# Patient Record
Sex: MALE | Race: BLACK OR AFRICAN AMERICAN | NOT HISPANIC OR LATINO | Employment: OTHER | URBAN - METROPOLITAN AREA
[De-identification: names, ages, dates, MRNs, and addresses within clinical notes are randomized per-mention and may not be internally consistent; named-entity substitution may affect disease eponyms.]

---

## 2017-01-09 RX ORDER — AMLODIPINE BESYLATE 10 MG/1
TABLET ORAL
Qty: 90 TABLET | Refills: 0 | Status: SHIPPED | OUTPATIENT
Start: 2017-01-09 | End: 2017-04-06 | Stop reason: SDUPTHER

## 2017-01-09 RX ORDER — LISINOPRIL 40 MG/1
TABLET ORAL
Qty: 90 TABLET | Refills: 0 | Status: SHIPPED | OUTPATIENT
Start: 2017-01-09 | End: 2017-04-06 | Stop reason: SDUPTHER

## 2017-04-07 RX ORDER — AMLODIPINE BESYLATE 10 MG/1
TABLET ORAL
Qty: 90 TABLET | Refills: 0 | Status: SHIPPED | OUTPATIENT
Start: 2017-04-07 | End: 2017-07-04 | Stop reason: SDUPTHER

## 2017-04-07 RX ORDER — LISINOPRIL 40 MG/1
TABLET ORAL
Qty: 90 TABLET | Refills: 0 | Status: SHIPPED | OUTPATIENT
Start: 2017-04-07 | End: 2017-07-04 | Stop reason: SDUPTHER

## 2017-07-05 ENCOUNTER — OFFICE VISIT (OUTPATIENT)
Dept: INTERNAL MEDICINE | Facility: CLINIC | Age: 62
End: 2017-07-05
Payer: COMMERCIAL

## 2017-07-05 ENCOUNTER — LAB VISIT (OUTPATIENT)
Dept: LAB | Facility: HOSPITAL | Age: 62
End: 2017-07-05
Attending: FAMILY MEDICINE
Payer: COMMERCIAL

## 2017-07-05 VITALS — SYSTOLIC BLOOD PRESSURE: 128 MMHG | DIASTOLIC BLOOD PRESSURE: 76 MMHG

## 2017-07-05 DIAGNOSIS — N52.9 ERECTILE DYSFUNCTION, UNSPECIFIED ERECTILE DYSFUNCTION TYPE: ICD-10-CM

## 2017-07-05 DIAGNOSIS — Z00.00 PREVENTATIVE HEALTH CARE: ICD-10-CM

## 2017-07-05 DIAGNOSIS — I10 ESSENTIAL HYPERTENSION: Primary | ICD-10-CM

## 2017-07-05 LAB
ALBUMIN SERPL BCP-MCNC: 3.6 G/DL
ALP SERPL-CCNC: 66 U/L
ALT SERPL W/O P-5'-P-CCNC: 34 U/L
ANION GAP SERPL CALC-SCNC: 4 MMOL/L
AST SERPL-CCNC: 23 U/L
BASOPHILS # BLD AUTO: 0.01 K/UL
BASOPHILS NFR BLD: 0.2 %
BILIRUB SERPL-MCNC: 0.4 MG/DL
BUN SERPL-MCNC: 13 MG/DL
CALCIUM SERPL-MCNC: 9.1 MG/DL
CHLORIDE SERPL-SCNC: 106 MMOL/L
CHOLEST/HDLC SERPL: 4.4 {RATIO}
CO2 SERPL-SCNC: 29 MMOL/L
COMPLEXED PSA SERPL-MCNC: 1.8 NG/ML
CREAT SERPL-MCNC: 1 MG/DL
DIFFERENTIAL METHOD: ABNORMAL
EOSINOPHIL # BLD AUTO: 0.3 K/UL
EOSINOPHIL NFR BLD: 7.8 %
ERYTHROCYTE [DISTWIDTH] IN BLOOD BY AUTOMATED COUNT: 13.6 %
EST. GFR  (AFRICAN AMERICAN): >60 ML/MIN/1.73 M^2
EST. GFR  (NON AFRICAN AMERICAN): >60 ML/MIN/1.73 M^2
ESTIMATED AVG GLUCOSE: 120 MG/DL
FERRITIN SERPL-MCNC: 46 NG/ML
GLUCOSE SERPL-MCNC: 105 MG/DL
HBA1C MFR BLD HPLC: 5.8 %
HCT VFR BLD AUTO: 44.1 %
HDL/CHOLESTEROL RATIO: 22.6 %
HDLC SERPL-MCNC: 159 MG/DL
HDLC SERPL-MCNC: 36 MG/DL
HGB BLD-MCNC: 14.2 G/DL
IRON SERPL-MCNC: 63 UG/DL
LDLC SERPL CALC-MCNC: 110.8 MG/DL
LYMPHOCYTES # BLD AUTO: 1.6 K/UL
LYMPHOCYTES NFR BLD: 38.5 %
MCH RBC QN AUTO: 27.8 PG
MCHC RBC AUTO-ENTMCNC: 32.2 %
MCV RBC AUTO: 86 FL
MONOCYTES # BLD AUTO: 0.6 K/UL
MONOCYTES NFR BLD: 13.4 %
NEUTROPHILS # BLD AUTO: 1.6 K/UL
NEUTROPHILS NFR BLD: 40.1 %
NONHDLC SERPL-MCNC: 123 MG/DL
PLATELET # BLD AUTO: 174 K/UL
PMV BLD AUTO: 11.3 FL
POTASSIUM SERPL-SCNC: 4.6 MMOL/L
PROT SERPL-MCNC: 7.5 G/DL
RBC # BLD AUTO: 5.11 M/UL
SATURATED IRON: 18 %
SODIUM SERPL-SCNC: 139 MMOL/L
TOTAL IRON BINDING CAPACITY: 351 UG/DL
TRANSFERRIN SERPL-MCNC: 237 MG/DL
TRIGL SERPL-MCNC: 61 MG/DL
WBC # BLD AUTO: 4.1 K/UL

## 2017-07-05 PROCEDURE — 36415 COLL VENOUS BLD VENIPUNCTURE: CPT

## 2017-07-05 PROCEDURE — 84153 ASSAY OF PSA TOTAL: CPT

## 2017-07-05 PROCEDURE — 85025 COMPLETE CBC W/AUTO DIFF WBC: CPT

## 2017-07-05 PROCEDURE — 82728 ASSAY OF FERRITIN: CPT

## 2017-07-05 PROCEDURE — 83540 ASSAY OF IRON: CPT

## 2017-07-05 PROCEDURE — 83036 HEMOGLOBIN GLYCOSYLATED A1C: CPT

## 2017-07-05 PROCEDURE — 99999 PR PBB SHADOW E&M-EST. PATIENT-LVL II: CPT | Mod: PBBFAC,,, | Performed by: FAMILY MEDICINE

## 2017-07-05 PROCEDURE — 80061 LIPID PANEL: CPT

## 2017-07-05 PROCEDURE — 99396 PREV VISIT EST AGE 40-64: CPT | Mod: S$GLB,,, | Performed by: FAMILY MEDICINE

## 2017-07-05 PROCEDURE — 80053 COMPREHEN METABOLIC PANEL: CPT

## 2017-07-05 RX ORDER — AMLODIPINE BESYLATE 10 MG/1
TABLET ORAL
Qty: 90 TABLET | Refills: 0 | Status: SHIPPED | OUTPATIENT
Start: 2017-07-05 | End: 2017-10-02 | Stop reason: SDUPTHER

## 2017-07-05 RX ORDER — LISINOPRIL 40 MG/1
TABLET ORAL
Qty: 90 TABLET | Refills: 0 | Status: SHIPPED | OUTPATIENT
Start: 2017-07-05 | End: 2017-10-02 | Stop reason: SDUPTHER

## 2017-07-05 NOTE — PROGRESS NOTES
Subjective:       Patient ID: Adrian Burt is a 62 y.o. male.    Chief Complaint: No chief complaint on file.  Adrian Burt 62 y.o. male is here for office visit to review care and physical exam, reports doing well in general.  Walking for exercise.  Limits Coca [employer] to one a day, diet otherwise ok.  Has gained weight since last seen.  Not too much LUTs.      HPI  Review of Systems   Constitutional: Negative for activity change, appetite change, fatigue, fever and unexpected weight change.   HENT: Negative for congestion, hearing loss, postnasal drip and rhinorrhea.    Eyes: Negative for pain, discharge and visual disturbance.   Respiratory: Negative for cough, choking and shortness of breath.    Cardiovascular: Negative for chest pain, palpitations and leg swelling.   Gastrointestinal: Negative for abdominal pain, diarrhea and vomiting.   Genitourinary: Negative for dysuria, flank pain, hematuria and urgency.   Musculoskeletal: Negative for arthralgias, back pain, joint swelling and neck pain.   Skin: Negative for color change and rash.   Neurological: Negative for dizziness, tremors, syncope, weakness, numbness and headaches.   Psychiatric/Behavioral: Negative for agitation and confusion. The patient is not hyperactive.        Objective:      Physical Exam   Constitutional: He is oriented to person, place, and time. He appears well-developed and well-nourished.   HENT:   Head: Normocephalic.   Eyes: EOM are normal. Pupils are equal, round, and reactive to light.   Neck: Normal range of motion. Neck supple. No thyromegaly present.   Cardiovascular: Normal rate.  Exam reveals no gallop and no friction rub.    No murmur heard.  Pulmonary/Chest: Effort normal. No respiratory distress. He has no wheezes.   Abdominal: Soft. Bowel sounds are normal. He exhibits no mass. There is no tenderness.   Musculoskeletal: He exhibits no edema or tenderness.   Lymphadenopathy:     He has no cervical adenopathy.    Neurological: He is alert and oriented to person, place, and time. He has normal reflexes. No cranial nerve deficit.   Skin: Skin is warm. No rash noted.   Psychiatric: He has a normal mood and affect. His behavior is normal.       Assessment:       No diagnosis found.    Plan:       Diagnoses and all orders for this visit:    Essential hypertension  - Discussed  Preventative health care  -     Comprehensive metabolic panel; Future  -     Lipid panel; Future  -     CBC auto differential; Future  -     Hemoglobin A1c; Future  -     PSA, Screening; Future    0+9

## 2017-07-14 DIAGNOSIS — Z12.11 SPECIAL SCREENING FOR MALIGNANT NEOPLASMS, COLON: Primary | ICD-10-CM

## 2017-07-14 RX ORDER — POLYETHYLENE GLYCOL 3350, SODIUM SULFATE ANHYDROUS, SODIUM BICARBONATE, SODIUM CHLORIDE, POTASSIUM CHLORIDE 236; 22.74; 6.74; 5.86; 2.97 G/4L; G/4L; G/4L; G/4L; G/4L
4 POWDER, FOR SOLUTION ORAL ONCE
Qty: 4000 ML | Refills: 0 | Status: SHIPPED | OUTPATIENT
Start: 2017-07-14 | End: 2017-07-14

## 2017-08-16 ENCOUNTER — ANESTHESIA (OUTPATIENT)
Dept: ENDOSCOPY | Facility: HOSPITAL | Age: 62
End: 2017-08-16
Payer: COMMERCIAL

## 2017-08-16 ENCOUNTER — ANESTHESIA EVENT (OUTPATIENT)
Dept: ENDOSCOPY | Facility: HOSPITAL | Age: 62
End: 2017-08-16
Payer: COMMERCIAL

## 2017-08-16 ENCOUNTER — HOSPITAL ENCOUNTER (OUTPATIENT)
Facility: HOSPITAL | Age: 62
Discharge: HOME | End: 2017-08-16
Attending: COLON & RECTAL SURGERY | Admitting: COLON & RECTAL SURGERY
Payer: COMMERCIAL

## 2017-08-16 ENCOUNTER — SURGERY (OUTPATIENT)
Age: 62
End: 2017-08-16

## 2017-08-16 VITALS
WEIGHT: 263 LBS | RESPIRATION RATE: 20 BRPM | HEART RATE: 66 BPM | BODY MASS INDEX: 33.75 KG/M2 | HEIGHT: 74 IN | TEMPERATURE: 98 F | DIASTOLIC BLOOD PRESSURE: 89 MMHG | SYSTOLIC BLOOD PRESSURE: 136 MMHG | RESPIRATION RATE: 18 BRPM | OXYGEN SATURATION: 98 %

## 2017-08-16 DIAGNOSIS — Z12.11 SCREENING FOR COLON CANCER: ICD-10-CM

## 2017-08-16 PROCEDURE — 88305 TISSUE EXAM BY PATHOLOGIST: CPT

## 2017-08-16 PROCEDURE — 63600175 PHARM REV CODE 636 W HCPCS: Performed by: NURSE ANESTHETIST, CERTIFIED REGISTERED

## 2017-08-16 PROCEDURE — 25000003 PHARM REV CODE 250: Performed by: NURSE PRACTITIONER

## 2017-08-16 PROCEDURE — 45380 COLONOSCOPY AND BIOPSY: CPT | Performed by: COLON & RECTAL SURGERY

## 2017-08-16 PROCEDURE — 45380 COLONOSCOPY AND BIOPSY: CPT | Mod: 33,,, | Performed by: COLON & RECTAL SURGERY

## 2017-08-16 PROCEDURE — D9220A PRA ANESTHESIA: Mod: 33,CRNA,, | Performed by: NURSE ANESTHETIST, CERTIFIED REGISTERED

## 2017-08-16 PROCEDURE — 27201012 HC FORCEPS, HOT/COLD, DISP: Performed by: COLON & RECTAL SURGERY

## 2017-08-16 PROCEDURE — 37000009 HC ANESTHESIA EA ADD 15 MINS: Performed by: COLON & RECTAL SURGERY

## 2017-08-16 PROCEDURE — 88305 TISSUE EXAM BY PATHOLOGIST: CPT | Mod: 26,,,

## 2017-08-16 PROCEDURE — 37000008 HC ANESTHESIA 1ST 15 MINUTES: Performed by: COLON & RECTAL SURGERY

## 2017-08-16 PROCEDURE — D9220A PRA ANESTHESIA: Mod: 33,ANES,, | Performed by: ANESTHESIOLOGY

## 2017-08-16 RX ORDER — PROPOFOL 10 MG/ML
VIAL (ML) INTRAVENOUS
Status: DISCONTINUED | OUTPATIENT
Start: 2017-08-16 | End: 2017-08-16

## 2017-08-16 RX ORDER — PROPOFOL 10 MG/ML
VIAL (ML) INTRAVENOUS CONTINUOUS PRN
Status: DISCONTINUED | OUTPATIENT
Start: 2017-08-16 | End: 2017-08-16

## 2017-08-16 RX ORDER — SODIUM CHLORIDE 9 MG/ML
INJECTION, SOLUTION INTRAVENOUS CONTINUOUS
Status: DISCONTINUED | OUTPATIENT
Start: 2017-08-16 | End: 2017-08-16 | Stop reason: HOSPADM

## 2017-08-16 RX ORDER — LIDOCAINE HCL/PF 100 MG/5ML
SYRINGE (ML) INTRAVENOUS
Status: DISCONTINUED | OUTPATIENT
Start: 2017-08-16 | End: 2017-08-16

## 2017-08-16 RX ADMIN — PROPOFOL 150 MCG/KG/MIN: 10 INJECTION, EMULSION INTRAVENOUS at 09:08

## 2017-08-16 RX ADMIN — LIDOCAINE HYDROCHLORIDE 50 MG: 20 INJECTION, SOLUTION INTRAVENOUS at 09:08

## 2017-08-16 RX ADMIN — PROPOFOL 100 MG: 10 INJECTION, EMULSION INTRAVENOUS at 09:08

## 2017-08-16 RX ADMIN — SODIUM CHLORIDE: 0.9 INJECTION, SOLUTION INTRAVENOUS at 09:08

## 2017-08-16 NOTE — PLAN OF CARE
Discharge instructions given to pt. Pt verbalizes understanding and has no questions at this time.

## 2017-08-16 NOTE — TRANSFER OF CARE
"Anesthesia Transfer of Care Note    Patient: Adrian Burt    Procedure(s) Performed: Procedure(s) (LRB):  COLONOSCOPY (N/A)    Patient location: PACU    Anesthesia Type: general    Transport from OR: Transported from OR on room air with adequate spontaneous ventilation    Post pain: adequate analgesia    Post assessment: no apparent anesthetic complications    Post vital signs: stable    Level of consciousness: sedated    Nausea/Vomiting: no nausea/vomiting    Complications: none    Transfer of care protocol was followed      Last vitals:   Visit Vitals  BP (!) 158/90 (BP Location: Left arm, Patient Position: Lying)   Pulse 73   Temp 36.3 °C (97.4 °F) (Oral)   Resp 18   Ht 6' 2" (1.88 m)   Wt 119.3 kg (263 lb)   SpO2 98%   BMI 33.77 kg/m²     "

## 2017-08-16 NOTE — DISCHARGE INSTRUCTIONS
Colonoscopy     A camera attached to a flexible tube with a viewing lens is used to take video pictures.     Colonoscopy is a test to view the inside of your lower digestive tract (colon and rectum). Sometimes it can show the last part of the small intestine (ileum). During the test, small pieces of tissue may be removed for testing. This is called a biopsy. Small growths, such as polyps, may also be removed.   Why is colonoscopy done?  The test is done to help look for colon cancer. And it can help find the source of abdominal pain, bleeding, and changes in bowel habits. It may be needed once a year, depending on factors such as your:  · Age  · Health history  · Family health history  · Symptoms  · Results from any prior colonoscopy  Risks and possible complications  These include:  · Bleeding               · A puncture or tear in the colon   · Risks of anesthesia  · A cancer lesion not being seen  Getting ready   To prepare for the test:  · Talk with your healthcare provider about the risks of the test (see below). Also ask your healthcare provider about alternatives to the test.  · Tell your healthcare provider about any medicines you take. Also tell him or her about any health conditions you may have.  · Make sure your rectum and colon are empty for the test. Follow the diet and bowel prep instructions exactly. If you dont, the test may need to be rescheduled.  · Plan for a friend or family member to drive you home after the test.     Colonoscopy provides an inside view of the entire colon.     You may discuss the results with your doctor right away or at a future visit.  During the test   The test is usually done in the hospital on an outpatient basis. This means you go home the same day. The procedure takes about 30 minutes. During that time:  · You are given relaxing (sedating) medicine through an IV line. You may be drowsy, or fully asleep.  · The healthcare provider will first give you a physical exam to  check for anal and rectal problems.  · Then the anus is lubricated and the scope inserted.  · If you are awake, you may have a feeling similar to needing to have a bowel movement. You may also feel pressure as air is pumped into the colon. Its OK to pass gas during the procedure.  · Biopsy, polyp removal, or other treatments may be done during the test.  After the test   You may have gas right after the test. It can help to try to pass it to help prevent later bloating. Your healthcare provider may discuss the results with you right away. Or you may need to schedule a follow-up visit to talk about the results. After the test, you can go back to your normal eating and other activities. You may be tired from the sedation and need to rest for a few hours.  Date Last Reviewed: 11/1/2016  © 5031-1424 [employer]. [address] [place] [address], [place] [address]. All rights reserved. This information is not intended as a substitute for professional medical care. Always follow your healthcare professional's instructions.

## 2017-08-16 NOTE — ANESTHESIA POSTPROCEDURE EVALUATION
"Anesthesia Post Evaluation    Patient: Adrian Burt    Procedure(s) Performed: Procedure(s) (LRB):  COLONOSCOPY (N/A)    Final Anesthesia Type: general  Patient location during evaluation: PACU  Patient participation: Yes- Able to Participate  Level of consciousness: awake and alert  Post-procedure vital signs: reviewed and stable  Pain management: adequate  Airway patency: patent  PONV status at discharge: No PONV  Anesthetic complications: no      Cardiovascular status: hemodynamically stable and blood pressure returned to baseline  Respiratory status: unassisted and spontaneous ventilation  Hydration status: euvolemic  Follow-up not needed.        Visit Vitals  /89 (BP Location: Left arm, Patient Position: Lying)   Pulse 66   Temp 36.7 °C (98 °F) (Axillary)   Resp 18   Ht 6' 2" (1.88 m)   Wt 119.3 kg (263 lb)   SpO2 98%   BMI 33.77 kg/m²       Pain/Sid Score: Pain Assessment Performed: Yes (8/16/2017 10:46 AM)  Presence of Pain: denies (8/16/2017 10:46 AM)  Sid Score: 10 (8/16/2017 10:46 AM)      "

## 2017-08-16 NOTE — PATIENT INSTRUCTIONS
Discharge Summary/Instructions after an Endoscopic Procedure  Patient Name: Adrian Burt  Patient MRN: [id number]  Patient YOB: 1955 Wednesday, August 16, 2017  Leo Henriquez MD  RESTRICTIONS:  During your procedure today, you received medications for sedation.  These   medications may affect your judgment, balance and coordination.  Therefore,   for 24 hours, you have the following restrictions:   - DO NOT drive a car, operate machinery, make legal/financial decisions,   sign important papers or drink alcohol.    ACTIVITY:  The following day: return to full activity including work, except no heavy   lifting, straining or running for 3 days if polyps were removed.  DIET:  Eat and drink normally unless instructed otherwise.  TREATMENT FOR COMMON SIDE EFFECTS:  - Mild abdominal pain, belching, bloating or excessive gas: rest, eat   lightly and use a heating pad.  - Sore Throat: treat with throat lozenges and/or gargle with warm salt   water.  SYMPTOMS TO WATCH FOR AND REPORT TO YOUR PHYSICIAN:  1. Abdominal pain or bloating, other than gas cramps.  2. Chest pain.  3. Back pain.  4. Chills or fever occurring within 24 hours after the procedure.  5. Rectal bleeding, which would show as bright red, maroon, or black stools.   (A tablespoon of blood from the rectum is not serious, especially if   hemorrhoids are present.)  6. Vomiting.  7. Weakness or dizziness.  8. Because air was used during the procedure, expelling large amounts of air   from your rectum or belching is normal.  9. If a bowel prep was taken, you may not have a bowel movement for 1-3   days.  This is normal.  GO DIRECTLY TO THE EMERGENCY ROOM IF YOU HAVE ANY OF THE FOLLOWING:   Difficulty breathing  Chills and/or fever over 101 F   Persistent vomiting and/or vomiting blood   Severe abdominal pain   Severe chest pain   Black, tarry stools   Bleeding- more than one tablespoon  Your doctor recommends these additional instructions:  If any  biopsies were taken, your doctorâs clinic will call you in 1 to 2   weeks with any results.  You have a contact number available for emergencies.  The signs and symptoms   of potential delayed complications were discussed with you.  You may return   to normal activities tomorrow.  Written discharge instructions were   provided to you.   You are being discharged to home.   Resume your regular diet indefinitely.   Continue your present medications.   Your physician has recommended a repeat colonoscopy in three to five years   for surveillance.  For questions, problems or results please call your physician - Leo Henriquez MD at Work:  [phone].  [facility], EMERGENCY ROOM PHONE NUMBER: [phone]  IF A COMPLICATION OR EMERGENCY SITUATION ARISES AND YOU ARE UNABLE TO REACH   YOUR PHYSICIAN - GO DIRECTLY TO THE EMERGENCY ROOM.  Leo Henriquez MD  8/16/2017 10:14:18 AM  This report has been verified and signed electronically.

## 2017-08-16 NOTE — ANESTHESIA PREPROCEDURE EVALUATION
08/16/2017  Adrian Burt is a 62 y.o., male.    Anesthesia Evaluation         Review of Systems  Anesthesia Hx:  No problems with previous Anesthesia   Social:  Former Smoker    Cardiovascular:   Exercise tolerance: good Hypertension, well controlled  Denies Angina.    Pulmonary:   Denies Shortness of breath.    Hepatic/GI:   GERD        Physical Exam  General:  Well nourished    Airway/Jaw/Neck:  Airway Findings: Mouth Opening: Normal Tongue: Normal  General Airway Assessment: Adult  Mallampati: II  TM Distance: Normal, at least 6 cm  Jaw/Neck Findings:     Neck ROM: Normal ROM      Dental:  Dental Findings: In tact   Chest/Lungs:  Chest/Lungs Findings: Clear to auscultation, Normal Respiratory Rate     Heart/Vascular:  Heart Findings: Rate: Normal  Rhythm: Regular Rhythm  Sounds: Normal        Mental Status:  Mental Status Findings:  Cooperative, Alert and Oriented         Anesthesia Plan  Type of Anesthesia, risks & benefits discussed:  Anesthesia Type:  general  Patient's Preference:   Intra-op Monitoring Plan: standard ASA monitors  Intra-op Monitoring Plan Comments:   Post Op Pain Control Plan:   Post Op Pain Control Plan Comments:   Induction:   IV  Beta Blocker:  Patient is not currently on a Beta-Blocker (No further documentation required).       Informed Consent: Patient understands risks and agrees with Anesthesia plan.  Questions answered. Anesthesia consent signed with patient.  ASA Score: 2     Day of Surgery Review of History & Physical:            Ready For Surgery From Anesthesia Perspective.

## 2017-08-16 NOTE — H&P
Endoscopy H&P    Procedure : Colonoscopy      personal history of colon polyps      Past Medical History:   Diagnosis Date    Allergy     Fatty liver     GERD (gastroesophageal reflux disease)     Hypertension      Sedation Problems: NO  Family History   Problem Relation Age of Onset    Kidney disease Mother     Heart disease Father      Fam Hx of Sedation Problems: NO  Social History     Social History    Marital status:      Spouse name: N/A    Number of children: N/A    Years of education: N/A     Occupational History    Not on file.     Social History Main Topics    Smoking status: Former Smoker    Smokeless tobacco: Not on file    Alcohol use No    Drug use: No    Sexual activity: Yes     Partners: Female     Other Topics Concern    Not on file     Social History Narrative    No narrative on file       Review of Systems -     Respiratory ROS: no cough, shortness of breath, or wheezing  Cardiovascular ROS: no chest pain or dyspnea on exertion  Gastrointestinal ROS: no abdominal pain, change in bowel habits, or black or bloody stools  Musculoskeletal ROS: negative  Neurological ROS: no TIA or stroke symptoms        Physical Exam:  General: no distress  Head: normocephalic  Airway:  normal oropharynx, airway normal  Neck: supple, symmetrical, trachea midline  Lungs:  normal respiratory effort  Heart: regular rate and rhythm  Abdomen: soft, non-tender non-distented; bowel sounds normal; no masses,  no organomegaly  Extremities: no cyanosis or edema, or clubbing       Deep Sedation: Mallampati Score per anesthesia     SedationPlan :MAC     ASA : II

## 2017-08-23 ENCOUNTER — TELEPHONE (OUTPATIENT)
Dept: ENDOSCOPY | Facility: HOSPITAL | Age: 62
End: 2017-08-23

## 2017-10-02 RX ORDER — AMLODIPINE BESYLATE 10 MG/1
TABLET ORAL
Qty: 90 TABLET | Refills: 0 | Status: SHIPPED | OUTPATIENT
Start: 2017-10-02 | End: 2017-12-29 | Stop reason: SDUPTHER

## 2017-10-02 RX ORDER — LISINOPRIL 40 MG/1
TABLET ORAL
Qty: 90 TABLET | Refills: 0 | Status: SHIPPED | OUTPATIENT
Start: 2017-10-02 | End: 2017-12-29 | Stop reason: SDUPTHER

## 2017-10-09 PROBLEM — Z00.00 PREVENTATIVE HEALTH CARE: Status: RESOLVED | Noted: 2017-07-05 | Resolved: 2017-10-09

## 2017-12-29 RX ORDER — AMLODIPINE BESYLATE 10 MG/1
TABLET ORAL
Qty: 90 TABLET | Refills: 0 | Status: SHIPPED | OUTPATIENT
Start: 2017-12-29 | End: 2018-03-28 | Stop reason: SDUPTHER

## 2017-12-29 RX ORDER — LISINOPRIL 40 MG/1
TABLET ORAL
Qty: 90 TABLET | Refills: 0 | Status: SHIPPED | OUTPATIENT
Start: 2017-12-29 | End: 2018-03-28 | Stop reason: SDUPTHER

## 2018-03-28 RX ORDER — AMLODIPINE BESYLATE 10 MG/1
TABLET ORAL
Qty: 90 TABLET | Refills: 0 | Status: SHIPPED | OUTPATIENT
Start: 2018-03-28 | End: 2018-07-28 | Stop reason: SDUPTHER

## 2018-03-28 RX ORDER — LISINOPRIL 40 MG/1
TABLET ORAL
Qty: 90 TABLET | Refills: 0 | Status: SHIPPED | OUTPATIENT
Start: 2018-03-28 | End: 2018-07-28 | Stop reason: SDUPTHER

## 2018-05-16 ENCOUNTER — OFFICE VISIT (OUTPATIENT)
Dept: INTERNAL MEDICINE | Facility: CLINIC | Age: 63
End: 2018-05-16
Payer: MEDICARE

## 2018-05-16 DIAGNOSIS — J30.1 SEASONAL ALLERGIC RHINITIS DUE TO POLLEN: ICD-10-CM

## 2018-05-16 DIAGNOSIS — I10 ESSENTIAL HYPERTENSION: Primary | ICD-10-CM

## 2018-05-16 PROCEDURE — 99214 OFFICE O/P EST MOD 30 MIN: CPT | Mod: 25,S$GLB,, | Performed by: FAMILY MEDICINE

## 2018-05-16 PROCEDURE — 99999 PR PBB SHADOW E&M-EST. PATIENT-LVL I: CPT | Mod: PBBFAC,,, | Performed by: FAMILY MEDICINE

## 2018-05-16 PROCEDURE — 96372 THER/PROPH/DIAG INJ SC/IM: CPT | Mod: S$GLB,,, | Performed by: FAMILY MEDICINE

## 2018-05-16 RX ORDER — METHYLPREDNISOLONE 4 MG/1
TABLET ORAL
Qty: 21 TABLET | Refills: 0 | Status: SHIPPED | OUTPATIENT
Start: 2018-05-16 | End: 2018-11-15 | Stop reason: ALTCHOICE

## 2018-05-16 RX ORDER — TRIAMCINOLONE ACETONIDE 40 MG/ML
40 INJECTION, SUSPENSION INTRA-ARTICULAR; INTRAMUSCULAR
Status: COMPLETED | OUTPATIENT
Start: 2018-05-16 | End: 2018-05-16

## 2018-05-16 RX ORDER — ALBUTEROL SULFATE 90 UG/1
2 AEROSOL, METERED RESPIRATORY (INHALATION) EVERY 6 HOURS PRN
Qty: 1 EACH | Refills: 11 | Status: SHIPPED | OUTPATIENT
Start: 2018-05-16 | End: 2018-11-15 | Stop reason: ALTCHOICE

## 2018-05-16 RX ORDER — MONTELUKAST SODIUM 10 MG/1
10 TABLET ORAL NIGHTLY
Qty: 30 TABLET | Refills: 0 | Status: SHIPPED | OUTPATIENT
Start: 2018-05-16 | End: 2018-06-12 | Stop reason: SDUPTHER

## 2018-05-16 RX ADMIN — TRIAMCINOLONE ACETONIDE 40 MG: 40 INJECTION, SUSPENSION INTRA-ARTICULAR; INTRAMUSCULAR at 02:05

## 2018-05-16 NOTE — PROGRESS NOTES
Subjective:       Patient ID: Adrian Burt is a 63 y.o. male.    Chief Complaint: No chief complaint on file.  Adrian Burt 63 y.o. male is here for office visit to review care and physical exam,  HPI  Review of Systems   Constitutional: Negative for activity change, appetite change, fatigue, fever and unexpected weight change.   HENT: Negative for congestion, hearing loss, postnasal drip and rhinorrhea.    Eyes: Negative for pain, discharge and visual disturbance.   Respiratory: Negative for cough, choking and shortness of breath.    Cardiovascular: Negative for chest pain, palpitations and leg swelling.   Gastrointestinal: Negative for abdominal pain, diarrhea and vomiting.   Genitourinary: Negative for dysuria, flank pain, hematuria and urgency.   Musculoskeletal: Negative for arthralgias, back pain, joint swelling and neck pain.   Skin: Negative for color change and rash.   Neurological: Negative for dizziness, tremors, syncope, weakness, numbness and headaches.   Psychiatric/Behavioral: Negative for agitation and confusion. The patient is not hyperactive.        Objective:      Physical Exam   Constitutional: He is oriented to person, place, and time. He appears well-developed and well-nourished.   HENT:   Head: Normocephalic.   Eyes: EOM are normal. Pupils are equal, round, and reactive to light.   Neck: Normal range of motion. Neck supple. No thyromegaly present.   Cardiovascular: Normal rate.  Exam reveals no gallop and no friction rub.    No murmur heard.  Pulmonary/Chest: Effort normal. No respiratory distress. He has no wheezes.   Abdominal: Soft. Bowel sounds are normal. He exhibits no mass. There is no tenderness.   Musculoskeletal: He exhibits no edema or tenderness.   Lymphadenopathy:     He has no cervical adenopathy.   Neurological: He is alert and oriented to person, place, and time. He has normal reflexes. No cranial nerve deficit.   Skin: Skin is warm. No rash noted.   Psychiatric: He  has a normal mood and affect. His behavior is normal.       Assessment:       1. Essential hypertension    2. Seasonal allergic rhinitis due to pollen        Plan:       Diagnoses and all orders for this visit:    Essential hypertension  - Follow, controled, avoid decongestantsSeasonal allergic rhinitis due to pollen  -     triamcinolone acetonide injection 40 mg; Inject 1 mL (40 mg total) into the muscle one time.  -     methylPREDNISolone (MEDROL DOSEPACK) 4 mg tablet; Take as directed  -     montelukast (SINGULAIR) 10 mg tablet; Take 1 tablet (10 mg total) by mouth every evening.  -     albuterol 90 mcg/actuation inhaler; Inhale 2 puffs into the lungs every 6 (six) hours as needed for Wheezing.  RTC if no imp

## 2018-06-12 DIAGNOSIS — J30.1 SEASONAL ALLERGIC RHINITIS DUE TO POLLEN: ICD-10-CM

## 2018-06-12 RX ORDER — MONTELUKAST SODIUM 10 MG/1
TABLET ORAL
Qty: 90 TABLET | Refills: 0 | Status: SHIPPED | OUTPATIENT
Start: 2018-06-12 | End: 2018-12-04 | Stop reason: SDUPTHER

## 2018-07-30 RX ORDER — LISINOPRIL 40 MG/1
TABLET ORAL
Qty: 90 TABLET | Refills: 0 | Status: SHIPPED | OUTPATIENT
Start: 2018-07-30 | End: 2018-11-01 | Stop reason: SDUPTHER

## 2018-07-30 RX ORDER — AMLODIPINE BESYLATE 10 MG/1
TABLET ORAL
Qty: 90 TABLET | Refills: 0 | Status: SHIPPED | OUTPATIENT
Start: 2018-07-30 | End: 2018-11-01 | Stop reason: SDUPTHER

## 2018-09-07 DIAGNOSIS — J30.1 SEASONAL ALLERGIC RHINITIS DUE TO POLLEN: ICD-10-CM

## 2018-09-07 RX ORDER — MONTELUKAST SODIUM 10 MG/1
TABLET ORAL
Qty: 90 TABLET | Refills: 0 | OUTPATIENT
Start: 2018-09-07

## 2018-09-10 ENCOUNTER — LAB VISIT (OUTPATIENT)
Dept: LAB | Facility: HOSPITAL | Age: 63
End: 2018-09-10
Payer: MEDICARE

## 2018-09-10 ENCOUNTER — OFFICE VISIT (OUTPATIENT)
Dept: INTERNAL MEDICINE | Facility: CLINIC | Age: 63
End: 2018-09-10
Payer: MEDICARE

## 2018-09-10 VITALS
TEMPERATURE: 98 F | HEART RATE: 87 BPM | DIASTOLIC BLOOD PRESSURE: 82 MMHG | WEIGHT: 253.31 LBS | HEIGHT: 74 IN | BODY MASS INDEX: 32.51 KG/M2 | OXYGEN SATURATION: 97 % | SYSTOLIC BLOOD PRESSURE: 142 MMHG

## 2018-09-10 DIAGNOSIS — I10 ESSENTIAL HYPERTENSION: ICD-10-CM

## 2018-09-10 DIAGNOSIS — M54.50 ACUTE LEFT-SIDED LOW BACK PAIN WITHOUT SCIATICA: ICD-10-CM

## 2018-09-10 DIAGNOSIS — Z12.5 SCREENING FOR PROSTATE CANCER: ICD-10-CM

## 2018-09-10 DIAGNOSIS — M54.50 ACUTE LEFT-SIDED LOW BACK PAIN WITHOUT SCIATICA: Primary | ICD-10-CM

## 2018-09-10 LAB
ALBUMIN SERPL BCP-MCNC: 4.1 G/DL
ALP SERPL-CCNC: 58 U/L
ALT SERPL W/O P-5'-P-CCNC: 31 U/L
ANION GAP SERPL CALC-SCNC: 6 MMOL/L
AST SERPL-CCNC: 23 U/L
BASOPHILS # BLD AUTO: 0.01 K/UL
BASOPHILS NFR BLD: 0.2 %
BILIRUB SERPL-MCNC: 0.7 MG/DL
BILIRUB UR QL STRIP: NEGATIVE
BUN SERPL-MCNC: 11 MG/DL
CALCIUM SERPL-MCNC: 9.6 MG/DL
CHLORIDE SERPL-SCNC: 106 MMOL/L
CLARITY UR REFRACT.AUTO: CLEAR
CO2 SERPL-SCNC: 27 MMOL/L
COLOR UR AUTO: YELLOW
COMPLEXED PSA SERPL-MCNC: 2.1 NG/ML
CREAT SERPL-MCNC: 1 MG/DL
DIFFERENTIAL METHOD: ABNORMAL
EOSINOPHIL # BLD AUTO: 0.2 K/UL
EOSINOPHIL NFR BLD: 3.9 %
ERYTHROCYTE [DISTWIDTH] IN BLOOD BY AUTOMATED COUNT: 14.5 %
EST. GFR  (AFRICAN AMERICAN): >60 ML/MIN/1.73 M^2
EST. GFR  (NON AFRICAN AMERICAN): >60 ML/MIN/1.73 M^2
GLUCOSE SERPL-MCNC: 75 MG/DL
GLUCOSE UR QL STRIP: NEGATIVE
HCT VFR BLD AUTO: 45.1 %
HGB BLD-MCNC: 14.2 G/DL
HGB UR QL STRIP: NEGATIVE
KETONES UR QL STRIP: NEGATIVE
LEUKOCYTE ESTERASE UR QL STRIP: ABNORMAL
LYMPHOCYTES # BLD AUTO: 2.2 K/UL
LYMPHOCYTES NFR BLD: 49.3 %
MCH RBC QN AUTO: 26.8 PG
MCHC RBC AUTO-ENTMCNC: 31.5 G/DL
MCV RBC AUTO: 85 FL
MICROSCOPIC COMMENT: ABNORMAL
MONOCYTES # BLD AUTO: 0.5 K/UL
MONOCYTES NFR BLD: 11.6 %
NEUTROPHILS # BLD AUTO: 1.5 K/UL
NEUTROPHILS NFR BLD: 35 %
NITRITE UR QL STRIP: NEGATIVE
PH UR STRIP: 6 [PH] (ref 5–8)
PLATELET # BLD AUTO: 182 K/UL
PMV BLD AUTO: 11.3 FL
POTASSIUM SERPL-SCNC: 4.3 MMOL/L
PROT SERPL-MCNC: 7.7 G/DL
PROT UR QL STRIP: NEGATIVE
RBC # BLD AUTO: 5.29 M/UL
RBC #/AREA URNS AUTO: 1 /HPF (ref 0–4)
SODIUM SERPL-SCNC: 139 MMOL/L
SP GR UR STRIP: 1.01 (ref 1–1.03)
URN SPEC COLLECT METH UR: ABNORMAL
UROBILINOGEN UR STRIP-ACNC: NEGATIVE EU/DL
WBC # BLD AUTO: 4.4 K/UL
WBC #/AREA URNS AUTO: 8 /HPF (ref 0–5)

## 2018-09-10 PROCEDURE — 1101F PT FALLS ASSESS-DOCD LE1/YR: CPT | Mod: ,,, | Performed by: NURSE PRACTITIONER

## 2018-09-10 PROCEDURE — 84153 ASSAY OF PSA TOTAL: CPT

## 2018-09-10 PROCEDURE — 3077F SYST BP >= 140 MM HG: CPT | Mod: ,,, | Performed by: NURSE PRACTITIONER

## 2018-09-10 PROCEDURE — 80053 COMPREHEN METABOLIC PANEL: CPT

## 2018-09-10 PROCEDURE — 99999 PR PBB SHADOW E&M-EST. PATIENT-LVL V: CPT | Mod: PBBFAC,,, | Performed by: NURSE PRACTITIONER

## 2018-09-10 PROCEDURE — 82043 UR ALBUMIN QUANTITATIVE: CPT

## 2018-09-10 PROCEDURE — 36415 COLL VENOUS BLD VENIPUNCTURE: CPT

## 2018-09-10 PROCEDURE — 3008F BODY MASS INDEX DOCD: CPT | Mod: ,,, | Performed by: NURSE PRACTITIONER

## 2018-09-10 PROCEDURE — 99215 OFFICE O/P EST HI 40 MIN: CPT | Mod: PBBFAC | Performed by: NURSE PRACTITIONER

## 2018-09-10 PROCEDURE — 83036 HEMOGLOBIN GLYCOSYLATED A1C: CPT

## 2018-09-10 PROCEDURE — 3079F DIAST BP 80-89 MM HG: CPT | Mod: ,,, | Performed by: NURSE PRACTITIONER

## 2018-09-10 PROCEDURE — 81001 URINALYSIS AUTO W/SCOPE: CPT

## 2018-09-10 PROCEDURE — 99214 OFFICE O/P EST MOD 30 MIN: CPT | Mod: S$PBB,,, | Performed by: NURSE PRACTITIONER

## 2018-09-10 PROCEDURE — 85025 COMPLETE CBC W/AUTO DIFF WBC: CPT

## 2018-09-10 RX ORDER — MELOXICAM 7.5 MG/1
7.5 TABLET ORAL DAILY
Qty: 30 TABLET | Refills: 0 | Status: SHIPPED | OUTPATIENT
Start: 2018-09-10 | End: 2018-10-07 | Stop reason: SDUPTHER

## 2018-09-10 RX ORDER — TIZANIDINE 4 MG/1
4 TABLET ORAL EVERY 6 HOURS PRN
Qty: 30 TABLET | Refills: 0 | Status: SHIPPED | OUTPATIENT
Start: 2018-09-10 | End: 2018-09-14 | Stop reason: SDUPTHER

## 2018-09-10 NOTE — PROGRESS NOTES
Subjective:       Patient ID: Adrian Burt is a 63 y.o. male.    Chief Complaint: Low-back Pain (left side. (friday) ) and Pelvic Pain    Pt here c/o left sided flank pain started Friday, gradual onset.  Has hx pulled muscle in past.  No pain radiating down leg, no b/b issues.        Review of Systems   Constitutional: Negative for activity change, appetite change, fatigue and fever.   HENT: Negative for trouble swallowing.    Genitourinary: Positive for flank pain. Negative for dysuria and hematuria.   Musculoskeletal: Positive for back pain. Negative for arthralgias, myalgias, neck pain and neck stiffness.   Skin: Negative for rash.   Psychiatric/Behavioral: Negative for sleep disturbance.         Past Medical History:   Diagnosis Date    Allergy     Fatty liver     GERD (gastroesophageal reflux disease)     Hypertension      Past Surgical History:   Procedure Laterality Date    COLONOSCOPY N/A 8/16/2017    Procedure: COLONOSCOPY;  Surgeon: Leo Henriquez MD;  Location: [facility] ([address] [place] [address]);  Service: Endoscopy;  Laterality: N/A;    COLONOSCOPY N/A 8/16/2017    Performed by Leo Henriquez MD at [facility] ([address] [place] [address])     Social History     Social History Narrative    Not on file     Family History   Problem Relation Age of Onset    Kidney disease Mother     Heart disease Father      Outpatient Encounter Medications as of 9/10/2018   Medication Sig Dispense Refill    amLODIPine (NORVASC) 10 MG tablet TAKE 1 TABLET BY MOUTH EVERY MORNING 90 tablet 0    hydrocodone-acetaminophen (NORCO) 5-325 mg per tablet Take 1 tablet by mouth every 4 to 6 hours as needed.      lisinopril (PRINIVIL,ZESTRIL) 40 MG tablet TAKE 1 TABLET BY MOUTH EVERY MORNING 90 tablet 0    montelukast (SINGULAIR) 10 mg tablet TAKE 1 TABLET BY MOUTH EVERY EVENING 90 tablet 0    sildenafil (VIAGRA) 100 MG tablet Take 1 tablet by mouth continuous prn.      testosterone (TESTIM) 50 mg/5 gram (1 %) Gel by Percutaneous route.    "   albuterol 90 mcg/actuation inhaler Inhale 2 puffs into the lungs every 6 (six) hours as needed for Wheezing. 1 each 11    fexofenadine (ALLEGRA) 30 MG tablet Take 30 mg by mouth 2 (two) times daily.      loratadine (CLARITIN) 10 mg tablet       meclizine (ANTIVERT) 25 mg tablet Take 1 tablet (25 mg total) by mouth 3 (three) times daily as needed. 20 tablet 0    meloxicam (MOBIC) 7.5 MG tablet Take 1 tablet (7.5 mg total) by mouth once daily. 30 tablet 0    methylPREDNISolone (MEDROL DOSEPACK) 4 mg tablet Take as directed 21 tablet 0    tiZANidine (ZANAFLEX) 4 MG tablet Take 1 tablet (4 mg total) by mouth every 6 (six) hours as needed (muscle spasm). 30 tablet 0     No facility-administered encounter medications on file as of 9/10/2018.      Last 3 sets of Vitals  Vitals - 1 value per visit 8/16/2017 8/16/2017 9/10/2018   SYSTOLIC 136 - 142   DIASTOLIC 89 - 82   PULSE 66 - 87   TEMPERATURE 98 - 97.6   RESPIRATIONS 18 20 -   SPO2 98 - 97   Weight (lb) 263 - 253.31   Weight (kg) 119.296 - 114.9   HEIGHT 6' 2" - 6' 2"   BODY MASS INDEX 33.77 - 32.52   VISIT REPORT - - -   Pain Score  - - 0         Objective:      Physical Exam   Constitutional: He is oriented to person, place, and time. He appears well-developed and well-nourished. No distress.   HENT:   Head: Normocephalic and atraumatic.   Right Ear: External ear normal.   Left Ear: External ear normal.   Nose: Nose normal.   Mouth/Throat: Oropharynx is clear and moist. No oropharyngeal exudate.   Eyes: EOM are normal. Pupils are equal, round, and reactive to light.   Neck: Normal range of motion. Neck supple.   Cardiovascular: Normal rate, regular rhythm, normal heart sounds and intact distal pulses.   Pulmonary/Chest: Effort normal and breath sounds normal. No stridor. No respiratory distress. He has no wheezes.   Musculoskeletal:        Lumbar back: He exhibits tenderness and spasm. He exhibits normal range of motion, no bony tenderness, no swelling, no " edema, no deformity, no laceration, no pain and normal pulse.        Back:    Neurological: He is alert and oriented to person, place, and time.   Skin: Skin is warm and dry. Capillary refill takes less than 2 seconds. No rash noted. He is not diaphoretic. No erythema. No pallor.   Psychiatric: He has a normal mood and affect. His behavior is normal. Judgment and thought content normal.   Nursing note and vitals reviewed.          Lab Results   Component Value Date    WBC 4.40 09/10/2018    RBC 5.29 09/10/2018    HGB 14.2 09/10/2018    HCT 45.1 09/10/2018    MCV 85 09/10/2018    MCH 26.8 (L) 09/10/2018    MCHC 31.5 (L) 09/10/2018    RDW 14.5 09/10/2018     09/10/2018    MPV 11.3 09/10/2018    GRAN 1.5 (L) 09/10/2018    GRAN 35.0 (L) 09/10/2018    LYMPH 2.2 09/10/2018    LYMPH 49.3 (H) 09/10/2018    MONO 0.5 09/10/2018    MONO 11.6 09/10/2018    EOS 0.2 09/10/2018    BASO 0.01 09/10/2018    EOSINOPHIL 3.9 09/10/2018    BASOPHIL 0.2 09/10/2018     Lab Results   Component Value Date    WBC 4.40 09/10/2018    HGB 14.2 09/10/2018    HCT 45.1 09/10/2018     09/10/2018    CHOL 159 07/05/2017    TRIG 61 07/05/2017    HDL 36 (L) 07/05/2017    ALT 34 07/05/2017    AST 23 07/05/2017     07/05/2017    K 4.6 07/05/2017     07/05/2017    CREATININE 1.0 07/05/2017    BUN 13 07/05/2017    CO2 29 07/05/2017    PSA 1.8 07/05/2017    HGBA1C 5.8 (H) 07/05/2017       Assessment:       1. Acute left-sided low back pain without sciatica    2. Essential hypertension    3. Screening for prostate cancer        Plan:       Pt given card to get immunizations updated      Adrian Sierra was seen today for low-back pain and pelvic pain.    Diagnoses and all orders for this visit:    Acute left-sided low back pain without sciatica  -     meloxicam (MOBIC) 7.5 MG tablet; Take 1 tablet (7.5 mg total) by mouth once daily.  -     tiZANidine (ZANAFLEX) 4 MG tablet; Take 1 tablet (4 mg total) by mouth every 6 (six) hours as needed  (muscle spasm).    Essential hypertension  -     MICROALBUMIN / CREATININE RATIO URINE  -     Urinalysis  -     Cardiology Lab Abdominal Aorta Evaluation; Future  -     CBC auto differential; Future  -     Comprehensive metabolic panel; Future  -     Hemoglobin A1c; Future    Screening for prostate cancer  -     PSA, Screening; Future      Patient Instructions   Warm moist heat to back    Labs today    Urine test to check your protein level     Call for any worsening    Follow up with your new PCP of choice, see list

## 2018-09-10 NOTE — PATIENT INSTRUCTIONS
Warm moist heat to back    Labs today    Urine test to check your protein level     Call for any worsening    Follow up with your new PCP of choice, see list

## 2018-09-11 ENCOUNTER — CLINICAL SUPPORT (OUTPATIENT)
Dept: CARDIOLOGY | Facility: CLINIC | Age: 63
End: 2018-09-11
Attending: NURSE PRACTITIONER
Payer: MEDICARE

## 2018-09-11 DIAGNOSIS — I10 ESSENTIAL HYPERTENSION: ICD-10-CM

## 2018-09-11 LAB
ALBUMIN/CREAT UR: 12 UG/MG
CREAT UR-MCNC: 75 MG/DL
ESTIMATED AVG GLUCOSE: 123 MG/DL
HBA1C MFR BLD HPLC: 5.9 %
MICROALBUMIN UR DL<=1MG/L-MCNC: 9 UG/ML
VASCULAR ABDOMINAL AORTIC ANEURYSM (AAA): 2.4

## 2018-09-11 PROCEDURE — 93978 VASCULAR STUDY: CPT | Mod: PBBFAC | Performed by: INTERNAL MEDICINE

## 2018-09-11 RX ORDER — MELOXICAM 7.5 MG/1
TABLET ORAL
Qty: 90 TABLET | Refills: 0 | OUTPATIENT
Start: 2018-09-11

## 2018-09-11 RX ORDER — TIZANIDINE 4 MG/1
TABLET ORAL
Qty: 385 TABLET | Refills: 0 | OUTPATIENT
Start: 2018-09-11

## 2018-09-12 ENCOUNTER — TELEPHONE (OUTPATIENT)
Dept: INTERNAL MEDICINE | Facility: CLINIC | Age: 63
End: 2018-09-12

## 2018-09-12 NOTE — TELEPHONE ENCOUNTER
Mihaela Smith, FNP-C  Lily Downs MA             Please call pt and let him know that his ultrasound to check for an aneurysm is normal

## 2018-09-14 DIAGNOSIS — M54.50 ACUTE LEFT-SIDED LOW BACK PAIN WITHOUT SCIATICA: ICD-10-CM

## 2018-09-14 RX ORDER — TIZANIDINE 4 MG/1
TABLET ORAL
Qty: 30 TABLET | Refills: 0 | Status: SHIPPED | OUTPATIENT
Start: 2018-09-14 | End: 2018-09-18 | Stop reason: SDUPTHER

## 2018-09-14 RX ORDER — TIZANIDINE 4 MG/1
TABLET ORAL
Qty: 385 TABLET | Refills: 0 | OUTPATIENT
Start: 2018-09-14

## 2018-09-18 DIAGNOSIS — M54.50 ACUTE LEFT-SIDED LOW BACK PAIN WITHOUT SCIATICA: ICD-10-CM

## 2018-09-18 RX ORDER — TIZANIDINE 4 MG/1
TABLET ORAL
Qty: 30 TABLET | Refills: 0 | Status: SHIPPED | OUTPATIENT
Start: 2018-09-18 | End: 2018-11-15 | Stop reason: ALTCHOICE

## 2018-10-07 DIAGNOSIS — M54.50 ACUTE LEFT-SIDED LOW BACK PAIN WITHOUT SCIATICA: ICD-10-CM

## 2018-10-08 DIAGNOSIS — M54.50 ACUTE LEFT-SIDED LOW BACK PAIN WITHOUT SCIATICA: ICD-10-CM

## 2018-10-08 RX ORDER — MELOXICAM 7.5 MG/1
TABLET ORAL
Qty: 90 TABLET | Refills: 0 | Status: SHIPPED | OUTPATIENT
Start: 2018-10-08 | End: 2018-11-15 | Stop reason: ALTCHOICE

## 2018-10-08 RX ORDER — MELOXICAM 7.5 MG/1
TABLET ORAL
Qty: 30 TABLET | Refills: 0 | Status: SHIPPED | OUTPATIENT
Start: 2018-10-08 | End: 2018-10-08 | Stop reason: SDUPTHER

## 2018-11-01 RX ORDER — LISINOPRIL 40 MG/1
40 TABLET ORAL EVERY MORNING
Qty: 90 TABLET | Refills: 0 | Status: SHIPPED | OUTPATIENT
Start: 2018-11-01 | End: 2019-01-31

## 2018-11-01 RX ORDER — AMLODIPINE BESYLATE 10 MG/1
10 TABLET ORAL EVERY MORNING
Qty: 90 TABLET | Refills: 0 | Status: SHIPPED | OUTPATIENT
Start: 2018-11-01 | End: 2019-01-31 | Stop reason: SDUPTHER

## 2018-11-09 ENCOUNTER — PES CALL (OUTPATIENT)
Dept: ADMINISTRATIVE | Facility: CLINIC | Age: 63
End: 2018-11-09

## 2018-11-15 ENCOUNTER — OFFICE VISIT (OUTPATIENT)
Dept: INTERNAL MEDICINE | Facility: CLINIC | Age: 63
End: 2018-11-15
Payer: MEDICARE

## 2018-11-15 VITALS
BODY MASS INDEX: 32.79 KG/M2 | OXYGEN SATURATION: 97 % | WEIGHT: 255.5 LBS | SYSTOLIC BLOOD PRESSURE: 130 MMHG | HEART RATE: 80 BPM | HEIGHT: 74 IN | DIASTOLIC BLOOD PRESSURE: 72 MMHG

## 2018-11-15 DIAGNOSIS — Z01.00 ROUTINE EYE EXAM: ICD-10-CM

## 2018-11-15 DIAGNOSIS — J30.2 SEASONAL ALLERGIES: ICD-10-CM

## 2018-11-15 DIAGNOSIS — Z87.438 HISTORY OF BENIGN PROSTATIC HYPERPLASIA: ICD-10-CM

## 2018-11-15 DIAGNOSIS — I10 ESSENTIAL HYPERTENSION: ICD-10-CM

## 2018-11-15 DIAGNOSIS — M19.90 OSTEOARTHRITIS, UNSPECIFIED OSTEOARTHRITIS TYPE, UNSPECIFIED SITE: ICD-10-CM

## 2018-11-15 DIAGNOSIS — Z00.00 ENCOUNTER FOR PREVENTIVE HEALTH EXAMINATION: Primary | ICD-10-CM

## 2018-11-15 PROBLEM — Z12.11 SCREENING FOR COLON CANCER: Status: RESOLVED | Noted: 2017-08-16 | Resolved: 2018-11-15

## 2018-11-15 PROCEDURE — G0439 PPPS, SUBSEQ VISIT: HCPCS | Mod: S$GLB,,, | Performed by: NURSE PRACTITIONER

## 2018-11-15 PROCEDURE — 3078F DIAST BP <80 MM HG: CPT | Mod: S$GLB,,, | Performed by: NURSE PRACTITIONER

## 2018-11-15 PROCEDURE — 99999 PR PBB SHADOW E&M-EST. PATIENT-LVL IV: CPT | Mod: PBBFAC,,, | Performed by: NURSE PRACTITIONER

## 2018-11-15 PROCEDURE — 3075F SYST BP GE 130 - 139MM HG: CPT | Mod: S$GLB,,, | Performed by: NURSE PRACTITIONER

## 2018-11-15 NOTE — PATIENT INSTRUCTIONS
Counseling and Referral of Other Preventative  (Italic type indicates deductible and co-insurance are waived)    Patient Name: Adrian Burt  Today's Date: 11/15/2018    Health Maintenance       Date Due Completion Date    Zoster Vaccine 01/25/2013 ---    PROSTATE-SPECIFIC ANTIGEN 09/10/2019 9/10/2018    Pneumococcal (65+) (2 of 2 - PPSV23) 09/10/2019 9/10/2018    Lipid Panel 07/05/2022 7/5/2017    Colonoscopy 08/16/2022 8/16/2017    TETANUS VACCINE 09/10/2028 9/10/2018        Orders Placed This Encounter   Procedures    Ambulatory Referral to Optometry     The following information is provided to all patients.  This information is to help you find resources for any of the problems found today that may be affecting your health:                Living healthy guide: www.[facility].[place].gov      Understanding Diabetes: www.diabetes.org      Eating healthy: www.cdc.gov/healthyweight      CDC home safety checklist: www.cdc.gov/steadi/patient.html      Agency on Aging: www.goea.[place].[facility]      Alcoholics anonymous (AA): www.aa.org      Physical Activity: www.shlomo.nih.gov/cb8dnyd      Tobacco use: www.quitwithusla.org

## 2018-11-15 NOTE — PROGRESS NOTES
"Adrian Burt presented for a  Medicare AWV and comprehensive Health Risk Assessment today. The following components were reviewed and updated:    · Medical history  · Family History  · Social history  · Allergies and Current Medications  · Health Risk Assessment  · Health Maintenance  · Care Team     ** See Completed Assessments for Annual Wellness Visit within the encounter summary.**       The following assessments were completed:  · Living Situation  · CAGE  · Depression Screening  · Timed Get Up and Go  · Whisper Test  · Cognitive Function Screening  ·   ·   · Nutrition Screening  · ADL Screening  · PAQ Screening    Vitals:    11/15/18 1113   BP: 130/72   Pulse: 80   SpO2: 97%   Weight: 115.9 kg (255 lb 8.2 oz)   Height: 6' 2" (1.88 m)     Body mass index is 32.81 kg/m².  Physical Exam   Constitutional: He is oriented to person, place, and time. He appears well-developed.   overweight   HENT:   Head: Normocephalic and atraumatic.   Nose: Nose normal.   Eyes: Conjunctivae and EOM are normal.   Cardiovascular: Normal rate, regular rhythm, normal heart sounds and intact distal pulses.   Pulmonary/Chest: Effort normal and breath sounds normal.   Abdominal: Soft.   Musculoskeletal: Normal range of motion.   Neurological: He is alert and oriented to person, place, and time.   Skin: Skin is warm and dry.   Psychiatric: He has a normal mood and affect. His behavior is normal. Judgment and thought content normal.   Nursing note and vitals reviewed.        Diagnoses and health risks identified today and associated recommendations/orders:    1. Encounter for preventive health examination  Assessment performed. Health maintenance updated. Chart review completed.    2. Routine eye exam  - Ambulatory Referral to Optometry    3. History of benign prostatic hyperplasia  Stable. Followed by PCP.  Component      Latest Ref Rng & Units 9/10/2018   PSA, SCREEN      0.00 - 4.00 ng/mL 2.1     4. Essential hypertension  Chronic. " Stable on current regimen. Followed by PCP.    5. Osteoarthritis, unspecified osteoarthritis type, unspecified site  Chronic. Stable on current regimen. Followed by PCP.    6. Seasonal allergies  Chronic. Stable on current regimen. Followed by PCP.    7. BMI 32.0-32.9,adult  Stable. Discussed diet and exercise recommendations. 30 minutes a day 5x each week is recommended.  Followed by PCP.      Provided Adrian Sierra with a 5-10 year written screening schedule and personal prevention plan. Recommendations were developed using the USPSTF age appropriate recommendations. Education, counseling, and referrals were provided as needed. After Visit Summary printed and given to patient which includes a list of additional screenings\tests needed.    Follow-up for Annual Wellness Visit in 1 year, follow up with Primary Care Provider as instructed, ;sooner if prob.    SELAM Lantigua

## 2018-11-27 DIAGNOSIS — M54.50 ACUTE LEFT-SIDED LOW BACK PAIN WITHOUT SCIATICA: ICD-10-CM

## 2018-11-27 RX ORDER — MELOXICAM 7.5 MG/1
TABLET ORAL
Qty: 30 TABLET | Refills: 0 | Status: SHIPPED | OUTPATIENT
Start: 2018-11-27 | End: 2019-01-31

## 2018-12-04 DIAGNOSIS — J30.1 SEASONAL ALLERGIC RHINITIS DUE TO POLLEN: ICD-10-CM

## 2018-12-04 RX ORDER — MONTELUKAST SODIUM 10 MG/1
10 TABLET ORAL NIGHTLY
Qty: 90 TABLET | Refills: 0 | Status: SHIPPED | OUTPATIENT
Start: 2018-12-04 | End: 2019-01-31 | Stop reason: SDUPTHER

## 2019-01-31 ENCOUNTER — OFFICE VISIT (OUTPATIENT)
Dept: INTERNAL MEDICINE | Facility: CLINIC | Age: 64
End: 2019-01-31
Payer: MEDICARE

## 2019-01-31 ENCOUNTER — LAB VISIT (OUTPATIENT)
Dept: LAB | Facility: HOSPITAL | Age: 64
End: 2019-01-31
Attending: INTERNAL MEDICINE
Payer: MEDICARE

## 2019-01-31 VITALS
HEIGHT: 74 IN | SYSTOLIC BLOOD PRESSURE: 142 MMHG | WEIGHT: 259.94 LBS | DIASTOLIC BLOOD PRESSURE: 92 MMHG | OXYGEN SATURATION: 99 % | BODY MASS INDEX: 33.36 KG/M2 | HEART RATE: 75 BPM

## 2019-01-31 DIAGNOSIS — R73.03 PREDIABETES: ICD-10-CM

## 2019-01-31 DIAGNOSIS — J30.1 SEASONAL ALLERGIC RHINITIS DUE TO POLLEN: ICD-10-CM

## 2019-01-31 DIAGNOSIS — H92.03 DISCOMFORT OF BOTH EARS: ICD-10-CM

## 2019-01-31 DIAGNOSIS — I10 ESSENTIAL HYPERTENSION: ICD-10-CM

## 2019-01-31 DIAGNOSIS — Z13.220 LIPID SCREENING: ICD-10-CM

## 2019-01-31 DIAGNOSIS — N52.9 ERECTILE DYSFUNCTION, UNSPECIFIED ERECTILE DYSFUNCTION TYPE: ICD-10-CM

## 2019-01-31 DIAGNOSIS — Z76.89 ESTABLISHING CARE WITH NEW DOCTOR, ENCOUNTER FOR: Primary | ICD-10-CM

## 2019-01-31 DIAGNOSIS — R22.32 MASS OF FINGER OF LEFT HAND: ICD-10-CM

## 2019-01-31 LAB
ALBUMIN SERPL BCP-MCNC: 3.9 G/DL
ALP SERPL-CCNC: 66 U/L
ALT SERPL W/O P-5'-P-CCNC: 33 U/L
ANION GAP SERPL CALC-SCNC: 5 MMOL/L
AST SERPL-CCNC: 22 U/L
BILIRUB SERPL-MCNC: 0.5 MG/DL
BUN SERPL-MCNC: 14 MG/DL
CALCIUM SERPL-MCNC: 9.4 MG/DL
CHLORIDE SERPL-SCNC: 107 MMOL/L
CHOLEST SERPL-MCNC: 171 MG/DL
CHOLEST/HDLC SERPL: 4.2 {RATIO}
CO2 SERPL-SCNC: 29 MMOL/L
CREAT SERPL-MCNC: 1 MG/DL
EST. GFR  (AFRICAN AMERICAN): >60 ML/MIN/1.73 M^2
EST. GFR  (NON AFRICAN AMERICAN): >60 ML/MIN/1.73 M^2
ESTIMATED AVG GLUCOSE: 123 MG/DL
GLUCOSE SERPL-MCNC: 104 MG/DL
HBA1C MFR BLD HPLC: 5.9 %
HDLC SERPL-MCNC: 41 MG/DL
HDLC SERPL: 24 %
LDLC SERPL CALC-MCNC: 116.8 MG/DL
NONHDLC SERPL-MCNC: 130 MG/DL
POTASSIUM SERPL-SCNC: 4.3 MMOL/L
PROT SERPL-MCNC: 7.4 G/DL
SODIUM SERPL-SCNC: 141 MMOL/L
TRIGL SERPL-MCNC: 66 MG/DL

## 2019-01-31 PROCEDURE — 3080F PR MOST RECENT DIASTOLIC BLOOD PRESSURE >= 90 MM HG: ICD-10-PCS | Mod: S$GLB,,, | Performed by: INTERNAL MEDICINE

## 2019-01-31 PROCEDURE — 99999 PR PBB SHADOW E&M-EST. PATIENT-LVL III: ICD-10-PCS | Mod: PBBFAC,,, | Performed by: INTERNAL MEDICINE

## 2019-01-31 PROCEDURE — 80061 LIPID PANEL: CPT

## 2019-01-31 PROCEDURE — 83036 HEMOGLOBIN GLYCOSYLATED A1C: CPT

## 2019-01-31 PROCEDURE — 99214 OFFICE O/P EST MOD 30 MIN: CPT | Mod: S$GLB,,, | Performed by: INTERNAL MEDICINE

## 2019-01-31 PROCEDURE — 99214 PR OFFICE/OUTPT VISIT, EST, LEVL IV, 30-39 MIN: ICD-10-PCS | Mod: S$GLB,,, | Performed by: INTERNAL MEDICINE

## 2019-01-31 PROCEDURE — 1101F PT FALLS ASSESS-DOCD LE1/YR: CPT | Mod: S$GLB,,, | Performed by: INTERNAL MEDICINE

## 2019-01-31 PROCEDURE — 80053 COMPREHEN METABOLIC PANEL: CPT

## 2019-01-31 PROCEDURE — 1101F PR PT FALLS ASSESS DOC 0-1 FALLS W/OUT INJ PAST YR: ICD-10-PCS | Mod: S$GLB,,, | Performed by: INTERNAL MEDICINE

## 2019-01-31 PROCEDURE — 3080F DIAST BP >= 90 MM HG: CPT | Mod: S$GLB,,, | Performed by: INTERNAL MEDICINE

## 2019-01-31 PROCEDURE — 3077F PR MOST RECENT SYSTOLIC BLOOD PRESSURE >= 140 MM HG: ICD-10-PCS | Mod: S$GLB,,, | Performed by: INTERNAL MEDICINE

## 2019-01-31 PROCEDURE — 36415 COLL VENOUS BLD VENIPUNCTURE: CPT

## 2019-01-31 PROCEDURE — 3077F SYST BP >= 140 MM HG: CPT | Mod: S$GLB,,, | Performed by: INTERNAL MEDICINE

## 2019-01-31 PROCEDURE — 99999 PR PBB SHADOW E&M-EST. PATIENT-LVL III: CPT | Mod: PBBFAC,,, | Performed by: INTERNAL MEDICINE

## 2019-01-31 RX ORDER — LOSARTAN POTASSIUM 100 MG/1
100 TABLET ORAL DAILY
Qty: 90 TABLET | Refills: 3 | Status: SHIPPED | OUTPATIENT
Start: 2019-01-31 | End: 2020-01-22

## 2019-01-31 RX ORDER — FLUTICASONE PROPIONATE 50 MCG
1 SPRAY, SUSPENSION (ML) NASAL DAILY
Qty: 16 G | Refills: 3 | Status: SHIPPED | OUTPATIENT
Start: 2019-01-31 | End: 2019-09-24 | Stop reason: SDUPTHER

## 2019-01-31 RX ORDER — AMLODIPINE BESYLATE 10 MG/1
10 TABLET ORAL EVERY MORNING
Qty: 90 TABLET | Refills: 3 | Status: SHIPPED | OUTPATIENT
Start: 2019-01-31 | End: 2020-01-22

## 2019-01-31 RX ORDER — MONTELUKAST SODIUM 10 MG/1
10 TABLET ORAL NIGHTLY
Qty: 90 TABLET | Refills: 3 | Status: SHIPPED | OUTPATIENT
Start: 2019-01-31 | End: 2020-02-17

## 2019-01-31 NOTE — PROGRESS NOTES
"INTERNAL MEDICINE INITIAL VISIT NOTE      CHIEF COMPLAINT     Chief Complaint   Patient presents with    Establish Care    Mass     left hand       HPI     Adrian Burt is a 64 y.o. AA male who presents with HTN, BPH, hx hemorrhoids, DJD, seasonal allergies, former pt of Dr. Brower, here today to establish care.    Today reports a lump on L hand that popped up yesterday, located on dorsal aspect of his hand.  Says it felt like a marble and today is much smaller and as well-circumscribed.  Ear discomfort on R for several mos, feels like something is in there, says a little moisture comes out when he puts a tissue in it.  L ear feels like it's "vibrating" at times.    Past Medical History:  Past Medical History:   Diagnosis Date    Allergy     Fatty liver     GERD (gastroesophageal reflux disease)     Hypertension        Past Surgical History:  Past Surgical History:   Procedure Laterality Date    COLONOSCOPY N/A 8/16/2017    Performed by Leo Henriquez MD at [facility] (4TH FLR)    LEG SURGERY Left        Home Medications:  Prior to Admission medications    Medication Sig Start Date End Date Taking? Authorizing Provider   amLODIPine (NORVASC) 10 MG tablet Take 1 tablet (10 mg total) by mouth every morning. 11/1/18   Mavis Jeffries NP   lisinopril (PRINIVIL,ZESTRIL) 40 MG tablet Take 1 tablet (40 mg total) by mouth every morning. 11/1/18   Mavis Jeffries NP   meclizine (ANTIVERT) 25 mg tablet Take 1 tablet (25 mg total) by mouth 3 (three) times daily as needed. 11/15/12   Jerilyn Becker PA-C   meloxicam (MOBIC) 7.5 MG tablet TAKE 1 TABLET BY MOUTH DAILY 11/27/18   SELAM Clifton-C   montelukast (SINGULAIR) 10 mg tablet Take 1 tablet (10 mg total) by mouth every evening. 12/4/18   Mone Brown MD   multivit with minerals/lutein (MULTIVITAMIN 50 PLUS ORAL) Take 1 tablet by mouth once daily.    Historical Provider, MD   sildenafil (VIAGRA) 100 MG tablet Take 1 tablet by mouth " "continuous prn. 6/15/12   Historical Provider, MD   testosterone (TESTIM) 50 mg/5 gram (1 %) Gel by Percutaneous route. 6/15/12   Historical Provider, MD       Allergies:  Review of patient's allergies indicates:   Allergen Reactions    No known drug allergies        Family History:  Family History   Problem Relation Age of Onset    Kidney disease Mother     Heart disease Father     Cancer Sister         kidney    Kidney disease Sister        Social History:  Social History     Tobacco Use    Smoking status: Former Smoker     Last attempt to quit: 11/15/1978     Years since quittin.2    Smokeless tobacco: Never Used    Tobacco comment: smoked for 10 years, quit in 76   Substance Use Topics    Alcohol use: No    Drug use: No       Review of Systems:  Review of Systems   Constitutional: Negative for appetite change, chills, fatigue, fever and unexpected weight change.   HENT: Negative for congestion, hearing loss and rhinorrhea.    Eyes: Negative for pain and visual disturbance.   Respiratory: Negative for cough, chest tightness, shortness of breath and wheezing.    Cardiovascular: Negative for chest pain, palpitations and leg swelling.   Gastrointestinal: Negative for abdominal distention and abdominal pain.   Endocrine: Negative for polydipsia and polyuria.   Genitourinary: Negative for decreased urine volume, discharge, dysuria and frequency.   Neurological: Negative for dizziness, weakness, numbness and headaches.   Psychiatric/Behavioral: Negative for behavioral problems and confusion.       Health Maintenance:   Immunizations:   Influenza 2018  TDap 2018  Prevnar 2018, pvax due in Sept  Shingrix rec once back in stock    Cancer Screening:  Colonoscopy:  2017 3 polyps removed, tubular adenoma on path and reported rec f/u 5 yrs\  PSA 2018 wnl  AAA screening since tob hx neg for AAA 2018    PHYSICAL EXAM     BP (!) 142/92   Pulse 75   Ht 6' 2" (1.88 m)   Wt 117.9 kg (259 lb 14.8 oz)   " SpO2 99%   BMI 33.37 kg/m²     GEN - A+OX4, NAD   HEENT - PERRL, EOMI, OP clear  Neck - No thyromegaly or cervical LAD. No thyroid masses felt.  CV - RRR, no m/r/g  Chest - CTAB, no wheezing, crackles, or rhonchi  Abd - S/NT/ND/+BS.   Ext - 2+BDP. No C/C/E.  LN - No LAD appreciated.  Skin - Normal color and texture, no rash.  Lump noted over dorsal aspect of L-hand, nickel-sized, rubbery, no erythema or warmth, no TTP, mobile      LABS         ASSESSMENT/PLAN     Adrian Burt is a 64 y.o. male with  Adrian Sierra was seen today for establish care and mass.    Diagnoses and all orders for this visit:    Establishing care with new doctor, encounter for  History and physical exam completed.  Health maintenance reviewed as above.    Essential hypertension  Elevated today but a little better on repeat  Missed several doses of meds this past week.  Advised to take daily as rx'ed.  Advised of possible link c ACEi to lung CA; while no formal recall has been advised, pt given opportunity to change to ARB, pt would like to change  Stop Lisinopril 40 and start losartan 100 daily, cont amlodipine at current dose  -     losartan (COZAAR) 100 MG tablet; Take 1 tablet (100 mg total) by mouth once daily.  -     amLODIPine (NORVASC) 10 MG tablet; Take 1 tablet (10 mg total) by mouth every morning.  -     Comprehensive metabolic panel; Future; Expected date: 01/31/2019    Erectile dysfunction, unspecified erectile dysfunction type  Mgmt as per Urology, also on Testim topical as per Uro    Mass of finger of left hand  On dorsal hand.  Possible ganglion cyst vs lymph node although it appears to be rapidly decreasing in size.  Will monitor for now and if it does not resolve, can get referral to hand clinic    Discomfort of both ears  Clear on exam today, pt given reassurance  Cont meds for allergies.    Seasonal allergic rhinitis due to pollen  Stable, cont meds  -     montelukast (SINGULAIR) 10 mg tablet; Take 1 tablet (10 mg total)  by mouth every evening.  -     fluticasone (FLONASE) 50 mcg/actuation nasal spray; 1 spray (50 mcg total) by Each Nare route once daily.    Prediabetes  Noted on chart review  Lab Results   Component Value Date    HGBA1C 5.9 (H) 01/31/2019     Pt was counseled on the need to avoid intake of simple sugars and minimize carbohydrates.  Also recommended weight loss and daily exercise.  -     Hemoglobin A1c; Future; Expected date: 01/31/2019    Lipid screening  -     Lipid panel; Future; Expected date: 01/31/2019      HM as above    RTC in 2 months for f/u BP since meds adjusted, sooner if needed and depending on labs.    Mone Brown MD  Department of Internal Medicine - [facility]  01/31/2019

## 2019-02-01 RX ORDER — LISINOPRIL 40 MG/1
TABLET ORAL
Qty: 90 TABLET | Refills: 0 | Status: SHIPPED | OUTPATIENT
Start: 2019-02-01 | End: 2019-04-04

## 2019-02-01 RX ORDER — AMLODIPINE BESYLATE 10 MG/1
TABLET ORAL
Qty: 90 TABLET | Refills: 0 | Status: SHIPPED | OUTPATIENT
Start: 2019-02-01 | End: 2019-04-04

## 2019-02-04 DIAGNOSIS — M54.50 ACUTE LEFT-SIDED LOW BACK PAIN WITHOUT SCIATICA: ICD-10-CM

## 2019-02-04 RX ORDER — MELOXICAM 7.5 MG/1
TABLET ORAL
Qty: 90 TABLET | Refills: 0 | OUTPATIENT
Start: 2019-02-04

## 2019-04-04 ENCOUNTER — OFFICE VISIT (OUTPATIENT)
Dept: INTERNAL MEDICINE | Facility: CLINIC | Age: 64
End: 2019-04-04
Payer: MEDICARE

## 2019-04-04 VITALS
OXYGEN SATURATION: 98 % | HEIGHT: 74 IN | DIASTOLIC BLOOD PRESSURE: 92 MMHG | BODY MASS INDEX: 31.58 KG/M2 | SYSTOLIC BLOOD PRESSURE: 142 MMHG | WEIGHT: 246.06 LBS | HEART RATE: 77 BPM

## 2019-04-04 DIAGNOSIS — Z87.438 HISTORY OF BENIGN PROSTATIC HYPERPLASIA: ICD-10-CM

## 2019-04-04 DIAGNOSIS — N52.9 ERECTILE DYSFUNCTION, UNSPECIFIED ERECTILE DYSFUNCTION TYPE: ICD-10-CM

## 2019-04-04 DIAGNOSIS — J30.2 SEASONAL ALLERGIES: ICD-10-CM

## 2019-04-04 DIAGNOSIS — I10 ESSENTIAL HYPERTENSION: Primary | ICD-10-CM

## 2019-04-04 DIAGNOSIS — R73.03 PREDIABETES: ICD-10-CM

## 2019-04-04 PROCEDURE — 1101F PT FALLS ASSESS-DOCD LE1/YR: CPT | Mod: S$GLB,,, | Performed by: INTERNAL MEDICINE

## 2019-04-04 PROCEDURE — 99214 PR OFFICE/OUTPT VISIT, EST, LEVL IV, 30-39 MIN: ICD-10-PCS | Mod: S$GLB,,, | Performed by: INTERNAL MEDICINE

## 2019-04-04 PROCEDURE — 3077F PR MOST RECENT SYSTOLIC BLOOD PRESSURE >= 140 MM HG: ICD-10-PCS | Mod: S$GLB,,, | Performed by: INTERNAL MEDICINE

## 2019-04-04 PROCEDURE — 3080F DIAST BP >= 90 MM HG: CPT | Mod: S$GLB,,, | Performed by: INTERNAL MEDICINE

## 2019-04-04 PROCEDURE — 3080F PR MOST RECENT DIASTOLIC BLOOD PRESSURE >= 90 MM HG: ICD-10-PCS | Mod: S$GLB,,, | Performed by: INTERNAL MEDICINE

## 2019-04-04 PROCEDURE — 1101F PR PT FALLS ASSESS DOC 0-1 FALLS W/OUT INJ PAST YR: ICD-10-PCS | Mod: S$GLB,,, | Performed by: INTERNAL MEDICINE

## 2019-04-04 PROCEDURE — 3077F SYST BP >= 140 MM HG: CPT | Mod: S$GLB,,, | Performed by: INTERNAL MEDICINE

## 2019-04-04 PROCEDURE — 99214 OFFICE O/P EST MOD 30 MIN: CPT | Mod: S$GLB,,, | Performed by: INTERNAL MEDICINE

## 2019-04-04 PROCEDURE — 99999 PR PBB SHADOW E&M-EST. PATIENT-LVL III: ICD-10-PCS | Mod: PBBFAC,,, | Performed by: INTERNAL MEDICINE

## 2019-04-04 PROCEDURE — 99999 PR PBB SHADOW E&M-EST. PATIENT-LVL III: CPT | Mod: PBBFAC,,, | Performed by: INTERNAL MEDICINE

## 2019-04-04 RX ORDER — HYDROCHLOROTHIAZIDE 12.5 MG/1
12.5 CAPSULE ORAL DAILY
Qty: 90 CAPSULE | Refills: 2 | Status: SHIPPED | OUTPATIENT
Start: 2019-04-04 | End: 2019-07-08

## 2019-04-04 NOTE — PROGRESS NOTES
INTERNAL MEDICINE ESTABLISHED PATIENT VISIT NOTE    Subjective:     Chief Complaint: Follow-up  HTN     Patient ID: Adrian Burt is a 64 y.o. male with HTN, BPH, hx hemorrhoids, DJD, seasonal allergies, last seen by me in Jan to [facility] care, here today for f/u.    At last appt, Lisinopril changed to losartan (see last note for details).  Taking meds as rx'ed.  Reports home BP runs in upper 130s sys.    Denies cp or sob.  No blood in stools.  No issues c voiding.    Past Medical History:  Past Medical History:   Diagnosis Date    Allergy     Fatty liver     GERD (gastroesophageal reflux disease)     Hypertension        Home Medications:  Prior to Admission medications    Medication Sig Start Date End Date Taking? Authorizing Provider   amLODIPine (NORVASC) 10 MG tablet Take 1 tablet (10 mg total) by mouth every morning. 1/31/19  Yes Mone Brown MD   fluticasone (FLONASE) 50 mcg/actuation nasal spray 1 spray (50 mcg total) by Each Nare route once daily. 1/31/19  Yes Mone Brown MD   losartan (COZAAR) 100 MG tablet Take 1 tablet (100 mg total) by mouth once daily. 1/31/19 1/31/20 Yes Mone Brown MD   meclizine (ANTIVERT) 25 mg tablet Take 1 tablet (25 mg total) by mouth 3 (three) times daily as needed. 11/15/12  Yes Jerilyn Becker PA-C   montelukast (SINGULAIR) 10 mg tablet Take 1 tablet (10 mg total) by mouth every evening. 1/31/19  Yes Mone Brown MD   multivit with minerals/lutein (MULTIVITAMIN 50 PLUS ORAL) Take 1 tablet by mouth once daily.   Yes Historical Provider, MD   sildenafil (VIAGRA) 100 MG tablet Take 1 tablet by mouth continuous prn. 6/15/12  Yes Historical Provider, MD   testosterone (TESTIM) 50 mg/5 gram (1 %) Gel by Percutaneous route. 6/15/12  Yes Historical Provider, MD   amLODIPine (NORVASC) 10 MG tablet TAKE 1 TABLET BY MOUTH EVERY MORNING 2/1/19 4/4/19  Elean Vital MD   lisinopril (PRINIVIL,ZESTRIL) 40 MG tablet TAKE 1 TABLET BY MOUTH EVERY MORNING 2/1/19 4/4/19  Elena Vital MD  "      Allergies:  Review of patient's allergies indicates:   Allergen Reactions    No known drug allergies        Social History:  Social History     Tobacco Use    Smoking status: Former Smoker     Last attempt to quit: 11/15/1978     Years since quittin.4    Smokeless tobacco: Never Used    Tobacco comment: smoked for 10 years, quit in 76   Substance Use Topics    Alcohol use: No    Drug use: No        Review of Systems   Constitutional: Negative for chills, fatigue and fever.   Respiratory: Negative for cough, chest tightness and shortness of breath.    Cardiovascular: Negative for chest pain.   Gastrointestinal: Negative for abdominal pain and blood in stool.   Genitourinary: Negative for dysuria and frequency.         Health Maintenance:   Immunizations:   Influenza 2018  TDap 2018  Prevnar 2018, pvax due in Sept  Shingrix rec once back in stock     Cancer Screening:  Colonoscopy:  2017 3 polyps removed, tubular adenoma on path and reported rec f/u 5 yrs  PSA 2018 wnl  AAA screening since tob hx neg for AAA 2018      Objective:   BP (!) 142/92   Pulse 77   Ht 6' 2" (1.88 m)   Wt 111.6 kg (246 lb 0.5 oz)   SpO2 98%   BMI 31.59 kg/m²        General: AAO x3, no apparent distress  CV: RRR, no m/r/g  Pulm: Lungs CTAB, no crackles, no wheezes  Abd: s/NT/ND +BS  Extremities: trace pitting edema BLE      Labs:     Lab Results   Component Value Date    WBC 4.40 09/10/2018    HGB 14.2 09/10/2018    HCT 45.1 09/10/2018    MCV 85 09/10/2018     09/10/2018     CMP  Sodium   Date Value Ref Range Status   2019 141 136 - 145 mmol/L Final     Potassium   Date Value Ref Range Status   2019 4.3 3.5 - 5.1 mmol/L Final     Chloride   Date Value Ref Range Status   2019 107 95 - 110 mmol/L Final     CO2   Date Value Ref Range Status   2019 29 23 - 29 mmol/L Final     Glucose   Date Value Ref Range Status   2019 104 70 - 110 mg/dL Final     BUN, Bld   Date Value Ref " Range Status   01/31/2019 14 8 - 23 mg/dL Final     Creatinine   Date Value Ref Range Status   01/31/2019 1.0 0.5 - 1.4 mg/dL Final     Calcium   Date Value Ref Range Status   01/31/2019 9.4 8.7 - 10.5 mg/dL Final     Total Protein   Date Value Ref Range Status   01/31/2019 7.4 6.0 - 8.4 g/dL Final     Albumin   Date Value Ref Range Status   01/31/2019 3.9 3.5 - 5.2 g/dL Final     Total Bilirubin   Date Value Ref Range Status   01/31/2019 0.5 0.1 - 1.0 mg/dL Final     Comment:     For infants and newborns, interpretation of results should be based  on gestational age, weight and in agreement with clinical  observations.  Premature Infant recommended reference ranges:  Up to 24 hours.............<8.0 mg/dL  Up to 48 hours............<12.0 mg/dL  3-5 days..................<15.0 mg/dL  6-29 days.................<15.0 mg/dL       Alkaline Phosphatase   Date Value Ref Range Status   01/31/2019 66 55 - 135 U/L Final     AST   Date Value Ref Range Status   01/31/2019 22 10 - 40 U/L Final     ALT   Date Value Ref Range Status   01/31/2019 33 10 - 44 U/L Final     Anion Gap   Date Value Ref Range Status   01/31/2019 5 (L) 8 - 16 mmol/L Final     eGFR if    Date Value Ref Range Status   01/31/2019 >60 >60 mL/min/1.73 m^2 Final     eGFR if non    Date Value Ref Range Status   01/31/2019 >60 >60 mL/min/1.73 m^2 Final     Comment:     Calculation used to obtain the estimated glomerular filtration  rate (eGFR) is the CKD-EPI equation.        Lab Results   Component Value Date    LDLCALC 116.8 01/31/2019     No results found for: TSH  Lab Results   Component Value Date    HGBA1C 5.9 (H) 01/31/2019         Assessment/Plan     Adrian Sierra was seen today for follow-up.    Diagnoses and all orders for this visit:    Essential hypertension  Elevated today even on repeat\  Based on trend of BP will add HCTZ which should also help c LE edema.  Cont losartan and amlodipine at current dose.  -      hydroCHLOROthiazide (MICROZIDE) 12.5 mg capsule; Take 1 capsule (12.5 mg total) by mouth once daily.  -     Comprehensive metabolic panel; Future    Erectile dysfunction, unspecified erectile dysfunction type  History of benign prostatic hyperplasia  Mgmt as per Uro, on Testim, refills as per uro.    Seasonal allergies  Stable on Singulair and Flonase  No acute issues    Prediabetes  Lab Results   Component Value Date    HGBA1C 5.9 (H) 01/31/2019     Mild  Pt was counseled on the need to avoid intake of simple sugars and minimize carbohydrates.  Also recommended weight loss and daily exercise.  -     Hemoglobin A1c; Future    HM as above  RTC in 3 mos for f/u c labs prior.    Mone Brown MD  Department of Internal Medicine - [facility]  04/04/2019

## 2019-05-24 ENCOUNTER — OFFICE VISIT (OUTPATIENT)
Dept: INTERNAL MEDICINE | Facility: CLINIC | Age: 64
End: 2019-05-24
Payer: MEDICARE

## 2019-05-24 VITALS
HEART RATE: 73 BPM | HEIGHT: 74 IN | WEIGHT: 249.31 LBS | BODY MASS INDEX: 32 KG/M2 | TEMPERATURE: 98 F | OXYGEN SATURATION: 99 % | SYSTOLIC BLOOD PRESSURE: 140 MMHG | DIASTOLIC BLOOD PRESSURE: 82 MMHG

## 2019-05-24 DIAGNOSIS — E66.9 OBESITY (BMI 30-39.9): ICD-10-CM

## 2019-05-24 DIAGNOSIS — R09.82 POSTNASAL DRIP: ICD-10-CM

## 2019-05-24 DIAGNOSIS — J01.11 ACUTE RECURRENT FRONTAL SINUSITIS: Primary | ICD-10-CM

## 2019-05-24 DIAGNOSIS — R05.9 COUGHING: ICD-10-CM

## 2019-05-24 PROCEDURE — 99213 OFFICE O/P EST LOW 20 MIN: CPT | Mod: S$GLB,,, | Performed by: NURSE PRACTITIONER

## 2019-05-24 PROCEDURE — 3077F SYST BP >= 140 MM HG: CPT | Mod: S$GLB,,, | Performed by: NURSE PRACTITIONER

## 2019-05-24 PROCEDURE — 1101F PT FALLS ASSESS-DOCD LE1/YR: CPT | Mod: S$GLB,,, | Performed by: NURSE PRACTITIONER

## 2019-05-24 PROCEDURE — 3077F PR MOST RECENT SYSTOLIC BLOOD PRESSURE >= 140 MM HG: ICD-10-PCS | Mod: S$GLB,,, | Performed by: NURSE PRACTITIONER

## 2019-05-24 PROCEDURE — 99213 PR OFFICE/OUTPT VISIT, EST, LEVL III, 20-29 MIN: ICD-10-PCS | Mod: S$GLB,,, | Performed by: NURSE PRACTITIONER

## 2019-05-24 PROCEDURE — 99999 PR PBB SHADOW E&M-EST. PATIENT-LVL III: ICD-10-PCS | Mod: PBBFAC,,, | Performed by: NURSE PRACTITIONER

## 2019-05-24 PROCEDURE — 3079F PR MOST RECENT DIASTOLIC BLOOD PRESSURE 80-89 MM HG: ICD-10-PCS | Mod: S$GLB,,, | Performed by: NURSE PRACTITIONER

## 2019-05-24 PROCEDURE — 99999 PR PBB SHADOW E&M-EST. PATIENT-LVL III: CPT | Mod: PBBFAC,,, | Performed by: NURSE PRACTITIONER

## 2019-05-24 PROCEDURE — 3079F DIAST BP 80-89 MM HG: CPT | Mod: S$GLB,,, | Performed by: NURSE PRACTITIONER

## 2019-05-24 PROCEDURE — 1101F PR PT FALLS ASSESS DOC 0-1 FALLS W/OUT INJ PAST YR: ICD-10-PCS | Mod: S$GLB,,, | Performed by: NURSE PRACTITIONER

## 2019-05-24 RX ORDER — BENZONATATE 100 MG/1
100 CAPSULE ORAL 3 TIMES DAILY PRN
Qty: 30 CAPSULE | Refills: 0 | Status: SHIPPED | OUTPATIENT
Start: 2019-05-24 | End: 2019-06-23

## 2019-05-24 RX ORDER — AZITHROMYCIN 250 MG/1
TABLET, FILM COATED ORAL
Qty: 6 TABLET | Refills: 0 | Status: SHIPPED | OUTPATIENT
Start: 2019-05-24 | End: 2019-05-29

## 2019-05-24 NOTE — PROGRESS NOTES
Subjective:       Patient ID: Adrian Burt is a 64 y.o. male.    Chief Complaint: Cough and Nasal Congestion    Pt of Dr. Brown, here for cough and congestion    Cough   This is a new problem. The current episode started in the past 7 days (1 week). The problem has been unchanged. The problem occurs every few minutes. The cough is productive of sputum. Associated symptoms include chills, ear congestion, ear pain, headaches, nasal congestion, postnasal drip, rhinorrhea, shortness of breath and sweats. Pertinent negatives include no chest pain, fever, heartburn, hemoptysis, myalgias, rash, sore throat, weight loss or wheezing. The symptoms are aggravated by lying down. Risk factors: sleeps with ceiling, dog in home, has been around sick persons. He has tried OTC cough suppressant (robitussin) for the symptoms. The treatment provided no relief. There is no history of environmental allergies.     Review of Systems   Constitutional: Positive for chills. Negative for activity change, fever and weight loss.   HENT: Positive for congestion, ear pain, postnasal drip, rhinorrhea, sinus pressure, sinus pain, sneezing and voice change. Negative for ear discharge, facial swelling and sore throat.    Eyes: Positive for pain.   Respiratory: Positive for cough and shortness of breath. Negative for hemoptysis, chest tightness and wheezing.    Cardiovascular: Negative for chest pain, palpitations and leg swelling.   Gastrointestinal: Negative for abdominal pain, diarrhea, heartburn, nausea and vomiting.   Musculoskeletal: Positive for arthralgias. Negative for myalgias.   Skin: Negative for color change, pallor and rash.   Allergic/Immunologic: Negative for environmental allergies and food allergies.   Neurological: Positive for headaches.         Review of patient's allergies indicates:   Allergen Reactions    No known drug allergies      Current Outpatient Medications:     amLODIPine (NORVASC) 10 MG tablet, Take 1 tablet  (10 mg total) by mouth every morning., Disp: 90 tablet, Rfl: 3    fluticasone (FLONASE) 50 mcg/actuation nasal spray, 1 spray (50 mcg total) by Each Nare route once daily., Disp: 16 g, Rfl: 3    hydroCHLOROthiazide (MICROZIDE) 12.5 mg capsule, Take 1 capsule (12.5 mg total) by mouth once daily., Disp: 90 capsule, Rfl: 2    losartan (COZAAR) 100 MG tablet, Take 1 tablet (100 mg total) by mouth once daily., Disp: 90 tablet, Rfl: 3    meclizine (ANTIVERT) 25 mg tablet, Take 1 tablet (25 mg total) by mouth 3 (three) times daily as needed., Disp: 20 tablet, Rfl: 0    montelukast (SINGULAIR) 10 mg tablet, Take 1 tablet (10 mg total) by mouth every evening., Disp: 90 tablet, Rfl: 3    multivit with minerals/lutein (MULTIVITAMIN 50 PLUS ORAL), Take 1 tablet by mouth once daily., Disp: , Rfl:     sildenafil (VIAGRA) 100 MG tablet, Take 1 tablet by mouth continuous prn., Disp: , Rfl:     testosterone (TESTIM) 50 mg/5 gram (1 %) Gel, by Percutaneous route., Disp: , Rfl:      Patient Active Problem List   Diagnosis    Seasonal allergies    Hemorrhoids without complication    DJD (degenerative joint disease)    Erectile dysfunction    Essential hypertension    History of benign prostatic hyperplasia    BMI 32.0-32.9,adult    Prediabetes     Past Medical History:   Diagnosis Date    Allergy     Fatty liver     GERD (gastroesophageal reflux disease)     Hypertension      Past Surgical History:   Procedure Laterality Date    COLONOSCOPY N/A 8/16/2017    Performed by Leo [facility]quez [facility] at [facility] (4[facility] F[facility])    LEG SURGERY Left      Social History     Socioeconomic History    Marital status:      Spouse name: Not on file    Number of children: Not on file    Years of education: Not on file    Highest education level: Not on file   Occupational History    Not on file   Social Needs    Financial resource strain: Not on file    Food insecurity:     Worry: Not on file     Inability: Not on file  "   Transportation needs:     Medical: Not on file     Non-medical: Not on file   Tobacco Use    Smoking status: Former Smoker     Last attempt to quit: 11/15/1978     Years since quittin.5    Smokeless tobacco: Never Used    Tobacco comment: smoked for 10 years, quit in 76   Substance and Sexual Activity    Alcohol use: No    Drug use: No    Sexual activity: Yes     Partners: Female   Lifestyle    Physical activity:     Days per week: Not on file     Minutes per session: Not on file    Stress: Not on file   Relationships    Social connections:     Talks on phone: Not on file     Gets together: Not on file     Attends Hinduism service: Not on file     Active member of club or organization: Not on file     Attends meetings of clubs or organizations: Not on file     Relationship status: Not on file   Other Topics Concern    Not on file   Social History Narrative    Not on file     Family History   Problem Relation Age of Onset    Kidney disease Mother     Heart disease Father     Cancer Sister         kidney    Kidney disease Sister        Objective:       Vitals:    19 1440   BP: (!) 140/82   Pulse: 73   Temp: 98 °F (36.7 °C)   SpO2: 99%   Weight: 113.1 kg (249 lb 5.4 oz)   Height: 6' 2" (1.88 m)   PainSc: 0-No pain     Body mass index is 32.01 kg/m².    Physical Exam   Constitutional: He is oriented to person, place, and time. Vital signs are normal. He appears well-developed and well-nourished.   obese   HENT:   Head: Normocephalic.   Right Ear: Tympanic membrane, external ear and ear canal normal.   Left Ear: Tympanic membrane, external ear and ear canal normal.   Nose: Rhinorrhea and sinus tenderness present. No nasal deformity. No epistaxis. Right sinus exhibits maxillary sinus tenderness and frontal sinus tenderness. Left sinus exhibits maxillary sinus tenderness and frontal sinus tenderness.   Mouth/Throat: Uvula is midline, oropharynx is clear and moist and mucous membranes are " normal. Mucous membranes are not pale. No oropharyngeal exudate.   Clear PND noted on exam   Eyes: Pupils are equal, round, and reactive to light. Conjunctivae, EOM and lids are normal.   Neck: Trachea normal, normal range of motion and phonation normal. Neck supple. No JVD present. Carotid bruit is not present. No thyromegaly present.   Cardiovascular: Normal rate, regular rhythm, normal heart sounds and intact distal pulses.   Pulmonary/Chest: Effort normal and breath sounds normal.   Abdominal: Soft. Normal appearance and bowel sounds are normal. There is no tenderness.   obese   Musculoskeletal: Normal range of motion.   Neurological: He is alert and oriented to person, place, and time.   Skin: Skin is warm, dry and intact. Capillary refill takes less than 2 seconds.   Psychiatric: He has a normal mood and affect. His speech is normal and behavior is normal. Judgment and thought content normal. Cognition and memory are normal.   Nursing note and vitals reviewed.      Assessment:       1. Acute recurrent frontal sinusitis    2. Coughing    3. Postnasal drip    4. BMI 32.0-32.9,adult    5. Obesity (BMI 30-39.9)        Plan:       Adrian Sierra was seen today for cough and nasal congestion.    Diagnoses and all orders for this visit:    Acute recurrent frontal sinusitis  -     azithromycin (Z-MAI) 250 MG tablet; Take 2 tablets by mouth on day 1; Take 1 tablet by mouth on days 2-5    Coughing  -     azithromycin (Z-MAI) 250 MG tablet; Take 2 tablets by mouth on day 1; Take 1 tablet by mouth on days 2-5  -     benzonatate (TESSALON) 100 MG capsule; Take 1 capsule (100 mg total) by mouth 3 (three) times daily as needed for Cough.    Postnasal drip  -     azithromycin (Z-MAI) 250 MG tablet; Take 2 tablets by mouth on day 1; Take 1 tablet by mouth on days 2-5    BMI 32.0-32.9,adult  BMI reviewed    Obesity (BMI 30-39.9)  BMI reviewed.    Diet and exercise to lose weight.    Meds as prescribed    Rest and Fluids, Tylenol or  Motrin otc as needed for pain and or fever    Warm liquids to thin mucus    Allergen avoidance discussed, humidified air recommended    Warm salt water gargles as needed for throat pain    Nasal spray, taught how to correctly use    Self care instructions provided in AVS    Follow up if symptoms worsen or fail to improve.

## 2019-06-24 ENCOUNTER — PATIENT OUTREACH (OUTPATIENT)
Dept: ADMINISTRATIVE | Facility: HOSPITAL | Age: 64
End: 2019-06-24

## 2019-06-24 NOTE — PROGRESS NOTES
Chart review completed. Immunizations reconciled, HM modifiers updated, HM duplicate entries deleted, care team updated, old orders deleted. Pt is due for shingles vaccine.

## 2019-07-01 ENCOUNTER — LAB VISIT (OUTPATIENT)
Dept: LAB | Facility: HOSPITAL | Age: 64
End: 2019-07-01
Attending: INTERNAL MEDICINE
Payer: MEDICARE

## 2019-07-01 DIAGNOSIS — R73.03 PREDIABETES: ICD-10-CM

## 2019-07-01 DIAGNOSIS — I10 ESSENTIAL HYPERTENSION: ICD-10-CM

## 2019-07-01 LAB
ALBUMIN SERPL BCP-MCNC: 3.6 G/DL (ref 3.5–5.2)
ALP SERPL-CCNC: 51 U/L (ref 55–135)
ALT SERPL W/O P-5'-P-CCNC: 24 U/L (ref 10–44)
ANION GAP SERPL CALC-SCNC: 6 MMOL/L (ref 8–16)
AST SERPL-CCNC: 20 U/L (ref 10–40)
BILIRUB SERPL-MCNC: 0.6 MG/DL (ref 0.1–1)
BUN SERPL-MCNC: 19 MG/DL (ref 8–23)
CALCIUM SERPL-MCNC: 9.1 MG/DL (ref 8.7–10.5)
CHLORIDE SERPL-SCNC: 108 MMOL/L (ref 95–110)
CO2 SERPL-SCNC: 27 MMOL/L (ref 23–29)
CREAT SERPL-MCNC: 1.1 MG/DL (ref 0.5–1.4)
EST. GFR  (AFRICAN AMERICAN): >60 ML/MIN/1.73 M^2
EST. GFR  (NON AFRICAN AMERICAN): >60 ML/MIN/1.73 M^2
ESTIMATED AVG GLUCOSE: 120 MG/DL (ref 68–131)
GLUCOSE SERPL-MCNC: 104 MG/DL (ref 70–110)
HBA1C MFR BLD HPLC: 5.8 % (ref 4–5.6)
POTASSIUM SERPL-SCNC: 4.1 MMOL/L (ref 3.5–5.1)
PROT SERPL-MCNC: 6.9 G/DL (ref 6–8.4)
SODIUM SERPL-SCNC: 141 MMOL/L (ref 136–145)

## 2019-07-01 PROCEDURE — 83036 HEMOGLOBIN GLYCOSYLATED A1C: CPT

## 2019-07-01 PROCEDURE — 36415 COLL VENOUS BLD VENIPUNCTURE: CPT

## 2019-07-01 PROCEDURE — 80053 COMPREHEN METABOLIC PANEL: CPT

## 2019-07-08 ENCOUNTER — OFFICE VISIT (OUTPATIENT)
Dept: INTERNAL MEDICINE | Facility: CLINIC | Age: 64
End: 2019-07-08
Payer: MEDICARE

## 2019-07-08 VITALS
HEIGHT: 74 IN | WEIGHT: 249.31 LBS | DIASTOLIC BLOOD PRESSURE: 80 MMHG | HEART RATE: 75 BPM | OXYGEN SATURATION: 98 % | BODY MASS INDEX: 32 KG/M2 | SYSTOLIC BLOOD PRESSURE: 124 MMHG

## 2019-07-08 DIAGNOSIS — R73.03 PREDIABETES: ICD-10-CM

## 2019-07-08 DIAGNOSIS — J30.2 SEASONAL ALLERGIES: ICD-10-CM

## 2019-07-08 DIAGNOSIS — Z87.438 HISTORY OF BENIGN PROSTATIC HYPERPLASIA: ICD-10-CM

## 2019-07-08 DIAGNOSIS — I10 ESSENTIAL HYPERTENSION: Primary | ICD-10-CM

## 2019-07-08 DIAGNOSIS — N52.9 ERECTILE DYSFUNCTION, UNSPECIFIED ERECTILE DYSFUNCTION TYPE: ICD-10-CM

## 2019-07-08 PROCEDURE — 1101F PT FALLS ASSESS-DOCD LE1/YR: CPT | Mod: S$GLB,,, | Performed by: INTERNAL MEDICINE

## 2019-07-08 PROCEDURE — 99999 PR PBB SHADOW E&M-EST. PATIENT-LVL IV: ICD-10-PCS | Mod: PBBFAC,,, | Performed by: INTERNAL MEDICINE

## 2019-07-08 PROCEDURE — 3079F DIAST BP 80-89 MM HG: CPT | Mod: S$GLB,,, | Performed by: INTERNAL MEDICINE

## 2019-07-08 PROCEDURE — 3074F SYST BP LT 130 MM HG: CPT | Mod: S$GLB,,, | Performed by: INTERNAL MEDICINE

## 2019-07-08 PROCEDURE — 99214 OFFICE O/P EST MOD 30 MIN: CPT | Mod: S$GLB,,, | Performed by: INTERNAL MEDICINE

## 2019-07-08 PROCEDURE — 99999 PR PBB SHADOW E&M-EST. PATIENT-LVL IV: CPT | Mod: PBBFAC,,, | Performed by: INTERNAL MEDICINE

## 2019-07-08 PROCEDURE — 3079F PR MOST RECENT DIASTOLIC BLOOD PRESSURE 80-89 MM HG: ICD-10-PCS | Mod: S$GLB,,, | Performed by: INTERNAL MEDICINE

## 2019-07-08 PROCEDURE — 3074F PR MOST RECENT SYSTOLIC BLOOD PRESSURE < 130 MM HG: ICD-10-PCS | Mod: S$GLB,,, | Performed by: INTERNAL MEDICINE

## 2019-07-08 PROCEDURE — 99214 PR OFFICE/OUTPT VISIT, EST, LEVL IV, 30-39 MIN: ICD-10-PCS | Mod: S$GLB,,, | Performed by: INTERNAL MEDICINE

## 2019-07-08 PROCEDURE — 1101F PR PT FALLS ASSESS DOC 0-1 FALLS W/OUT INJ PAST YR: ICD-10-PCS | Mod: S$GLB,,, | Performed by: INTERNAL MEDICINE

## 2019-07-08 RX ORDER — AZELASTINE 1 MG/ML
1 SPRAY, METERED NASAL 2 TIMES DAILY
Qty: 30 ML | Refills: 2 | Status: SHIPPED | OUTPATIENT
Start: 2019-07-08 | End: 2020-05-14

## 2019-07-08 NOTE — PROGRESS NOTES
INTERNAL MEDICINE ESTABLISHED PATIENT VISIT NOTE    Subjective:     Chief Complaint: Follow-up  HTN, sinus issues     Patient ID: Adrian Burt is a 64 y.o. male with  HTN, preDM, BPH, hx hemorrhoids, DJD, seasonal allergies, last seen by me in April, here today for f/u HTN and discuss lab results.    At last appt, cont losartan and amlodipine and added HCTZ for HTN.  However, pt states he never took it and decided to increase his exercise and says home BP has been 120s over 80s.    Had recent issues c sinus infection a little over a month ago and was treated c a zpack and tessalon which initially helped a little but still c post nasal drip and dry cough, occasionally productive.  No fever but feels like his ears and head full stuffed up and full.  Has been on flonase and singulair which has helped a little but not completely.  Requesting ENT evaluation.      Past Medical History:  Past Medical History:   Diagnosis Date    Allergy     Fatty liver     GERD (gastroesophageal reflux disease)     Hypertension        Home Medications:  Prior to Admission medications    Medication Sig Start Date End Date Taking? Authorizing Provider   amLODIPine (NORVASC) 10 MG tablet Take 1 tablet (10 mg total) by mouth every morning. 1/31/19   Mone Brown MD   fluticasone (FLONASE) 50 mcg/actuation nasal spray 1 spray (50 mcg total) by Each Nare route once daily. 1/31/19   Mone Brown MD   hydroCHLOROthiazide (MICROZIDE) 12.5 mg capsule Take 1 capsule (12.5 mg total) by mouth once daily. 4/4/19 4/3/20  Mone Brown MD   losartan (COZAAR) 100 MG tablet Take 1 tablet (100 mg total) by mouth once daily. 1/31/19 1/31/20  Mone Brown MD   meclizine (ANTIVERT) 25 mg tablet Take 1 tablet (25 mg total) by mouth 3 (three) times daily as needed. 11/15/12   Jerilyn Becker PA-C   montelukast (SINGULAIR) 10 mg tablet Take 1 tablet (10 mg total) by mouth every evening. 1/31/19   Mone Brown MD   multivit with minerals/lutein (MULTIVITAMIN 50  "PLUS ORAL) Take 1 tablet by mouth once daily.    Historical Provider, MD   sildenafil (VIAGRA) 100 MG tablet Take 1 tablet by mouth continuous prn. 6/15/12   Historical Provider, MD   testosterone (TESTIM) 50 mg/5 gram (1 %) Gel by Percutaneous route. 6/15/12   Historical Provider, MD       Allergies:  Review of patient's allergies indicates:   Allergen Reactions    No known drug allergies        Social History:  Social History     Tobacco Use    Smoking status: Former Smoker     Last attempt to quit: 11/15/1978     Years since quittin.6    Smokeless tobacco: Never Used    Tobacco comment: smoked for 10 years, quit in 76   Substance Use Topics    Alcohol use: No    Drug use: No        Review of Systems   Constitutional: Negative for chills, fatigue and fever.   HENT: Positive for congestion, postnasal drip, rhinorrhea and sinus pressure. Negative for sinus pain, sneezing and sore throat.    Eyes: Positive for itching.   Respiratory: Negative for cough, chest tightness and shortness of breath.    Cardiovascular: Negative for chest pain.   Gastrointestinal: Negative for abdominal pain and blood in stool.   Genitourinary: Negative for dysuria and frequency.         Health Maintenance:     Immunizations:   Influenza 2018  TDap 2018  Prevnar 2018, pvax due in Sept, can give at f/u  Shingrix rec once back in stock     Cancer Screening:  Colonoscopy:  2017 3 polyps removed, tubular adenoma on path and reported rec f/u 5 yrs  PSA 2018 wnl  AAA screening neg 2018    Objective:   /80 (BP Location: Right arm, Patient Position: Sitting, BP Method: Large (Manual))   Pulse 75   Ht 6' 2" (1.88 m)   Wt 113.1 kg (249 lb 5.4 oz)   SpO2 98%   BMI 32.01 kg/m²        General: AAO x3, no apparent distress  HEENT: scant fluid behind TM on R, some post nasal drip noted, no ttp over frontal or maxillary sinuses.  CV: RRR, no m/r/g  Pulm: Lungs CTAB, no crackles, no wheezes  Abd: s/NT/ND +BS  Extremities: no " c/c/e    Labs:     Lab Results   Component Value Date    WBC 4.40 09/10/2018    HGB 14.2 09/10/2018    HCT 45.1 09/10/2018    MCV 85 09/10/2018     09/10/2018     CMP  Sodium   Date Value Ref Range Status   07/01/2019 141 136 - 145 mmol/L Final     Potassium   Date Value Ref Range Status   07/01/2019 4.1 3.5 - 5.1 mmol/L Final     Chloride   Date Value Ref Range Status   07/01/2019 108 95 - 110 mmol/L Final     CO2   Date Value Ref Range Status   07/01/2019 27 23 - 29 mmol/L Final     Glucose   Date Value Ref Range Status   07/01/2019 104 70 - 110 mg/dL Final     BUN, Bld   Date Value Ref Range Status   07/01/2019 19 8 - 23 mg/dL Final     Creatinine   Date Value Ref Range Status   07/01/2019 1.1 0.5 - 1.4 mg/dL Final     Calcium   Date Value Ref Range Status   07/01/2019 9.1 8.7 - 10.5 mg/dL Final     Total Protein   Date Value Ref Range Status   07/01/2019 6.9 6.0 - 8.4 g/dL Final     Albumin   Date Value Ref Range Status   07/01/2019 3.6 3.5 - 5.2 g/dL Final     Total Bilirubin   Date Value Ref Range Status   07/01/2019 0.6 0.1 - 1.0 mg/dL Final     Comment:     For infants and newborns, interpretation of results should be based  on gestational age, weight and in agreement with clinical  observations.  Premature Infant recommended reference ranges:  Up to 24 hours.............<8.0 mg/dL  Up to 48 hours............<12.0 mg/dL  3-5 days..................<15.0 mg/dL  6-29 days.................<15.0 mg/dL       Alkaline Phosphatase   Date Value Ref Range Status   07/01/2019 51 (L) 55 - 135 U/L Final     AST   Date Value Ref Range Status   07/01/2019 20 10 - 40 U/L Final     ALT   Date Value Ref Range Status   07/01/2019 24 10 - 44 U/L Final     Anion Gap   Date Value Ref Range Status   07/01/2019 6 (L) 8 - 16 mmol/L Final     eGFR if    Date Value Ref Range Status   07/01/2019 >60 >60 mL/min/1.73 m^2 Final     eGFR if non    Date Value Ref Range Status   07/01/2019 >60 >60  mL/min/1.73 m^2 Final     Comment:     Calculation used to obtain the estimated glomerular filtration  rate (eGFR) is the CKD-EPI equation.        Lab Results   Component Value Date    LDLCALC 116.8 01/31/2019     Lab Results   Component Value Date    HGBA1C 5.8 (H) 07/01/2019     No results found for: TSH      Assessment/Plan     Adrian Sierra was seen today for follow-up.    Diagnoses and all orders for this visit:    Essential hypertension  At goal on losartan and amlodipine at max doses.  If elevated in future consider added hctz again which pt never took.  Recommend low sodium diet, < 2g Na/day as well as weight loss and exercise.    Prediabetes  Recent labs marginally improved from 5.9 to 5.8.  Pt was counseled on the need to avoid intake of simple sugars and minimize carbohydrates.  Also recommended weight loss and daily exercise.    Seasonal allergies  As per HPI  suboptimal control despite singulair and flonase  Will add astelin and refer to ENT for eval  -     Ambulatory Referral to ENT  -     azelastine (ASTELIN) 137 mcg (0.1 %) nasal spray; 1 spray (137 mcg total) by Nasal route 2 (two) times daily.    Erectile dysfunction, unspecified erectile dysfunction type  On viagra prn, no acute issues    History of benign prostatic hyperplasia  No acute issues    BMI 32.0-32.9,adult  Counseled extensively on need for diet changes and daily exercise. Recommend at least 30 minutes of exercise at least 5 days a week.     HM as above  RTC in 6 mos for f/u, sooner if needed.    Mone Brown MD  Department of Internal Medicine - [facility]  07/08/2019

## 2019-08-01 ENCOUNTER — OFFICE VISIT (OUTPATIENT)
Dept: OTOLARYNGOLOGY | Facility: CLINIC | Age: 64
End: 2019-08-01
Payer: MEDICARE

## 2019-08-01 ENCOUNTER — CLINICAL SUPPORT (OUTPATIENT)
Dept: AUDIOLOGY | Facility: CLINIC | Age: 64
End: 2019-08-01
Payer: MEDICARE

## 2019-08-01 VITALS
HEART RATE: 71 BPM | BODY MASS INDEX: 31.58 KG/M2 | SYSTOLIC BLOOD PRESSURE: 160 MMHG | WEIGHT: 246 LBS | DIASTOLIC BLOOD PRESSURE: 93 MMHG

## 2019-08-01 DIAGNOSIS — J31.0 CHRONIC RHINITIS: Primary | ICD-10-CM

## 2019-08-01 DIAGNOSIS — H93.13 TINNITUS OF BOTH EARS: Primary | ICD-10-CM

## 2019-08-01 DIAGNOSIS — H83.3X3 NOISE-INDUCED HEARING LOSS OF BOTH EARS: ICD-10-CM

## 2019-08-01 PROCEDURE — 92557 PR COMPREHENSIVE HEARING TEST: ICD-10-PCS | Mod: S$GLB,,, | Performed by: AUDIOLOGIST

## 2019-08-01 PROCEDURE — 99999 PR PBB SHADOW E&M-EST. PATIENT-LVL I: ICD-10-PCS | Mod: PBBFAC,,,

## 2019-08-01 PROCEDURE — 1101F PT FALLS ASSESS-DOCD LE1/YR: CPT | Mod: S$GLB,,, | Performed by: NURSE PRACTITIONER

## 2019-08-01 PROCEDURE — 99203 PR OFFICE/OUTPT VISIT, NEW, LEVL III, 30-44 MIN: ICD-10-PCS | Mod: S$GLB,,, | Performed by: NURSE PRACTITIONER

## 2019-08-01 PROCEDURE — 92567 PR TYMPA2METRY: ICD-10-PCS | Mod: S$GLB,,, | Performed by: AUDIOLOGIST

## 2019-08-01 PROCEDURE — 92557 COMPREHENSIVE HEARING TEST: CPT | Mod: S$GLB,,, | Performed by: AUDIOLOGIST

## 2019-08-01 PROCEDURE — 99203 OFFICE O/P NEW LOW 30 MIN: CPT | Mod: S$GLB,,, | Performed by: NURSE PRACTITIONER

## 2019-08-01 PROCEDURE — 1101F PR PT FALLS ASSESS DOC 0-1 FALLS W/OUT INJ PAST YR: ICD-10-PCS | Mod: S$GLB,,, | Performed by: NURSE PRACTITIONER

## 2019-08-01 PROCEDURE — 99999 PR PBB SHADOW E&M-EST. PATIENT-LVL III: CPT | Mod: PBBFAC,,, | Performed by: NURSE PRACTITIONER

## 2019-08-01 PROCEDURE — 99999 PR PBB SHADOW E&M-EST. PATIENT-LVL III: ICD-10-PCS | Mod: PBBFAC,,, | Performed by: NURSE PRACTITIONER

## 2019-08-01 PROCEDURE — 3077F SYST BP >= 140 MM HG: CPT | Mod: S$GLB,,, | Performed by: NURSE PRACTITIONER

## 2019-08-01 PROCEDURE — 3080F DIAST BP >= 90 MM HG: CPT | Mod: S$GLB,,, | Performed by: NURSE PRACTITIONER

## 2019-08-01 PROCEDURE — 3080F PR MOST RECENT DIASTOLIC BLOOD PRESSURE >= 90 MM HG: ICD-10-PCS | Mod: S$GLB,,, | Performed by: NURSE PRACTITIONER

## 2019-08-01 PROCEDURE — 92567 TYMPANOMETRY: CPT | Mod: S$GLB,,, | Performed by: AUDIOLOGIST

## 2019-08-01 PROCEDURE — 3077F PR MOST RECENT SYSTOLIC BLOOD PRESSURE >= 140 MM HG: ICD-10-PCS | Mod: S$GLB,,, | Performed by: NURSE PRACTITIONER

## 2019-08-01 PROCEDURE — 99999 PR PBB SHADOW E&M-EST. PATIENT-LVL I: CPT | Mod: PBBFAC,,,

## 2019-08-01 NOTE — PROGRESS NOTES
Adrian Burt was seen in the clinic today for an audiological evaluation.  Ms. Burt reported bilateral tinnitus.    Audiological testing revealed normal to borderline normal hearing sensitivity for each ear.  A speech reception threshold was obtained at 15 dBHL for each ear.  Speech discrimination was 100% for each ear.      Tympanometry testing revealed a Type A tympanogram for each ear.    Recommendations:  1. Otologic evaluation  2. Annual audiological evaluation  3. Hearing protection when in noise

## 2019-08-01 NOTE — LETTER
August 1, 2019      Mone Brown MD  [address]  [facility] [address]           [facility] - Otorhinolaryngology  [address]  [facility] [address]  Phone: [phone]  Fax: [phone]          Patient: Adrian Burt   MR Number: [id number]   YOB: 1955   Date of Visit: 8/1/2019       Dear Dr. Rodriguez:    Thank you for referring Adrian Burt to me for evaluation. Attached you will find relevant portions of my assessment and plan of care.    If you have questions, please do not hesitate to call me. I look forward to following Adrian Burt along with you.    Sincerely,    Mckenzie Silva, NP    Enclosure  CC:  No Recipients    If you would like to receive this communication electronically, please contact [email or web address] or [phone] to request more information on [employer] Link access.    For providers and/or their staff who would like to refer a patient to [facility], please contact us through our one-stop-shop provider referral line, [facility] , at [phone].    If you feel you have received this communication in error or would no longer like to receive these types of communications, please e-mail [email or web address]

## 2019-08-01 NOTE — PROGRESS NOTES
"  Subjective:      Adrian Burt is a 64 y.o. male who was referred to me by Dr. Rodriguez in consultation for ringing in ears.    Mr. Burt presents today with c/o "unusual sound" in left ear intermittently for the past 9 months. He describes the sound in ear as a "busted speaker." He denies any change in hearing. There is a family history of hearing loss at a young age, mother.  There is not a prior history of ear surgery.  There is not a prior history of ear infections .  He denies a history of significant noise exposure.  He does not wear hearing aids currently.  He has not had a hearing test recently.  ETDQ score 3.7 today.    He also reports constant runny nose for the past several years. He has associated itchy eyes and post-nasal drip. He currently uses Fluticasone and Astelin spray for the past month with significant improvement of symptoms, but he continues to have bother some runny nose. He denies fever, sinus pressure, cough or nasal congestion.     Past Medical History  He has a past medical history of Allergy, Fatty liver, GERD (gastroesophageal reflux disease), and Hypertension.    Past Surgical History  He has a past surgical history that includes Colonoscopy (N/A, 8/16/2017) and Leg Surgery (Left).    Family History  His family history includes Cancer in his sister; Heart disease in his father; Kidney disease in his mother and sister.    Social History  He reports that he quit smoking about 40 years ago. He has never used smokeless tobacco. He reports that he does not drink alcohol or use drugs.    Allergies  He is allergic to no known drug allergies.    Medications  He has a current medication list which includes the following prescription(s): amlodipine, azelastine, fluticasone propionate, losartan, meclizine, montelukast, multivit with minerals/lutein, sildenafil, and testosterone.    Review of Systems   Constitutional: Negative for chills, fatigue, fever and unexpected weight change. "   HENT: Positive for congestion, postnasal drip, rhinorrhea, sneezing and tinnitus. Negative for ear discharge, ear pain, facial swelling, hearing loss, nosebleeds, sinus pressure, sinus pain, sore throat and trouble swallowing.    Eyes: Positive for itching. Negative for photophobia, pain, redness and visual disturbance.   Respiratory: Negative for apnea, cough, shortness of breath, wheezing and stridor.    Cardiovascular: Negative for chest pain and palpitations.   Gastrointestinal: Negative for abdominal pain, diarrhea, nausea and vomiting.   Endocrine: Negative.  Negative for cold intolerance and heat intolerance.   Genitourinary: Negative for decreased urine volume, dysuria and frequency.   Musculoskeletal: Negative for arthralgias, joint swelling, myalgias and neck stiffness.   Skin: Negative for color change, rash and wound.   Allergic/Immunologic: Positive for environmental allergies. Negative for food allergies and immunocompromised state.   Neurological: Negative for dizziness, syncope, facial asymmetry, weakness, light-headedness and headaches.   Hematological: Negative for adenopathy. Does not bruise/bleed easily.   Psychiatric/Behavioral: Negative for agitation, confusion, decreased concentration and sleep disturbance. The patient is not nervous/anxious.           Objective:     BP (!) 160/93   Pulse 71   Wt 111.6 kg (246 lb)   BMI 31.58 kg/m²    Physical Exam    Procedure    None        Data Reviewed    WBC (K/uL)   Date Value   09/10/2018 4.40     Platelets (K/uL)   Date Value   09/10/2018 182      Creatinine (mg/dL)   Date Value   07/01/2019 1.1     No results found for: TSH  Glucose (mg/dL)   Date Value   07/01/2019 104     Hemoglobin A1C (%)   Date Value   07/01/2019 5.8 (H)       I independently reviewed the tracings of the complete audiometric evaluation performed today.  I reviewed the audiogram with the patient as well.  Pertinent findings include slight HF SNHL at 4000 Hz in both ears,  normal tymps, symmetric SRT..         Assessment:     1. Chronic rhinitis    2. Noise-induced hearing loss of both ears         Plan:     I had a long discussion with the patient regarding his condition and the further workup and management options.    Audiogram reveal slight noise induced SNHL. I recommend he use hearing protection when exposed to noisy environments.  I believe the sound in his ears is related to the significant mucous production around the opening of the eustachian tube related to his chronic rhinitis.  For chronic rhinitis, I recommend that he continue with the current regimen of Fluticasone and Astelin spray daily for another two months.  I recommended he add nasal saline rinse once daily with distilled water.   If symptoms do not improve, will consider nasal endoscopy and referral to Allergy.      Follow up in about 2 months (around 10/1/2019).

## 2019-09-24 DIAGNOSIS — J30.1 SEASONAL ALLERGIC RHINITIS DUE TO POLLEN: ICD-10-CM

## 2019-09-24 RX ORDER — FLUTICASONE PROPIONATE 50 MCG
SPRAY, SUSPENSION (ML) NASAL
Qty: 48 ML | Refills: 0 | Status: SHIPPED | OUTPATIENT
Start: 2019-09-24 | End: 2019-12-29

## 2019-09-24 RX ORDER — FLUTICASONE PROPIONATE 50 MCG
SPRAY, SUSPENSION (ML) NASAL
Qty: 16 ML | Refills: 0 | Status: SHIPPED | OUTPATIENT
Start: 2019-09-24 | End: 2019-09-24 | Stop reason: SDUPTHER

## 2019-09-27 ENCOUNTER — PATIENT OUTREACH (OUTPATIENT)
Dept: ADMINISTRATIVE | Facility: OTHER | Age: 64
End: 2019-09-27

## 2019-11-13 ENCOUNTER — TELEPHONE (OUTPATIENT)
Dept: INTERNAL MEDICINE | Facility: CLINIC | Age: 64
End: 2019-11-13

## 2019-11-14 ENCOUNTER — OFFICE VISIT (OUTPATIENT)
Dept: INTERNAL MEDICINE | Facility: CLINIC | Age: 64
End: 2019-11-14
Payer: MEDICARE

## 2019-11-14 VITALS
HEIGHT: 74 IN | BODY MASS INDEX: 33.1 KG/M2 | DIASTOLIC BLOOD PRESSURE: 82 MMHG | HEART RATE: 77 BPM | WEIGHT: 257.94 LBS | SYSTOLIC BLOOD PRESSURE: 134 MMHG

## 2019-11-14 DIAGNOSIS — M15.9 PRIMARY OSTEOARTHRITIS INVOLVING MULTIPLE JOINTS: ICD-10-CM

## 2019-11-14 DIAGNOSIS — Z87.438 HISTORY OF BENIGN PROSTATIC HYPERPLASIA: ICD-10-CM

## 2019-11-14 DIAGNOSIS — I10 ESSENTIAL HYPERTENSION: ICD-10-CM

## 2019-11-14 DIAGNOSIS — Z00.00 ENCOUNTER FOR PREVENTIVE HEALTH EXAMINATION: Primary | ICD-10-CM

## 2019-11-14 DIAGNOSIS — R73.03 PREDIABETES: ICD-10-CM

## 2019-11-14 PROBLEM — E66.811 CLASS 1 OBESITY DUE TO EXCESS CALORIES WITH SERIOUS COMORBIDITY IN ADULT: Status: ACTIVE | Noted: 2018-11-15

## 2019-11-14 PROBLEM — E66.09 CLASS 1 OBESITY DUE TO EXCESS CALORIES WITH SERIOUS COMORBIDITY IN ADULT: Status: ACTIVE | Noted: 2018-11-15

## 2019-11-14 PROCEDURE — 99999 PR PBB SHADOW E&M-EST. PATIENT-LVL IV: CPT | Mod: PBBFAC,,, | Performed by: NURSE PRACTITIONER

## 2019-11-14 PROCEDURE — 99999 PR PBB SHADOW E&M-EST. PATIENT-LVL IV: ICD-10-PCS | Mod: PBBFAC,,, | Performed by: NURSE PRACTITIONER

## 2019-11-14 PROCEDURE — 3079F DIAST BP 80-89 MM HG: CPT | Mod: S$GLB,,, | Performed by: NURSE PRACTITIONER

## 2019-11-14 PROCEDURE — 3075F PR MOST RECENT SYSTOLIC BLOOD PRESS GE 130-139MM HG: ICD-10-PCS | Mod: S$GLB,,, | Performed by: NURSE PRACTITIONER

## 2019-11-14 PROCEDURE — G0439 PR MEDICARE ANNUAL WELLNESS SUBSEQUENT VISIT: ICD-10-PCS | Mod: S$GLB,,, | Performed by: NURSE PRACTITIONER

## 2019-11-14 PROCEDURE — 3075F SYST BP GE 130 - 139MM HG: CPT | Mod: S$GLB,,, | Performed by: NURSE PRACTITIONER

## 2019-11-14 PROCEDURE — G0439 PPPS, SUBSEQ VISIT: HCPCS | Mod: S$GLB,,, | Performed by: NURSE PRACTITIONER

## 2019-11-14 PROCEDURE — 3079F PR MOST RECENT DIASTOLIC BLOOD PRESSURE 80-89 MM HG: ICD-10-PCS | Mod: S$GLB,,, | Performed by: NURSE PRACTITIONER

## 2019-11-14 RX ORDER — MULTIVITAMIN
1 TABLET ORAL DAILY
COMMUNITY
End: 2021-04-07

## 2019-11-14 NOTE — PATIENT INSTRUCTIONS
Counseling and Referral of Other Preventative  (Italic type indicates deductible and co-insurance are waived)    Patient Name: Adrian Burt  Today's Date: 11/14/2019    Health Maintenance       Date Due Completion Date    Shingles Vaccine (1 of 2) 01/25/2003 Obtain new shingles vaccine - SHINGRIX - when available    Influenza Vaccine (1) 09/01/2019 9/10/2018    PROSTATE-SPECIFIC ANTIGEN 09/10/2019 9/10/2018    Pneumococcal Vaccine (65+ Low/Medium Risk) (2 of 2 - PPSV23) 09/10/2019 9/10/2018    Colonoscopy 08/16/2022 8/16/2017    Lipid Panel 01/31/2024 1/31/2019    TETANUS VACCINE 09/10/2028 9/10/2018        No orders of the defined types were placed in this encounter.    The following information is provided to all patients.  This information is to help you find resources for any of the problems found today that may be affecting your health:                Living healthy guide: www.[facility].[place].gov      Understanding Diabetes: www.diabetes.org      Eating healthy: www.cdc.gov/healthyweight      CDC home safety checklist: www.cdc.gov/steadi/patient.html      Agency on Aging: www.goea.[place].[facility]      Alcoholics anonymous (AA): www.aa.org      Physical Activity: www.shlomo.nih.gov/gt6uzcj      Tobacco use: www.quitwithusla.org

## 2019-11-14 NOTE — PROGRESS NOTES
"Adrian Burt presented for a  Medicare AWV and comprehensive Health Risk Assessment today. The following components were reviewed and updated:    · Medical history  · Family History  · Social history  · Allergies and Current Medications  · Health Risk Assessment  · Health Maintenance  · Care Team     ** See Completed Assessments for Annual Wellness Visit within the encounter summary.**       The following assessments were completed:  · Living Situation  · CAGE  · Depression Screening  · Timed Get Up and Go  · Whisper Test  · Cognitive Function Screening      ·   · Nutrition Screening  · ADL Screening  · PAQ Screening    Vitals:    11/14/19 0826   BP: 134/82   BP Location: Right arm   Pulse: 77   Weight: 117 kg (257 lb 15 oz)   Height: 6' 2" (1.88 m)     Body mass index is 33.12 kg/m².  Physical Exam   Constitutional: He is oriented to person, place, and time. He appears well-developed and well-nourished.   HENT:   Head: Normocephalic.   Cardiovascular: Normal rate and regular rhythm.   Pulmonary/Chest: Effort normal and breath sounds normal.   Abdominal: Soft. Bowel sounds are normal.   Musculoskeletal: Normal range of motion. He exhibits no edema.   Neurological: He is alert and oriented to person, place, and time.   Skin: Skin is warm and dry.   Psychiatric: He has a normal mood and affect.   Nursing note and vitals reviewed.        Diagnoses and health risks identified today and associated recommendations/orders:    1. Encounter for preventive health examination  Here for Health Risk Assessment/Annual Wellness Visit.  Health maintenance reviewed and updated. Follow up in one year.    2. Essential hypertension  Chronic, stable on current medications. Followed by PCP.    3. Prediabetes  Chronic, stable with diet. Last A1c 5.8. Followed by PCP.    4. History of benign prostatic hyperplasia  Stable. Followed by outside Urology, Dr. Rogers.    5. Primary osteoarthritis involving multiple joints  Chronic, stable. " Followed by PCP.    6. BMI 33.0-33.9,adult  Chronic, weight stable. Followed by PCP.      Provided Adrian Rigo with a 5-10 year written screening schedule and personal prevention plan. Recommendations were developed using the USPSTF age appropriate recommendations. Education, counseling, and referrals were provided as needed. After Visit Summary printed and given to patient which includes a list of additional screenings\tests needed.    Follow up in 4 months (on 3/24/2020).with PCP    Jerilyn Harmon NP

## 2019-12-28 DIAGNOSIS — J30.1 SEASONAL ALLERGIC RHINITIS DUE TO POLLEN: ICD-10-CM

## 2019-12-29 RX ORDER — FLUTICASONE PROPIONATE 50 MCG
SPRAY, SUSPENSION (ML) NASAL
Qty: 16 G | Refills: 5 | Status: SHIPPED | OUTPATIENT
Start: 2019-12-29 | End: 2020-05-14

## 2020-01-22 DIAGNOSIS — I10 ESSENTIAL HYPERTENSION: ICD-10-CM

## 2020-01-22 RX ORDER — AMLODIPINE BESYLATE 10 MG/1
TABLET ORAL
Qty: 90 TABLET | Refills: 3 | Status: SHIPPED | OUTPATIENT
Start: 2020-01-22 | End: 2021-03-29 | Stop reason: SDUPTHER

## 2020-01-22 RX ORDER — LOSARTAN POTASSIUM 100 MG/1
TABLET ORAL
Qty: 90 TABLET | Refills: 3 | Status: SHIPPED | OUTPATIENT
Start: 2020-01-22 | End: 2021-02-02 | Stop reason: SDUPTHER

## 2020-02-12 ENCOUNTER — TELEPHONE (OUTPATIENT)
Dept: INTERNAL MEDICINE | Facility: CLINIC | Age: 65
End: 2020-02-12

## 2020-02-12 NOTE — TELEPHONE ENCOUNTER
----- Message from Alma Rosa Quesada sent at 2/12/2020  7:32 AM CST -----  Contact: [employer] request  Message     Appointment Request From: Adrian Burt    With Provider: Mone Brown MD [[facility] - Internal Medicine]    Preferred Date Range: 2/14/2020 - 2/18/2020    Preferred Times: Any time    Reason for visit: Flu and influenza  shots    Comments:  Just shots

## 2020-02-16 DIAGNOSIS — J30.1 SEASONAL ALLERGIC RHINITIS DUE TO POLLEN: ICD-10-CM

## 2020-02-17 RX ORDER — MONTELUKAST SODIUM 10 MG/1
TABLET ORAL
Qty: 90 TABLET | Refills: 3 | Status: SHIPPED | OUTPATIENT
Start: 2020-02-17 | End: 2020-05-14

## 2020-03-02 ENCOUNTER — IMMUNIZATION (OUTPATIENT)
Dept: PHARMACY | Facility: CLINIC | Age: 65
End: 2020-03-02

## 2020-03-02 ENCOUNTER — IMMUNIZATION (OUTPATIENT)
Dept: PHARMACY | Facility: CLINIC | Age: 65
End: 2020-03-02
Payer: MEDICARE

## 2020-03-03 ENCOUNTER — PATIENT MESSAGE (OUTPATIENT)
Dept: PHARMACY | Facility: CLINIC | Age: 65
End: 2020-03-03

## 2020-03-20 ENCOUNTER — TELEPHONE (OUTPATIENT)
Dept: INTERNAL MEDICINE | Facility: CLINIC | Age: 65
End: 2020-03-20

## 2020-03-20 NOTE — TELEPHONE ENCOUNTER
----- Message from Caroline Chavira sent at 3/20/2020  3:02 PM CDT -----  Call patient for booked out, no answer unable to leave a message

## 2020-05-13 DIAGNOSIS — J30.1 SEASONAL ALLERGIC RHINITIS DUE TO POLLEN: ICD-10-CM

## 2020-05-13 DIAGNOSIS — J30.2 SEASONAL ALLERGIES: ICD-10-CM

## 2020-05-14 RX ORDER — AZELASTINE 1 MG/ML
SPRAY, METERED NASAL
Qty: 30 ML | Refills: 2 | Status: SHIPPED | OUTPATIENT
Start: 2020-05-14 | End: 2022-03-31

## 2020-05-14 RX ORDER — MONTELUKAST SODIUM 10 MG/1
TABLET ORAL
Qty: 90 TABLET | Refills: 3 | Status: SHIPPED | OUTPATIENT
Start: 2020-05-14 | End: 2021-05-04 | Stop reason: SDUPTHER

## 2020-05-14 RX ORDER — FLUTICASONE PROPIONATE 50 MCG
SPRAY, SUSPENSION (ML) NASAL
Qty: 48 G | Refills: 1 | Status: SHIPPED | OUTPATIENT
Start: 2020-05-14 | End: 2020-11-06 | Stop reason: SDUPTHER

## 2020-08-04 ENCOUNTER — IMMUNIZATION (OUTPATIENT)
Dept: PHARMACY | Facility: CLINIC | Age: 65
End: 2020-08-04
Payer: MEDICARE

## 2020-11-06 DIAGNOSIS — J30.1 SEASONAL ALLERGIC RHINITIS DUE TO POLLEN: ICD-10-CM

## 2020-11-06 RX ORDER — FLUTICASONE PROPIONATE 50 MCG
1 SPRAY, SUSPENSION (ML) NASAL DAILY
Qty: 48 G | Refills: 2 | Status: SHIPPED | OUTPATIENT
Start: 2020-11-06 | End: 2021-11-30

## 2020-11-19 ENCOUNTER — PES CALL (OUTPATIENT)
Dept: ADMINISTRATIVE | Facility: CLINIC | Age: 65
End: 2020-11-19

## 2021-01-04 ENCOUNTER — PATIENT MESSAGE (OUTPATIENT)
Dept: ADMINISTRATIVE | Facility: HOSPITAL | Age: 66
End: 2021-01-04

## 2021-01-28 ENCOUNTER — PATIENT MESSAGE (OUTPATIENT)
Dept: INTERNAL MEDICINE | Facility: CLINIC | Age: 66
End: 2021-01-28

## 2021-02-02 DIAGNOSIS — I10 ESSENTIAL HYPERTENSION: ICD-10-CM

## 2021-02-02 RX ORDER — LOSARTAN POTASSIUM 100 MG/1
100 TABLET ORAL DAILY
Qty: 90 TABLET | Refills: 3 | Status: SHIPPED | OUTPATIENT
Start: 2021-02-02 | End: 2022-01-31

## 2021-02-19 ENCOUNTER — PATIENT MESSAGE (OUTPATIENT)
Dept: INTERNAL MEDICINE | Facility: CLINIC | Age: 66
End: 2021-02-19

## 2021-03-16 ENCOUNTER — PATIENT MESSAGE (OUTPATIENT)
Dept: INTERNAL MEDICINE | Facility: CLINIC | Age: 66
End: 2021-03-16

## 2021-03-29 ENCOUNTER — PATIENT MESSAGE (OUTPATIENT)
Dept: INTERNAL MEDICINE | Facility: CLINIC | Age: 66
End: 2021-03-29

## 2021-03-29 DIAGNOSIS — I10 ESSENTIAL HYPERTENSION: ICD-10-CM

## 2021-03-30 RX ORDER — AMLODIPINE BESYLATE 10 MG/1
10 TABLET ORAL EVERY MORNING
Qty: 90 TABLET | Refills: 3 | Status: SHIPPED | OUTPATIENT
Start: 2021-03-30 | End: 2022-03-28

## 2021-04-07 ENCOUNTER — LAB VISIT (OUTPATIENT)
Dept: LAB | Facility: HOSPITAL | Age: 66
End: 2021-04-07
Attending: INTERNAL MEDICINE
Payer: MEDICARE

## 2021-04-07 ENCOUNTER — OFFICE VISIT (OUTPATIENT)
Dept: INTERNAL MEDICINE | Facility: CLINIC | Age: 66
End: 2021-04-07
Payer: MEDICARE

## 2021-04-07 ENCOUNTER — IMMUNIZATION (OUTPATIENT)
Dept: PHARMACY | Facility: CLINIC | Age: 66
End: 2021-04-07
Payer: MEDICARE

## 2021-04-07 VITALS
SYSTOLIC BLOOD PRESSURE: 120 MMHG | HEART RATE: 80 BPM | BODY MASS INDEX: 32 KG/M2 | WEIGHT: 249.31 LBS | DIASTOLIC BLOOD PRESSURE: 80 MMHG | OXYGEN SATURATION: 99 % | HEIGHT: 74 IN

## 2021-04-07 DIAGNOSIS — Z00.01 ENCOUNTER FOR GENERAL ADULT MEDICAL EXAMINATION WITH ABNORMAL FINDINGS: ICD-10-CM

## 2021-04-07 DIAGNOSIS — R73.03 PREDIABETES: ICD-10-CM

## 2021-04-07 DIAGNOSIS — I10 ESSENTIAL HYPERTENSION: ICD-10-CM

## 2021-04-07 DIAGNOSIS — Z00.01 ENCOUNTER FOR GENERAL ADULT MEDICAL EXAMINATION WITH ABNORMAL FINDINGS: Primary | ICD-10-CM

## 2021-04-07 DIAGNOSIS — Z12.5 SCREENING PSA (PROSTATE SPECIFIC ANTIGEN): ICD-10-CM

## 2021-04-07 DIAGNOSIS — R79.9 ABNORMAL FINDING OF BLOOD CHEMISTRY, UNSPECIFIED: ICD-10-CM

## 2021-04-07 DIAGNOSIS — E29.1 TESTOSTERONE DEFICIENCY IN MALE: ICD-10-CM

## 2021-04-07 DIAGNOSIS — M79.89 LEG SWELLING: ICD-10-CM

## 2021-04-07 LAB
BASOPHILS # BLD AUTO: 0.02 K/UL (ref 0–0.2)
BASOPHILS NFR BLD: 0.5 % (ref 0–1.9)
DIFFERENTIAL METHOD: ABNORMAL
EOSINOPHIL # BLD AUTO: 0.1 K/UL (ref 0–0.5)
EOSINOPHIL NFR BLD: 2.7 % (ref 0–8)
ERYTHROCYTE [DISTWIDTH] IN BLOOD BY AUTOMATED COUNT: 16 % (ref 11.5–14.5)
ESTIMATED AVG GLUCOSE: 117 MG/DL (ref 68–131)
HBA1C MFR BLD: 5.7 % (ref 4–5.6)
HCT VFR BLD AUTO: 43 % (ref 40–54)
HGB BLD-MCNC: 12.2 G/DL (ref 14–18)
IMM GRANULOCYTES # BLD AUTO: 0.01 K/UL (ref 0–0.04)
IMM GRANULOCYTES NFR BLD AUTO: 0.3 % (ref 0–0.5)
LYMPHOCYTES # BLD AUTO: 1.8 K/UL (ref 1–4.8)
LYMPHOCYTES NFR BLD: 47.6 % (ref 18–48)
MCH RBC QN AUTO: 23 PG (ref 27–31)
MCHC RBC AUTO-ENTMCNC: 28.4 G/DL (ref 32–36)
MCV RBC AUTO: 81 FL (ref 82–98)
MONOCYTES # BLD AUTO: 0.6 K/UL (ref 0.3–1)
MONOCYTES NFR BLD: 15.8 % (ref 4–15)
NEUTROPHILS # BLD AUTO: 1.2 K/UL (ref 1.8–7.7)
NEUTROPHILS NFR BLD: 33.1 % (ref 38–73)
NRBC BLD-RTO: 0 /100 WBC
PLATELET # BLD AUTO: 204 K/UL (ref 150–450)
PMV BLD AUTO: 11.3 FL (ref 9.2–12.9)
RBC # BLD AUTO: 5.31 M/UL (ref 4.6–6.2)
WBC # BLD AUTO: 3.68 K/UL (ref 3.9–12.7)

## 2021-04-07 PROCEDURE — 80061 LIPID PANEL: CPT | Performed by: INTERNAL MEDICINE

## 2021-04-07 PROCEDURE — 1126F AMNT PAIN NOTED NONE PRSNT: CPT | Mod: S$GLB,,, | Performed by: INTERNAL MEDICINE

## 2021-04-07 PROCEDURE — 99397 PR PREVENTIVE VISIT,EST,65 & OVER: ICD-10-PCS | Mod: S$GLB,,, | Performed by: INTERNAL MEDICINE

## 2021-04-07 PROCEDURE — 36415 COLL VENOUS BLD VENIPUNCTURE: CPT | Performed by: INTERNAL MEDICINE

## 2021-04-07 PROCEDURE — 80053 COMPREHEN METABOLIC PANEL: CPT | Performed by: INTERNAL MEDICINE

## 2021-04-07 PROCEDURE — 3079F DIAST BP 80-89 MM HG: CPT | Mod: S$GLB,,, | Performed by: INTERNAL MEDICINE

## 2021-04-07 PROCEDURE — 3074F PR MOST RECENT SYSTOLIC BLOOD PRESSURE < 130 MM HG: ICD-10-PCS | Mod: S$GLB,,, | Performed by: INTERNAL MEDICINE

## 2021-04-07 PROCEDURE — 99397 PER PM REEVAL EST PAT 65+ YR: CPT | Mod: S$GLB,,, | Performed by: INTERNAL MEDICINE

## 2021-04-07 PROCEDURE — 1101F PR PT FALLS ASSESS DOC 0-1 FALLS W/OUT INJ PAST YR: ICD-10-PCS | Mod: S$GLB,,, | Performed by: INTERNAL MEDICINE

## 2021-04-07 PROCEDURE — 83036 HEMOGLOBIN GLYCOSYLATED A1C: CPT | Performed by: INTERNAL MEDICINE

## 2021-04-07 PROCEDURE — 85025 COMPLETE CBC W/AUTO DIFF WBC: CPT | Performed by: INTERNAL MEDICINE

## 2021-04-07 PROCEDURE — 1101F PT FALLS ASSESS-DOCD LE1/YR: CPT | Mod: S$GLB,,, | Performed by: INTERNAL MEDICINE

## 2021-04-07 PROCEDURE — 3079F PR MOST RECENT DIASTOLIC BLOOD PRESSURE 80-89 MM HG: ICD-10-PCS | Mod: S$GLB,,, | Performed by: INTERNAL MEDICINE

## 2021-04-07 PROCEDURE — 3008F PR BODY MASS INDEX (BMI) DOCUMENTED: ICD-10-PCS | Mod: S$GLB,,, | Performed by: INTERNAL MEDICINE

## 2021-04-07 PROCEDURE — 84153 ASSAY OF PSA TOTAL: CPT | Performed by: INTERNAL MEDICINE

## 2021-04-07 PROCEDURE — 99999 PR PBB SHADOW E&M-EST. PATIENT-LVL III: ICD-10-PCS | Mod: PBBFAC,,, | Performed by: INTERNAL MEDICINE

## 2021-04-07 PROCEDURE — 99999 PR PBB SHADOW E&M-EST. PATIENT-LVL III: CPT | Mod: PBBFAC,,, | Performed by: INTERNAL MEDICINE

## 2021-04-07 PROCEDURE — 1126F PR PAIN SEVERITY QUANTIFIED, NO PAIN PRESENT: ICD-10-PCS | Mod: S$GLB,,, | Performed by: INTERNAL MEDICINE

## 2021-04-07 PROCEDURE — 3074F SYST BP LT 130 MM HG: CPT | Mod: S$GLB,,, | Performed by: INTERNAL MEDICINE

## 2021-04-07 PROCEDURE — 3288F FALL RISK ASSESSMENT DOCD: CPT | Mod: S$GLB,,, | Performed by: INTERNAL MEDICINE

## 2021-04-07 PROCEDURE — 3288F PR FALLS RISK ASSESSMENT DOCUMENTED: ICD-10-PCS | Mod: S$GLB,,, | Performed by: INTERNAL MEDICINE

## 2021-04-07 PROCEDURE — 3008F BODY MASS INDEX DOCD: CPT | Mod: S$GLB,,, | Performed by: INTERNAL MEDICINE

## 2021-04-07 RX ORDER — TESTOSTERONE CYPIONATE 200 MG/ML
200 INJECTION, SOLUTION INTRAMUSCULAR
Refills: 0
Start: 2021-04-07 | End: 2022-05-10

## 2021-04-08 LAB
ALBUMIN SERPL BCP-MCNC: 3.7 G/DL (ref 3.5–5.2)
ALP SERPL-CCNC: 59 U/L (ref 55–135)
ALT SERPL W/O P-5'-P-CCNC: 23 U/L (ref 10–44)
ANION GAP SERPL CALC-SCNC: 8 MMOL/L (ref 8–16)
AST SERPL-CCNC: 29 U/L (ref 10–40)
BILIRUB SERPL-MCNC: 0.6 MG/DL (ref 0.1–1)
BUN SERPL-MCNC: 10 MG/DL (ref 8–23)
CALCIUM SERPL-MCNC: 8.7 MG/DL (ref 8.7–10.5)
CHLORIDE SERPL-SCNC: 103 MMOL/L (ref 95–110)
CHOLEST SERPL-MCNC: 129 MG/DL (ref 120–199)
CHOLEST/HDLC SERPL: 3.5 {RATIO} (ref 2–5)
CO2 SERPL-SCNC: 26 MMOL/L (ref 23–29)
COMPLEXED PSA SERPL-MCNC: 5.2 NG/ML (ref 0–4)
CREAT SERPL-MCNC: 1 MG/DL (ref 0.5–1.4)
EST. GFR  (AFRICAN AMERICAN): >60 ML/MIN/1.73 M^2
EST. GFR  (NON AFRICAN AMERICAN): >60 ML/MIN/1.73 M^2
GLUCOSE SERPL-MCNC: 93 MG/DL (ref 70–110)
HDLC SERPL-MCNC: 37 MG/DL (ref 40–75)
HDLC SERPL: 28.7 % (ref 20–50)
LDLC SERPL CALC-MCNC: 82.6 MG/DL (ref 63–159)
NONHDLC SERPL-MCNC: 92 MG/DL
POTASSIUM SERPL-SCNC: 4.3 MMOL/L (ref 3.5–5.1)
PROT SERPL-MCNC: 7.6 G/DL (ref 6–8.4)
SODIUM SERPL-SCNC: 137 MMOL/L (ref 136–145)
TRIGL SERPL-MCNC: 47 MG/DL (ref 30–150)

## 2021-04-20 ENCOUNTER — TELEPHONE (OUTPATIENT)
Dept: INTERNAL MEDICINE | Facility: CLINIC | Age: 66
End: 2021-04-20

## 2021-04-21 ENCOUNTER — TELEPHONE (OUTPATIENT)
Dept: INTERNAL MEDICINE | Facility: CLINIC | Age: 66
End: 2021-04-21

## 2021-04-21 DIAGNOSIS — D50.9 MICROCYTIC ANEMIA: Primary | ICD-10-CM

## 2021-04-23 ENCOUNTER — HOSPITAL ENCOUNTER (OUTPATIENT)
Dept: CARDIOLOGY | Facility: HOSPITAL | Age: 66
Discharge: HOME | End: 2021-04-23
Attending: INTERNAL MEDICINE
Payer: MEDICARE

## 2021-04-23 VITALS
HEIGHT: 74 IN | SYSTOLIC BLOOD PRESSURE: 172 MMHG | DIASTOLIC BLOOD PRESSURE: 92 MMHG | BODY MASS INDEX: 31.95 KG/M2 | WEIGHT: 249 LBS | HEART RATE: 75 BPM

## 2021-04-23 DIAGNOSIS — M79.89 LEG SWELLING: ICD-10-CM

## 2021-04-23 LAB
ASCENDING AORTA: 3.16 CM
AV INDEX (PROSTH): 0.57
AV MEAN GRADIENT: 7 MMHG
AV PEAK GRADIENT: 14 MMHG
AV VALVE AREA: 2.87 CM2
AV VELOCITY RATIO: 0.6
BSA FOR ECHO PROCEDURE: 2.43 M2
CV ECHO LV RWT: 0.46 CM
DOP CALC AO PEAK VEL: 1.84 M/S
DOP CALC AO VTI: 37.42 CM
DOP CALC LVOT AREA: 5 CM2
DOP CALC LVOT DIAMETER: 2.53 CM
DOP CALC LVOT PEAK VEL: 1.11 M/S
DOP CALC LVOT STROKE VOLUME: 107.23 CM3
DOP CALCLVOT PEAK VEL VTI: 21.34 CM
E WAVE DECELERATION TIME: 241.2 MSEC
E/A RATIO: 1.1
E/E' RATIO: 6.5 M/S
ECHO LV POSTERIOR WALL: 1.27 CM (ref 0.6–1.1)
EJECTION FRACTION: 50 %
FRACTIONAL SHORTENING: 33 % (ref 28–44)
INTERVENTRICULAR SEPTUM: 1.24 CM (ref 0.6–1.1)
LA MAJOR: 6.18 CM
LA MINOR: 6 CM
LA WIDTH: 5.33 CM
LEFT ATRIUM SIZE: 3.93 CM
LEFT ATRIUM VOLUME INDEX MOD: 52.6 ML/M2
LEFT ATRIUM VOLUME INDEX: 45.4 ML/M2
LEFT ATRIUM VOLUME MOD: 125.64 CM3
LEFT ATRIUM VOLUME: 108.41 CM3
LEFT INTERNAL DIMENSION IN SYSTOLE: 3.65 CM (ref 2.1–4)
LEFT VENTRICLE DIASTOLIC VOLUME INDEX: 61.16 ML/M2
LEFT VENTRICLE DIASTOLIC VOLUME: 146.17 ML
LEFT VENTRICLE MASS INDEX: 121 G/M2
LEFT VENTRICLE SYSTOLIC VOLUME INDEX: 23.6 ML/M2
LEFT VENTRICLE SYSTOLIC VOLUME: 56.44 ML
LEFT VENTRICULAR INTERNAL DIMENSION IN DIASTOLE: 5.48 CM (ref 3.5–6)
LEFT VENTRICULAR MASS: 288.07 G
LV LATERAL E/E' RATIO: 5.42 M/S
LV SEPTAL E/E' RATIO: 8.13 M/S
MV A" WAVE DURATION": 10.56 MSEC
MV PEAK A VEL: 0.59 M/S
MV PEAK E VEL: 0.65 M/S
MV STENOSIS PRESSURE HALF TIME: 69.95 MS
MV VALVE AREA P 1/2 METHOD: 3.15 CM2
PISA TR MAX VEL: 2.02 M/S
PULM VEIN S/D RATIO: 1.79
PV PEAK D VEL: 0.39 M/S
PV PEAK S VEL: 0.7 M/S
QEF: 50 %
RA MAJOR: 4.86 CM
RA PRESSURE: 3 MMHG
RA WIDTH: 4.53 CM
RIGHT VENTRICULAR END-DIASTOLIC DIMENSION: 4.69 CM
RV TISSUE DOPPLER FREE WALL SYSTOLIC VELOCITY 1 (APICAL 4 CHAMBER VIEW): 11.62 CM/S
SINUS: 3.54 CM
STJ: 3.3 CM
TDI LATERAL: 0.12 M/S
TDI SEPTAL: 0.08 M/S
TDI: 0.1 M/S
TR MAX PG: 16 MMHG
TRICUSPID ANNULAR PLANE SYSTOLIC EXCURSION: 1.95 CM
TV REST PULMONARY ARTERY PRESSURE: 19 MMHG

## 2021-04-23 PROCEDURE — 93306 ECHO (CUPID ONLY): ICD-10-PCS | Mod: 26,,, | Performed by: INTERNAL MEDICINE

## 2021-04-23 PROCEDURE — 93306 TTE W/DOPPLER COMPLETE: CPT

## 2021-04-23 PROCEDURE — 93306 TTE W/DOPPLER COMPLETE: CPT | Mod: 26,,, | Performed by: INTERNAL MEDICINE

## 2021-05-04 DIAGNOSIS — J30.1 SEASONAL ALLERGIC RHINITIS DUE TO POLLEN: ICD-10-CM

## 2021-05-04 RX ORDER — MONTELUKAST SODIUM 10 MG/1
10 TABLET ORAL NIGHTLY
Qty: 90 TABLET | Refills: 3 | Status: SHIPPED | OUTPATIENT
Start: 2021-05-04 | End: 2022-04-29

## 2021-05-24 ENCOUNTER — LAB VISIT (OUTPATIENT)
Dept: LAB | Facility: HOSPITAL | Age: 66
End: 2021-05-24
Attending: INTERNAL MEDICINE
Payer: MEDICARE

## 2021-05-24 DIAGNOSIS — D50.9 MICROCYTIC ANEMIA: ICD-10-CM

## 2021-05-24 LAB
BASOPHILS # BLD AUTO: 0.02 K/UL (ref 0–0.2)
BASOPHILS NFR BLD: 0.4 % (ref 0–1.9)
DIFFERENTIAL METHOD: ABNORMAL
EOSINOPHIL # BLD AUTO: 0.2 K/UL (ref 0–0.5)
EOSINOPHIL NFR BLD: 5 % (ref 0–8)
ERYTHROCYTE [DISTWIDTH] IN BLOOD BY AUTOMATED COUNT: 16.4 % (ref 11.5–14.5)
FERRITIN SERPL-MCNC: 10 NG/ML (ref 20–300)
HCT VFR BLD AUTO: 40.7 % (ref 40–54)
HGB BLD-MCNC: 11.7 G/DL (ref 14–18)
IMM GRANULOCYTES # BLD AUTO: 0.01 K/UL (ref 0–0.04)
IMM GRANULOCYTES NFR BLD AUTO: 0.2 % (ref 0–0.5)
IRON SERPL-MCNC: 29 UG/DL (ref 45–160)
LYMPHOCYTES # BLD AUTO: 2.1 K/UL (ref 1–4.8)
LYMPHOCYTES NFR BLD: 44.5 % (ref 18–48)
MCH RBC QN AUTO: 23.5 PG (ref 27–31)
MCHC RBC AUTO-ENTMCNC: 28.7 G/DL (ref 32–36)
MCV RBC AUTO: 82 FL (ref 82–98)
MONOCYTES # BLD AUTO: 0.6 K/UL (ref 0.3–1)
MONOCYTES NFR BLD: 13.4 % (ref 4–15)
NEUTROPHILS # BLD AUTO: 1.7 K/UL (ref 1.8–7.7)
NEUTROPHILS NFR BLD: 36.5 % (ref 38–73)
NRBC BLD-RTO: 0 /100 WBC
PLATELET # BLD AUTO: 228 K/UL (ref 150–450)
PMV BLD AUTO: 11.9 FL (ref 9.2–12.9)
RBC # BLD AUTO: 4.97 M/UL (ref 4.6–6.2)
SATURATED IRON: 7 % (ref 20–50)
TOTAL IRON BINDING CAPACITY: 432 UG/DL (ref 250–450)
TRANSFERRIN SERPL-MCNC: 292 MG/DL (ref 200–375)
WBC # BLD AUTO: 4.61 K/UL (ref 3.9–12.7)

## 2021-05-24 PROCEDURE — 36415 COLL VENOUS BLD VENIPUNCTURE: CPT | Performed by: INTERNAL MEDICINE

## 2021-05-24 PROCEDURE — 83540 ASSAY OF IRON: CPT | Performed by: INTERNAL MEDICINE

## 2021-05-24 PROCEDURE — 86703 HIV-1/HIV-2 1 RESULT ANTBDY: CPT | Performed by: INTERNAL MEDICINE

## 2021-05-24 PROCEDURE — 85025 COMPLETE CBC W/AUTO DIFF WBC: CPT | Performed by: INTERNAL MEDICINE

## 2021-05-24 PROCEDURE — 82728 ASSAY OF FERRITIN: CPT | Performed by: INTERNAL MEDICINE

## 2021-05-25 LAB — HIV 1+2 AB+HIV1 P24 AG SERPL QL IA: NEGATIVE

## 2021-05-27 ENCOUNTER — PATIENT MESSAGE (OUTPATIENT)
Dept: INTERNAL MEDICINE | Facility: CLINIC | Age: 66
End: 2021-05-27

## 2021-05-27 DIAGNOSIS — D50.9 IRON DEFICIENCY ANEMIA, UNSPECIFIED IRON DEFICIENCY ANEMIA TYPE: Primary | ICD-10-CM

## 2021-05-28 ENCOUNTER — PATIENT MESSAGE (OUTPATIENT)
Dept: INTERNAL MEDICINE | Facility: CLINIC | Age: 66
End: 2021-05-28

## 2021-07-21 ENCOUNTER — OFFICE VISIT (OUTPATIENT)
Dept: GASTROENTEROLOGY | Facility: CLINIC | Age: 66
End: 2021-07-21
Payer: MEDICARE

## 2021-07-21 VITALS
HEART RATE: 87 BPM | BODY MASS INDEX: 32.6 KG/M2 | WEIGHT: 254 LBS | SYSTOLIC BLOOD PRESSURE: 130 MMHG | DIASTOLIC BLOOD PRESSURE: 70 MMHG | HEIGHT: 74 IN

## 2021-07-21 DIAGNOSIS — D50.9 IRON DEFICIENCY ANEMIA, UNSPECIFIED IRON DEFICIENCY ANEMIA TYPE: ICD-10-CM

## 2021-07-21 PROCEDURE — 99999 PR PBB SHADOW E&M-EST. PATIENT-LVL IV: ICD-10-PCS | Mod: PBBFAC,,, | Performed by: INTERNAL MEDICINE

## 2021-07-21 PROCEDURE — 3008F PR BODY MASS INDEX (BMI) DOCUMENTED: ICD-10-PCS | Mod: S$GLB,,, | Performed by: INTERNAL MEDICINE

## 2021-07-21 PROCEDURE — 99999 PR PBB SHADOW E&M-EST. PATIENT-LVL IV: CPT | Mod: PBBFAC,,, | Performed by: INTERNAL MEDICINE

## 2021-07-21 PROCEDURE — 3075F SYST BP GE 130 - 139MM HG: CPT | Mod: S$GLB,,, | Performed by: INTERNAL MEDICINE

## 2021-07-21 PROCEDURE — 3075F PR MOST RECENT SYSTOLIC BLOOD PRESS GE 130-139MM HG: ICD-10-PCS | Mod: S$GLB,,, | Performed by: INTERNAL MEDICINE

## 2021-07-21 PROCEDURE — 1159F PR MEDICATION LIST DOCUMENTED IN MEDICAL RECORD: ICD-10-PCS | Mod: S$GLB,,, | Performed by: INTERNAL MEDICINE

## 2021-07-21 PROCEDURE — 1126F PR PAIN SEVERITY QUANTIFIED, NO PAIN PRESENT: ICD-10-PCS | Mod: S$GLB,,, | Performed by: INTERNAL MEDICINE

## 2021-07-21 PROCEDURE — 99204 OFFICE O/P NEW MOD 45 MIN: CPT | Mod: S$GLB,,, | Performed by: INTERNAL MEDICINE

## 2021-07-21 PROCEDURE — 1159F MED LIST DOCD IN RCRD: CPT | Mod: S$GLB,,, | Performed by: INTERNAL MEDICINE

## 2021-07-21 PROCEDURE — 3078F DIAST BP <80 MM HG: CPT | Mod: S$GLB,,, | Performed by: INTERNAL MEDICINE

## 2021-07-21 PROCEDURE — 1126F AMNT PAIN NOTED NONE PRSNT: CPT | Mod: S$GLB,,, | Performed by: INTERNAL MEDICINE

## 2021-07-21 PROCEDURE — 99204 PR OFFICE/OUTPT VISIT, NEW, LEVL IV, 45-59 MIN: ICD-10-PCS | Mod: S$GLB,,, | Performed by: INTERNAL MEDICINE

## 2021-07-21 PROCEDURE — 3008F BODY MASS INDEX DOCD: CPT | Mod: S$GLB,,, | Performed by: INTERNAL MEDICINE

## 2021-07-21 PROCEDURE — 3078F PR MOST RECENT DIASTOLIC BLOOD PRESSURE < 80 MM HG: ICD-10-PCS | Mod: S$GLB,,, | Performed by: INTERNAL MEDICINE

## 2021-07-21 RX ORDER — TAMSULOSIN HYDROCHLORIDE 0.4 MG/1
1 CAPSULE ORAL DAILY
COMMUNITY
Start: 2021-06-02 | End: 2023-03-13 | Stop reason: SDUPTHER

## 2021-07-21 RX ORDER — ASPIRIN 81 MG/1
81 TABLET ORAL DAILY
COMMUNITY
End: 2023-10-02

## 2021-07-21 RX ORDER — FERROUS GLUCONATE 324(37.5)
324 TABLET ORAL
Qty: 30 TABLET | Refills: 11 | Status: SHIPPED | OUTPATIENT
Start: 2021-07-21 | End: 2022-10-21

## 2022-03-07 ENCOUNTER — PATIENT MESSAGE (OUTPATIENT)
Dept: INTERNAL MEDICINE | Facility: CLINIC | Age: 67
End: 2022-03-07
Payer: MEDICARE

## 2022-03-07 NOTE — TELEPHONE ENCOUNTER
Spoke to patient stated that he has been having pain to his right side which has gotten worst over the past 4 days.     Stated he has not done any thing out side of his norm. Denised any heavy lifting    Right side is paiful when he gets up form lying or sitting for a long period of time.     Patient rated pain a 10/10     Please advised

## 2022-03-24 ENCOUNTER — TELEPHONE (OUTPATIENT)
Dept: INTERNAL MEDICINE | Facility: CLINIC | Age: 67
End: 2022-03-24
Payer: MEDICARE

## 2022-03-24 ENCOUNTER — HOSPITAL ENCOUNTER (OUTPATIENT)
Dept: RADIOLOGY | Facility: HOSPITAL | Age: 67
Discharge: HOME | End: 2022-03-24
Attending: NURSE PRACTITIONER
Payer: MEDICARE

## 2022-03-24 ENCOUNTER — OFFICE VISIT (OUTPATIENT)
Dept: INTERNAL MEDICINE | Facility: CLINIC | Age: 67
End: 2022-03-24
Payer: MEDICARE

## 2022-03-24 VITALS
OXYGEN SATURATION: 99 % | TEMPERATURE: 98 F | SYSTOLIC BLOOD PRESSURE: 156 MMHG | HEIGHT: 74 IN | DIASTOLIC BLOOD PRESSURE: 98 MMHG | RESPIRATION RATE: 16 BRPM | BODY MASS INDEX: 31.8 KG/M2 | HEART RATE: 76 BPM | WEIGHT: 247.81 LBS

## 2022-03-24 DIAGNOSIS — R82.90 ABNORMAL URINALYSIS: ICD-10-CM

## 2022-03-24 DIAGNOSIS — Z00.00 ANNUAL PHYSICAL EXAM: Primary | ICD-10-CM

## 2022-03-24 DIAGNOSIS — M15.9 PRIMARY OSTEOARTHRITIS INVOLVING MULTIPLE JOINTS: ICD-10-CM

## 2022-03-24 DIAGNOSIS — K64.9 HEMORRHOIDS WITHOUT COMPLICATION: ICD-10-CM

## 2022-03-24 DIAGNOSIS — K21.9 GASTROESOPHAGEAL REFLUX DISEASE, UNSPECIFIED WHETHER ESOPHAGITIS PRESENT: ICD-10-CM

## 2022-03-24 DIAGNOSIS — M54.50 ACUTE RIGHT-SIDED LOW BACK PAIN WITHOUT SCIATICA: ICD-10-CM

## 2022-03-24 DIAGNOSIS — I10 ESSENTIAL HYPERTENSION: ICD-10-CM

## 2022-03-24 DIAGNOSIS — N28.89 RENAL MASS, RIGHT: Primary | ICD-10-CM

## 2022-03-24 DIAGNOSIS — Z12.11 ENCOUNTER FOR COLORECTAL CANCER SCREENING: ICD-10-CM

## 2022-03-24 DIAGNOSIS — D50.9 MICROCYTIC ANEMIA: ICD-10-CM

## 2022-03-24 DIAGNOSIS — Z13.6 SCREENING, ISCHEMIC HEART DISEASE: ICD-10-CM

## 2022-03-24 DIAGNOSIS — Z87.438 HISTORY OF BENIGN PROSTATIC HYPERPLASIA: ICD-10-CM

## 2022-03-24 DIAGNOSIS — R73.03 PREDIABETES: ICD-10-CM

## 2022-03-24 DIAGNOSIS — N52.9 ERECTILE DYSFUNCTION, UNSPECIFIED ERECTILE DYSFUNCTION TYPE: ICD-10-CM

## 2022-03-24 DIAGNOSIS — Z12.12 ENCOUNTER FOR COLORECTAL CANCER SCREENING: ICD-10-CM

## 2022-03-24 DIAGNOSIS — Z12.5 ENCOUNTER FOR SCREENING FOR MALIGNANT NEOPLASM OF PROSTATE: ICD-10-CM

## 2022-03-24 DIAGNOSIS — D50.9 IRON DEFICIENCY ANEMIA, UNSPECIFIED IRON DEFICIENCY ANEMIA TYPE: ICD-10-CM

## 2022-03-24 LAB
BACTERIA #/AREA URNS AUTO: ABNORMAL /HPF
BILIRUB SERPL-MCNC: NORMAL MG/DL
BILIRUB UR QL STRIP: NEGATIVE
BLOOD URINE, POC: NORMAL
CLARITY UR REFRACT.AUTO: CLEAR
CLARITY, POC UA: NORMAL
COLOR UR AUTO: YELLOW
COLOR, POC UA: YELLOW
GLUCOSE UR QL STRIP: NEGATIVE
GLUCOSE UR QL STRIP: NORMAL
HGB UR QL STRIP: NEGATIVE
KETONES UR QL STRIP: NEGATIVE
KETONES UR QL STRIP: NORMAL
LEUKOCYTE ESTERASE UR QL STRIP: ABNORMAL
LEUKOCYTE ESTERASE URINE, POC: NORMAL
MICROSCOPIC COMMENT: ABNORMAL
NITRITE UR QL STRIP: NEGATIVE
NITRITE, POC UA: NORMAL
PH UR STRIP: 7 [PH] (ref 5–8)
PH, POC UA: 7
PROT UR QL STRIP: NEGATIVE
PROTEIN, POC: NORMAL
RBC #/AREA URNS AUTO: 13 /HPF (ref 0–4)
SP GR UR STRIP: 1.01 (ref 1–1.03)
SPECIFIC GRAVITY, POC UA: NORMAL
URN SPEC COLLECT METH UR: ABNORMAL
UROBILINOGEN, POC UA: NORMAL
WBC #/AREA URNS AUTO: 24 /HPF (ref 0–5)

## 2022-03-24 PROCEDURE — 76770 US EXAM ABDO BACK WALL COMP: CPT | Mod: 26,,, | Performed by: INTERNAL MEDICINE

## 2022-03-24 PROCEDURE — 1159F MED LIST DOCD IN RCRD: CPT | Mod: CPTII,S$GLB,, | Performed by: NURSE PRACTITIONER

## 2022-03-24 PROCEDURE — 99999 PR PBB SHADOW E&M-EST. PATIENT-LVL V: CPT | Mod: PBBFAC,,, | Performed by: NURSE PRACTITIONER

## 2022-03-24 PROCEDURE — 81001 URINALYSIS AUTO W/SCOPE: CPT | Performed by: NURSE PRACTITIONER

## 2022-03-24 PROCEDURE — 3080F PR MOST RECENT DIASTOLIC BLOOD PRESSURE >= 90 MM HG: ICD-10-PCS | Mod: CPTII,S$GLB,, | Performed by: NURSE PRACTITIONER

## 2022-03-24 PROCEDURE — 3044F PR MOST RECENT HEMOGLOBIN A1C LEVEL <7.0%: ICD-10-PCS | Mod: CPTII,S$GLB,, | Performed by: NURSE PRACTITIONER

## 2022-03-24 PROCEDURE — 3077F PR MOST RECENT SYSTOLIC BLOOD PRESSURE >= 140 MM HG: ICD-10-PCS | Mod: CPTII,S$GLB,, | Performed by: NURSE PRACTITIONER

## 2022-03-24 PROCEDURE — 1101F PR PT FALLS ASSESS DOC 0-1 FALLS W/OUT INJ PAST YR: ICD-10-PCS | Mod: CPTII,S$GLB,, | Performed by: NURSE PRACTITIONER

## 2022-03-24 PROCEDURE — 3080F DIAST BP >= 90 MM HG: CPT | Mod: CPTII,S$GLB,, | Performed by: NURSE PRACTITIONER

## 2022-03-24 PROCEDURE — 99999 PR PBB SHADOW E&M-EST. PATIENT-LVL V: ICD-10-PCS | Mod: PBBFAC,,, | Performed by: NURSE PRACTITIONER

## 2022-03-24 PROCEDURE — 1125F AMNT PAIN NOTED PAIN PRSNT: CPT | Mod: CPTII,S$GLB,, | Performed by: NURSE PRACTITIONER

## 2022-03-24 PROCEDURE — 3288F FALL RISK ASSESSMENT DOCD: CPT | Mod: CPTII,S$GLB,, | Performed by: NURSE PRACTITIONER

## 2022-03-24 PROCEDURE — 99214 OFFICE O/P EST MOD 30 MIN: CPT | Mod: S$GLB,,, | Performed by: NURSE PRACTITIONER

## 2022-03-24 PROCEDURE — 87077 CULTURE AEROBIC IDENTIFY: CPT | Performed by: NURSE PRACTITIONER

## 2022-03-24 PROCEDURE — 3288F PR FALLS RISK ASSESSMENT DOCUMENTED: ICD-10-PCS | Mod: CPTII,S$GLB,, | Performed by: NURSE PRACTITIONER

## 2022-03-24 PROCEDURE — 81002 URINALYSIS NONAUTO W/O SCOPE: CPT | Mod: S$GLB,,, | Performed by: NURSE PRACTITIONER

## 2022-03-24 PROCEDURE — 3044F HG A1C LEVEL LT 7.0%: CPT | Mod: CPTII,S$GLB,, | Performed by: NURSE PRACTITIONER

## 2022-03-24 PROCEDURE — 4010F PR ACE/ARB THEARPY RXD/TAKEN: ICD-10-PCS | Mod: CPTII,S$GLB,, | Performed by: NURSE PRACTITIONER

## 2022-03-24 PROCEDURE — 76770 US RETROPERITONEAL COMPLETE: ICD-10-PCS | Mod: 26,,, | Performed by: INTERNAL MEDICINE

## 2022-03-24 PROCEDURE — 87186 SC STD MICRODIL/AGAR DIL: CPT | Performed by: NURSE PRACTITIONER

## 2022-03-24 PROCEDURE — 1125F PR PAIN SEVERITY QUANTIFIED, PAIN PRESENT: ICD-10-PCS | Mod: CPTII,S$GLB,, | Performed by: NURSE PRACTITIONER

## 2022-03-24 PROCEDURE — 99214 PR OFFICE/OUTPT VISIT, EST, LEVL IV, 30-39 MIN: ICD-10-PCS | Mod: S$GLB,,, | Performed by: NURSE PRACTITIONER

## 2022-03-24 PROCEDURE — 87088 URINE BACTERIA CULTURE: CPT | Performed by: NURSE PRACTITIONER

## 2022-03-24 PROCEDURE — 1101F PT FALLS ASSESS-DOCD LE1/YR: CPT | Mod: CPTII,S$GLB,, | Performed by: NURSE PRACTITIONER

## 2022-03-24 PROCEDURE — 1159F PR MEDICATION LIST DOCUMENTED IN MEDICAL RECORD: ICD-10-PCS | Mod: CPTII,S$GLB,, | Performed by: NURSE PRACTITIONER

## 2022-03-24 PROCEDURE — 3008F PR BODY MASS INDEX (BMI) DOCUMENTED: ICD-10-PCS | Mod: CPTII,S$GLB,, | Performed by: NURSE PRACTITIONER

## 2022-03-24 PROCEDURE — 81002 POCT URINE DIPSTICK WITHOUT MICROSCOPE: ICD-10-PCS | Mod: S$GLB,,, | Performed by: NURSE PRACTITIONER

## 2022-03-24 PROCEDURE — 76770 US EXAM ABDO BACK WALL COMP: CPT | Mod: TC

## 2022-03-24 PROCEDURE — 4010F ACE/ARB THERAPY RXD/TAKEN: CPT | Mod: CPTII,S$GLB,, | Performed by: NURSE PRACTITIONER

## 2022-03-24 PROCEDURE — 3077F SYST BP >= 140 MM HG: CPT | Mod: CPTII,S$GLB,, | Performed by: NURSE PRACTITIONER

## 2022-03-24 PROCEDURE — 87086 URINE CULTURE/COLONY COUNT: CPT | Performed by: NURSE PRACTITIONER

## 2022-03-24 PROCEDURE — 3008F BODY MASS INDEX DOCD: CPT | Mod: CPTII,S$GLB,, | Performed by: NURSE PRACTITIONER

## 2022-03-24 RX ORDER — HYDROCHLOROTHIAZIDE 12.5 MG/1
12.5 TABLET ORAL DAILY
Qty: 30 TABLET | Refills: 11 | Status: SHIPPED | OUTPATIENT
Start: 2022-03-24 | End: 2023-04-10 | Stop reason: SDUPTHER

## 2022-03-24 NOTE — PROGRESS NOTES
Internal Medicine Annual Exam       CHIEF COMPLAINT     The patient, Adrian Burt, who is a 67 y.o. male with HTN, preDM, BPH, hx hemorrhoids, DJD, testosterone def previously on topical tx but now getting injections every other week (managed by Dr. Rogers at ), seasonal allergies presents for an annual exam.    HPI     C/o right lower back pain- started 3 weeks ago. States it started over night. Woke up with right lower back pain. Stiffness but improved with mvmt. Worse after prolonged sitting. No referred pain down the leg.   No numbness or tingling.   Started after driving to [place]     [place]- seen by Dr Chavira - in 2021 advised to schedule EGD and c-scope   On ferrous sulfate     BPH- followed y Dr Rogers - taking flomax  ED- viagra    HTN- taking losartan 100mg and amlodipine 10mg - took medications this morning   Denies headaches and vision changes   +dizziness   BP at home 190/87    AR- taking Singulair and Flonase     HM-  Covid-  and 3/24  Flu-defer   PSA-  Tdap-  CRCS-  AAA-  Shingles-3/2 and     Past Medical History:  Past Medical History:   Diagnosis Date    Allergy     Colon polyp     Fatty liver     GERD (gastroesophageal reflux disease)     Hypertension        Past Surgical History:   Procedure Laterality Date    COLONOSCOPY N/A 2017    Procedure: COLONOSCOPY;  Surgeon: Leo Henriquez MD;  Location: [address]);  Service: Endoscopy;  Laterality: N/A;    LEG SURGERY Left         Family History   Problem Relation Age of Onset    Kidney disease Mother     Heart disease Father     Cancer Sister         kidney    Kidney disease Sister     Colon cancer Neg Hx     Esophageal cancer Neg Hx         Social History     Socioeconomic History    Marital status:    Tobacco Use    Smoking status: Former Smoker     Quit date: 11/15/1978     Years since quittin.3    Smokeless tobacco: Never Used    Tobacco comment: smoked for 10 years, quit in  78   Substance and Sexual Activity    Alcohol use: No    Drug use: No    Sexual activity: Yes     Partners: Female        Social History     Tobacco Use   Smoking Status Former Smoker    Quit date: 11/15/1978    Years since quittin.3   Smokeless Tobacco Never Used   Tobacco Comment    smoked for 10 years, quit in 76        Allergies as of 2022 - Reviewed 2022   Allergen Reaction Noted    No known drug allergies  07/15/2012          Home Medications:  Prior to Admission medications    Medication Sig Start Date End Date Taking? Authorizing Provider   amLODIPine (NORVASC) 10 MG tablet Take 1 tablet (10 mg total) by mouth every morning. 3/30/21  Yes Mone Brown MD   aspirin (ECOTRIN) 81 MG EC tablet Take 81 mg by mouth once daily.   Yes Historical Provider   azelastine (ASTELIN) 137 mcg (0.1 %) nasal spray SPRAY ONCE IN EACH NOSTRIL TWICE DAILY 20  Yes Mone Brown MD   ferrous gluconate 324 mg (37.5 mg iron) Tab tablet Take 1 tablet (324 mg total) by mouth daily with breakfast. 21 Yes Ramon Chavira MD   fluticasone propionate (FLONASE) 50 mcg/actuation nasal spray SPRAY ONCE IN EACH NOSTRIL EVERY DAY 21  Yes Mavis Jeffries NP   losartan (COZAAR) 100 MG tablet TAKE 1 TABLET(100 MG) BY MOUTH EVERY DAY 22  Yes Mone Brown MD   montelukast (SINGULAIR) 10 mg tablet Take 1 tablet (10 mg total) by mouth every evening. 21  Yes Mone Brown MD   multivit with minerals/lutein (MULTIVITAMIN 50 PLUS ORAL) Take 1 tablet by mouth once daily.   Yes Historical Provider   sildenafiL (VIAGRA) 100 MG tablet Take 1 tablet by mouth continuous prn. 6/15/12  Yes Historical Provider   tamsulosin (FLOMAX) 0.4 mg Cap Take 1 capsule by mouth once daily. 21  Yes Historical Provider   testosterone cypionate (DEPOTESTOTERONE CYPIONATE) 200 mg/mL injection Inject 1 mL (200 mg total) into the muscle every 14 (fourteen) days. 4/7/21 10/6/21  Mone Brown MD       Review of  "Systems:  Review of Systems   Constitutional: Negative for chills, fatigue and fever.   HENT: Negative.    Eyes: Negative for visual disturbance.   Respiratory: Negative for cough, shortness of breath and wheezing.    Cardiovascular: Negative for chest pain, palpitations and leg swelling.   Gastrointestinal: Negative for abdominal pain, blood in stool, constipation, diarrhea, nausea and vomiting.   Genitourinary: Negative for dysuria, flank pain, frequency and urgency.   Musculoskeletal: Positive for back pain.   Neurological: Positive for dizziness. Negative for weakness, light-headedness, numbness and headaches.       Health Maintainence:   Immunizations:  Health Maintenance       Date Due Completion Date    COVID-19 Vaccine (3 - Booster for Moderna series) 08/24/2021 3/24/2021    Influenza Vaccine (1) 09/01/2021 9/15/2020    PROSTATE-SPECIFIC ANTIGEN 04/07/2022 4/7/2021    Colorectal Cancer Screening 08/16/2022 8/16/2017    Lipid Panel 04/07/2026 4/7/2021    TETANUS VACCINE 09/10/2028 9/10/2018           PHYSICAL EXAM     BP (!) 180/110 (BP Location: Right arm, Patient Position: Sitting, BP Method: Large (Manual))   Pulse 76   Temp 98 °F (36.7 °C) (Oral)   Resp 16   Ht 6' 2" (1.88 m)   Wt 112.4 kg (247 lb 12.8 oz)   SpO2 99%   BMI 31.82 kg/m²  Body mass index is 31.82 kg/m².    Physical Exam  Vitals reviewed.   Constitutional:       Appearance: He is well-developed.   HENT:      Head: Normocephalic.      Right Ear: External ear normal.      Left Ear: External ear normal.      Nose: Nose normal.      Mouth/Throat:      Pharynx: No oropharyngeal exudate.   Eyes:      Pupils: Pupils are equal, round, and reactive to light.   Neck:      Thyroid: No thyromegaly.      Vascular: No JVD.      Trachea: No tracheal deviation.   Cardiovascular:      Rate and Rhythm: Normal rate and regular rhythm.      Heart sounds: No murmur heard.    No friction rub. No gallop.   Pulmonary:      Effort: No respiratory distress.     "  Breath sounds: Normal breath sounds. No wheezing or rales.   Chest:      Chest wall: No tenderness.   Abdominal:      General: Bowel sounds are normal. There is no distension.      Palpations: Abdomen is soft.      Tenderness: There is no abdominal tenderness.   Musculoskeletal:         General: Normal range of motion.      Lumbar back: Tenderness present. No swelling, edema, deformity, signs of trauma, lacerations or bony tenderness. Normal range of motion. Negative right straight leg raise test and negative left straight leg raise test. No scoliosis.        Back:    Lymphadenopathy:      Cervical: No cervical adenopathy.   Skin:     General: Skin is warm and dry.      Findings: No rash.   Neurological:      Mental Status: He is alert and oriented to person, place, and time.   Psychiatric:         Behavior: Behavior normal.         LABS     Lab Results   Component Value Date    HGBA1C 5.7 (H) 04/07/2021     CMP  Sodium   Date Value Ref Range Status   04/07/2021 137 136 - 145 mmol/L Final     Potassium   Date Value Ref Range Status   04/07/2021 4.3 3.5 - 5.1 mmol/L Final     Chloride   Date Value Ref Range Status   04/07/2021 103 95 - 110 mmol/L Final     CO2   Date Value Ref Range Status   04/07/2021 26 23 - 29 mmol/L Final     Glucose   Date Value Ref Range Status   04/07/2021 93 70 - 110 mg/dL Final     BUN   Date Value Ref Range Status   04/07/2021 10 8 - 23 mg/dL Final     Creatinine   Date Value Ref Range Status   04/07/2021 1.0 0.5 - 1.4 mg/dL Final     Calcium   Date Value Ref Range Status   04/07/2021 8.7 8.7 - 10.5 mg/dL Final     Total Protein   Date Value Ref Range Status   04/07/2021 7.6 6.0 - 8.4 g/dL Final     Albumin   Date Value Ref Range Status   04/07/2021 3.7 3.5 - 5.2 g/dL Final     Total Bilirubin   Date Value Ref Range Status   04/07/2021 0.6 0.1 - 1.0 mg/dL Final     Comment:     For infants and newborns, interpretation of results should be based  on gestational age, weight and in agreement  with clinical  observations.    Premature Infant recommended reference ranges:  Up to 24 hours.............<8.0 mg/dL  Up to 48 hours............<12.0 mg/dL  3-5 days..................<15.0 mg/dL  6-29 days.................<15.0 mg/dL       Alkaline Phosphatase   Date Value Ref Range Status   04/07/2021 59 55 - 135 U/L Final     AST   Date Value Ref Range Status   04/07/2021 29 10 - 40 U/L Final     ALT   Date Value Ref Range Status   04/07/2021 23 10 - 44 U/L Final     Anion Gap   Date Value Ref Range Status   04/07/2021 8 8 - 16 mmol/L Final     eGFR if    Date Value Ref Range Status   04/07/2021 >60.0 >60 mL/min/1.73 m^2 Final     eGFR if non    Date Value Ref Range Status   04/07/2021 >60.0 >60 mL/min/1.73 m^2 Final     Comment:     Calculation used to obtain the estimated glomerular filtration  rate (eGFR) is the CKD-EPI equation.        Lab Results   Component Value Date    WBC 4.61 05/24/2021    HGB 11.7 (L) 05/24/2021    HCT 40.7 05/24/2021    MCV 82 05/24/2021     05/24/2021     Lab Results   Component Value Date    CHOL 129 04/07/2021    CHOL 171 01/31/2019    CHOL 159 07/05/2017     Lab Results   Component Value Date    HDL 37 (L) 04/07/2021    HDL 41 01/31/2019    HDL 36 (L) 07/05/2017     Lab Results   Component Value Date    LDLCALC 82.6 04/07/2021    LDLCALC 116.8 01/31/2019    LDLCALC 110.8 07/05/2017     Lab Results   Component Value Date    TRIG 47 04/07/2021    TRIG 66 01/31/2019    TRIG 61 07/05/2017     Lab Results   Component Value Date    CHOLHDL 28.7 04/07/2021    CHOLHDL 24.0 01/31/2019    CHOLHDL 22.6 07/05/2017     No results found for: TSH, D9QKCBW, J3OEUVW, THYROIDAB    ASSESSMENT/PLAN     Adrian Burt is a 67 y.o. male    Annual physical exam- All age and gender related screenings discussed     Acute right-sided low back pain without sciatica- with hematuria on POCT urine dip - will send for renal ultrasound   -     POCT URINE DIPSTICK WITHOUT  MICROSCOPE  -     Urinalysis, Reflex to Urine Culture Urine, Clean Catch  -     US Retroperitoneal Complete (Kidney and; Future; Expected date: 03/24/2022    Abnormal urinalysis  -     US Retroperitoneal Complete (Kidney and; Future; Expected date: 03/24/2022    Essential hypertension- BP poorly controlled. Will increase HCTZ. Awaiting labs and urine    -     Cancel: Urinalysis  -     Comprehensive Metabolic Panel; Future; Expected date: 03/24/2022  -     hydroCHLOROthiazide (HYDRODIURIL) 12.5 MG Tab; Take 1 tablet (12.5 mg total) by mouth once daily.  Dispense: 30 tablet; Refill: 11  -     Urinalysis, Reflex to Urine Culture Urine, Clean Catch    Encounter for screening for malignant neoplasm of prostate History of benign prostatic hyperplasia- stable. Will f/u with outside urology     Erectile dysfunction, unspecified erectile dysfunction type- - stable. Will f/u with outside urology    Prediabetes- repeat A1c. Low sugar diet   -     Hemoglobin A1C; Future; Expected date: 03/24/2022    Hemorrhoids without complication- stable. Will monitor     Gastroesophageal reflux disease, unspecified whether esophagitis present    Primary osteoarthritis involving multiple joints    Microcytic anemia  -     CBC Auto Differential; Future; Expected date: 03/24/2022    Iron deficiency anemia, unspecified iron deficiency anemia type  -     CBC Auto Differential; Future; Expected date: 03/24/2022  -     Fecal Immunochemical Test (iFOBT); Future; Expected date: 03/24/2022    Encounter for colorectal cancer screening  -     Fecal Immunochemical Test (iFOBT); Future; Expected date: 03/24/2022    Screening, ischemic heart disease  -     Lipid Panel; Future; Expected date: 03/24/2022    Other orders  -     Urinalysis Microscopic  -     Urine culture          Follow up with PCP in 6 months     Mavis CARDOZA, APRN, FNP-c   Department of Internal Medicine - [facility]  10:26 AM

## 2022-03-24 NOTE — TELEPHONE ENCOUNTER
Spoke with pt - has lesions in the right kidney- no stones. Will set up CT of the kidneys   Waiting on urine culture   Pt states understanding

## 2022-03-25 ENCOUNTER — TELEPHONE (OUTPATIENT)
Dept: INTERNAL MEDICINE | Facility: CLINIC | Age: 67
End: 2022-03-25
Payer: MEDICARE

## 2022-03-25 DIAGNOSIS — N30.00 ACUTE CYSTITIS WITHOUT HEMATURIA: Primary | ICD-10-CM

## 2022-03-25 RX ORDER — CIPROFLOXACIN 250 MG/1
250 TABLET, FILM COATED ORAL 2 TIMES DAILY
Qty: 14 TABLET | Refills: 0 | Status: SHIPPED | OUTPATIENT
Start: 2022-03-25 | End: 2022-03-31

## 2022-03-25 NOTE — TELEPHONE ENCOUNTER
----- Message from Mavis Jeffries NP sent at 3/25/2022  3:17 PM CDT -----  Please let pt know his urine culture showed bacteria, please start cipro 250mg twice daily

## 2022-03-25 NOTE — TELEPHONE ENCOUNTER
Spoke to patient and advised of urine culture results and medication. Patient verbalized understanding

## 2022-03-25 NOTE — TELEPHONE ENCOUNTER
The soonest appointment I could make was 03/28 pt was ok with date and time just wanted to make sure it was ok with you

## 2022-03-27 LAB — BACTERIA UR CULT: ABNORMAL

## 2022-03-28 ENCOUNTER — HOSPITAL ENCOUNTER (OUTPATIENT)
Dept: RADIOLOGY | Facility: HOSPITAL | Age: 67
Discharge: HOME | End: 2022-03-28
Attending: NURSE PRACTITIONER
Payer: MEDICARE

## 2022-03-28 DIAGNOSIS — N28.89 RIGHT RENAL MASS: Primary | ICD-10-CM

## 2022-03-28 DIAGNOSIS — I10 ESSENTIAL HYPERTENSION: ICD-10-CM

## 2022-03-28 DIAGNOSIS — N28.89 RENAL MASS, RIGHT: ICD-10-CM

## 2022-03-28 PROCEDURE — 74178 CT ABD&PLV WO CNTR FLWD CNTR: CPT | Mod: 26,,, | Performed by: RADIOLOGY

## 2022-03-28 PROCEDURE — 25500020 PHARM REV CODE 255: Performed by: NURSE PRACTITIONER

## 2022-03-28 PROCEDURE — 74178 CT ABDOMEN PELVIS W WO CONTRAST: ICD-10-PCS | Mod: 26,,, | Performed by: RADIOLOGY

## 2022-03-28 PROCEDURE — 74178 CT ABD&PLV WO CNTR FLWD CNTR: CPT | Mod: TC

## 2022-03-28 RX ORDER — AMLODIPINE BESYLATE 10 MG/1
TABLET ORAL
Qty: 90 TABLET | Refills: 3 | Status: SHIPPED | OUTPATIENT
Start: 2022-03-28 | End: 2023-04-10 | Stop reason: SDUPTHER

## 2022-03-28 RX ADMIN — IOHEXOL 100 ML: 350 INJECTION, SOLUTION INTRAVENOUS at 03:03

## 2022-03-28 NOTE — TELEPHONE ENCOUNTER
Care Due:                  Date            Visit Type   Department     Provider  --------------------------------------------------------------------------------                                EP -                              PRIMARY      [facility]  Last Visit: 04-      CARE ([facility])   MEDICINE       Monica Brown  Next Visit: None Scheduled  None         None Found                                                            Last  Test          Frequency    Reason                     Performed    Due Date  --------------------------------------------------------------------------------    Office Visit  12 months..  amLODIPine, losartan,      04- 04-                             montelukast..............    Powered by [employer]. Reference number: [id number].   3/28/2022 3:26:04 AM CDT

## 2022-03-28 NOTE — TELEPHONE ENCOUNTER
Spoke to pt regarding recent CT results.  Renal mass concerning for malignancy.  Will refer to Urology asap for evaluation.  Martha to contact pt in AM to assist with scheduling.

## 2022-03-31 ENCOUNTER — OFFICE VISIT (OUTPATIENT)
Dept: UROLOGY | Facility: CLINIC | Age: 67
End: 2022-03-31
Payer: MEDICARE

## 2022-03-31 VITALS
SYSTOLIC BLOOD PRESSURE: 146 MMHG | HEART RATE: 79 BPM | BODY MASS INDEX: 31.4 KG/M2 | HEIGHT: 74 IN | DIASTOLIC BLOOD PRESSURE: 83 MMHG | WEIGHT: 244.69 LBS

## 2022-03-31 DIAGNOSIS — N28.89 RIGHT RENAL MASS: ICD-10-CM

## 2022-03-31 DIAGNOSIS — N39.0 URINARY TRACT INFECTION WITHOUT HEMATURIA, SITE UNSPECIFIED: Primary | ICD-10-CM

## 2022-03-31 DIAGNOSIS — N28.89 OTHER SPECIFIED DISORDERS OF KIDNEY AND URETER: ICD-10-CM

## 2022-03-31 LAB
BACTERIA #/AREA URNS AUTO: NORMAL /HPF
MICROSCOPIC COMMENT: NORMAL
RBC #/AREA URNS AUTO: 0 /HPF (ref 0–4)
SQUAMOUS #/AREA URNS AUTO: 0 /HPF
WBC #/AREA URNS AUTO: 1 /HPF (ref 0–5)

## 2022-03-31 PROCEDURE — 1126F PR PAIN SEVERITY QUANTIFIED, NO PAIN PRESENT: ICD-10-PCS | Mod: CPTII,S$GLB,, | Performed by: UROLOGY

## 2022-03-31 PROCEDURE — 99999 PR PBB SHADOW E&M-EST. PATIENT-LVL IV: ICD-10-PCS | Mod: PBBFAC,,, | Performed by: UROLOGY

## 2022-03-31 PROCEDURE — 99999 PR PBB SHADOW E&M-EST. PATIENT-LVL IV: CPT | Mod: PBBFAC,,, | Performed by: UROLOGY

## 2022-03-31 PROCEDURE — 3079F DIAST BP 80-89 MM HG: CPT | Mod: CPTII,S$GLB,, | Performed by: UROLOGY

## 2022-03-31 PROCEDURE — 4010F ACE/ARB THERAPY RXD/TAKEN: CPT | Mod: CPTII,S$GLB,, | Performed by: UROLOGY

## 2022-03-31 PROCEDURE — 1101F PR PT FALLS ASSESS DOC 0-1 FALLS W/OUT INJ PAST YR: ICD-10-PCS | Mod: CPTII,S$GLB,, | Performed by: UROLOGY

## 2022-03-31 PROCEDURE — 1159F MED LIST DOCD IN RCRD: CPT | Mod: CPTII,S$GLB,, | Performed by: UROLOGY

## 2022-03-31 PROCEDURE — 3288F PR FALLS RISK ASSESSMENT DOCUMENTED: ICD-10-PCS | Mod: CPTII,S$GLB,, | Performed by: UROLOGY

## 2022-03-31 PROCEDURE — 3008F BODY MASS INDEX DOCD: CPT | Mod: CPTII,S$GLB,, | Performed by: UROLOGY

## 2022-03-31 PROCEDURE — 99204 PR OFFICE/OUTPT VISIT, NEW, LEVL IV, 45-59 MIN: ICD-10-PCS | Mod: S$GLB,,, | Performed by: UROLOGY

## 2022-03-31 PROCEDURE — 1159F PR MEDICATION LIST DOCUMENTED IN MEDICAL RECORD: ICD-10-PCS | Mod: CPTII,S$GLB,, | Performed by: UROLOGY

## 2022-03-31 PROCEDURE — 87086 URINE CULTURE/COLONY COUNT: CPT | Performed by: UROLOGY

## 2022-03-31 PROCEDURE — 1160F RVW MEDS BY RX/DR IN RCRD: CPT | Mod: CPTII,S$GLB,, | Performed by: UROLOGY

## 2022-03-31 PROCEDURE — 4010F PR ACE/ARB THEARPY RXD/TAKEN: ICD-10-PCS | Mod: CPTII,S$GLB,, | Performed by: UROLOGY

## 2022-03-31 PROCEDURE — 3044F HG A1C LEVEL LT 7.0%: CPT | Mod: CPTII,S$GLB,, | Performed by: UROLOGY

## 2022-03-31 PROCEDURE — 1160F PR REVIEW ALL MEDS BY PRESCRIBER/CLIN PHARMACIST DOCUMENTED: ICD-10-PCS | Mod: CPTII,S$GLB,, | Performed by: UROLOGY

## 2022-03-31 PROCEDURE — 3079F PR MOST RECENT DIASTOLIC BLOOD PRESSURE 80-89 MM HG: ICD-10-PCS | Mod: CPTII,S$GLB,, | Performed by: UROLOGY

## 2022-03-31 PROCEDURE — 99204 OFFICE O/P NEW MOD 45 MIN: CPT | Mod: S$GLB,,, | Performed by: UROLOGY

## 2022-03-31 PROCEDURE — 81001 URINALYSIS AUTO W/SCOPE: CPT | Performed by: UROLOGY

## 2022-03-31 PROCEDURE — 3008F PR BODY MASS INDEX (BMI) DOCUMENTED: ICD-10-PCS | Mod: CPTII,S$GLB,, | Performed by: UROLOGY

## 2022-03-31 PROCEDURE — 3077F PR MOST RECENT SYSTOLIC BLOOD PRESSURE >= 140 MM HG: ICD-10-PCS | Mod: CPTII,S$GLB,, | Performed by: UROLOGY

## 2022-03-31 PROCEDURE — 3288F FALL RISK ASSESSMENT DOCD: CPT | Mod: CPTII,S$GLB,, | Performed by: UROLOGY

## 2022-03-31 PROCEDURE — 1101F PT FALLS ASSESS-DOCD LE1/YR: CPT | Mod: CPTII,S$GLB,, | Performed by: UROLOGY

## 2022-03-31 PROCEDURE — 3077F SYST BP >= 140 MM HG: CPT | Mod: CPTII,S$GLB,, | Performed by: UROLOGY

## 2022-03-31 PROCEDURE — 1126F AMNT PAIN NOTED NONE PRSNT: CPT | Mod: CPTII,S$GLB,, | Performed by: UROLOGY

## 2022-03-31 PROCEDURE — 3044F PR MOST RECENT HEMOGLOBIN A1C LEVEL <7.0%: ICD-10-PCS | Mod: CPTII,S$GLB,, | Performed by: UROLOGY

## 2022-03-31 RX ORDER — SILDENAFIL CITRATE 20 MG/1
20 TABLET ORAL 3 TIMES DAILY
COMMUNITY
Start: 2021-12-17 | End: 2023-01-18

## 2022-03-31 RX ORDER — CEFUROXIME AXETIL 500 MG/1
500 TABLET ORAL 2 TIMES DAILY
Status: ON HOLD | COMMUNITY
Start: 2021-12-22 | End: 2022-05-10

## 2022-04-01 LAB — BACTERIA UR CULT: NO GROWTH

## 2022-04-08 ENCOUNTER — PATIENT MESSAGE (OUTPATIENT)
Dept: ADMINISTRATIVE | Facility: HOSPITAL | Age: 67
End: 2022-04-08
Payer: MEDICARE

## 2022-04-12 ENCOUNTER — HOSPITAL ENCOUNTER (OUTPATIENT)
Dept: RADIOLOGY | Facility: HOSPITAL | Age: 67
Discharge: HOME | End: 2022-04-12
Attending: UROLOGY
Payer: MEDICARE

## 2022-04-12 DIAGNOSIS — N28.89 RIGHT RENAL MASS: ICD-10-CM

## 2022-04-12 PROCEDURE — 74183 MRI ABD W/O CNTR FLWD CNTR: CPT | Mod: TC

## 2022-04-12 PROCEDURE — 74183 MRI ABD W/O CNTR FLWD CNTR: CPT | Mod: 26,,, | Performed by: RADIOLOGY

## 2022-04-12 PROCEDURE — A9585 GADOBUTROL INJECTION: HCPCS | Performed by: UROLOGY

## 2022-04-12 PROCEDURE — 74183 MRI ABDOMEN W WO CONTRAST: ICD-10-PCS | Mod: 26,,, | Performed by: RADIOLOGY

## 2022-04-12 PROCEDURE — 25500020 PHARM REV CODE 255: Performed by: UROLOGY

## 2022-04-12 RX ORDER — GADOBUTROL 604.72 MG/ML
10 INJECTION INTRAVENOUS
Status: COMPLETED | OUTPATIENT
Start: 2022-04-12 | End: 2022-04-12

## 2022-04-12 RX ADMIN — GADOBUTROL 10 ML: 604.72 INJECTION INTRAVENOUS at 06:04

## 2022-04-14 ENCOUNTER — OFFICE VISIT (OUTPATIENT)
Dept: UROLOGY | Facility: CLINIC | Age: 67
End: 2022-04-14
Payer: MEDICARE

## 2022-04-14 VITALS
SYSTOLIC BLOOD PRESSURE: 162 MMHG | BODY MASS INDEX: 31.42 KG/M2 | DIASTOLIC BLOOD PRESSURE: 91 MMHG | HEART RATE: 54 BPM | HEIGHT: 74 IN

## 2022-04-14 DIAGNOSIS — N28.89 RIGHT RENAL MASS: Primary | ICD-10-CM

## 2022-04-14 DIAGNOSIS — R97.20 ELEVATED PSA: ICD-10-CM

## 2022-04-14 PROCEDURE — 1159F MED LIST DOCD IN RCRD: CPT | Mod: CPTII,S$GLB,, | Performed by: UROLOGY

## 2022-04-14 PROCEDURE — 3044F HG A1C LEVEL LT 7.0%: CPT | Mod: CPTII,S$GLB,, | Performed by: UROLOGY

## 2022-04-14 PROCEDURE — 3080F DIAST BP >= 90 MM HG: CPT | Mod: CPTII,S$GLB,, | Performed by: UROLOGY

## 2022-04-14 PROCEDURE — 99999 PR PBB SHADOW E&M-EST. PATIENT-LVL III: ICD-10-PCS | Mod: PBBFAC,,, | Performed by: UROLOGY

## 2022-04-14 PROCEDURE — 3044F PR MOST RECENT HEMOGLOBIN A1C LEVEL <7.0%: ICD-10-PCS | Mod: CPTII,S$GLB,, | Performed by: UROLOGY

## 2022-04-14 PROCEDURE — 4010F PR ACE/ARB THEARPY RXD/TAKEN: ICD-10-PCS | Mod: CPTII,S$GLB,, | Performed by: UROLOGY

## 2022-04-14 PROCEDURE — 4010F ACE/ARB THERAPY RXD/TAKEN: CPT | Mod: CPTII,S$GLB,, | Performed by: UROLOGY

## 2022-04-14 PROCEDURE — 1126F AMNT PAIN NOTED NONE PRSNT: CPT | Mod: CPTII,S$GLB,, | Performed by: UROLOGY

## 2022-04-14 PROCEDURE — 3288F PR FALLS RISK ASSESSMENT DOCUMENTED: ICD-10-PCS | Mod: CPTII,S$GLB,, | Performed by: UROLOGY

## 2022-04-14 PROCEDURE — 99215 PR OFFICE/OUTPT VISIT, EST, LEVL V, 40-54 MIN: ICD-10-PCS | Mod: S$GLB,,, | Performed by: UROLOGY

## 2022-04-14 PROCEDURE — 1101F PR PT FALLS ASSESS DOC 0-1 FALLS W/OUT INJ PAST YR: ICD-10-PCS | Mod: CPTII,S$GLB,, | Performed by: UROLOGY

## 2022-04-14 PROCEDURE — 1126F PR PAIN SEVERITY QUANTIFIED, NO PAIN PRESENT: ICD-10-PCS | Mod: CPTII,S$GLB,, | Performed by: UROLOGY

## 2022-04-14 PROCEDURE — 1160F PR REVIEW ALL MEDS BY PRESCRIBER/CLIN PHARMACIST DOCUMENTED: ICD-10-PCS | Mod: CPTII,S$GLB,, | Performed by: UROLOGY

## 2022-04-14 PROCEDURE — 3080F PR MOST RECENT DIASTOLIC BLOOD PRESSURE >= 90 MM HG: ICD-10-PCS | Mod: CPTII,S$GLB,, | Performed by: UROLOGY

## 2022-04-14 PROCEDURE — 1159F PR MEDICATION LIST DOCUMENTED IN MEDICAL RECORD: ICD-10-PCS | Mod: CPTII,S$GLB,, | Performed by: UROLOGY

## 2022-04-14 PROCEDURE — 3008F PR BODY MASS INDEX (BMI) DOCUMENTED: ICD-10-PCS | Mod: CPTII,S$GLB,, | Performed by: UROLOGY

## 2022-04-14 PROCEDURE — 3077F PR MOST RECENT SYSTOLIC BLOOD PRESSURE >= 140 MM HG: ICD-10-PCS | Mod: CPTII,S$GLB,, | Performed by: UROLOGY

## 2022-04-14 PROCEDURE — 99215 OFFICE O/P EST HI 40 MIN: CPT | Mod: S$GLB,,, | Performed by: UROLOGY

## 2022-04-14 PROCEDURE — 99999 PR PBB SHADOW E&M-EST. PATIENT-LVL III: CPT | Mod: PBBFAC,,, | Performed by: UROLOGY

## 2022-04-14 PROCEDURE — 1160F RVW MEDS BY RX/DR IN RCRD: CPT | Mod: CPTII,S$GLB,, | Performed by: UROLOGY

## 2022-04-14 PROCEDURE — 3008F BODY MASS INDEX DOCD: CPT | Mod: CPTII,S$GLB,, | Performed by: UROLOGY

## 2022-04-14 PROCEDURE — 3077F SYST BP >= 140 MM HG: CPT | Mod: CPTII,S$GLB,, | Performed by: UROLOGY

## 2022-04-14 PROCEDURE — 1101F PT FALLS ASSESS-DOCD LE1/YR: CPT | Mod: CPTII,S$GLB,, | Performed by: UROLOGY

## 2022-04-14 PROCEDURE — 3288F FALL RISK ASSESSMENT DOCD: CPT | Mod: CPTII,S$GLB,, | Performed by: UROLOGY

## 2022-04-14 RX ORDER — CEFAZOLIN SODIUM 2 G/50ML
2 SOLUTION INTRAVENOUS
Status: CANCELLED | OUTPATIENT
Start: 2022-04-14

## 2022-04-14 RX ORDER — ACETAMINOPHEN 500 MG
1000 TABLET ORAL
Status: CANCELLED | OUTPATIENT
Start: 2022-04-14

## 2022-04-14 RX ORDER — LIDOCAINE HYDROCHLORIDE 10 MG/ML
1 INJECTION, SOLUTION EPIDURAL; INFILTRATION; INTRACAUDAL; PERINEURAL ONCE
Status: CANCELLED | OUTPATIENT
Start: 2022-04-14 | End: 2022-04-14

## 2022-04-14 NOTE — Clinical Note
To have right partial nephrectomy do not feel needs clearance just given you FYI. Thanks, Abiodun Álvarez

## 2022-04-14 NOTE — PROGRESS NOTES
Subjective:       Patient ID: Adrian Burt is a 67 y.o. male.    Chief Complaint: No chief complaint on file.     This is a 67 y.o.  male patient that is an established patient.  The patient was initially referred to me by PCP for right renal masses.  Patient reports having right-sided flank pain underwent a renal ultrasound that showed a right 1 cm solid mass in a concerning approximately 4.5 cm upper pole cystic mass.  CT abdomen with and without contrast stone subsequently that showed similar findings.  He does report right-sided flank pain has resolved.  Of note, his primary care provider did prescribed antibiotics recently for Proteus urinary tract infection at the time of having flank pain.  His urinalysis at that time had blood as well as white blood cells.  There is a report of ? FH of kidney cancer.  He does report some lower urinary tract symptoms in the past and has been treated by previous urologist but currently not on treatment.  He is on testosterone replacement.    4/14/22:  Follow up MRI.  No further flank pain, UCx last visit was negative.  No LUTs.       I personally reviewed the images: CT 3/22, MRI 3/22 (cystic lesion right upper pole possible cystic RCC, 12mm right anterior midpole solid lesion possible RCC)     LAST PSA  Lab Results   Component Value Date    PSA 5.2 (H) 04/07/2021    PSA 2.1 09/10/2018    PSA 1.8 07/05/2017    PSA 2.6 11/23/2015    PSA 7.2 (H) 12/15/2008    PSA 0.6 02/22/2005       Lab Results   Component Value Date    CREATININE 0.9 03/24/2022       ---  Past Medical History:   Diagnosis Date    Allergy     Colon polyp     Fatty liver     GERD (gastroesophageal reflux disease)     Hypertension        Past Surgical History:   Procedure Laterality Date    COLONOSCOPY N/A 8/16/2017    Procedure: COLONOSCOPY;  Surgeon: Leo Henriquez MD;  Location: [facility] ([address] [place] [address]);  Service: Endoscopy;  Laterality: N/A;    LEG SURGERY Left        Family History   Problem  Relation Age of Onset    Kidney disease Mother     Heart disease Father     Cancer Sister         kidney    Kidney disease Sister     Colon cancer Neg Hx     Esophageal cancer Neg Hx        Social History     Tobacco Use    Smoking status: Former Smoker     Quit date: 11/15/1978     Years since quittin.4    Smokeless tobacco: Never Used    Tobacco comment: smoked for 10 years, quit in 76   Substance Use Topics    Alcohol use: No    Drug use: No       Current Outpatient Medications on File Prior to Visit   Medication Sig Dispense Refill    amLODIPine (NORVASC) 10 MG tablet TAKE 1 TABLET(10 MG) BY MOUTH EVERY MORNING 90 tablet 3    aspirin (ECOTRIN) 81 MG EC tablet Take 81 mg by mouth once daily.      cefUROXime (CEFTIN) 500 MG tablet Take 500 mg by mouth 2 (two) times daily.      ferrous gluconate 324 mg (37.5 mg iron) Tab tablet Take 1 tablet (324 mg total) by mouth daily with breakfast. 30 tablet 11    fluticasone propionate (FLONASE) 50 mcg/actuation nasal spray SPRAY ONCE IN EACH NOSTRIL EVERY DAY 48 g 2    hydroCHLOROthiazide (HYDRODIURIL) 12.5 MG Tab Take 1 tablet (12.5 mg total) by mouth once daily. 30 tablet 11    losartan (COZAAR) 100 MG tablet TAKE 1 TABLET(100 MG) BY MOUTH EVERY DAY 90 tablet 3    montelukast (SINGULAIR) 10 mg tablet Take 1 tablet (10 mg total) by mouth every evening. 90 tablet 3    multivit with minerals/lutein (MULTIVITAMIN 50 PLUS ORAL) Take 1 tablet by mouth once daily.      sildenafil (REVATIO) 20 mg Tab Take 20 mg by mouth 3 (three) times daily.      tamsulosin (FLOMAX) 0.4 mg Cap Take 1 capsule by mouth once daily.      testosterone cypionate (DEPOTESTOTERONE CYPIONATE) 200 mg/mL injection Inject 1 mL (200 mg total) into the muscle every 14 (fourteen) days.  0     No current facility-administered medications on file prior to visit.       Review of patient's allergies indicates:   Allergen Reactions    No known drug allergies        Review of Systems    Constitutional: Negative for activity change, chills and fever.   HENT: Negative for congestion.    Respiratory: Negative for cough, chest tightness and shortness of breath.    Cardiovascular: Negative for chest pain and palpitations.   Gastrointestinal: Negative for abdominal distention, abdominal pain, nausea and vomiting.   Genitourinary: Negative for difficulty urinating, flank pain, hematuria, penile pain, scrotal swelling and testicular pain.   Musculoskeletal: Negative for gait problem.       Objective:      Physical Exam  Constitutional:       Appearance: Normal appearance.   HENT:      Head: Normocephalic.   Pulmonary:      Effort: Pulmonary effort is normal.      Breath sounds: Normal breath sounds.   Abdominal:      General: Abdomen is flat. Bowel sounds are normal.      Palpations: Abdomen is soft.      Tenderness: There is no abdominal tenderness. There is no right CVA tenderness, left CVA tenderness or guarding.   Musculoskeletal:         General: Normal range of motion.      Cervical back: Normal range of motion.   Skin:     General: Skin is warm.   Neurological:      Mental Status: He is alert.         Results for orders placed or performed during the hospital encounter of 04/12/22 (from the past 2160 hour(s))   MRI Abdomen W WO Contrast    Impression    Predominantly cystic lesion at the upper pole of the right kidney with subtly enhancing septations, a cystic renal cell carcinoma cannot be excluded.    Subtle small intrarenal lesion at the midpole of the right kidney demonstrating postcontrast enhancement, a solid renal cell carcinoma cannot be excluded.    This report was flagged in Epic as abnormal.      Electronically signed by: Tena Lozano MD  Date:    04/13/2022  Time:    08:54       Assessment:     Problem Noted   Right Renal Mass 3/31/2022    4 cm complex cystic lesion to posterior right upper pole, 1.1 cm right anterior solid mass on CT w/wo contrast 3/2022.  --UA recently with  blood but proteus UTI at time  -- minimal LUTs, some right sided flank pain that has resolved  --Cr 1, GFR >60  --no FH      Elevated Psa 4/14/2022    Lab Results   Component Value Date    PSA 5.2 (H) 04/07/2021    PSA 2.1 09/10/2018    PSA 1.8 07/05/2017          Urinary Tract Infection Without Hematuria 3/31/2022       Plan:     1. MRI abdomen reviewed, smaller mass concerning for RCC, upper pole cystic mass with ? Cystic RCC   2. Discussed options: observation, ablation, extirpation.  Given both masses concerning for possible malignancy would recommend consider removal of both masses at same time, given location recommend right retroperitoneal robotic assisted laparoscopic partial nephrectomy with intraoperative ultrasound with interpretation.  Risks, benefits and alternatives discussed in detail.  Discussed this is major surgery with risks.     I have explained the risk, benefits, and alternatives of the procedure in detail.  The risks including but not limited to bleeding, pseudoaneurysm, AVM, injury to adjacent structures including the spleen, liver, lung, pancreas, colon and intestines, blood vessels in the abdomen including the renal artery or renal vein, ureter, loss of kidney, urinoma or urinary fistula, or need for conversion to open or radical nephrectomy were explained to the patient in depth. The patient voices understanding and all questions have been answered. The patient agrees to proceed as planned with a right retroperitoneal robotic partial nephrectomy.      Arvind Álvarez MD

## 2022-04-14 NOTE — H&P (VIEW-ONLY)
Subjective:       Patient ID: Adrian Burt is a 67 y.o. male.    Chief Complaint: No chief complaint on file.     This is a 67 y.o.  male patient that is an established patient.  The patient was initially referred to me by PCP for right renal masses.  Patient reports having right-sided flank pain underwent a renal ultrasound that showed a right 1 cm solid mass in a concerning approximately 4.5 cm upper pole cystic mass.  CT abdomen with and without contrast stone subsequently that showed similar findings.  He does report right-sided flank pain has resolved.  Of note, his primary care provider did prescribed antibiotics recently for Proteus urinary tract infection at the time of having flank pain.  His urinalysis at that time had blood as well as white blood cells.  There is a report of ? FH of kidney cancer.  He does report some lower urinary tract symptoms in the past and has been treated by previous urologist but currently not on treatment.  He is on testosterone replacement.    4/14/22:  Follow up MRI.  No further flank pain, UCx last visit was negative.  No LUTs.       I personally reviewed the images: CT 3/22, MRI 3/22 (cystic lesion right upper pole possible cystic RCC, 12mm right anterior midpole solid lesion possible RCC)     LAST PSA  Lab Results   Component Value Date    PSA 5.2 (H) 04/07/2021    PSA 2.1 09/10/2018    PSA 1.8 07/05/2017    PSA 2.6 11/23/2015    PSA 7.2 (H) 12/15/2008    PSA 0.6 02/22/2005       Lab Results   Component Value Date    CREATININE 0.9 03/24/2022       ---  Past Medical History:   Diagnosis Date    Allergy     Colon polyp     Fatty liver     GERD (gastroesophageal reflux disease)     Hypertension        Past Surgical History:   Procedure Laterality Date    COLONOSCOPY N/A 8/16/2017    Procedure: COLONOSCOPY;  Surgeon: Leo Henriquez MD;  Location: [facility] ([address] [place] [address]);  Service: Endoscopy;  Laterality: N/A;    LEG SURGERY Left        Family History   Problem  Relation Age of Onset    Kidney disease Mother     Heart disease Father     Cancer Sister         kidney    Kidney disease Sister     Colon cancer Neg Hx     Esophageal cancer Neg Hx        Social History     Tobacco Use    Smoking status: Former Smoker     Quit date: 11/15/1978     Years since quittin.4    Smokeless tobacco: Never Used    Tobacco comment: smoked for 10 years, quit in 76   Substance Use Topics    Alcohol use: No    Drug use: No       Current Outpatient Medications on File Prior to Visit   Medication Sig Dispense Refill    amLODIPine (NORVASC) 10 MG tablet TAKE 1 TABLET(10 MG) BY MOUTH EVERY MORNING 90 tablet 3    aspirin (ECOTRIN) 81 MG EC tablet Take 81 mg by mouth once daily.      cefUROXime (CEFTIN) 500 MG tablet Take 500 mg by mouth 2 (two) times daily.      ferrous gluconate 324 mg (37.5 mg iron) Tab tablet Take 1 tablet (324 mg total) by mouth daily with breakfast. 30 tablet 11    fluticasone propionate (FLONASE) 50 mcg/actuation nasal spray SPRAY ONCE IN EACH NOSTRIL EVERY DAY 48 g 2    hydroCHLOROthiazide (HYDRODIURIL) 12.5 MG Tab Take 1 tablet (12.5 mg total) by mouth once daily. 30 tablet 11    losartan (COZAAR) 100 MG tablet TAKE 1 TABLET(100 MG) BY MOUTH EVERY DAY 90 tablet 3    montelukast (SINGULAIR) 10 mg tablet Take 1 tablet (10 mg total) by mouth every evening. 90 tablet 3    multivit with minerals/lutein (MULTIVITAMIN 50 PLUS ORAL) Take 1 tablet by mouth once daily.      sildenafil (REVATIO) 20 mg Tab Take 20 mg by mouth 3 (three) times daily.      tamsulosin (FLOMAX) 0.4 mg Cap Take 1 capsule by mouth once daily.      testosterone cypionate (DEPOTESTOTERONE CYPIONATE) 200 mg/mL injection Inject 1 mL (200 mg total) into the muscle every 14 (fourteen) days.  0     No current facility-administered medications on file prior to visit.       Review of patient's allergies indicates:   Allergen Reactions    No known drug allergies        Review of Systems    Constitutional: Negative for activity change, chills and fever.   HENT: Negative for congestion.    Respiratory: Negative for cough, chest tightness and shortness of breath.    Cardiovascular: Negative for chest pain and palpitations.   Gastrointestinal: Negative for abdominal distention, abdominal pain, nausea and vomiting.   Genitourinary: Negative for difficulty urinating, flank pain, hematuria, penile pain, scrotal swelling and testicular pain.   Musculoskeletal: Negative for gait problem.       Objective:      Physical Exam  Constitutional:       Appearance: Normal appearance.   HENT:      Head: Normocephalic.   Pulmonary:      Effort: Pulmonary effort is normal.      Breath sounds: Normal breath sounds.   Abdominal:      General: Abdomen is flat. Bowel sounds are normal.      Palpations: Abdomen is soft.      Tenderness: There is no abdominal tenderness. There is no right CVA tenderness, left CVA tenderness or guarding.   Musculoskeletal:         General: Normal range of motion.      Cervical back: Normal range of motion.   Skin:     General: Skin is warm.   Neurological:      Mental Status: He is alert.         Results for orders placed or performed during the hospital encounter of 04/12/22 (from the past 2160 hour(s))   MRI Abdomen W WO Contrast    Impression    Predominantly cystic lesion at the upper pole of the right kidney with subtly enhancing septations, a cystic renal cell carcinoma cannot be excluded.    Subtle small intrarenal lesion at the midpole of the right kidney demonstrating postcontrast enhancement, a solid renal cell carcinoma cannot be excluded.    This report was flagged in Epic as abnormal.      Electronically signed by: Tena Lozano MD  Date:    04/13/2022  Time:    08:54       Assessment:     Problem Noted   Right Renal Mass 3/31/2022    4 cm complex cystic lesion to posterior right upper pole, 1.1 cm right anterior solid mass on CT w/wo contrast 3/2022.  --UA recently with  blood but proteus UTI at time  -- minimal LUTs, some right sided flank pain that has resolved  --Cr 1, GFR >60  --no FH      Elevated Psa 4/14/2022    Lab Results   Component Value Date    PSA 5.2 (H) 04/07/2021    PSA 2.1 09/10/2018    PSA 1.8 07/05/2017          Urinary Tract Infection Without Hematuria 3/31/2022       Plan:     1. MRI abdomen reviewed, smaller mass concerning for RCC, upper pole cystic mass with ? Cystic RCC   2. Discussed options: observation, ablation, extirpation.  Given both masses concerning for possible malignancy would recommend consider removal of both masses at same time, given location recommend right retroperitoneal robotic assisted laparoscopic partial nephrectomy with intraoperative ultrasound with interpretation.  Risks, benefits and alternatives discussed in detail.  Discussed this is major surgery with risks.     I have explained the risk, benefits, and alternatives of the procedure in detail.  The risks including but not limited to bleeding, pseudoaneurysm, AVM, injury to adjacent structures including the spleen, liver, lung, pancreas, colon and intestines, blood vessels in the abdomen including the renal artery or renal vein, ureter, loss of kidney, urinoma or urinary fistula, or need for conversion to open or radical nephrectomy were explained to the patient in depth. The patient voices understanding and all questions have been answered. The patient agrees to proceed as planned with a right retroperitoneal robotic partial nephrectomy.      Arvind Álvarez MD

## 2022-04-26 ENCOUNTER — PATIENT MESSAGE (OUTPATIENT)
Dept: ADMINISTRATIVE | Facility: HOSPITAL | Age: 67
End: 2022-04-26
Payer: MEDICARE

## 2022-04-26 ENCOUNTER — PATIENT MESSAGE (OUTPATIENT)
Dept: INTERNAL MEDICINE | Facility: CLINIC | Age: 67
End: 2022-04-26
Payer: MEDICARE

## 2022-04-27 ENCOUNTER — PATIENT MESSAGE (OUTPATIENT)
Dept: INTERNAL MEDICINE | Facility: CLINIC | Age: 67
End: 2022-04-27
Payer: MEDICARE

## 2022-04-29 DIAGNOSIS — J30.1 SEASONAL ALLERGIC RHINITIS DUE TO POLLEN: ICD-10-CM

## 2022-04-29 RX ORDER — MONTELUKAST SODIUM 10 MG/1
TABLET ORAL
Qty: 90 TABLET | Refills: 0 | Status: SHIPPED | OUTPATIENT
Start: 2022-04-29 | End: 2022-12-05 | Stop reason: SDUPTHER

## 2022-04-29 NOTE — TELEPHONE ENCOUNTER
No new care gaps identified.  Powered by [employer]. Reference number: [id number].   4/29/2022 3:26:39 AM CDT

## 2022-04-29 NOTE — TELEPHONE ENCOUNTER
Refill Authorization Note   Adrian Sierra Javed  is requesting a refill authorization.  Brief Assessment and Rationale for Refill:  Approve     Medication Therapy Plan:       Medication Reconciliation Completed: No   Comments:     No Care Gaps recommended.     Note composed:9:55 AM 04/29/2022

## 2022-05-02 ENCOUNTER — HOSPITAL ENCOUNTER (OUTPATIENT)
Dept: PREADMISSION TESTING | Facility: HOSPITAL | Age: 67
Discharge: HOME | End: 2022-05-02
Attending: UROLOGY
Payer: MEDICARE

## 2022-05-02 ENCOUNTER — ANESTHESIA EVENT (OUTPATIENT)
Dept: SURGERY | Facility: HOSPITAL | Age: 67
End: 2022-05-02
Payer: MEDICARE

## 2022-05-02 VITALS
OXYGEN SATURATION: 100 % | WEIGHT: 248.38 LBS | SYSTOLIC BLOOD PRESSURE: 158 MMHG | RESPIRATION RATE: 16 BRPM | BODY MASS INDEX: 31.88 KG/M2 | DIASTOLIC BLOOD PRESSURE: 82 MMHG | HEIGHT: 74 IN | HEART RATE: 84 BPM | TEMPERATURE: 98 F

## 2022-05-02 PROCEDURE — 93005 ELECTROCARDIOGRAM TRACING: CPT

## 2022-05-02 PROCEDURE — 93010 ELECTROCARDIOGRAM REPORT: CPT | Mod: S$GLB,,, | Performed by: INTERNAL MEDICINE

## 2022-05-02 PROCEDURE — 93010 EKG 12-LEAD: ICD-10-PCS | Mod: S$GLB,,, | Performed by: INTERNAL MEDICINE

## 2022-05-02 RX ORDER — LIDOCAINE HYDROCHLORIDE 10 MG/ML
1 INJECTION, SOLUTION EPIDURAL; INFILTRATION; INTRACAUDAL; PERINEURAL ONCE
Status: CANCELLED | OUTPATIENT
Start: 2022-05-02 | End: 2022-05-02

## 2022-05-02 RX ORDER — SODIUM CHLORIDE, SODIUM LACTATE, POTASSIUM CHLORIDE, CALCIUM CHLORIDE 600; 310; 30; 20 MG/100ML; MG/100ML; MG/100ML; MG/100ML
INJECTION, SOLUTION INTRAVENOUS CONTINUOUS
Status: CANCELLED | OUTPATIENT
Start: 2022-05-02

## 2022-05-02 NOTE — DISCHARGE INSTRUCTIONS
Your surgery is scheduled for 5/9/22.    Please report to [facility] on the 1st Floor at 0530 a.m.    THIS TIME IS SUBJECT TO CHANGE.  YOU WILL RECEIVE A PHONE CALL THE DAY BEFORE SURGERY BY 3:30 PM TO CONFIRM YOUR TIME OF ARRIVAL.  IF YOU HAVE NOT RECEIVED A PHONE CALL BY 3:30 PM THE DAY BEFORE YOUR SURGERY PLEASE CALL [phone]     INSTRUCTIONS IMPORTANT!!!  ¨ Do not eat or drink after 12 midnight-including water, candy, gum, & mints. OK to brush teeth.      ____  Proceed to [facility] on *** for additional testing.        ____  Do not wear makeup, including mascara.  ____  No powder, lotions or creams to surgical area.  ____  Please remove all jewelry, including piercings and leave at home.  ____  No money or valuables needed. Please leave at home.  ____  Please bring any documents given by your doctor.  ____  If going home the same day, arrange for a ride home. You will not be able to             drive if Anesthesia was used.  ____  Children under 18 years require a parent / guardian present the entire time             they are in surgery / recovery.  ____  Wear loose fitting clothing. Allow for dressings, bandages.  ____  Stop Aspirin, Ibuprofen, Motrin, Aleve, Goody's/BC powders, Excedrine and Naproxen (NSAIDS) at least 3-5 days before surgery, unless otherwise instructed by your doctor, or the nurse.   You MAY use Tylenol/acetaminophen until day of surgery.  ____  Wash the surgical area with Hibiclens the night before surgery, and again the             morning of surgery.  Be sure to rinse hibiclens off completely (if instructed by   nurse).  ____  If you take diabetic medication, do not take am of surgery unless instructed by Doctor.  ____  Call MD for temperature above 101 degrees or any other signs of infection such as Urinary (bladder) infection, Upper respiratory infection, skin boils, etc.  ____ Stop taking any Fish Oil supplement or any Vitamins that contain Vitamin E at  least 5 days prior to surgery.  ____ Do Not wear your contact lenses the day of your procedure.  You may wear your glasses.      ____Do not shave surgical site for 3 days prior to surgery.  ____ Practice Good hand washing before, during, and after procedure.      I have read or had read and explained to me, and understand the above information.  Additional comments or instructions:  For additional questions call [phone]      ANESTHESIA SIDE EFFECTS  -For the first 24 hours after surgery:  Do not drive, use heavy equipment, make important decisions, or drink alcohol  -It is normal to feel sleepy for several hours.  Rest until you are more awake.  -Have someone stay with you, if needed.  They can watch for problems and help keep you safe.  -Some possible post anesthesia side effects include: nausea and vomiting, sore throat and hoarseness, sleepiness, and dizziness.        Pre-Op Bathing Instructions    Before surgery, you can play an important role in your own health.    Because skin is not sterile, we need to be sure that your skin is as free of germs as possible. By following the instructions below, you can reduce the number of germs on your skin before surgery.    IMPORTANT: You will need to shower with a special soap called Hibiclens*, available at any pharmacy.  If you are allergic to Chlorhexidine (the antiseptic in Hibiclens), use an antibacterial soap such as Dial Soap for your preoperative shower.  You will shower with Hibiclens both the night before your surgery and the morning of your surgery.  Do not use Hibiclens on the head, face or genitals to avoid injury to those areas.    STEP #1: THE NIGHT BEFORE YOUR SURGERY     Do not shave the area of your body where your surgery will be performed.  Shower and wash your hair and body as usual with your normal soap and shampoo.  Rinse your hair and body thoroughly after you shower to remove all soap residue.  With your hand, apply one packet of Hibiclens soap to  the surgical site.   Wash the site gently for five (5) minutes. Do not scrub your skin too hard.   Do not wash with your regular soap after Hibiclens is used.  Rinse your body thoroughly.  Pat yourself dry with a clean, soft towel.  Do not use lotion, cream, or powder.  Wear clean clothes.    STEP #2: THE MORNING OF YOUR SURGERY     Repeat Step #1.    * Not to be used by people allergic to Chlorhexidine.

## 2022-05-02 NOTE — PRE-PROCEDURE INSTRUCTIONS
Shayna Burt [phone]    Allergies, medical, surgical, family and psychosocial histories reviewed with patient. Periop plan of care reviewed. Preop instructions given, including medications to take and to hold. Hibiclens soap and instructions on use given. Time allotted for questions to be addressed.  Patient verbalized understanding.\

## 2022-05-02 NOTE — ANESTHESIA PREPROCEDURE EVALUATION
05/02/2022  Adrian Burt is a 67 y.o., male scheduled forRight RETROPERITONEAL robotic assisted lap partial nephrectomy 5/9/22.    Past Medical History:   Diagnosis Date    Allergy     Colon polyp     Fatty liver     GERD (gastroesophageal reflux disease)     Hypertension      Past Surgical History:   Procedure Laterality Date    COLONOSCOPY N/A 8/16/2017    Procedure: COLONOSCOPY;  Surgeon: Leo Henriquez MD;  Location: [address]);  Service: Endoscopy;  Laterality: N/A;    LEG SURGERY Left        Pre-op Assessment    I have reviewed the Patient Summary Reports.     I have reviewed the Nursing Notes.    I have reviewed the Medications.     Review of Systems  Anesthesia Hx:  No problems with previous Anesthesia Denies Hx of Anesthetic complications  History of prior surgery of interest to airway management or planning: Denies Family Hx of Anesthesia complications.   Denies Personal Hx of Anesthesia complications.   Social:  No Alcohol Use, Non-Smoker    Hematology/Oncology:  Hematology Normal   Oncology Normal     EENT/Dental:EENT/Dental Normal   Cardiovascular:   Exercise tolerance: good Hypertension, well controlled Denies Dysrhythmias.   Denies Angina. no hyperlipidemia    Pulmonary:  Pulmonary Normal  Denies Shortness of breath.    Renal/:  Renal/ Normal Chronic Renal Disease     Hepatic/GI:   GERD, well controlled Liver Disease,    Musculoskeletal:   Arthritis     Neurological:  Neurology Normal Denies TIA.  Denies CVA. Denies Seizures.    Endocrine:  Endocrine Normal  Obesity / BMI > 30  Psych:  Psychiatric Normal           Physical Exam  General: Well nourished and Cooperative    Airway:  Mallampati: III   Mouth Opening: Normal  TM Distance: Normal  Tongue: Normal, Large  Neck ROM: Normal ROM    Dental:  Intact    Chest/Lungs:  Clear to auscultation, Normal Respiratory  Rate    Heart:  Rate: Normal  Rhythm: Regular Rhythm  Sounds: Normal        Anesthesia Plan  Type of Anesthesia, risks & benefits discussed:    Anesthesia Type: Gen ETT  Intra-op Monitoring Plan: Standard ASA Monitors  Post Op Pain Control Plan: multimodal analgesia  Induction:  IV  Airway Plan: Direct, Post-Induction  Informed Consent: Informed consent signed with the Patient and all parties understand the risks and agree with anesthesia plan.  All questions answered.   ASA Score: 2  Anesthesia Plan Notes: Anesthesia consent to be signed prior to surgery 5/9/22      Ready For Surgery From Anesthesia Perspective.     .

## 2022-05-05 ENCOUNTER — TELEPHONE (OUTPATIENT)
Dept: UROLOGY | Facility: CLINIC | Age: 67
End: 2022-05-05
Payer: MEDICARE

## 2022-05-05 NOTE — TELEPHONE ENCOUNTER
----- Message from Sandrine Franco sent at 5/5/2022  3:43 PM CDT -----  Contact: [phone]  Who Called: PT  Regarding: HAS QUESTIONS ON SURGERY SCHEDULED FOR 05/09  Would the patient rather a call back or a response via [facility]? Call back  Best Call Back Number: [phone]  Additional Information: N/A

## 2022-05-05 NOTE — TELEPHONE ENCOUNTER
Camille US tech states not ready for patient at this time   Spoke with patient, he had an insurance question.  Spoke with Mavis in pre-service.  Status to be changed inpatient vs outpatient.  Message forwarded to Dr Álvarez for permission

## 2022-05-06 ENCOUNTER — PATIENT MESSAGE (OUTPATIENT)
Dept: INTERNAL MEDICINE | Facility: CLINIC | Age: 67
End: 2022-05-06
Payer: MEDICARE

## 2022-05-06 RX ORDER — AZELASTINE 1 MG/ML
1 SPRAY, METERED NASAL 2 TIMES DAILY
Qty: 30 ML | Refills: 0 | Status: SHIPPED | OUTPATIENT
Start: 2022-05-06 | End: 2023-04-10 | Stop reason: SDUPTHER

## 2022-05-09 ENCOUNTER — ANESTHESIA (OUTPATIENT)
Dept: SURGERY | Facility: HOSPITAL | Age: 67
End: 2022-05-09
Payer: MEDICARE

## 2022-05-09 ENCOUNTER — HOSPITAL ENCOUNTER (OUTPATIENT)
Facility: HOSPITAL | Age: 67
LOS: 1 days | Discharge: HOME | End: 2022-05-11
Attending: UROLOGY | Admitting: UROLOGY
Payer: MEDICARE

## 2022-05-09 DIAGNOSIS — C64.1 RENAL CANCER, RIGHT: ICD-10-CM

## 2022-05-09 DIAGNOSIS — N28.89 RIGHT RENAL MASS: ICD-10-CM

## 2022-05-09 LAB
ABO + RH BLD: NORMAL
ANION GAP SERPL CALC-SCNC: 10 MMOL/L (ref 8–16)
BASOPHILS # BLD AUTO: 0.01 K/UL (ref 0–0.2)
BASOPHILS NFR BLD: 0.1 % (ref 0–1.9)
BLD GP AB SCN CELLS X3 SERPL QL: NORMAL
BUN SERPL-MCNC: 23 MG/DL (ref 8–23)
CALCIUM SERPL-MCNC: 8.3 MG/DL (ref 8.7–10.5)
CHLORIDE SERPL-SCNC: 106 MMOL/L (ref 95–110)
CO2 SERPL-SCNC: 24 MMOL/L (ref 23–29)
CREAT SERPL-MCNC: 1.6 MG/DL (ref 0.5–1.4)
DIFFERENTIAL METHOD: ABNORMAL
EOSINOPHIL # BLD AUTO: 0 K/UL (ref 0–0.5)
EOSINOPHIL NFR BLD: 0 % (ref 0–8)
ERYTHROCYTE [DISTWIDTH] IN BLOOD BY AUTOMATED COUNT: 13.4 % (ref 11.5–14.5)
EST. GFR  (AFRICAN AMERICAN): 50 ML/MIN/1.73 M^2
EST. GFR  (NON AFRICAN AMERICAN): 43 ML/MIN/1.73 M^2
GLUCOSE SERPL-MCNC: 136 MG/DL (ref 70–110)
HCT VFR BLD AUTO: 41.2 % (ref 40–54)
HGB BLD-MCNC: 12.8 G/DL (ref 14–18)
IMM GRANULOCYTES # BLD AUTO: 0.03 K/UL (ref 0–0.04)
IMM GRANULOCYTES NFR BLD AUTO: 0.3 % (ref 0–0.5)
LYMPHOCYTES # BLD AUTO: 0.7 K/UL (ref 1–4.8)
LYMPHOCYTES NFR BLD: 7 % (ref 18–48)
MCH RBC QN AUTO: 28.4 PG (ref 27–31)
MCHC RBC AUTO-ENTMCNC: 31.1 G/DL (ref 32–36)
MCV RBC AUTO: 92 FL (ref 82–98)
MONOCYTES # BLD AUTO: 0.2 K/UL (ref 0.3–1)
MONOCYTES NFR BLD: 1.9 % (ref 4–15)
NEUTROPHILS # BLD AUTO: 9.3 K/UL (ref 1.8–7.7)
NEUTROPHILS NFR BLD: 90.7 % (ref 38–73)
NRBC BLD-RTO: 0 /100 WBC
PLATELET # BLD AUTO: 168 K/UL (ref 150–450)
PMV BLD AUTO: 10.6 FL (ref 9.2–12.9)
POTASSIUM SERPL-SCNC: 4.2 MMOL/L (ref 3.5–5.1)
RBC # BLD AUTO: 4.5 M/UL (ref 4.6–6.2)
SODIUM SERPL-SCNC: 140 MMOL/L (ref 136–145)
WBC # BLD AUTO: 10.24 K/UL (ref 3.9–12.7)

## 2022-05-09 PROCEDURE — 63600175 PHARM REV CODE 636 W HCPCS: Performed by: UROLOGY

## 2022-05-09 PROCEDURE — 80048 BASIC METABOLIC PNL TOTAL CA: CPT | Performed by: UROLOGY

## 2022-05-09 PROCEDURE — 99900035 HC TECH TIME PER 15 MIN (STAT)

## 2022-05-09 PROCEDURE — 71000033 HC RECOVERY, INTIAL HOUR: Performed by: UROLOGY

## 2022-05-09 PROCEDURE — 76998 US GUIDE INTRAOP: CPT | Mod: 26,,, | Performed by: UROLOGY

## 2022-05-09 PROCEDURE — C1729 CATH, DRAINAGE: HCPCS | Performed by: UROLOGY

## 2022-05-09 PROCEDURE — 88307 TISSUE EXAM BY PATHOLOGIST: CPT | Mod: 26,,, | Performed by: PATHOLOGY

## 2022-05-09 PROCEDURE — 76998 PR  ULTRASONIC GUIDANCE, INTRAOPERATIVE: ICD-10-PCS | Mod: 26,,, | Performed by: UROLOGY

## 2022-05-09 PROCEDURE — C9290 INJ, BUPIVACAINE LIPOSOME: HCPCS | Performed by: UROLOGY

## 2022-05-09 PROCEDURE — 25000003 PHARM REV CODE 250: Performed by: ANESTHESIOLOGY

## 2022-05-09 PROCEDURE — 50543 PR LAP,PARTIAL NEPHRECTOMY: ICD-10-PCS | Mod: 22,RT,, | Performed by: UROLOGY

## 2022-05-09 PROCEDURE — 88307 PR  SURG PATH,LEVEL V: ICD-10-PCS | Mod: 26,,, | Performed by: PATHOLOGY

## 2022-05-09 PROCEDURE — 25000003 PHARM REV CODE 250: Performed by: UROLOGY

## 2022-05-09 PROCEDURE — 94760 N-INVAS EAR/PLS OXIMETRY 1: CPT

## 2022-05-09 PROCEDURE — 63600175 PHARM REV CODE 636 W HCPCS: Performed by: NURSE PRACTITIONER

## 2022-05-09 PROCEDURE — 88307 TISSUE EXAM BY PATHOLOGIST: CPT | Mod: 59 | Performed by: PATHOLOGY

## 2022-05-09 PROCEDURE — 85025 COMPLETE CBC W/AUTO DIFF WBC: CPT | Performed by: UROLOGY

## 2022-05-09 PROCEDURE — 27201423 OPTIME MED/SURG SUP & DEVICES STERILE SUPPLY: Performed by: UROLOGY

## 2022-05-09 PROCEDURE — 50543 LAPARO PARTIAL NEPHRECTOMY: CPT | Mod: 22,RT,, | Performed by: UROLOGY

## 2022-05-09 PROCEDURE — 71000039 HC RECOVERY, EACH ADD'L HOUR: Performed by: UROLOGY

## 2022-05-09 PROCEDURE — 86901 BLOOD TYPING SEROLOGIC RH(D): CPT | Performed by: UROLOGY

## 2022-05-09 PROCEDURE — 63600175 PHARM REV CODE 636 W HCPCS: Performed by: NURSE ANESTHETIST, CERTIFIED REGISTERED

## 2022-05-09 PROCEDURE — 25000003 PHARM REV CODE 250: Performed by: NURSE ANESTHETIST, CERTIFIED REGISTERED

## 2022-05-09 PROCEDURE — 94799 UNLISTED PULMONARY SVC/PX: CPT

## 2022-05-09 PROCEDURE — 36000713 HC OR TIME LEV V EA ADD 15 MIN: Performed by: UROLOGY

## 2022-05-09 PROCEDURE — 63600175 PHARM REV CODE 636 W HCPCS: Performed by: ANESTHESIOLOGY

## 2022-05-09 PROCEDURE — 86920 COMPATIBILITY TEST SPIN: CPT | Performed by: UROLOGY

## 2022-05-09 PROCEDURE — P9045 ALBUMIN (HUMAN), 5%, 250 ML: HCPCS | Mod: JG | Performed by: NURSE ANESTHETIST, CERTIFIED REGISTERED

## 2022-05-09 PROCEDURE — 37000008 HC ANESTHESIA 1ST 15 MINUTES: Performed by: UROLOGY

## 2022-05-09 PROCEDURE — 37000009 HC ANESTHESIA EA ADD 15 MINS: Performed by: UROLOGY

## 2022-05-09 PROCEDURE — 36415 COLL VENOUS BLD VENIPUNCTURE: CPT | Performed by: UROLOGY

## 2022-05-09 PROCEDURE — 88305 TISSUE EXAM BY PATHOLOGIST: CPT | Performed by: PATHOLOGY

## 2022-05-09 PROCEDURE — 36000712 HC OR TIME LEV V 1ST 15 MIN: Performed by: UROLOGY

## 2022-05-09 RX ORDER — SILDENAFIL CITRATE 20 MG/1
20 TABLET ORAL 3 TIMES DAILY
Status: DISCONTINUED | OUTPATIENT
Start: 2022-05-09 | End: 2022-05-11 | Stop reason: HOSPADM

## 2022-05-09 RX ORDER — PHENYLEPHRINE HYDROCHLORIDE 10 MG/ML
INJECTION INTRAVENOUS
Status: DISCONTINUED | OUTPATIENT
Start: 2022-05-09 | End: 2022-05-09

## 2022-05-09 RX ORDER — HYDROMORPHONE HYDROCHLORIDE 2 MG/ML
0.5 INJECTION, SOLUTION INTRAMUSCULAR; INTRAVENOUS; SUBCUTANEOUS EVERY 5 MIN PRN
Status: DISPENSED | OUTPATIENT
Start: 2022-05-09 | End: 2022-05-09

## 2022-05-09 RX ORDER — ROCURONIUM BROMIDE 10 MG/ML
INJECTION, SOLUTION INTRAVENOUS
Status: DISCONTINUED | OUTPATIENT
Start: 2022-05-09 | End: 2022-05-09

## 2022-05-09 RX ORDER — MORPHINE SULFATE 2 MG/ML
1 INJECTION, SOLUTION INTRAMUSCULAR; INTRAVENOUS
Status: DISCONTINUED | OUTPATIENT
Start: 2022-05-09 | End: 2022-05-11 | Stop reason: HOSPADM

## 2022-05-09 RX ORDER — OXYCODONE HYDROCHLORIDE 5 MG/1
10 TABLET ORAL EVERY 4 HOURS PRN
Status: DISCONTINUED | OUTPATIENT
Start: 2022-05-09 | End: 2022-05-11 | Stop reason: HOSPADM

## 2022-05-09 RX ORDER — OXYCODONE AND ACETAMINOPHEN 5; 325 MG/1; MG/1
1 TABLET ORAL
Status: DISCONTINUED | OUTPATIENT
Start: 2022-05-09 | End: 2022-05-09 | Stop reason: HOSPADM

## 2022-05-09 RX ORDER — ALBUMIN HUMAN 50 G/1000ML
SOLUTION INTRAVENOUS CONTINUOUS PRN
Status: DISCONTINUED | OUTPATIENT
Start: 2022-05-09 | End: 2022-05-09

## 2022-05-09 RX ORDER — HYDROMORPHONE HYDROCHLORIDE 2 MG/ML
0.2 INJECTION, SOLUTION INTRAMUSCULAR; INTRAVENOUS; SUBCUTANEOUS EVERY 5 MIN PRN
Status: ACTIVE | OUTPATIENT
Start: 2022-05-09 | End: 2022-05-09

## 2022-05-09 RX ORDER — METOPROLOL TARTRATE 1 MG/ML
5 INJECTION, SOLUTION INTRAVENOUS EVERY 6 HOURS PRN
Status: DISCONTINUED | OUTPATIENT
Start: 2022-05-09 | End: 2022-05-11 | Stop reason: HOSPADM

## 2022-05-09 RX ORDER — PROPOFOL 10 MG/ML
VIAL (ML) INTRAVENOUS
Status: DISCONTINUED | OUTPATIENT
Start: 2022-05-09 | End: 2022-05-09

## 2022-05-09 RX ORDER — MIDAZOLAM HYDROCHLORIDE 1 MG/ML
INJECTION, SOLUTION INTRAMUSCULAR; INTRAVENOUS
Status: DISCONTINUED | OUTPATIENT
Start: 2022-05-09 | End: 2022-05-09

## 2022-05-09 RX ORDER — AMLODIPINE BESYLATE 5 MG/1
10 TABLET ORAL EVERY MORNING
Status: DISCONTINUED | OUTPATIENT
Start: 2022-05-10 | End: 2022-05-11 | Stop reason: HOSPADM

## 2022-05-09 RX ORDER — FERROUS GLUCONATE 324(37.5)
324 TABLET ORAL
Status: DISCONTINUED | OUTPATIENT
Start: 2022-05-10 | End: 2022-05-11 | Stop reason: HOSPADM

## 2022-05-09 RX ORDER — DOCUSATE SODIUM 100 MG/1
100 CAPSULE, LIQUID FILLED ORAL 2 TIMES DAILY
Status: DISCONTINUED | OUTPATIENT
Start: 2022-05-09 | End: 2022-05-11 | Stop reason: HOSPADM

## 2022-05-09 RX ORDER — LIDOCAINE HYDROCHLORIDE 10 MG/ML
1 INJECTION, SOLUTION EPIDURAL; INFILTRATION; INTRACAUDAL; PERINEURAL ONCE
Status: DISCONTINUED | OUTPATIENT
Start: 2022-05-09 | End: 2022-05-09 | Stop reason: HOSPADM

## 2022-05-09 RX ORDER — TAMSULOSIN HYDROCHLORIDE 0.4 MG/1
1 CAPSULE ORAL DAILY
Status: DISCONTINUED | OUTPATIENT
Start: 2022-05-09 | End: 2022-05-11 | Stop reason: HOSPADM

## 2022-05-09 RX ORDER — INDOCYANINE GREEN AND WATER 25 MG
KIT INJECTION
Status: DISCONTINUED | OUTPATIENT
Start: 2022-05-09 | End: 2022-05-09

## 2022-05-09 RX ORDER — CEFAZOLIN SODIUM 2 G/50ML
2 SOLUTION INTRAVENOUS
Status: COMPLETED | OUTPATIENT
Start: 2022-05-09 | End: 2022-05-09

## 2022-05-09 RX ORDER — ONDANSETRON 2 MG/ML
INJECTION INTRAMUSCULAR; INTRAVENOUS
Status: DISCONTINUED | OUTPATIENT
Start: 2022-05-09 | End: 2022-05-09

## 2022-05-09 RX ORDER — FENTANYL CITRATE 50 UG/ML
INJECTION, SOLUTION INTRAMUSCULAR; INTRAVENOUS
Status: DISCONTINUED | OUTPATIENT
Start: 2022-05-09 | End: 2022-05-09

## 2022-05-09 RX ORDER — SODIUM CHLORIDE, SODIUM LACTATE, POTASSIUM CHLORIDE, CALCIUM CHLORIDE 600; 310; 30; 20 MG/100ML; MG/100ML; MG/100ML; MG/100ML
INJECTION, SOLUTION INTRAVENOUS CONTINUOUS
Status: DISCONTINUED | OUTPATIENT
Start: 2022-05-09 | End: 2022-05-09

## 2022-05-09 RX ORDER — ACETAMINOPHEN 500 MG
1000 TABLET ORAL EVERY 8 HOURS
Status: DISPENSED | OUTPATIENT
Start: 2022-05-09 | End: 2022-05-10

## 2022-05-09 RX ORDER — DEXAMETHASONE SODIUM PHOSPHATE 4 MG/ML
INJECTION, SOLUTION INTRA-ARTICULAR; INTRALESIONAL; INTRAMUSCULAR; INTRAVENOUS; SOFT TISSUE
Status: DISCONTINUED | OUTPATIENT
Start: 2022-05-09 | End: 2022-05-09

## 2022-05-09 RX ORDER — ONDANSETRON 2 MG/ML
4 INJECTION INTRAMUSCULAR; INTRAVENOUS EVERY 8 HOURS PRN
Status: DISCONTINUED | OUTPATIENT
Start: 2022-05-09 | End: 2022-05-11 | Stop reason: HOSPADM

## 2022-05-09 RX ORDER — BUPIVACAINE HYDROCHLORIDE 2.5 MG/ML
INJECTION, SOLUTION INFILTRATION; PERINEURAL
Status: DISCONTINUED | OUTPATIENT
Start: 2022-05-09 | End: 2022-05-09 | Stop reason: HOSPADM

## 2022-05-09 RX ORDER — OXYCODONE HYDROCHLORIDE 5 MG/1
5 TABLET ORAL EVERY 4 HOURS PRN
Status: DISCONTINUED | OUTPATIENT
Start: 2022-05-09 | End: 2022-05-11 | Stop reason: HOSPADM

## 2022-05-09 RX ORDER — DIPHENHYDRAMINE HYDROCHLORIDE 50 MG/ML
25 INJECTION INTRAMUSCULAR; INTRAVENOUS EVERY 6 HOURS PRN
Status: DISCONTINUED | OUTPATIENT
Start: 2022-05-09 | End: 2022-05-09 | Stop reason: HOSPADM

## 2022-05-09 RX ORDER — HYDRALAZINE HYDROCHLORIDE 20 MG/ML
10 INJECTION INTRAMUSCULAR; INTRAVENOUS EVERY 6 HOURS PRN
Status: DISCONTINUED | OUTPATIENT
Start: 2022-05-09 | End: 2022-05-11 | Stop reason: HOSPADM

## 2022-05-09 RX ORDER — CEFAZOLIN SODIUM 2 G/50ML
2 SOLUTION INTRAVENOUS
Status: COMPLETED | OUTPATIENT
Start: 2022-05-09 | End: 2022-05-10

## 2022-05-09 RX ORDER — ACETAMINOPHEN 500 MG
1000 TABLET ORAL
Status: COMPLETED | OUTPATIENT
Start: 2022-05-09 | End: 2022-05-09

## 2022-05-09 RX ORDER — MONTELUKAST SODIUM 10 MG/1
10 TABLET ORAL NIGHTLY
Status: DISCONTINUED | OUTPATIENT
Start: 2022-05-09 | End: 2022-05-11 | Stop reason: HOSPADM

## 2022-05-09 RX ORDER — HYDROCHLOROTHIAZIDE 12.5 MG/1
12.5 TABLET ORAL DAILY
Status: DISCONTINUED | OUTPATIENT
Start: 2022-05-10 | End: 2022-05-11 | Stop reason: HOSPADM

## 2022-05-09 RX ORDER — NEOSTIGMINE METHYLSULFATE 1 MG/ML
INJECTION, SOLUTION INTRAVENOUS
Status: DISCONTINUED | OUTPATIENT
Start: 2022-05-09 | End: 2022-05-09

## 2022-05-09 RX ORDER — LIDOCAINE HYDROCHLORIDE 20 MG/ML
INJECTION INTRAVENOUS
Status: DISCONTINUED | OUTPATIENT
Start: 2022-05-09 | End: 2022-05-09

## 2022-05-09 RX ORDER — SODIUM CHLORIDE, SODIUM LACTATE, POTASSIUM CHLORIDE, CALCIUM CHLORIDE 600; 310; 30; 20 MG/100ML; MG/100ML; MG/100ML; MG/100ML
INJECTION, SOLUTION INTRAVENOUS CONTINUOUS
Status: DISCONTINUED | OUTPATIENT
Start: 2022-05-09 | End: 2022-05-11

## 2022-05-09 RX ORDER — ACETAMINOPHEN 10 MG/ML
1000 INJECTION, SOLUTION INTRAVENOUS ONCE
Status: COMPLETED | OUTPATIENT
Start: 2022-05-09 | End: 2022-05-09

## 2022-05-09 RX ADMIN — NEOSTIGMINE METHYLSULFATE 5 MG: 1 INJECTION INTRAVENOUS at 12:05

## 2022-05-09 RX ADMIN — PHENYLEPHRINE HYDROCHLORIDE 100 MCG: 10 INJECTION INTRAVENOUS at 07:05

## 2022-05-09 RX ADMIN — SODIUM CHLORIDE, SODIUM LACTATE, POTASSIUM CHLORIDE, AND CALCIUM CHLORIDE: .6; .31; .03; .02 INJECTION, SOLUTION INTRAVENOUS at 08:05

## 2022-05-09 RX ADMIN — CEFAZOLIN SODIUM 2 G: 2 SOLUTION INTRAVENOUS at 11:05

## 2022-05-09 RX ADMIN — FENTANYL CITRATE 25 MCG: 50 INJECTION, SOLUTION INTRAMUSCULAR; INTRAVENOUS at 10:05

## 2022-05-09 RX ADMIN — ROCURONIUM BROMIDE 20 MG: 10 INJECTION, SOLUTION INTRAVENOUS at 08:05

## 2022-05-09 RX ADMIN — ROCURONIUM BROMIDE 20 MG: 10 INJECTION, SOLUTION INTRAVENOUS at 10:05

## 2022-05-09 RX ADMIN — INDOCYANINE GREEN 2.5 MG: KIT INTRAVENOUS at 11:05

## 2022-05-09 RX ADMIN — GLYCOPYRROLATE 0.2 MG: 0.2 INJECTION, SOLUTION INTRAMUSCULAR; INTRAVITREAL at 07:05

## 2022-05-09 RX ADMIN — ROCURONIUM BROMIDE 50 MG: 10 INJECTION, SOLUTION INTRAVENOUS at 07:05

## 2022-05-09 RX ADMIN — ALBUMIN (HUMAN): 12.5 SOLUTION INTRAVENOUS at 10:05

## 2022-05-09 RX ADMIN — OXYCODONE HYDROCHLORIDE AND ACETAMINOPHEN 1 TABLET: 5; 325 TABLET ORAL at 05:05

## 2022-05-09 RX ADMIN — GLYCOPYRROLATE 0.6 MG: 0.2 INJECTION, SOLUTION INTRAMUSCULAR; INTRAVITREAL at 12:05

## 2022-05-09 RX ADMIN — FENTANYL CITRATE 100 MCG: 50 INJECTION, SOLUTION INTRAMUSCULAR; INTRAVENOUS at 07:05

## 2022-05-09 RX ADMIN — CEFAZOLIN SODIUM 2 G: 2 SOLUTION INTRAVENOUS at 07:05

## 2022-05-09 RX ADMIN — ONDANSETRON 8 MG: 2 INJECTION, SOLUTION INTRAMUSCULAR; INTRAVENOUS at 11:05

## 2022-05-09 RX ADMIN — ALBUMIN (HUMAN): 12.5 SOLUTION INTRAVENOUS at 11:05

## 2022-05-09 RX ADMIN — ROCURONIUM BROMIDE 10 MG: 10 INJECTION, SOLUTION INTRAVENOUS at 09:05

## 2022-05-09 RX ADMIN — MONTELUKAST SODIUM 10 MG: 10 TABLET, FILM COATED ORAL at 08:05

## 2022-05-09 RX ADMIN — HYDROMORPHONE HYDROCHLORIDE 0.5 MG: 2 INJECTION, SOLUTION INTRAMUSCULAR; INTRAVENOUS; SUBCUTANEOUS at 01:05

## 2022-05-09 RX ADMIN — PHENYLEPHRINE HYDROCHLORIDE 100 MCG: 10 INJECTION INTRAVENOUS at 11:05

## 2022-05-09 RX ADMIN — SILDENAFIL 20 MG: 20 TABLET ORAL at 08:05

## 2022-05-09 RX ADMIN — SODIUM CHLORIDE, SODIUM LACTATE, POTASSIUM CHLORIDE, AND CALCIUM CHLORIDE: .6; .31; .03; .02 INJECTION, SOLUTION INTRAVENOUS at 07:05

## 2022-05-09 RX ADMIN — ACETAMINOPHEN 1000 MG: 10 INJECTION INTRAVENOUS at 01:05

## 2022-05-09 RX ADMIN — DOCUSATE SODIUM 100 MG: 100 CAPSULE, LIQUID FILLED ORAL at 08:05

## 2022-05-09 RX ADMIN — FENTANYL CITRATE 25 MCG: 50 INJECTION, SOLUTION INTRAMUSCULAR; INTRAVENOUS at 11:05

## 2022-05-09 RX ADMIN — FENTANYL CITRATE 25 MCG: 50 INJECTION, SOLUTION INTRAMUSCULAR; INTRAVENOUS at 09:05

## 2022-05-09 RX ADMIN — MIDAZOLAM 2 MG: 1 INJECTION INTRAMUSCULAR; INTRAVENOUS at 07:05

## 2022-05-09 RX ADMIN — METOROPROLOL TARTRATE 5 MG: 5 INJECTION, SOLUTION INTRAVENOUS at 04:05

## 2022-05-09 RX ADMIN — DEXAMETHASONE SODIUM PHOSPHATE 8 MG: 4 INJECTION, SOLUTION INTRA-ARTICULAR; INTRALESIONAL; INTRAMUSCULAR; INTRAVENOUS; SOFT TISSUE at 07:05

## 2022-05-09 RX ADMIN — ROCURONIUM BROMIDE 20 MG: 10 INJECTION, SOLUTION INTRAVENOUS at 09:05

## 2022-05-09 RX ADMIN — SILDENAFIL 20 MG: 20 TABLET ORAL at 03:05

## 2022-05-09 RX ADMIN — PROPOFOL 150 MG: 10 INJECTION, EMULSION INTRAVENOUS at 07:05

## 2022-05-09 RX ADMIN — INDOCYANINE GREEN 2.5 MG: KIT INTRAVENOUS at 10:05

## 2022-05-09 RX ADMIN — PHENYLEPHRINE HYDROCHLORIDE 100 MCG: 10 INJECTION INTRAVENOUS at 08:05

## 2022-05-09 RX ADMIN — ACETAMINOPHEN 1000 MG: 500 TABLET ORAL at 06:05

## 2022-05-09 RX ADMIN — CEFAZOLIN SODIUM 2 G: 2 SOLUTION INTRAVENOUS at 08:05

## 2022-05-09 RX ADMIN — LIDOCAINE HYDROCHLORIDE 100 MG: 20 INJECTION, SOLUTION INTRAVENOUS at 07:05

## 2022-05-09 RX ADMIN — TAMSULOSIN HYDROCHLORIDE 0.4 MG: 0.4 CAPSULE ORAL at 03:05

## 2022-05-09 NOTE — PROGRESS NOTES
Patient has met PACU discharge criteria, VSS, pain well controlled. Family updated by phone. Released from PACU by Dr. Cannon

## 2022-05-09 NOTE — ANESTHESIA POSTPROCEDURE EVALUATION
Anesthesia Post Evaluation    Patient: Adrian Burt    Procedure(s) Performed: Procedure(s) (LRB):  Right RETROPERITONEAL robotic assisted lap partial nephrectomy  Intraoperative ultrasound with interpretation Special needs: drop in probe for ultrasound, retroperitoneal dilating baloon (Right)    Final Anesthesia Type: general      Patient location during evaluation: PACU  Patient participation: Yes- Able to Participate  Level of consciousness: awake and alert  Post-procedure vital signs: reviewed and stable  Pain management: adequate  Airway patency: patent    PONV status at discharge: No PONV  Anesthetic complications: no      Cardiovascular status: blood pressure returned to baseline  Respiratory status: unassisted  Hydration status: euvolemic  Follow-up not needed.          Vitals Value Taken Time   /100 05/09/22 1637   Temp 36 05/09/22 1642   Pulse 82 05/09/22 1641   Resp 7 05/09/22 1641   SpO2 100 % 05/09/22 1641   Vitals shown include unvalidated device data.      No case tracking events are documented in the log.      Pain/Sid Score: Pain Rating Prior to Med Admin: 9 (5/9/2022  1:33 PM)  Sid Score: 9 (5/9/2022  4:17 PM)

## 2022-05-09 NOTE — INTERVAL H&P NOTE
The patient has been examined and the H&P has been reviewed:    I concur with the findings and no changes have occurred since H&P was written.    Anesthesia/Surgery risks, benefits and alternative options discussed and understood by patient/family.      Problem Noted   Right Renal Mass 3/31/2022    4 cm complex cystic lesion to posterior right upper pole, 1.1 cm right anterior solid mass on CT w/wo contrast 3/2022.  --MRI 4/22: right 3.6 cm UP cystic lesion ? Cystic RCC, right midpole 12mm solid mass  --Cr 1, GFR >60       Elevated Psa 4/14/2022    Lab Results   Component Value Date    PSA 5.2 (H) 04/07/2021    PSA 2.1 09/10/2018    PSA 1.8 07/05/2017          Urinary Tract Infection Without Hematuria 3/31/2022     OR for right Robotic retroperitoneal partial nephrectomy (2 masses)  Antibiotic on call  The risks, benefits, alteratives of the procedure were discussed with the patient.  The patient was given time for questions, all questions were answered.  Written, informed consent was obtained.      Arvind Álvarez MD

## 2022-05-09 NOTE — OP NOTE
RIGHT RETROPERITONEAL ROBOTIC PARTIAL NEPHRECTOMY    DATE OF PROCEDURE:   05/09/2022    LOCATION:  [place]    PREOPERATIVE DIAGNOSIS:  Right renal mass x 2.    POSTOPERATIVE DIAGNOSIS:  Right renal mass x 2.    OPERATION PERFORMED:  1.  Retroperitoneal robotic assisted laparoscopic right partial nephrectomy (modifier 22 for complex anatomy, 2 masses, >180% expected time)  2.  Intraoperative ultrasound with interpretation.    SURGEON:  Arvind Álvarez M.D.     ASSISTANT:  Dr. Rascon    FINDINGS:  Hilar anatomy: 1 arteries.    3-4 cm renal mass upper pole mass, 1.5 cm midpole anterior mass.  Warm ischemia time: 27.  Preop. creatinine:  1.0 mg/dl    SPECIMENS:  1. Final resection margin of right upper pole cystic mass  2.  Right upper pole cystic mass  3.  Right midpole mass     DRAINS:  16-Kinyarwanda Hagan catheter.  15 Round LIZETH drain     ESTIMATED BLOOD LOSS:  400    FLUIDS: 750 albumin, 1800 crystalloid    ANESTHESIA:  General and local    COMPLICATIONS:  None.    INDICATIONS FOR OPERATION:  Adrian Burt is a 67 y.o. male with a right renal masses, 3-4 cm cystic upper pole mass and right 1.5 cm mid pole mass.  Options discussed, wished to proceed with robotic partial nephrectomy.  Patient understands risks and wishes to proceed.  Written informed consent was obtained prior to the procedure.  All questions were answered.      DESCRIPTION OF OPERATION:  Informed consent was obtained.  The patient was marked on the right side and then taken to the operating room, placed supine on the operating table.  General anesthesia was provided. SCDs and 5000 units of subcutaneous heparin were provided for DVT prophylaxis and IV antibiotics for bacterial prophylaxis.  A Hagan catheter was placed to drain the bladder.  An OG tube was placed by anesthesia.  The patient was then placed in the left full flank position with the right flank elevated 90 degrees.  Axillary roll was placed.  All pressure points were carefully padded.  The  table was flexed completely to open up the right flank.  The right arm was padded with pillows for support.  The patient was secured to the table with soft chest, hip, and lower extremity straps.  The patient was prepped and draped in the usual sterile fashion.  A time-out confirming the patient identification, the planned procedure, the surgical site was performed and all present were in agreement.    A 2 cm incision was made 2 fingerbreadths above the hip in the lateral midline.  The retroperitoneal space was entered with a Diane clamp and the psoas muscle was swept clean with finger dissection.  A kidney shaped balloon dilator was introduced and inflated with bulb insufflation x50 to open the retroperitoneal space.  The device was deflated and removed.  The medial two and lateral robotic trocars were marked.  A Pryor  tipped trocar was placed and secured.  Under direct vision, the peritoneum was swept medially with a laparoscopic grasper.  Two additional robotic trocars were placed 6cm apart towards the midline..  A 12mm trocar was placed just above the hip for the assistant.  The robot was docked.  Scissors, fenestrated biopolar and prograsp were used.  The retroperitoneal space was created slowly.       The psoas muscle was identified.  The posterior Gerota's fascia was incised.  The ureter was identified and followed towards the hilum.  The hilar vessels were identified and exposed circumferentially.    At this point, I defatted the kidney in the region of the tumor.  Once the tumors were exposed, intraoperative ultrasound was performed.  The mass was imaged and measured, and then excision markings were made. Two Bulldog clamp was placed on the renal artery.   ICG confirmed ischemia. Vein was left unclamped.  Warm ischemia time was clocked.  The upper pole renal mass was sharply excised, the cystic mass was entered laterally and clear fluid drained.  A second deep margin was taken.  Margin appeared grossly  clear. There was more than expected bleeding and the anatomy was complex.  The first layer of the renorrhaphy was closed with running V-Loc suture, anchored at each end with a Weck clip and Lapra-Ty.  The mid pole mass was then excised.  The deep layer was done with a 3-0 v lock. The bulldog clamp was removed.  Warm ischemia time was 27 minutes. A sliding clip renorrhaphy was performed to close the outer layer, using interrupted 2-0 vicryl suture, Weck clips, and Lapra-Ty's.  The capsule edges were nicely approximated.   All needles were extracted.  The kidney was well perfused and the renorrhaphy was hemostatic.  Tisseel, Floseal, and surgicel were applied in/over the renorrhaphy site.  The specimens were placed in a 10 mm endocatch bag.    The resection of two masses, complex anatomy required more than 180% expected time thus a modifier 22 is applied.     A 15 round LIZETH drain was placed lateral to the kidney.    I reduced the pneumoperitoneum pressure to 7 mmHg and confirmed hemostasis throughout the surgical field.  Trocars were removed under direct vision.  The tumor was extracted and sent for pathology.  At the 12 mm trocar site, a UR6 0 Vicryl sutures that had been placed to close the fascia.  The extraction site was closed with running #1 PDS sutures.  All the wounds were copiously irrigated.  The skin edges were re approximated with 4-0 Monocryl.  Marcaine and liposomal bupivicaine was instilled for post operative pain control.  Dermabond was applied. 4x4 and tape was placed over the drain site. The drain was secured with a nylon stitch.    All sponge, needle, and instrument counts were reported correct.  The patient was awakened from anesthesia and transferred to recovery in stable condition.  The needle count was not correct at the end of the case and we inspected the area prior to closure and not needle was seen.  An Xray was done and not needle was seen. There were no complications and the patient  tolerated the procedure well.  Operative events were discussed with the patient's family.      Arvind Álvarez M.D.

## 2022-05-09 NOTE — ANESTHESIA PROCEDURE NOTES
Intubation    Date/Time: 5/9/2022 7:21 AM  Performed by: Gema Willoughby CRNA  Authorized by: David Cannon MD     Intubation:     Induction:  Intravenous    Intubated:  Postinduction    Mask Ventilation:  Easy with oral airway    Attempts:  1    Attempted By:  Student (BRANDEN Duque)    Blade:  Lees 3    Laryngeal View Grade: Grade IIA - cords partially seen      Difficult Airway Encountered?: No      Complications:  None    Airway Device:  Oral endotracheal tube    Airway Device Size:  7.5    Style/Cuff Inflation:  Cuffed (inflated to minimal occlusive pressure)    Inflation Amount (mL):  8    Tube secured:  22    Secured at:  The lips    Placement Verified By:  Capnometry    Complicating Factors:  None    Findings Post-Intubation:  BS equal bilateral and atraumatic/condition of teeth unchanged

## 2022-05-10 LAB
ANION GAP SERPL CALC-SCNC: 13 MMOL/L (ref 8–16)
BASOPHILS # BLD AUTO: 0.02 K/UL (ref 0–0.2)
BASOPHILS NFR BLD: 0.2 % (ref 0–1.9)
BODY FLUID SOURCE, CREATININE: NORMAL
BUN SERPL-MCNC: 18 MG/DL (ref 8–23)
CALCIUM SERPL-MCNC: 8.7 MG/DL (ref 8.7–10.5)
CHLORIDE SERPL-SCNC: 103 MMOL/L (ref 95–110)
CO2 SERPL-SCNC: 23 MMOL/L (ref 23–29)
CREAT FLD-MCNC: 1 MG/DL
CREAT SERPL-MCNC: 1.2 MG/DL (ref 0.5–1.4)
DIFFERENTIAL METHOD: ABNORMAL
EOSINOPHIL # BLD AUTO: 0 K/UL (ref 0–0.5)
EOSINOPHIL NFR BLD: 0 % (ref 0–8)
ERYTHROCYTE [DISTWIDTH] IN BLOOD BY AUTOMATED COUNT: 13.4 % (ref 11.5–14.5)
EST. GFR  (AFRICAN AMERICAN): >60 ML/MIN/1.73 M^2
EST. GFR  (NON AFRICAN AMERICAN): >60 ML/MIN/1.73 M^2
GLUCOSE SERPL-MCNC: 114 MG/DL (ref 70–110)
HCT VFR BLD AUTO: 42.9 % (ref 40–54)
HGB BLD-MCNC: 13.4 G/DL (ref 14–18)
IMM GRANULOCYTES # BLD AUTO: 0.03 K/UL (ref 0–0.04)
IMM GRANULOCYTES NFR BLD AUTO: 0.3 % (ref 0–0.5)
LYMPHOCYTES # BLD AUTO: 1.9 K/UL (ref 1–4.8)
LYMPHOCYTES NFR BLD: 19.7 % (ref 18–48)
MAGNESIUM SERPL-MCNC: 1.8 MG/DL (ref 1.6–2.6)
MCH RBC QN AUTO: 28.2 PG (ref 27–31)
MCHC RBC AUTO-ENTMCNC: 31.2 G/DL (ref 32–36)
MCV RBC AUTO: 90 FL (ref 82–98)
MONOCYTES # BLD AUTO: 1.1 K/UL (ref 0.3–1)
MONOCYTES NFR BLD: 10.9 % (ref 4–15)
NEUTROPHILS # BLD AUTO: 6.7 K/UL (ref 1.8–7.7)
NEUTROPHILS NFR BLD: 68.9 % (ref 38–73)
NRBC BLD-RTO: 0 /100 WBC
PHOSPHATE SERPL-MCNC: 2.7 MG/DL (ref 2.7–4.5)
PLATELET # BLD AUTO: 183 K/UL (ref 150–450)
PMV BLD AUTO: 11.3 FL (ref 9.2–12.9)
POTASSIUM SERPL-SCNC: 4.2 MMOL/L (ref 3.5–5.1)
RBC # BLD AUTO: 4.75 M/UL (ref 4.6–6.2)
SODIUM SERPL-SCNC: 139 MMOL/L (ref 136–145)
WBC # BLD AUTO: 9.73 K/UL (ref 3.9–12.7)

## 2022-05-10 PROCEDURE — 83735 ASSAY OF MAGNESIUM: CPT | Performed by: UROLOGY

## 2022-05-10 PROCEDURE — 25000003 PHARM REV CODE 250: Performed by: UROLOGY

## 2022-05-10 PROCEDURE — 94761 N-INVAS EAR/PLS OXIMETRY MLT: CPT

## 2022-05-10 PROCEDURE — 36415 COLL VENOUS BLD VENIPUNCTURE: CPT | Performed by: UROLOGY

## 2022-05-10 PROCEDURE — 82570 ASSAY OF URINE CREATININE: CPT | Performed by: UROLOGY

## 2022-05-10 PROCEDURE — 97165 OT EVAL LOW COMPLEX 30 MIN: CPT

## 2022-05-10 PROCEDURE — 80048 BASIC METABOLIC PNL TOTAL CA: CPT | Performed by: UROLOGY

## 2022-05-10 PROCEDURE — 94799 UNLISTED PULMONARY SVC/PX: CPT

## 2022-05-10 PROCEDURE — 84100 ASSAY OF PHOSPHORUS: CPT | Performed by: UROLOGY

## 2022-05-10 PROCEDURE — 27000221 HC OXYGEN, UP TO 24 HOURS

## 2022-05-10 PROCEDURE — 63600175 PHARM REV CODE 636 W HCPCS: Performed by: UROLOGY

## 2022-05-10 PROCEDURE — 85025 COMPLETE CBC W/AUTO DIFF WBC: CPT | Performed by: UROLOGY

## 2022-05-10 PROCEDURE — 97535 SELF CARE MNGMENT TRAINING: CPT

## 2022-05-10 PROCEDURE — 97161 PT EVAL LOW COMPLEX 20 MIN: CPT

## 2022-05-10 PROCEDURE — 99900035 HC TECH TIME PER 15 MIN (STAT)

## 2022-05-10 RX ORDER — HEPARIN SODIUM 5000 [USP'U]/ML
5000 INJECTION, SOLUTION INTRAVENOUS; SUBCUTANEOUS EVERY 8 HOURS
Status: DISCONTINUED | OUTPATIENT
Start: 2022-05-10 | End: 2022-05-11 | Stop reason: HOSPADM

## 2022-05-10 RX ADMIN — SILDENAFIL 20 MG: 20 TABLET ORAL at 02:05

## 2022-05-10 RX ADMIN — HEPARIN SODIUM 5000 UNITS: 5000 INJECTION, SOLUTION INTRAVENOUS; SUBCUTANEOUS at 09:05

## 2022-05-10 RX ADMIN — OXYCODONE 10 MG: 5 TABLET ORAL at 04:05

## 2022-05-10 RX ADMIN — ACETAMINOPHEN 1000 MG: 500 TABLET ORAL at 02:05

## 2022-05-10 RX ADMIN — FERROUS GLUCONATE TAB 324 MG (37.5 MG ELEMENTAL IRON) 324 MG: 324 (37.5 FE) TAB at 08:05

## 2022-05-10 RX ADMIN — HYDRALAZINE HYDROCHLORIDE 10 MG: 20 INJECTION, SOLUTION INTRAMUSCULAR; INTRAVENOUS at 12:05

## 2022-05-10 RX ADMIN — AMLODIPINE BESYLATE 10 MG: 5 TABLET ORAL at 08:05

## 2022-05-10 RX ADMIN — OXYCODONE 5 MG: 5 TABLET ORAL at 12:05

## 2022-05-10 RX ADMIN — OXYCODONE 5 MG: 5 TABLET ORAL at 10:05

## 2022-05-10 RX ADMIN — HYDROCHLOROTHIAZIDE 12.5 MG: 12.5 TABLET ORAL at 08:05

## 2022-05-10 RX ADMIN — HYDRALAZINE HYDROCHLORIDE 10 MG: 20 INJECTION, SOLUTION INTRAMUSCULAR; INTRAVENOUS at 05:05

## 2022-05-10 RX ADMIN — CEFAZOLIN SODIUM 2 G: 2 SOLUTION INTRAVENOUS at 04:05

## 2022-05-10 RX ADMIN — MONTELUKAST SODIUM 10 MG: 10 TABLET, FILM COATED ORAL at 09:05

## 2022-05-10 RX ADMIN — HEPARIN SODIUM 5000 UNITS: 5000 INJECTION, SOLUTION INTRAVENOUS; SUBCUTANEOUS at 02:05

## 2022-05-10 RX ADMIN — SILDENAFIL 20 MG: 20 TABLET ORAL at 09:05

## 2022-05-10 RX ADMIN — TAMSULOSIN HYDROCHLORIDE 0.4 MG: 0.4 CAPSULE ORAL at 08:05

## 2022-05-10 RX ADMIN — ACETAMINOPHEN 500 MG: 500 TABLET ORAL at 05:05

## 2022-05-10 RX ADMIN — SILDENAFIL 20 MG: 20 TABLET ORAL at 08:05

## 2022-05-10 RX ADMIN — DOCUSATE SODIUM 100 MG: 100 CAPSULE, LIQUID FILLED ORAL at 08:05

## 2022-05-10 RX ADMIN — CEFAZOLIN SODIUM 2 G: 2 SOLUTION INTRAVENOUS at 10:05

## 2022-05-10 NOTE — ASSESSMENT & PLAN NOTE
Patient on tapia catheter placed, pre-operatively, will be removed today  Elevated PSA    - Continue flomax  - Tapia to be removed today

## 2022-05-10 NOTE — PT/OT/SLP EVAL
Physical Therapy Evaluation    Patient Name:  Adrian Burt   MRN:  [id number]    Recommendations:     Discharge Recommendations:  home   Discharge Equipment Recommendations: shower chair   Barriers to discharge: None    Assessment:     Adrian Burt is a 67 y.o. male admitted with a medical diagnosis of Right renal mass.Patient seen for physical therapy evaluation.  Patient is at independent level with all functional mobility.  No further PT needs.     Recent Surgery: Procedure(s) (LRB):  Right RETROPERITONEAL robotic assisted lap partial nephrectomy  Intraoperative ultrasound with interpretation Special needs: drop in probe for ultrasound, retroperitoneal dilating baloon (Right) 1 Day Post-Op    Plan:     · Discharge Physical Therapy    Subjective     Chief Complaint: No complaints  Patient/Family Comments/goals: To return home  Pain/Comfort:  · Pain Rating 1: 1/10  · Location - Side 1: Right  · Location - Orientation 1: lateral  · Location 1: back  · Pain Addressed 1: Reposition, Distraction  · Pain Rating Post-Intervention 1: 1/10    Patients cultural, spiritual, Yarsani conflicts given the current situation: no    Living Environment:  Patient lives with family in 2 SH, 4 AMINAH, L railing.  Bedroom upstairs (12 steps with R railing)  Prior to admission, patients level of function was independent, + Driving, works as a .  Equipment used at home: none.  DME owned (not currently used): none.  Upon discharge, patient will have assistance from family.    Objective:     Communicated with nurse prior to session.  Patient found sitting edge of bed with telemetry, tapia catheter, peripheral IV, LIZETH drain  upon PT entry to room.    General Precautions: Standard, fall   Orthopedic Precautions:N/A   Braces: N/A  Respiratory Status: Room air    Exams:  · Gross Motor Coordination:  WFL  · Postural Exam:  Patient presented with the following abnormalities:    · -       Rounded shoulders  · Sensation:    · -        Intact  · Skin Integrity/Edema:      · -       Skin integrity: Visible skin intact  · RLE ROM: WFL  · RLE Strength: WFL  · LLE ROM: WFL  · LLE Strength: WFL    Functional Mobility:  · Bed Mobility:     · Supine to Sit: independence  · Transfers:     · Sit to Stand:  independence with no AD  · Gait: with no AD x 250', pushing IV pole, Mod I level, slow pace  · Balance: Seated:  Independent   Standing:  Independent    Therapeutic Activities and Exercises:   Educated on safety with mobility.  Educated on progressive walking program when he returns home.    AM-PAC 6 CLICK MOBILITY  Total Score:23     Patient left up in chair with all lines intact, call button in reach and nurse notified.    GOALS:   Multidisciplinary Problems     Physical Therapy Goals        Problem: Physical Therapy    Goal Priority Disciplines Outcome Goal Variances Interventions   Physical Therapy Goal     PT, PT/OT Adequate for Care Transition                     History:     Past Medical History:   Diagnosis Date    Allergy     Colon polyp     Fatty liver     GERD (gastroesophageal reflux disease)     Hypertension        Past Surgical History:   Procedure Laterality Date    COLONOSCOPY N/A 8/16/2017    Procedure: COLONOSCOPY;  Surgeon: Leo Henriquez MD;  Location: [address];  Service: Endoscopy;  Laterality: N/A;    LEG SURGERY Left     ROBOT-ASSISTED LAPAROSCOPIC PARTIAL NEPHRECTOMY Right 5/9/2022    Procedure: Right RETROPERITONEAL robotic assisted lap partial nephrectomy  Intraoperative ultrasound with interpretation Special needs: drop in probe for ultrasound, retroperitoneal dilating baloon;  Surgeon: Arvind Álvarez MD;  Location: [facility] [place];  Service: Urology;  Laterality: Right;       Time Tracking:     PT Received On: 05/10/22  PT Start Time: 1049     PT Stop Time: 1058  PT Total Time (min): 9 min     Billable Minutes: Evaluation 9      05/10/2022

## 2022-05-10 NOTE — HPI
Adrian Burt is a 67 year old male with PMHx of HTN, pre-diabetes, hemorrhoids, DJD, and testosterone deficiency admitted to the hospital for a right peritoneal robotic partial nephrectomy with primary diagnosis of multiple right renal masses. In March 2022, patient reported having right-sided flank pain to PCP. Renal US showed a 2.7 cm heterogeneous lesion in the upper pole and a 1.6 cm hyperechoic lesion in the midpole of the right kidney,  CT and MRI abdomen showed similar findings. Patient tolerated procedure well, reports decrease in pain overnight. On 1 L NC s/p 1 day post-op. Denies SOB, chest pain, nausea, vomiting, lightheadedness, dizziness, syncope or near syncope. Denies BM since surgery. Hospital medicine consulted for medical management of HTN. BP meds held pre-operatively, re-started today.

## 2022-05-10 NOTE — PHARMACY MED REC
"[facility]           Pharmacy  Admission Medication History     The home medication history was taken by Marleni Chavez PharmD.      Medications listed below were obtained from: Patient/family    Based on information gathered for medication list, you may go to "Admission" then "Reconcile Home Medications" tabs to review and/or act upon those items.      The home medication list has been updated by the Pharmacy department.    Please read ALL comments highlighted in yellow.    Please address this information as you see fit.     Feel free to contact us if you have any questions or require assistance.    The medications listed below were removed from the home medication list.  Please reorder if appropriate:     Patient reports NOT TAKING the following medication(s):  o N/A   Patient reports he/she IS TAKING the following which was not ordered upon admit  o Aspirin 81 mg PO QD  o Losartan 100 mg PO QD    Potential issues to be addressed PRIOR TO DISCHARGE  N/A    No current facility-administered medications on file prior to encounter.     Current Outpatient Medications on File Prior to Encounter   Medication Sig Dispense Refill    amLODIPine (NORVASC) 10 MG tablet TAKE 1 TABLET(10 MG) BY MOUTH EVERY MORNING 90 tablet 3    aspirin (ECOTRIN) 81 MG EC tablet Take 81 mg by mouth once daily.      ferrous gluconate 324 mg (37.5 mg iron) Tab tablet Take 1 tablet (324 mg total) by mouth daily with breakfast. 30 tablet 11    fluticasone propionate (FLONASE) 50 mcg/actuation nasal spray SPRAY ONCE IN EACH NOSTRIL EVERY DAY 48 g 2    hydroCHLOROthiazide (HYDRODIURIL) 12.5 MG Tab Take 1 tablet (12.5 mg total) by mouth once daily. 30 tablet 11    losartan (COZAAR) 100 MG tablet TAKE 1 TABLET(100 MG) BY MOUTH EVERY DAY 90 tablet 3    multivit with minerals/lutein (MULTIVITAMIN 50 PLUS ORAL) Take 1 tablet by mouth once daily.      sildenafil (REVATIO) 20 mg Tab Take 20 mg by mouth 3 (three) times " daily as needed.      tamsulosin (FLOMAX) 0.4 mg Cap Take 1 capsule by mouth once daily.      testosterone cypionate (DEPOTESTOTERONE CYPIONATE) 200 mg/mL injection Inject 1 mL (200 mg total) into the muscle every 14 (fourteen) days.  0    [DISCONTINUED] cefUROXime (CEFTIN) 500 MG tablet Take 500 mg by mouth 2 (two) times daily.         Please address this information as you see fit.  Feel free to contact us if you have any questions or require assistance.    Marleni Chavez, PharmD  [phone]                .

## 2022-05-10 NOTE — CONSULTS
[facility] Medicine  Consult Note    Patient Name: Adrian Burt  MRN: [id number]  Admission Date: 5/9/2022  Hospital Length of Stay: 1 days  Attending Physician: Arvind Álvarez MD   Primary Care Provider: Mone Brown MD        Patient information was obtained from patient and ER records.     Inpatient consult to Hospitalist  Consult performed by: Jemma Enrique PA-C  Consult ordered by: Arvind Álvarez MD        Subjective:     Principal Problem: Right renal mass    Chief Complaint: Right renal mass     HPI: Adrian Burt is a 67 year old male with PMHx of HTN, pre-diabetes, hemorrhoids, DJD, and testosterone deficiency admitted to the hospital for a right peritoneal robotic partial nephrectomy with primary diagnosis of multiple right renal masses. In March 2022, patient reported having right-sided flank pain to PCP. Renal US showed a 2.7 cm heterogeneous lesion in the upper pole and a 1.6 cm hyperechoic lesion in the midpole of the right kidney,  CT and MRI abdomen showed similar findings. Patient tolerated procedure well, reports decrease in pain overnight. On 1 L NC s/p 1 day post-op. Denies SOB, chest pain, nausea, vomiting, lightheadedness, dizziness, syncope or near syncope. Denies BM since surgery. Hospital medicine consulted for medical management of HTN. BP meds held pre-operatively, re-started today.       Past Medical History:   Diagnosis Date    Allergy     Colon polyp     Fatty liver     GERD (gastroesophageal reflux disease)     Hypertension        Past Surgical History:   Procedure Laterality Date    COLONOSCOPY N/A 8/16/2017    Procedure: COLONOSCOPY;  Surgeon: Leo Henriquez MD;  Location: [address]);  Service: Endoscopy;  Laterality: N/A;    LEG SURGERY Left        Review of patient's allergies indicates:   Allergen Reactions    No known drug allergies        No current facility-administered medications on file prior to encounter.     Current  Outpatient Medications on File Prior to Encounter   Medication Sig    amLODIPine (NORVASC) 10 MG tablet TAKE 1 TABLET(10 MG) BY MOUTH EVERY MORNING    aspirin (ECOTRIN) 81 MG EC tablet Take 81 mg by mouth once daily.    cefUROXime (CEFTIN) 500 MG tablet Take 500 mg by mouth 2 (two) times daily.    ferrous gluconate 324 mg (37.5 mg iron) Tab tablet Take 1 tablet (324 mg total) by mouth daily with breakfast.    fluticasone propionate (FLONASE) 50 mcg/actuation nasal spray SPRAY ONCE IN EACH NOSTRIL EVERY DAY    hydroCHLOROthiazide (HYDRODIURIL) 12.5 MG Tab Take 1 tablet (12.5 mg total) by mouth once daily.    losartan (COZAAR) 100 MG tablet TAKE 1 TABLET(100 MG) BY MOUTH EVERY DAY    multivit with minerals/lutein (MULTIVITAMIN 50 PLUS ORAL) Take 1 tablet by mouth once daily.    sildenafil (REVATIO) 20 mg Tab Take 20 mg by mouth 3 (three) times daily.    tamsulosin (FLOMAX) 0.4 mg Cap Take 1 capsule by mouth once daily.    testosterone cypionate (DEPOTESTOTERONE CYPIONATE) 200 mg/mL injection Inject 1 mL (200 mg total) into the muscle every 14 (fourteen) days.     Family History       Problem Relation (Age of Onset)    Cancer Sister    Heart disease Father    Kidney disease Mother, Sister          Tobacco Use    Smoking status: Former Smoker     Quit date: 11/15/1978     Years since quittin.5    Smokeless tobacco: Never Used    Tobacco comment: smoked for 10 years, quit in 76   Substance and Sexual Activity    Alcohol use: No    Drug use: No    Sexual activity: Yes     Partners: Female     Review of Systems   Constitutional:  Negative for appetite change, fatigue and fever.   HENT:  Negative for congestion, ear pain, postnasal drip and sore throat.    Eyes:  Negative for photophobia and visual disturbance.   Respiratory:  Negative for cough and shortness of breath.    Cardiovascular:  Negative for chest pain and leg swelling.   Gastrointestinal:  Positive for constipation. Negative for abdominal  distention, abdominal pain, diarrhea, nausea and vomiting.   Endocrine: Negative for polyuria.   Genitourinary:  Negative for dysuria, frequency, hematuria and urgency.   Musculoskeletal:  Negative for back pain and myalgias.        Reports pain around incision site, improved compared to yesterday   Skin:  Negative for color change and pallor.   Neurological:  Negative for dizziness, speech difficulty, numbness and headaches.   Psychiatric/Behavioral:  Negative for agitation and confusion. The patient is not nervous/anxious.    Objective:     Vital Signs (Most Recent):  Temp: 98.2 °F (36.8 °C) (05/10/22 0752)  Pulse: 73 (05/10/22 0752)  Resp: 17 (05/10/22 0752)  BP: (!) 118/59 (05/10/22 0752)  SpO2: 97 % (05/10/22 0752)   Vital Signs (24h Range):  Temp:  [97.7 °F (36.5 °C)-99.1 °F (37.3 °C)] 98.2 °F (36.8 °C)  Pulse:  [73-90] 73  Resp:  [10-19] 17  SpO2:  [93 %-100 %] 97 %  BP: (118-188)/(58-98) 118/59     Weight: 112 kg (247 lb)  Body mass index is 31.71 kg/m².    Physical Exam  Vitals and nursing note reviewed.   Constitutional:       Appearance: Normal appearance. He is not ill-appearing.   HENT:      Head: Normocephalic and atraumatic.      Mouth/Throat:      Mouth: Mucous membranes are moist.      Pharynx: Oropharynx is clear.   Eyes:      Extraocular Movements: Extraocular movements intact.      Conjunctiva/sclera: Conjunctivae normal.      Pupils: Pupils are equal, round, and reactive to light.   Cardiovascular:      Rate and Rhythm: Normal rate and regular rhythm.      Pulses: Normal pulses.      Heart sounds: Normal heart sounds.   Pulmonary:      Effort: Pulmonary effort is normal.      Breath sounds: Normal breath sounds.   Abdominal:      General: Abdomen is flat. Bowel sounds are normal.      Palpations: Abdomen is soft.      Tenderness: There is no abdominal tenderness.   Musculoskeletal:         General: No swelling or tenderness. Normal range of motion.      Cervical back: Normal range of motion and  neck supple.   Skin:     General: Skin is warm and dry.      Comments: LIZETH drain in place   Neurological:      General: No focal deficit present.      Mental Status: He is alert and oriented to person, place, and time. Mental status is at baseline.   Psychiatric:         Mood and Affect: Mood normal.         Behavior: Behavior normal.         Thought Content: Thought content normal.         Judgment: Judgment normal.       Significant Labs: All pertinent labs within the past 24 hours have been reviewed.  BMP:   Recent Labs   Lab 05/10/22  0532   *      K 4.2      CO2 23   BUN 18   CREATININE 1.2   CALCIUM 8.7   MG 1.8     CBC:   Recent Labs   Lab 05/09/22  1337 05/10/22  0532   WBC 10.24 9.73   HGB 12.8* 13.4*   HCT 41.2 42.9    183     CMP:   Recent Labs   Lab 05/09/22  1337 05/10/22  0532    139   K 4.2 4.2    103   CO2 24 23   * 114*   BUN 23 18   CREATININE 1.6* 1.2   CALCIUM 8.3* 8.7   ANIONGAP 10 13   EGFRNONAA 43* >60       Significant Imaging: I have reviewed all pertinent imaging results/findings within the past 24 hours.    Assessment/Plan:     * Right renal mass  Reports improvement in pain since yesterday    - Management per primary team  - PT/OT consulted s/p partial nephrectomy     GERD (gastroesophageal reflux disease)  No symptoms related to diagnosis  Managed with diet restrictions without medication    - Continue to monitor    BMI 33.0-33.9,adult  Patient counseled on importance of balanced diet and regular exercise    Prediabetes  HgbA1C 5.7  Patient aware of pre-diabetic status  Management through diet    - BG stable since admission  - No indication for insulin administration  - Continue to monitor daily BG    History of benign prostatic hyperplasia  Patient on tapia catheter placed, pre-operatively, will be removed today  Elevated PSA    - Continue flomax  - Tapia to be removed today    Essential hypertension  Chronic, controlled.  Latest blood  pressure and vitals reviewed-   Temp:  [97.7 °F (36.5 °C)-99.1 °F (37.3 °C)]   Pulse:  [73-90]   Resp:  [10-19]   BP: (118-188)/(58-98)   SpO2:  [93 %-100 %] .   Home meds for hypertension were reviewed and noted below.   Hypertension Medications             amLODIPine (NORVASC) 10 MG tablet TAKE 1 TABLET(10 MG) BY MOUTH EVERY MORNING    hydroCHLOROthiazide (HYDRODIURIL) 12.5 MG Tab Take 1 tablet (12.5 mg total) by mouth once daily.    losartan (COZAAR) 100 MG tablet TAKE 1 TABLET(100 MG) BY MOUTH EVERY DAY        - Re-start amlodipine and HCTZ  - Will utilize p.r.n. blood pressure medication only if patient's blood pressure greater than 180/110 and he develops symptoms such as worsening chest pain or shortness of breath.    Erectile dysfunction  - Continue slidenafil    DJD (degenerative joint disease)  Denies pain relating to diagnosis    - Continue to monitor    VTE Risk Mitigation (From admission, onward)         Ordered     heparin (porcine) injection 5,000 Units  Every 8 hours         05/10/22 0737                    Thank you for your consult. I will follow-up with patient. Please contact us if you have any additional questions.    Jemma Enrique PA-C  Department of Hospital Medicine   [facility]

## 2022-05-10 NOTE — PLAN OF CARE
Problem: Physical Therapy  Goal: Physical Therapy Goal  Outcome: Adequate for Care Transition       Patient seen for physical therapy evaluation.  Patient is at independent level with all functional mobility.  No further PT needs.

## 2022-05-10 NOTE — SUBJECTIVE & OBJECTIVE
Past Medical History:   Diagnosis Date    Allergy     Colon polyp     Fatty liver     GERD (gastroesophageal reflux disease)     Hypertension        Past Surgical History:   Procedure Laterality Date    COLONOSCOPY N/A 2017    Procedure: COLONOSCOPY;  Surgeon: Leo Henriquez MD;  Location: [address]);  Service: Endoscopy;  Laterality: N/A;    LEG SURGERY Left        Review of patient's allergies indicates:   Allergen Reactions    No known drug allergies        No current facility-administered medications on file prior to encounter.     Current Outpatient Medications on File Prior to Encounter   Medication Sig    amLODIPine (NORVASC) 10 MG tablet TAKE 1 TABLET(10 MG) BY MOUTH EVERY MORNING    aspirin (ECOTRIN) 81 MG EC tablet Take 81 mg by mouth once daily.    cefUROXime (CEFTIN) 500 MG tablet Take 500 mg by mouth 2 (two) times daily.    ferrous gluconate 324 mg (37.5 mg iron) Tab tablet Take 1 tablet (324 mg total) by mouth daily with breakfast.    fluticasone propionate (FLONASE) 50 mcg/actuation nasal spray SPRAY ONCE IN EACH NOSTRIL EVERY DAY    hydroCHLOROthiazide (HYDRODIURIL) 12.5 MG Tab Take 1 tablet (12.5 mg total) by mouth once daily.    losartan (COZAAR) 100 MG tablet TAKE 1 TABLET(100 MG) BY MOUTH EVERY DAY    multivit with minerals/lutein (MULTIVITAMIN 50 PLUS ORAL) Take 1 tablet by mouth once daily.    sildenafil (REVATIO) 20 mg Tab Take 20 mg by mouth 3 (three) times daily.    tamsulosin (FLOMAX) 0.4 mg Cap Take 1 capsule by mouth once daily.    testosterone cypionate (DEPOTESTOTERONE CYPIONATE) 200 mg/mL injection Inject 1 mL (200 mg total) into the muscle every 14 (fourteen) days.     Family History       Problem Relation (Age of Onset)    Cancer Sister    Heart disease Father    Kidney disease Mother, Sister          Tobacco Use    Smoking status: Former Smoker     Quit date: 11/15/1978     Years since quittin.5    Smokeless tobacco: Never Used    Tobacco comment: smoked for  10 years, quit in 76   Substance and Sexual Activity    Alcohol use: No    Drug use: No    Sexual activity: Yes     Partners: Female     Review of Systems   Constitutional:  Negative for appetite change, fatigue and fever.   HENT:  Negative for congestion, ear pain, postnasal drip and sore throat.    Eyes:  Negative for photophobia and visual disturbance.   Respiratory:  Negative for cough and shortness of breath.    Cardiovascular:  Negative for chest pain and leg swelling.   Gastrointestinal:  Positive for constipation. Negative for abdominal distention, abdominal pain, diarrhea, nausea and vomiting.   Endocrine: Negative for polyuria.   Genitourinary:  Negative for dysuria, frequency, hematuria and urgency.   Musculoskeletal:  Negative for back pain and myalgias.        Reports pain around incision site, improved compared to yesterday   Skin:  Negative for color change and pallor.   Neurological:  Negative for dizziness, speech difficulty, numbness and headaches.   Psychiatric/Behavioral:  Negative for agitation and confusion. The patient is not nervous/anxious.    Objective:     Vital Signs (Most Recent):  Temp: 98.2 °F (36.8 °C) (05/10/22 0752)  Pulse: 73 (05/10/22 0752)  Resp: 17 (05/10/22 0752)  BP: (!) 118/59 (05/10/22 0752)  SpO2: 97 % (05/10/22 0752)   Vital Signs (24h Range):  Temp:  [97.7 °F (36.5 °C)-99.1 °F (37.3 °C)] 98.2 °F (36.8 °C)  Pulse:  [73-90] 73  Resp:  [10-19] 17  SpO2:  [93 %-100 %] 97 %  BP: (118-188)/(58-98) 118/59     Weight: 112 kg (247 lb)  Body mass index is 31.71 kg/m².    Physical Exam  Vitals and nursing note reviewed.   Constitutional:       Appearance: Normal appearance. He is not ill-appearing.   HENT:      Head: Normocephalic and atraumatic.      Mouth/Throat:      Mouth: Mucous membranes are moist.      Pharynx: Oropharynx is clear.   Eyes:      Extraocular Movements: Extraocular movements intact.      Conjunctiva/sclera: Conjunctivae normal.      Pupils: Pupils are equal,  round, and reactive to light.   Cardiovascular:      Rate and Rhythm: Normal rate and regular rhythm.      Pulses: Normal pulses.      Heart sounds: Normal heart sounds.   Pulmonary:      Effort: Pulmonary effort is normal.      Breath sounds: Normal breath sounds.   Abdominal:      General: Abdomen is flat. Bowel sounds are normal.      Palpations: Abdomen is soft.      Tenderness: There is no abdominal tenderness.   Musculoskeletal:         General: No swelling or tenderness. Normal range of motion.      Cervical back: Normal range of motion and neck supple.   Skin:     General: Skin is warm and dry.      Comments: LIZETH drain in place   Neurological:      General: No focal deficit present.      Mental Status: He is alert and oriented to person, place, and time. Mental status is at baseline.   Psychiatric:         Mood and Affect: Mood normal.         Behavior: Behavior normal.         Thought Content: Thought content normal.         Judgment: Judgment normal.       Significant Labs: All pertinent labs within the past 24 hours have been reviewed.  BMP:   Recent Labs   Lab 05/10/22  0532   *      K 4.2      CO2 23   BUN 18   CREATININE 1.2   CALCIUM 8.7   MG 1.8     CBC:   Recent Labs   Lab 05/09/22  1337 05/10/22  0532   WBC 10.24 9.73   HGB 12.8* 13.4*   HCT 41.2 42.9    183     CMP:   Recent Labs   Lab 05/09/22  1337 05/10/22  0532    139   K 4.2 4.2    103   CO2 24 23   * 114*   BUN 23 18   CREATININE 1.6* 1.2   CALCIUM 8.3* 8.7   ANIONGAP 10 13   EGFRNONAA 43* >60       Significant Imaging: I have reviewed all pertinent imaging results/findings within the past 24 hours.

## 2022-05-10 NOTE — ASSESSMENT & PLAN NOTE
Reports improvement in pain since yesterday    - Management per primary team  - PT/OT consulted s/p partial nephrectomy

## 2022-05-10 NOTE — ASSESSMENT & PLAN NOTE
Chronic, controlled.  Latest blood pressure and vitals reviewed-   Temp:  [97.7 °F (36.5 °C)-99.1 °F (37.3 °C)]   Pulse:  [73-90]   Resp:  [10-19]   BP: (118-188)/(58-98)   SpO2:  [93 %-100 %] .   Home meds for hypertension were reviewed and noted below.   Hypertension Medications             amLODIPine (NORVASC) 10 MG tablet TAKE 1 TABLET(10 MG) BY MOUTH EVERY MORNING    hydroCHLOROthiazide (HYDRODIURIL) 12.5 MG Tab Take 1 tablet (12.5 mg total) by mouth once daily.    losartan (COZAAR) 100 MG tablet TAKE 1 TABLET(100 MG) BY MOUTH EVERY DAY        - Re-start amlodipine and HCTZ  - Will utilize p.r.n. blood pressure medication only if patient's blood pressure greater than 180/110 and he develops symptoms such as worsening chest pain or shortness of breath.

## 2022-05-10 NOTE — PLAN OF CARE
OT eval performed, Report to follow    Pt is indep-mod I--no needs identified    Problem: Occupational Therapy  Goal: Occupational Therapy Goal  Outcome: Met

## 2022-05-10 NOTE — PROGRESS NOTES
[facility] - Telemetry  Urology  Progress Note    Patient Name: Adrian Burt  MRN: [id number]  Admission Date: 5/9/2022  Hospital Length of Stay: 1 days    POD 1    Subjective:     Interval History: MARK.  Afebrile.  Pain ok.  No flatus or BM.  Not OOB yet, tolerating tapia.      Objective:     Temp:  [97.7 °F (36.5 °C)-99.1 °F (37.3 °C)] 99.1 °F (37.3 °C)  Pulse:  [75-90] 80  Resp:  [10-19] 18  SpO2:  [93 %-100 %] 98 %  BP: (125-188)/(58-98) 125/58     Drain 70 ss overnight    NAD  RRR  CTAB  ABD S/ND/aTTP; inc c/d/i       Penis normal        Tapia clear slight old red tinge, no clots       Ext: no edema    Significant Labs:  BMP:  Recent Labs   Lab 05/09/22  1337 05/10/22  0532    139   K 4.2 4.2    103   CO2 24 23   BUN 23 18   CREATININE 1.6* 1.2   CALCIUM 8.3* 8.7       CBC:  Recent Labs   Lab 05/09/22  1337 05/10/22  0532   WBC 10.24 9.73   HGB 12.8* 13.4*   HCT 41.2 42.9    183         Urology Specific Assessment:     Problem Noted   Right Renal Mass 3/31/2022    4 cm complex cystic lesion to posterior right upper pole, 1.1 cm right anterior solid mass on CT w/wo contrast 3/2022.  --MRI 4/22: right 3.6 cm UP cystic lesion ? Cystic RCC, right midpole 12mm solid mass  --Cr 1, GFR >60  --S/p right retroperitioneal partial 5/9/22 (both masses removed)     Elevated Psa 4/14/2022    Lab Results   Component Value Date    PSA 5.2 (H) 04/07/2021    PSA 2.1 09/10/2018    PSA 1.8 07/05/2017          Urinary Tract Infection Without Hematuria 3/31/2022     Plan:   1. Doing well, decrease IVF, clears  2. Home likely tomorrow, needs more time to recover from complicated procedure  3. Labs in AM  4. Tapia out in AM  5.  SQH/ICS/OOB    Arvind Álvarez MD  Urology

## 2022-05-10 NOTE — ASSESSMENT & PLAN NOTE
No symptoms related to diagnosis  Managed with diet restrictions without medication    - Continue to monitor

## 2022-05-10 NOTE — PLAN OF CARE
CM went to meet with patient. Patient's daughter at bedside. He is independent and lives at home with his spouse. He does not have any DME or HH. Patient still drives, but his wife can transport home at discharge. Patient had an OP procedure with Dr. Álvarez. CM will request follow-up from access navigator. Patient encouraged to call with any questions or concerns.  will continue to follow patient through transitions of care and assist with any discharge needs.     Dr. Álvarez follow-up scheduled.    Therapy recommends home.    Patient Contacts    Name Relation Home Work Mobile   Veronica Burt Spouse [phone]     Future Appointments   Date Time Provider Department Center   5/17/2022 11:40 AM Arvind Álvarez MD [facility] UROLOGY [facility]        05/10/22 0942   Discharge Assessment   Assessment Type Discharge Planning Assessment   Confirmed/corrected address, phone number and insurance Yes   Confirmed Demographics Correct on Facesheet   Source of Information patient;family   Lives With significant other;spouse   Facility Arrived From: Home   Do you expect to return to your current living situation? Yes   Do you have help at home or someone to help you manage your care at home? Yes   Who are your caregiver(s) and their phone number(s)? Spouse-Veronica   Prior to hospitilization cognitive status: Alert/Oriented   Current cognitive status: Alert/Oriented   Walking or Climbing Stairs Difficulty none   Dressing/Bathing Difficulty none   Equipment Currently Used at Home none   Readmission within 30 days? No   Do you take prescription medications? Yes   Do you have prescription coverage? Yes   Do you have any problems affording any of your prescribed medications? No   Is the patient taking medications as prescribed? yes   Who is going to help you get home at discharge? Spouse-Veronica   How do you get to doctors appointments? family or friend will provide;car, drives self    Are you on dialysis? No   Do you take coumadin? No   Discharge Plan A Home   Discharge Plan B Home with family   DME Needed Upon Discharge  none   Discharge Plan discussed with: Patient   Discharge Barriers Identified None   Relationship/Environment   Name(s) of Who Lives With Patient Spouse-Veronica Ennis RN    [phone]

## 2022-05-10 NOTE — HOSPITAL COURSE
HM consulted for medical management s/p partial right nephrectomy. BP stable day 2 post-op. Hagan d/c prior to discharge. PT/OT evaluation, no further PT/OT needs. Follow up with urology scheduled.

## 2022-05-10 NOTE — ASSESSMENT & PLAN NOTE
HgbA1C 5.7  Patient aware of pre-diabetic status  Management through diet    - BG stable since admission  - No indication for insulin administration  - Continue to monitor daily BG

## 2022-05-11 VITALS
DIASTOLIC BLOOD PRESSURE: 72 MMHG | HEIGHT: 74 IN | TEMPERATURE: 98 F | SYSTOLIC BLOOD PRESSURE: 154 MMHG | WEIGHT: 247 LBS | HEART RATE: 88 BPM | BODY MASS INDEX: 31.7 KG/M2 | OXYGEN SATURATION: 97 % | RESPIRATION RATE: 18 BRPM

## 2022-05-11 LAB
ANION GAP SERPL CALC-SCNC: 8 MMOL/L (ref 8–16)
BASOPHILS # BLD AUTO: 0.02 K/UL (ref 0–0.2)
BASOPHILS NFR BLD: 0.3 % (ref 0–1.9)
BUN SERPL-MCNC: 19 MG/DL (ref 8–23)
CALCIUM SERPL-MCNC: 8.7 MG/DL (ref 8.7–10.5)
CHLORIDE SERPL-SCNC: 103 MMOL/L (ref 95–110)
CO2 SERPL-SCNC: 24 MMOL/L (ref 23–29)
CREAT SERPL-MCNC: 1.1 MG/DL (ref 0.5–1.4)
DIFFERENTIAL METHOD: ABNORMAL
EOSINOPHIL # BLD AUTO: 0 K/UL (ref 0–0.5)
EOSINOPHIL NFR BLD: 0.6 % (ref 0–8)
ERYTHROCYTE [DISTWIDTH] IN BLOOD BY AUTOMATED COUNT: 13.5 % (ref 11.5–14.5)
EST. GFR  (AFRICAN AMERICAN): >60 ML/MIN/1.73 M^2
EST. GFR  (NON AFRICAN AMERICAN): >60 ML/MIN/1.73 M^2
GLUCOSE SERPL-MCNC: 113 MG/DL (ref 70–110)
HCT VFR BLD AUTO: 35.9 % (ref 40–54)
HGB BLD-MCNC: 11.7 G/DL (ref 14–18)
IMM GRANULOCYTES # BLD AUTO: 0.02 K/UL (ref 0–0.04)
IMM GRANULOCYTES NFR BLD AUTO: 0.3 % (ref 0–0.5)
LYMPHOCYTES # BLD AUTO: 1.2 K/UL (ref 1–4.8)
LYMPHOCYTES NFR BLD: 17.5 % (ref 18–48)
MAGNESIUM SERPL-MCNC: 1.8 MG/DL (ref 1.6–2.6)
MCH RBC QN AUTO: 28.4 PG (ref 27–31)
MCHC RBC AUTO-ENTMCNC: 32.6 G/DL (ref 32–36)
MCV RBC AUTO: 87 FL (ref 82–98)
MONOCYTES # BLD AUTO: 0.8 K/UL (ref 0.3–1)
MONOCYTES NFR BLD: 10.8 % (ref 4–15)
NEUTROPHILS # BLD AUTO: 4.9 K/UL (ref 1.8–7.7)
NEUTROPHILS NFR BLD: 70.5 % (ref 38–73)
NRBC BLD-RTO: 0 /100 WBC
PHOSPHATE SERPL-MCNC: 1.9 MG/DL (ref 2.7–4.5)
PLATELET # BLD AUTO: 155 K/UL (ref 150–450)
PMV BLD AUTO: 11.2 FL (ref 9.2–12.9)
POTASSIUM SERPL-SCNC: 3.6 MMOL/L (ref 3.5–5.1)
RBC # BLD AUTO: 4.12 M/UL (ref 4.6–6.2)
SODIUM SERPL-SCNC: 135 MMOL/L (ref 136–145)
WBC # BLD AUTO: 6.97 K/UL (ref 3.9–12.7)

## 2022-05-11 PROCEDURE — 80048 BASIC METABOLIC PNL TOTAL CA: CPT | Performed by: UROLOGY

## 2022-05-11 PROCEDURE — 63600175 PHARM REV CODE 636 W HCPCS: Performed by: UROLOGY

## 2022-05-11 PROCEDURE — 50543 LAPARO PARTIAL NEPHRECTOMY: CPT | Mod: 82,,, | Performed by: STUDENT IN AN ORGANIZED HEALTH CARE EDUCATION/TRAINING PROGRAM

## 2022-05-11 PROCEDURE — 94761 N-INVAS EAR/PLS OXIMETRY MLT: CPT

## 2022-05-11 PROCEDURE — 84100 ASSAY OF PHOSPHORUS: CPT | Performed by: UROLOGY

## 2022-05-11 PROCEDURE — 25000003 PHARM REV CODE 250: Performed by: UROLOGY

## 2022-05-11 PROCEDURE — 36415 COLL VENOUS BLD VENIPUNCTURE: CPT | Performed by: UROLOGY

## 2022-05-11 PROCEDURE — 83735 ASSAY OF MAGNESIUM: CPT | Performed by: UROLOGY

## 2022-05-11 PROCEDURE — 50543 LAPARO PARTIAL NEPHRECTOMY: CPT | Mod: 22,RT,, | Performed by: UROLOGY

## 2022-05-11 PROCEDURE — 50543 PR LAP,PARTIAL NEPHRECTOMY: ICD-10-PCS | Mod: 22,RT,, | Performed by: UROLOGY

## 2022-05-11 PROCEDURE — 85025 COMPLETE CBC W/AUTO DIFF WBC: CPT | Performed by: UROLOGY

## 2022-05-11 PROCEDURE — 99900035 HC TECH TIME PER 15 MIN (STAT)

## 2022-05-11 PROCEDURE — 50543 PR LAP,PARTIAL NEPHRECTOMY: ICD-10-PCS | Mod: 82,,, | Performed by: STUDENT IN AN ORGANIZED HEALTH CARE EDUCATION/TRAINING PROGRAM

## 2022-05-11 RX ORDER — DOCUSATE SODIUM 100 MG/1
100 CAPSULE, LIQUID FILLED ORAL 2 TIMES DAILY
Qty: 14 CAPSULE | Refills: 0 | Status: SHIPPED | OUTPATIENT
Start: 2022-05-11 | End: 2022-05-18

## 2022-05-11 RX ORDER — OXYCODONE AND ACETAMINOPHEN 5; 325 MG/1; MG/1
1 TABLET ORAL EVERY 4 HOURS PRN
Qty: 18 TABLET | Refills: 0 | Status: SHIPPED | OUTPATIENT
Start: 2022-05-11 | End: 2022-05-14

## 2022-05-11 RX ORDER — LOSARTAN POTASSIUM 50 MG/1
100 TABLET ORAL DAILY
Status: DISCONTINUED | OUTPATIENT
Start: 2022-05-11 | End: 2022-05-11 | Stop reason: HOSPADM

## 2022-05-11 RX ORDER — SODIUM CHLORIDE, SODIUM LACTATE, POTASSIUM CHLORIDE, CALCIUM CHLORIDE 600; 310; 30; 20 MG/100ML; MG/100ML; MG/100ML; MG/100ML
INJECTION, SOLUTION INTRAVENOUS CONTINUOUS
Status: DISCONTINUED | OUTPATIENT
Start: 2022-05-11 | End: 2022-05-11 | Stop reason: HOSPADM

## 2022-05-11 RX ADMIN — LOSARTAN POTASSIUM 100 MG: 50 TABLET, FILM COATED ORAL at 12:05

## 2022-05-11 RX ADMIN — DOCUSATE SODIUM 100 MG: 100 CAPSULE, LIQUID FILLED ORAL at 10:05

## 2022-05-11 RX ADMIN — HEPARIN SODIUM 5000 UNITS: 5000 INJECTION, SOLUTION INTRAVENOUS; SUBCUTANEOUS at 05:05

## 2022-05-11 RX ADMIN — SODIUM CHLORIDE, SODIUM LACTATE, POTASSIUM CHLORIDE, AND CALCIUM CHLORIDE: .6; .31; .03; .02 INJECTION, SOLUTION INTRAVENOUS at 08:05

## 2022-05-11 RX ADMIN — AMLODIPINE BESYLATE 10 MG: 5 TABLET ORAL at 10:05

## 2022-05-11 RX ADMIN — SILDENAFIL 20 MG: 20 TABLET ORAL at 10:05

## 2022-05-11 RX ADMIN — OXYCODONE 10 MG: 5 TABLET ORAL at 03:05

## 2022-05-11 RX ADMIN — HYDROCHLOROTHIAZIDE 12.5 MG: 12.5 TABLET ORAL at 10:05

## 2022-05-11 RX ADMIN — OXYCODONE 5 MG: 5 TABLET ORAL at 10:05

## 2022-05-11 RX ADMIN — FERROUS GLUCONATE TAB 324 MG (37.5 MG ELEMENTAL IRON) 324 MG: 324 (37.5 FE) TAB at 07:05

## 2022-05-11 RX ADMIN — TAMSULOSIN HYDROCHLORIDE 0.4 MG: 0.4 CAPSULE ORAL at 10:05

## 2022-05-11 NOTE — PROGRESS NOTES
[facility]                    Pharmacy       Discharge Medication Education    Patient ACCEPTED medication education. Pharmacy has provided education on the name, indication, and possible side effects of the medication(s) prescribed, using teach-back method.     The following medications have also been discussed, during this admission.        Medication List        START taking these medications      docusate sodium 100 MG capsule  Commonly known as: COLACE  Take 1 capsule (100 mg total) by mouth 2 (two) times daily. for 7 days     oxyCODONE-acetaminophen 5-325 mg per tablet  Commonly known as: PERCOCET  Take 1 tablet by mouth every 4 (four) hours as needed for Pain (acute pain).            CHANGE how you take these medications      azelastine 137 mcg (0.1 %) nasal spray  Commonly known as: ASTELIN  1 spray (137 mcg total) by Nasal route 2 (two) times daily.  What changed:   when to take this  reasons to take this     fluticasone propionate 50 mcg/actuation nasal spray  Commonly known as: FLONASE  SPRAY ONCE IN EACH NOSTRIL EVERY DAY  What changed:   when to take this  reasons to take this            CONTINUE taking these medications      amLODIPine 10 MG tablet  Commonly known as: NORVASC  TAKE 1 TABLET(10 MG) BY MOUTH EVERY MORNING     aspirin 81 MG EC tablet  Commonly known as: ECOTRIN     ferrous gluconate 324 mg (37.5 mg iron) Tab tablet  Take 1 tablet (324 mg total) by mouth daily with breakfast.     hydroCHLOROthiazide 12.5 MG Tab  Commonly known as: HYDRODIURIL  Take 1 tablet (12.5 mg total) by mouth once daily.     losartan 100 MG tablet  Commonly known as: COZAAR  TAKE 1 TABLET(100 MG) BY MOUTH EVERY DAY     montelukast 10 mg tablet  Commonly known as: SINGULAIR  TAKE 1 TABLET(10 MG) BY MOUTH EVERY EVENING     MULTIVITAMIN 50 PLUS ORAL     sildenafil 20 mg Tab  Commonly known as: REVATIO     tamsulosin 0.4 mg Cap  Commonly known as: FLOMAX     testosterone cypionate 200 mg/mL  injection  Commonly known as: DEPOTESTOTERONE CYPIONATE  Inject 1 mL (200 mg total) into the muscle every 14 (fourteen) days.               Where to Get Your Medications        These medications were sent to [facility] [address], [place] [id number]      Hours: Mon-Fri, 8a-5:30p Phone: [phone]   docusate sodium 100 MG capsule  oxyCODONE-acetaminophen 5-325 mg per tablet          Thank you  Khris Brown, PharmD

## 2022-05-11 NOTE — SUBJECTIVE & OBJECTIVE
Interval History: BP stable. Plan for discharge today with urology follow up    Review of Systems   Constitutional:  Negative for appetite change, fatigue and fever.   HENT:  Negative for congestion, ear pain, postnasal drip and sore throat.    Eyes:  Negative for photophobia and visual disturbance.   Respiratory:  Negative for cough and shortness of breath.    Cardiovascular:  Negative for chest pain and leg swelling.   Gastrointestinal:  Negative for abdominal distention, abdominal pain, constipation, diarrhea, nausea and vomiting.   Endocrine: Negative for polyuria.   Genitourinary:  Negative for dysuria, frequency and urgency.   Musculoskeletal:  Negative for back pain and myalgias.   Skin:  Negative for color change and pallor.   Neurological:  Negative for dizziness, speech difficulty, numbness and headaches.   Psychiatric/Behavioral:  Negative for agitation and confusion. The patient is not nervous/anxious.    Objective:     Vital Signs (Most Recent):  Temp: 98 °F (36.7 °C) (05/11/22 0759)  Pulse: 95 (05/11/22 0759)  Resp: 18 (05/11/22 0759)  BP: (!) 145/70 (05/11/22 0759)  SpO2: 96 % (05/11/22 0816)   Vital Signs (24h Range):  Temp:  [96.6 °F (35.9 °C)-98 °F (36.7 °C)] 98 °F (36.7 °C)  Pulse:  [74-95] 95  Resp:  [16-18] 18  SpO2:  [95 %-98 %] 96 %  BP: (123-145)/(61-70) 145/70     Weight: 112 kg (247 lb)  Body mass index is 31.71 kg/m².    Intake/Output Summary (Last 24 hours) at 5/11/2022 0940  Last data filed at 5/11/2022 0300  Gross per 24 hour   Intake --   Output 620 ml   Net -620 ml      Physical Exam  Vitals and nursing note reviewed.   Constitutional:       Appearance: Normal appearance. He is not ill-appearing.   HENT:      Head: Normocephalic and atraumatic.      Mouth/Throat:      Mouth: Mucous membranes are moist.      Pharynx: Oropharynx is clear.   Eyes:      Extraocular Movements: Extraocular movements intact.      Conjunctiva/sclera: Conjunctivae normal.      Pupils: Pupils are equal, round,  and reactive to light.   Cardiovascular:      Rate and Rhythm: Normal rate and regular rhythm.      Pulses: Normal pulses.      Heart sounds: Normal heart sounds.   Pulmonary:      Effort: Pulmonary effort is normal.      Breath sounds: Normal breath sounds.   Abdominal:      General: Abdomen is flat. Bowel sounds are normal.      Palpations: Abdomen is soft.      Tenderness: There is no abdominal tenderness.   Musculoskeletal:         General: No swelling or tenderness. Normal range of motion.      Cervical back: Normal range of motion and neck supple.   Skin:     General: Skin is warm and dry.      Comments: LIZETH drain in place   Neurological:      General: No focal deficit present.      Mental Status: He is alert and oriented to person, place, and time. Mental status is at baseline.   Psychiatric:         Mood and Affect: Mood normal.         Behavior: Behavior normal.         Thought Content: Thought content normal.         Judgment: Judgment normal.       Significant Labs: All pertinent labs within the past 24 hours have been reviewed.  BMP:   Recent Labs   Lab 05/11/22  0511   *   *   K 3.6      CO2 24   BUN 19   CREATININE 1.1   CALCIUM 8.7   MG 1.8     CBC:   Recent Labs   Lab 05/09/22  1337 05/10/22  0532 05/11/22  0511   WBC 10.24 9.73 6.97   HGB 12.8* 13.4* 11.7*   HCT 41.2 42.9 35.9*    183 155     CMP:   Recent Labs   Lab 05/09/22  1337 05/10/22  0532 05/11/22  0511    139 135*   K 4.2 4.2 3.6    103 103   CO2 24 23 24   * 114* 113*   BUN 23 18 19   CREATININE 1.6* 1.2 1.1   CALCIUM 8.3* 8.7 8.7   ANIONGAP 10 13 8   EGFRNONAA 43* >60 >60       Significant Imaging: I have reviewed all pertinent imaging results/findings within the past 24 hours.

## 2022-05-11 NOTE — PLAN OF CARE
D/c orders noted, no DME, no HH.     Sw met with pt to discuss d/c plans. Sw used [employer]. Sw informed pt of upcoming follow up appointments. Pt verbalized understanding of all. Pt stated he has a shower chair already at his house. Pt stated his wife will transport him at the time of d/c.     Pt is cleared to go from CM standpoint.     Future Appointments   Date Time Provider Department Center   5/17/2022 11:40 AM Arvnid Álvarez MD [facility] UROLOGY [facility]         05/11/22 0856   Final Note   Assessment Type Final Discharge Note   Anticipated Discharge Disposition Home   What phone number can be called within the next 1-3 days to see how you are doing after discharge? [phone]   Hospital Resources/Appts/Education Provided Appointments scheduled by Navigator/Coordinator   Post-Acute Status   Discharge Delays None known at this time

## 2022-05-11 NOTE — PLAN OF CARE
AVS and educational attachments shared with patient via [employer]. Discussed thoroughly. Patient verbalized clear understanding using teach back method. Notified bedside nurse of completion of education. At present no distress noted. Patient to be discharged via w/c with escort service and family with all of patient's belonings. Will cont to be available to patient and family and intervene prn.

## 2022-05-11 NOTE — PROGRESS NOTES
[facility] Medicine  Progress Note    Patient Name: Adrian Burt  MRN: [id number]  Patient Class: OP- Outpatient Recovery   Admission Date: 5/9/2022  Length of Stay: 1 days  Attending Physician: Arvind Álvarez MD  Primary Care Provider: Mone Brown MD        Subjective:     Principal Problem:Right renal mass        HPI:  Adrian Burt is a 67 year old male with PMHx of HTN, pre-diabetes, hemorrhoids, DJD, and testosterone deficiency admitted to the hospital for a right peritoneal robotic partial nephrectomy with primary diagnosis of multiple right renal masses. In March 2022, patient reported having right-sided flank pain to PCP. Renal US showed a 2.7 cm heterogeneous lesion in the upper pole and a 1.6 cm hyperechoic lesion in the midpole of the right kidney,  CT and MRI abdomen showed similar findings. Patient tolerated procedure well, reports decrease in pain overnight. On 1 L NC s/p 1 day post-op. Denies SOB, chest pain, nausea, vomiting, lightheadedness, dizziness, syncope or near syncope. Denies BM since surgery. Hospital medicine consulted for medical management of HTN. BP meds held pre-operatively, re-started today.       Overview/Hospital Course:   consulted for medical management s/p partial right nephrectomy. BP stable day 2 post-op. Hagan d/c prior to discharge. PT/OT evaluation, no further PT/OT needs. Follow up with urology scheduled.       Interval History: BP stable. Plan for discharge today with urology follow up    Review of Systems   Constitutional:  Negative for appetite change, fatigue and fever.   HENT:  Negative for congestion, ear pain, postnasal drip and sore throat.    Eyes:  Negative for photophobia and visual disturbance.   Respiratory:  Negative for cough and shortness of breath.    Cardiovascular:  Negative for chest pain and leg swelling.   Gastrointestinal:  Negative for abdominal distention, abdominal pain, constipation, diarrhea, nausea and vomiting.    Endocrine: Negative for polyuria.   Genitourinary:  Negative for dysuria, frequency and urgency.   Musculoskeletal:  Negative for back pain and myalgias.   Skin:  Negative for color change and pallor.   Neurological:  Negative for dizziness, speech difficulty, numbness and headaches.   Psychiatric/Behavioral:  Negative for agitation and confusion. The patient is not nervous/anxious.    Objective:     Vital Signs (Most Recent):  Temp: 98 °F (36.7 °C) (05/11/22 0759)  Pulse: 95 (05/11/22 0759)  Resp: 18 (05/11/22 0759)  BP: (!) 145/70 (05/11/22 0759)  SpO2: 96 % (05/11/22 0816)   Vital Signs (24h Range):  Temp:  [96.6 °F (35.9 °C)-98 °F (36.7 °C)] 98 °F (36.7 °C)  Pulse:  [74-95] 95  Resp:  [16-18] 18  SpO2:  [95 %-98 %] 96 %  BP: (123-145)/(61-70) 145/70     Weight: 112 kg (247 lb)  Body mass index is 31.71 kg/m².    Intake/Output Summary (Last 24 hours) at 5/11/2022 0940  Last data filed at 5/11/2022 0300  Gross per 24 hour   Intake --   Output 620 ml   Net -620 ml      Physical Exam  Vitals and nursing note reviewed.   Constitutional:       Appearance: Normal appearance. He is not ill-appearing.   HENT:      Head: Normocephalic and atraumatic.      Mouth/Throat:      Mouth: Mucous membranes are moist.      Pharynx: Oropharynx is clear.   Eyes:      Extraocular Movements: Extraocular movements intact.      Conjunctiva/sclera: Conjunctivae normal.      Pupils: Pupils are equal, round, and reactive to light.   Cardiovascular:      Rate and Rhythm: Normal rate and regular rhythm.      Pulses: Normal pulses.      Heart sounds: Normal heart sounds.   Pulmonary:      Effort: Pulmonary effort is normal.      Breath sounds: Normal breath sounds.   Abdominal:      General: Abdomen is flat. Bowel sounds are normal.      Palpations: Abdomen is soft.      Tenderness: There is no abdominal tenderness.   Musculoskeletal:         General: No swelling or tenderness. Normal range of motion.      Cervical back: Normal range of  motion and neck supple.   Skin:     General: Skin is warm and dry.      Comments: LIZETH drain in place   Neurological:      General: No focal deficit present.      Mental Status: He is alert and oriented to person, place, and time. Mental status is at baseline.   Psychiatric:         Mood and Affect: Mood normal.         Behavior: Behavior normal.         Thought Content: Thought content normal.         Judgment: Judgment normal.       Significant Labs: All pertinent labs within the past 24 hours have been reviewed.  BMP:   Recent Labs   Lab 05/11/22  0511   *   *   K 3.6      CO2 24   BUN 19   CREATININE 1.1   CALCIUM 8.7   MG 1.8     CBC:   Recent Labs   Lab 05/09/22  1337 05/10/22  0532 05/11/22  0511   WBC 10.24 9.73 6.97   HGB 12.8* 13.4* 11.7*   HCT 41.2 42.9 35.9*    183 155     CMP:   Recent Labs   Lab 05/09/22  1337 05/10/22  0532 05/11/22  0511    139 135*   K 4.2 4.2 3.6    103 103   CO2 24 23 24   * 114* 113*   BUN 23 18 19   CREATININE 1.6* 1.2 1.1   CALCIUM 8.3* 8.7 8.7   ANIONGAP 10 13 8   EGFRNONAA 43* >60 >60       Significant Imaging: I have reviewed all pertinent imaging results/findings within the past 24 hours.      Assessment/Plan:      * Right renal mass  Reports improvement in pain since yesterday    - Management per primary team  - PT/OT consulted s/p partial nephrectomy, no PT/OT needs    GERD (gastroesophageal reflux disease)  No symptoms related to diagnosis  Managed with diet restrictions without medication    - Continue to monitor    BMI 33.0-33.9,adult  Patient counseled on importance of balanced diet and regular exercise    Prediabetes  HgbA1C 5.7  Patient aware of pre-diabetic status  Management through diet    - BG stable since admission  - No indication for insulin administration  - Continue to monitor daily BG    History of benign prostatic hyperplasia  Patient on tapia catheter placed, pre-operatively, will be removed today  Elevated  PSA    - Continue flomax  - Hagan removed    Essential hypertension  Chronic, controlled.  Latest blood pressure and vitals reviewed-   Temp:  [96.6 °F (35.9 °C)-98 °F (36.7 °C)]   Pulse:  [74-95]   Resp:  [16-18]   BP: (123-145)/(61-70)   SpO2:  [95 %-98 %] .   Home meds for hypertension were reviewed and noted below.   Hypertension Medications             amLODIPine (NORVASC) 10 MG tablet TAKE 1 TABLET(10 MG) BY MOUTH EVERY MORNING    hydroCHLOROthiazide (HYDRODIURIL) 12.5 MG Tab Take 1 tablet (12.5 mg total) by mouth once daily.    losartan (COZAAR) 100 MG tablet TAKE 1 TABLET(100 MG) BY MOUTH EVERY DAY        - Re-start amlodipine and HCTZ  - Will utilize p.r.n. blood pressure medication only if patient's blood pressure greater than 180/110 and he develops symptoms such as worsening chest pain or shortness of breath.    Erectile dysfunction  - Continue slidenafil    DJD (degenerative joint disease)  Denies pain relating to diagnosis    - Continue to monitor      VTE Risk Mitigation (From admission, onward)         Ordered     heparin (porcine) injection 5,000 Units  Every 8 hours         05/10/22 0737                Discharge Planning   SWATHI: 5/11/2022     Code Status: Not on file   Is the patient medically ready for discharge?:     Reason for patient still in hospital (select all that apply): Pending disposition  Discharge Plan A: Home   Discharge Delays: None known at this time              Jemma Enrique PA-C  Department of Hospital Medicine   [place] [facility]

## 2022-05-11 NOTE — ASSESSMENT & PLAN NOTE
Chronic, controlled.  Latest blood pressure and vitals reviewed-   Temp:  [96.6 °F (35.9 °C)-98 °F (36.7 °C)]   Pulse:  [74-95]   Resp:  [16-18]   BP: (123-145)/(61-70)   SpO2:  [95 %-98 %] .   Home meds for hypertension were reviewed and noted below.   Hypertension Medications             amLODIPine (NORVASC) 10 MG tablet TAKE 1 TABLET(10 MG) BY MOUTH EVERY MORNING    hydroCHLOROthiazide (HYDRODIURIL) 12.5 MG Tab Take 1 tablet (12.5 mg total) by mouth once daily.    losartan (COZAAR) 100 MG tablet TAKE 1 TABLET(100 MG) BY MOUTH EVERY DAY        - Re-start amlodipine and HCTZ  - Will utilize p.r.n. blood pressure medication only if patient's blood pressure greater than 180/110 and he develops symptoms such as worsening chest pain or shortness of breath.

## 2022-05-11 NOTE — ASSESSMENT & PLAN NOTE
Reports improvement in pain since yesterday    - Management per primary team  - PT/OT consulted s/p partial nephrectomy, no PT/OT needs

## 2022-05-11 NOTE — PROGRESS NOTES
[facility] - Telemetry  Urology  Progress Note    Patient Name: Adrian Burt  MRN: [id number]  Admission Date: 5/9/2022  Hospital Length of Stay: 1 days    POD 2    Subjective:     Interval History: MARK.  Pain ok with meds.  No N/V.  No flatus or BM.  Tolerating diet.  No lightheadedness or SOB.  Tolerating tapia.    I/Os not well recorded, drain output 70 overnight, minimal in bulb    Objective:     Temp:  [96.6 °F (35.9 °C)-97.7 °F (36.5 °C)] 97.6 °F (36.4 °C)  Pulse:  [74-86] 81  Resp:  [16-18] 17  SpO2:  [95 %-98 %] 96 %  BP: (123-136)/(61-67) 136/67     Intake/Output - Last 3 Shifts       05/09 0700  05/10 0659 05/10 0700  05/11 0659 05/11 0700  05/12 0659    I.V. (mL/kg) 1500 (13.4)      IV Piggyback 900      Total Intake(mL/kg) 2400 (21.4)      Urine (mL/kg/hr) 1595 (0.6) 550 (0.2)     Drains 1470 70     Blood 400      Total Output 3465 620     Net -1065 -620                  NAD  RRR  CTAB  ABD S/ND/aTTP; inc c/d/i, LIZETH scant ss (removed)        Penis normal       Tapia clear sourav       Ext: no edema    Significant Labs:  BMP:  Recent Labs   Lab 05/09/22  1337 05/10/22  0532 05/11/22  0511    139 135*   K 4.2 4.2 3.6    103 103   CO2 24 23 24   BUN 23 18 19   CREATININE 1.6* 1.2 1.1   CALCIUM 8.3* 8.7 8.7       CBC:  Recent Labs   Lab 05/09/22  1337 05/10/22  0532 05/11/22  0511   WBC 10.24 9.73 6.97   HGB 12.8* 13.4* 11.7*   HCT 41.2 42.9 35.9*    183 155         Urology Specific Assessment:     Problem Noted   Right Renal Mass 3/31/2022    4 cm complex cystic lesion to posterior right upper pole, 1.1 cm right anterior solid mass on CT w/wo contrast 3/2022.  --MRI 4/22: right 3.6 cm UP cystic lesion ? Cystic RCC, right midpole 12mm solid mass  --Cr 1, GFR >60  --S/p right retroperitioneal partial 5/9/22 (both masses removed)     Elevated Psa 4/14/2022    Lab Results   Component Value Date    PSA 5.2 (H) 04/07/2021    PSA 2.1 09/10/2018    PSA 1.8 07/05/2017          Urinary Tract Infection  Without Hematuria 3/31/2022     Plan:   1. D/c tapia, check PVR x 3  2. Soft diet  3. Decrease IVF  4. Home later today if doing well, voiding, tolerating diet    Arvind Álvarez MD  Urology

## 2022-05-11 NOTE — ASSESSMENT & PLAN NOTE
Patient on tapia catheter placed, pre-operatively, will be removed today  Elevated PSA    - Continue flomax  - Tapia removed

## 2022-05-12 LAB
BLD PROD TYP BPU: NORMAL
BLD PROD TYP BPU: NORMAL
BLOOD UNIT EXPIRATION DATE: NORMAL
BLOOD UNIT EXPIRATION DATE: NORMAL
BLOOD UNIT TYPE CODE: 5100
BLOOD UNIT TYPE CODE: 5100
BLOOD UNIT TYPE: NORMAL
BLOOD UNIT TYPE: NORMAL
CODING SYSTEM: NORMAL
CODING SYSTEM: NORMAL
DISPENSE STATUS: NORMAL
DISPENSE STATUS: NORMAL
TRANS ERYTHROCYTES VOL PATIENT: NORMAL ML
TRANS ERYTHROCYTES VOL PATIENT: NORMAL ML

## 2022-05-12 NOTE — DISCHARGE SUMMARY
[facility]  Urology  Discharge Summary      Patient Name: Adrian Burt  MRN: [id number]  Admission Date: 5/9/2022  Hospital Length of Stay: 1 days  Discharge Date: 05/12/2022  Attending Physician: No att. providers found   Discharging Provider: Rogelio Lozoya MD  Primary Care Physician: Mone Brown MD    HPI: The patient is a 67 y.o. male with past medical history (listed below) with right renal mass x 2 for surgery.     The patient elected to proceed with the procedure(s) below. Please see H&P and/or clinic progress note(s) for full details.     Procedure(s) (LRB):  Right RETROPERITONEAL robotic assisted lap partial nephrectomy  Intraoperative ultrasound with interpretation Special needs: drop in probe for ultrasound, retroperitoneal dilating baloon (Right)     Past Medical History:   Diagnosis Date    Allergy     Colon polyp     Fatty liver     GERD (gastroesophageal reflux disease)     Hypertension        Hospital Course (synopsis of major diagnoses, care, treatment, and services provided during the course of the hospital stay): Admitted post op, POD 1 did well, diet advanced.  LIZETH Cr negative for leak.  Hagan and LIZETH drain removed POD 2.  Discharged POD 2 tolerating diet, pain ok with meds, ambulating, voiding.       Consults:   Consults (From admission, onward)        Status Ordering Provider     Inpatient consult to Hospitalist  Once        Provider:  El Jang MD    Completed ROGELIO LOZOYA          Significant Diagnostic Studies: none    Pending Diagnostic Studies:     Procedure Component Value Units Date/Time    Specimen to Pathology, Surgery General Surgery [[id number]] Collected: 05/09/22 1214    Order Status: Sent Lab Status: In process Updated: 05/09/22 1523    Specimen: Tissue           Final Active Diagnoses:    Diagnosis Date Noted POA    PRINCIPAL PROBLEM:  Right renal mass [N28.89] 03/31/2022 Yes    GERD (gastroesophageal reflux disease) [K21.9]  Yes    BMI  "33.0-33.9,adult [Z68.33] 11/14/2019 Not Applicable    Prediabetes [R73.03] 04/04/2019 Yes    History of benign prostatic hyperplasia [Z87.438] 11/23/2015 Yes    Essential hypertension [I10] 07/19/2012 Yes    DJD (degenerative joint disease) [M19.90] 07/19/2012 Yes    Erectile dysfunction [N52.9] 07/19/2012 Yes      Problems Resolved During this Admission:       Discharged Condition: stable    Disposition: Home or Self Care    Follow Up:   Follow-up Information     Arvind Álvarez MD Follow up in 1 week(s).    Specialty: Urology  Why: For wound re-check  Contact information:  [address]  [address]  [place] [id number] [phone]                         Patient Instructions:      BATH/SHOWER CHAIR FOR HOME USE     Order Specific Question Answer Comments   Height: 6' 2" (1.88 m)    Weight: 112 kg (247 lb)    Does patient have medical equipment at home? none    Length of need (1-99 months): 99    Type: With back      Diet Adult Regular     Other restrictions (specify):   Order Comments: No heavy lifting >15lbs or strenuous activity for 4 weeks. No tub baths or swimming for 2 weeks. Walk daily. Do not drive while taking narcotics.     No driving until:   Order Comments: Off pain medications     No dressing needed     Notify your health care provider if you experience any of the following:  temperature >100.4     Notify your health care provider if you experience any of the following:  persistent nausea and vomiting or diarrhea     Notify your health care provider if you experience any of the following:  severe uncontrolled pain     Notify your health care provider if you experience any of the following:   Order Comments: Keep wounds clean and dry. You may shower tomorrow.  Do not remove staples, they will be removed at your follow up appointment in 1-2 weeks.      Call the clinic at [phone] or return to the emergency department if you  have persistent fever >101.4, severe bleeding that does not " stop, pain not controlled with medications, severe nausea and vomiting limiting intake of liquids and solids, or any other concerns.       Medications:  Reconciled Home Medications:      Medication List      START taking these medications    docusate sodium 100 MG capsule  Commonly known as: COLACE  Take 1 capsule (100 mg total) by mouth 2 (two) times daily. for 7 days     oxyCODONE-acetaminophen 5-325 mg per tablet  Commonly known as: PERCOCET  Take 1 tablet by mouth every 4 (four) hours as needed for Pain (acute pain).        CHANGE how you take these medications    azelastine 137 mcg (0.1 %) nasal spray  Commonly known as: ASTELIN  1 spray (137 mcg total) by Nasal route 2 (two) times daily.  What changed:   · when to take this  · reasons to take this     fluticasone propionate 50 mcg/actuation nasal spray  Commonly known as: FLONASE  SPRAY ONCE IN EACH NOSTRIL EVERY DAY  What changed:   · when to take this  · reasons to take this        CONTINUE taking these medications    amLODIPine 10 MG tablet  Commonly known as: NORVASC  TAKE 1 TABLET(10 MG) BY MOUTH EVERY MORNING     aspirin 81 MG EC tablet  Commonly known as: ECOTRIN  Take 81 mg by mouth once daily.     ferrous gluconate 324 mg (37.5 mg iron) Tab tablet  Take 1 tablet (324 mg total) by mouth daily with breakfast.     hydroCHLOROthiazide 12.5 MG Tab  Commonly known as: HYDRODIURIL  Take 1 tablet (12.5 mg total) by mouth once daily.     losartan 100 MG tablet  Commonly known as: COZAAR  TAKE 1 TABLET(100 MG) BY MOUTH EVERY DAY     montelukast 10 mg tablet  Commonly known as: SINGULAIR  TAKE 1 TABLET(10 MG) BY MOUTH EVERY EVENING     MULTIVITAMIN 50 PLUS ORAL  Take 1 tablet by mouth once daily.     sildenafil 20 mg Tab  Commonly known as: REVATIO  Take 20 mg by mouth 3 (three) times daily.     tamsulosin 0.4 mg Cap  Commonly known as: FLOMAX  Take 1 capsule by mouth once daily.     testosterone cypionate 200 mg/mL injection  Commonly known as: DEPOTESTOTERONE  CYPIONATE  Inject 1 mL (200 mg total) into the muscle every 14 (fourteen) days.            Time spent on the discharge of patient: 5 minutes    Arvind Álvarez MD  Urology  [facility]

## 2022-05-13 ENCOUNTER — PATIENT MESSAGE (OUTPATIENT)
Dept: UROLOGY | Facility: CLINIC | Age: 67
End: 2022-05-13
Payer: MEDICARE

## 2022-05-13 ENCOUNTER — TELEPHONE (OUTPATIENT)
Dept: UROLOGY | Facility: CLINIC | Age: 67
End: 2022-05-13
Payer: MEDICARE

## 2022-05-13 LAB
FINAL PATHOLOGIC DIAGNOSIS: NORMAL
GROSS: NORMAL
Lab: NORMAL
MICROSCOPIC EXAM: NORMAL

## 2022-05-13 NOTE — TELEPHONE ENCOUNTER
----- Message from Katja Morales sent at 5/13/2022  8:58 AM CDT -----  Type:  Needs Medical Advice    Who Called: Adrian   Symptoms (please be specific): surgery wound cleaning  How long has patient had these symptoms: 5/12  Pharmacy name and phone #:   Would the patient rather a call back or a response via [facility]? Call kaseyk  Best Call Back Number: [phone]  Additional Information: Patient would like a call back about cleaning his wound he recently had surgery.

## 2022-05-16 ENCOUNTER — PATIENT OUTREACH (OUTPATIENT)
Dept: ADMINISTRATIVE | Facility: OTHER | Age: 67
End: 2022-05-16
Payer: MEDICARE

## 2022-05-16 NOTE — PROGRESS NOTES
Health Maintenance Due   Topic Date Due    PROSTATE-SPECIFIC ANTIGEN  04/07/2022    Colorectal Cancer Screening  08/16/2022     Updates were requested from care everywhere.  Chart was reviewed for overdue [facility]active [facility] Encounters (JOSE) topics (CRS, Breast Cancer Screening, Eye exam)  Health Maintenance has been updated.  LINKS immunization registry triggered.  Immunizations were reconciled.

## 2022-05-17 PROBLEM — N39.0 URINARY TRACT INFECTION WITHOUT HEMATURIA: Status: RESOLVED | Noted: 2022-03-31 | Resolved: 2022-05-17

## 2022-05-17 PROBLEM — C64.1 RENAL CANCER, RIGHT: Status: ACTIVE | Noted: 2022-03-31

## 2022-05-18 ENCOUNTER — OFFICE VISIT (OUTPATIENT)
Dept: UROLOGY | Facility: CLINIC | Age: 67
End: 2022-05-18
Payer: MEDICARE

## 2022-05-18 VITALS
BODY MASS INDEX: 31.51 KG/M2 | HEIGHT: 74 IN | SYSTOLIC BLOOD PRESSURE: 167 MMHG | HEART RATE: 88 BPM | DIASTOLIC BLOOD PRESSURE: 91 MMHG | WEIGHT: 245.5 LBS

## 2022-05-18 DIAGNOSIS — R97.20 ELEVATED PSA: ICD-10-CM

## 2022-05-18 DIAGNOSIS — N28.89 RIGHT RENAL MASS: Primary | ICD-10-CM

## 2022-05-18 LAB
BILIRUB SERPL-MCNC: ABNORMAL MG/DL
BLOOD URINE, POC: 50
CLARITY, POC UA: ABNORMAL
COLOR, POC UA: YELLOW
GLUCOSE UR QL STRIP: ABNORMAL
KETONES UR QL STRIP: ABNORMAL
LEUKOCYTE ESTERASE URINE, POC: ABNORMAL
NITRITE, POC UA: ABNORMAL
PH, POC UA: 5
POC RESIDUAL URINE VOLUME: 27 ML (ref 0–100)
PROTEIN, POC: ABNORMAL
SPECIFIC GRAVITY, POC UA: 1.02
UROBILINOGEN, POC UA: ABNORMAL

## 2022-05-18 PROCEDURE — 99213 PR OFFICE/OUTPT VISIT, EST, LEVL III, 20-29 MIN: ICD-10-PCS | Mod: 24,S$GLB,, | Performed by: UROLOGY

## 2022-05-18 PROCEDURE — 3044F PR MOST RECENT HEMOGLOBIN A1C LEVEL <7.0%: ICD-10-PCS | Mod: CPTII,S$GLB,, | Performed by: UROLOGY

## 2022-05-18 PROCEDURE — 1159F PR MEDICATION LIST DOCUMENTED IN MEDICAL RECORD: ICD-10-PCS | Mod: CPTII,S$GLB,, | Performed by: UROLOGY

## 2022-05-18 PROCEDURE — 3044F HG A1C LEVEL LT 7.0%: CPT | Mod: CPTII,S$GLB,, | Performed by: UROLOGY

## 2022-05-18 PROCEDURE — 1111F PR DISCHARGE MEDS RECONCILED W/ CURRENT OUTPATIENT MED LIST: ICD-10-PCS | Mod: CPTII,S$GLB,, | Performed by: UROLOGY

## 2022-05-18 PROCEDURE — 51798 POCT BLADDER SCAN: ICD-10-PCS | Mod: S$GLB,,, | Performed by: UROLOGY

## 2022-05-18 PROCEDURE — 51798 US URINE CAPACITY MEASURE: CPT | Mod: S$GLB,,, | Performed by: UROLOGY

## 2022-05-18 PROCEDURE — 3008F PR BODY MASS INDEX (BMI) DOCUMENTED: ICD-10-PCS | Mod: CPTII,S$GLB,, | Performed by: UROLOGY

## 2022-05-18 PROCEDURE — 1160F RVW MEDS BY RX/DR IN RCRD: CPT | Mod: CPTII,S$GLB,, | Performed by: UROLOGY

## 2022-05-18 PROCEDURE — 3080F DIAST BP >= 90 MM HG: CPT | Mod: CPTII,S$GLB,, | Performed by: UROLOGY

## 2022-05-18 PROCEDURE — 99213 OFFICE O/P EST LOW 20 MIN: CPT | Mod: 24,S$GLB,, | Performed by: UROLOGY

## 2022-05-18 PROCEDURE — 1111F DSCHRG MED/CURRENT MED MERGE: CPT | Mod: CPTII,S$GLB,, | Performed by: UROLOGY

## 2022-05-18 PROCEDURE — 1159F MED LIST DOCD IN RCRD: CPT | Mod: CPTII,S$GLB,, | Performed by: UROLOGY

## 2022-05-18 PROCEDURE — 99999 PR PBB SHADOW E&M-EST. PATIENT-LVL III: CPT | Mod: PBBFAC,,, | Performed by: UROLOGY

## 2022-05-18 PROCEDURE — 3080F PR MOST RECENT DIASTOLIC BLOOD PRESSURE >= 90 MM HG: ICD-10-PCS | Mod: CPTII,S$GLB,, | Performed by: UROLOGY

## 2022-05-18 PROCEDURE — 99999 PR PBB SHADOW E&M-EST. PATIENT-LVL III: ICD-10-PCS | Mod: PBBFAC,,, | Performed by: UROLOGY

## 2022-05-18 PROCEDURE — 1160F PR REVIEW ALL MEDS BY PRESCRIBER/CLIN PHARMACIST DOCUMENTED: ICD-10-PCS | Mod: CPTII,S$GLB,, | Performed by: UROLOGY

## 2022-05-18 PROCEDURE — 3077F SYST BP >= 140 MM HG: CPT | Mod: CPTII,S$GLB,, | Performed by: UROLOGY

## 2022-05-18 PROCEDURE — 4010F PR ACE/ARB THEARPY RXD/TAKEN: ICD-10-PCS | Mod: CPTII,S$GLB,, | Performed by: UROLOGY

## 2022-05-18 PROCEDURE — 1126F AMNT PAIN NOTED NONE PRSNT: CPT | Mod: CPTII,S$GLB,, | Performed by: UROLOGY

## 2022-05-18 PROCEDURE — 3077F PR MOST RECENT SYSTOLIC BLOOD PRESSURE >= 140 MM HG: ICD-10-PCS | Mod: CPTII,S$GLB,, | Performed by: UROLOGY

## 2022-05-18 PROCEDURE — 1126F PR PAIN SEVERITY QUANTIFIED, NO PAIN PRESENT: ICD-10-PCS | Mod: CPTII,S$GLB,, | Performed by: UROLOGY

## 2022-05-18 PROCEDURE — 4010F ACE/ARB THERAPY RXD/TAKEN: CPT | Mod: CPTII,S$GLB,, | Performed by: UROLOGY

## 2022-05-18 PROCEDURE — 3008F BODY MASS INDEX DOCD: CPT | Mod: CPTII,S$GLB,, | Performed by: UROLOGY

## 2022-05-18 PROCEDURE — 81002 URINALYSIS NONAUTO W/O SCOPE: CPT | Mod: S$GLB,,, | Performed by: UROLOGY

## 2022-05-18 PROCEDURE — 81002 POCT URINE DIPSTICK WITHOUT MICROSCOPE: ICD-10-PCS | Mod: S$GLB,,, | Performed by: UROLOGY

## 2022-05-18 RX ORDER — TESTOSTERONE CYPIONATE 200 MG/ML
INJECTION, SOLUTION INTRAMUSCULAR
COMMUNITY
End: 2023-01-18 | Stop reason: SDUPTHER

## 2022-05-18 NOTE — PROGRESS NOTES
Subjective:       Patient ID: Adrian Burt is a 67 y.o. male.    Chief Complaint: Follow-up     This is a 67 y.o.  male patient that is an established patient for right kidney cancer.    Initial HPI: Patient reports having right-sided flank pain underwent a renal ultrasound that showed a right 1 cm solid mass in a concerning approximately 4.5 cm upper pole cystic mass.  CT abdomen with and without contrast stone subsequently that showed similar findings.  He does report right-sided flank pain has resolved.  Of note, his primary care provider did prescribed antibiotics recently for Proteus urinary tract infection at the time of having flank pain.  His urinalysis at that time had blood as well as white blood cells.  There is a report of ? FH of kidney cancer.  He does report some lower urinary tract symptoms in the past and has been treated by previous urologist but currently not on treatment.  He is on testosterone replacement.    S/p right partial nephrectomy of upper pole cystic mass and right renal mass 5/9/22.     5/18/22: doing well, no N/V/F/C.        I personally reviewed the images: CT 3/22, MRI 3/22 (cystic lesion right upper pole possible cystic RCC, 12mm right anterior midpole solid lesion possible RCC)     LAST PSA  Lab Results   Component Value Date    PSA 5.2 (H) 04/07/2021    PSA 2.1 09/10/2018    PSA 1.8 07/05/2017    PSA 2.6 11/23/2015    PSA 7.2 (H) 12/15/2008    PSA 0.6 02/22/2005       Lab Results   Component Value Date    CREATININE 1.1 05/11/2022       ---  Past Medical History:   Diagnosis Date    Allergy     Colon polyp     Fatty liver     GERD (gastroesophageal reflux disease)     Hypertension        Past Surgical History:   Procedure Laterality Date    COLONOSCOPY N/A 8/16/2017    Procedure: COLONOSCOPY;  Surgeon: Leo Henriquez MD;  Location: [facility] ([address] [place] [address]);  Service: Endoscopy;  Laterality: N/A;    LEG SURGERY Left     ROBOT-ASSISTED LAPAROSCOPIC PARTIAL  NEPHRECTOMY Right 2022    Procedure: Right RETROPERITONEAL robotic assisted lap partial nephrectomy  Intraoperative ultrasound with interpretation Special needs: drop in probe for ultrasound, retroperitoneal dilating baloon;  Surgeon: Arvind Álvarez MD;  Location: [facility];  Service: Urology;  Laterality: Right;       Family History   Problem Relation Age of Onset    Kidney disease Mother     Heart disease Father     Cancer Sister         kidney    Kidney disease Sister     Colon cancer Neg Hx     Esophageal cancer Neg Hx        Social History     Tobacco Use    Smoking status: Former Smoker     Quit date: 11/15/1978     Years since quittin.5    Smokeless tobacco: Never Used    Tobacco comment: smoked for 10 years, quit in 76   Substance Use Topics    Alcohol use: No    Drug use: No       Current Outpatient Medications on File Prior to Visit   Medication Sig Dispense Refill    amLODIPine (NORVASC) 10 MG tablet TAKE 1 TABLET(10 MG) BY MOUTH EVERY MORNING 90 tablet 3    aspirin (ECOTRIN) 81 MG EC tablet Take 81 mg by mouth once daily.      azelastine (ASTELIN) 137 mcg (0.1 %) nasal spray 1 spray (137 mcg total) by Nasal route 2 (two) times daily. (Patient taking differently: 1 spray by Nasal route 2 (two) times daily as needed.) 30 mL 0    docusate sodium (COLACE) 100 MG capsule Take 1 capsule (100 mg total) by mouth 2 (two) times daily. for 7 days 14 capsule 0    ferrous gluconate 324 mg (37.5 mg iron) Tab tablet Take 1 tablet (324 mg total) by mouth daily with breakfast. 30 tablet 11    fluticasone propionate (FLONASE) 50 mcg/actuation nasal spray SPRAY ONCE IN EACH NOSTRIL EVERY DAY (Patient taking differently: 1 spray by Each Nostril route daily as needed.) 48 g 2    hydroCHLOROthiazide (HYDRODIURIL) 12.5 MG Tab Take 1 tablet (12.5 mg total) by mouth once daily. 30 tablet 11    losartan (COZAAR) 100 MG tablet TAKE 1 TABLET(100 MG) BY MOUTH EVERY DAY 90 tablet 3    montelukast  (SINGULAIR) 10 mg tablet TAKE 1 TABLET(10 MG) BY MOUTH EVERY EVENING 90 tablet 0    multivit with minerals/lutein (MULTIVITAMIN 50 PLUS ORAL) Take 1 tablet by mouth once daily.      sildenafil (REVATIO) 20 mg Tab Take 20 mg by mouth 3 (three) times daily.      tamsulosin (FLOMAX) 0.4 mg Cap Take 1 capsule by mouth once daily.      testosterone cypionate (DEPOTESTOTERONE CYPIONATE) 200 mg/mL injection Inject into the muscle every 14 (fourteen) days.       No current facility-administered medications on file prior to visit.       Review of patient's allergies indicates:   Allergen Reactions    No known drug allergies        Review of Systems   Constitutional: Negative for activity change, chills and fever.   HENT: Negative for congestion.    Respiratory: Negative for cough, chest tightness and shortness of breath.    Cardiovascular: Negative for chest pain and palpitations.   Gastrointestinal: Negative for abdominal distention, abdominal pain, nausea and vomiting.   Genitourinary: Negative for difficulty urinating, flank pain, hematuria, penile pain, scrotal swelling and testicular pain.   Musculoskeletal: Negative for gait problem.       Objective:      Physical Exam  Constitutional:       Appearance: Normal appearance.   HENT:      Head: Normocephalic.   Pulmonary:      Effort: Pulmonary effort is normal.      Breath sounds: Normal breath sounds.   Abdominal:      General: Abdomen is flat. Bowel sounds are normal.      Palpations: Abdomen is soft.      Tenderness: There is no abdominal tenderness. There is no right CVA tenderness, left CVA tenderness or guarding.      Comments: Inc c/d/i   Musculoskeletal:         General: Normal range of motion.      Cervical back: Normal range of motion.   Skin:     General: Skin is warm.   Neurological:      Mental Status: He is alert.         Results for orders placed or performed during the hospital encounter of 04/12/22 (from the past 2160 hour(s))   MRI Abdomen W WO  Contrast    Impression    Predominantly cystic lesion at the upper pole of the right kidney with subtly enhancing septations, a cystic renal cell carcinoma cannot be excluded.    Subtle small intrarenal lesion at the midpole of the right kidney demonstrating postcontrast enhancement, a solid renal cell carcinoma cannot be excluded.    This report was flagged in Epic as abnormal.      Electronically signed by: Tena Lozano MD  Date:    04/13/2022  Time:    08:54     Path 5/9/22:    Final Pathologic Diagnosis 1. FINAL RESECTION MARGIN OF RIGHT UPPER POLE MASS:   RENAL PARENCHYMA WITH NO NEOPLASIA IDENTIFIED   2. RIGHT UPPER POLE MASS:   INNOCUOUS BENIGN MULTILOCULAR CYST   3. MASS FROM MID RIGHT  KIDNEY :   RENAL CELL CARCINOMA WITH NO INVOLVEMENT OF MARGINS OR PERINEPHRIC ADIPOSE   TISSUE IDENTIFIED    Comment: Interp By Adolph Bonilla M.D., Signed on 05/13/2022 at 09:22   Microscopic Exam 2. This lesion is a benign multilocular cyst.  The cystic spaces have a   single layer of lining at the most.  There is no proliferative lining   identified in this entirely submitted specimen.  No area has formation of a   mass neoplasm.  There is no significant abnormality of renal parenchyma   separate from the cystic lesion.   3.   Procedure :  Partial renal excision   Specimen Laterality :  Right   Tumor Focality :  Unifocal   Tumor Size :  1.2 cm   Histologic Type :  Renal cell carcinoma, clear cell variant   Tumor Extent :  Limited to kidney   Sarcomatoid Features :  Not identified   Rhabdoid Features :  Not identified   Tumor Necrosis :  Not identified   Margins ; no margin involvement identified   Regional Lymph Node Status :   Not applicable (no regional lymph nodes   submitted or found)   PATHOLOGIC STAGE CLASSIFICATION (pTNM, AJCC 8th Edition) : pT1a pNX pMX   Additional findings :  There is no significant abnormality of renal   parenchyma apart from the masses.          Assessment:     Problem Noted   Renal  Cancer, Right 3/31/2022    4 cm complex cystic lesion to posterior right upper pole, 1.1 cm right anterior solid mass on CT w/wo contrast 3/2022.  --MRI 4/22: right 3.6 cm UP cystic lesion ? Cystic RCC, right midpole 12mm solid mass  --Preop Cr 1, GFR >60  --S/p right retroperitioneal partial 5/9/22 (both masses removed)  --Path: upper pole lesion was cyst, midpole 1.2 cm [id number] CC RCC, negative margins     Elevated Psa 4/14/2022    Lab Results   Component Value Date    PSA 5.2 (H) 04/07/2021    PSA 2.1 09/10/2018    PSA 1.8 07/05/2017          Urinary Tract Infection Without Hematuria (Resolved) 3/31/2022       Plan:     1.  Pathology discussed, plan for CT A/P w/wo contrast, CXR and labs in 9-12 months  2.  Follow up in 1 month with BMP, CBC, PSA  3.  Separate from post op appt discussed h/o elevated PSA, will recheck in 1 month  (modifier 79)       Arvind Álvarez MD

## 2022-06-13 ENCOUNTER — LAB VISIT (OUTPATIENT)
Dept: LAB | Facility: HOSPITAL | Age: 67
End: 2022-06-13
Attending: UROLOGY
Payer: MEDICARE

## 2022-06-13 DIAGNOSIS — N28.89 RIGHT RENAL MASS: ICD-10-CM

## 2022-06-13 DIAGNOSIS — R97.20 ELEVATED PSA: ICD-10-CM

## 2022-06-13 LAB
ANION GAP SERPL CALC-SCNC: 8 MMOL/L (ref 8–16)
BASOPHILS # BLD AUTO: 0.02 K/UL (ref 0–0.2)
BASOPHILS NFR BLD: 0.4 % (ref 0–1.9)
BUN SERPL-MCNC: 16 MG/DL (ref 8–23)
CALCIUM SERPL-MCNC: 9.3 MG/DL (ref 8.7–10.5)
CHLORIDE SERPL-SCNC: 105 MMOL/L (ref 95–110)
CO2 SERPL-SCNC: 27 MMOL/L (ref 23–29)
CREAT SERPL-MCNC: 1.2 MG/DL (ref 0.5–1.4)
DIFFERENTIAL METHOD: ABNORMAL
EOSINOPHIL # BLD AUTO: 0.2 K/UL (ref 0–0.5)
EOSINOPHIL NFR BLD: 4.3 % (ref 0–8)
ERYTHROCYTE [DISTWIDTH] IN BLOOD BY AUTOMATED COUNT: 14 % (ref 11.5–14.5)
EST. GFR  (AFRICAN AMERICAN): >60 ML/MIN/1.73 M^2
EST. GFR  (NON AFRICAN AMERICAN): >60 ML/MIN/1.73 M^2
GLUCOSE SERPL-MCNC: 101 MG/DL (ref 70–110)
HCT VFR BLD AUTO: 45.6 % (ref 40–54)
HGB BLD-MCNC: 14.3 G/DL (ref 14–18)
IMM GRANULOCYTES # BLD AUTO: 0.01 K/UL (ref 0–0.04)
IMM GRANULOCYTES NFR BLD AUTO: 0.2 % (ref 0–0.5)
LYMPHOCYTES # BLD AUTO: 2.1 K/UL (ref 1–4.8)
LYMPHOCYTES NFR BLD: 46.4 % (ref 18–48)
MCH RBC QN AUTO: 28 PG (ref 27–31)
MCHC RBC AUTO-ENTMCNC: 31.4 G/DL (ref 32–36)
MCV RBC AUTO: 89 FL (ref 82–98)
MONOCYTES # BLD AUTO: 0.5 K/UL (ref 0.3–1)
MONOCYTES NFR BLD: 11.7 % (ref 4–15)
NEUTROPHILS # BLD AUTO: 1.7 K/UL (ref 1.8–7.7)
NEUTROPHILS NFR BLD: 37 % (ref 38–73)
NRBC BLD-RTO: 0 /100 WBC
PLATELET # BLD AUTO: 168 K/UL (ref 150–450)
PMV BLD AUTO: 10.9 FL (ref 9.2–12.9)
POTASSIUM SERPL-SCNC: 4.4 MMOL/L (ref 3.5–5.1)
PROSTATE SPECIFIC ANTIGEN, TOTAL: 6.5 NG/ML (ref 0–4)
PSA FREE MFR SERPL: 20.92 %
PSA FREE SERPL-MCNC: 1.36 NG/ML (ref 0–1.5)
RBC # BLD AUTO: 5.11 M/UL (ref 4.6–6.2)
SODIUM SERPL-SCNC: 140 MMOL/L (ref 136–145)
WBC # BLD AUTO: 4.46 K/UL (ref 3.9–12.7)

## 2022-06-13 PROCEDURE — 84153 ASSAY OF PSA TOTAL: CPT | Performed by: UROLOGY

## 2022-06-13 PROCEDURE — 36415 COLL VENOUS BLD VENIPUNCTURE: CPT | Performed by: UROLOGY

## 2022-06-13 PROCEDURE — 80048 BASIC METABOLIC PNL TOTAL CA: CPT | Performed by: UROLOGY

## 2022-06-13 PROCEDURE — 85025 COMPLETE CBC W/AUTO DIFF WBC: CPT | Performed by: UROLOGY

## 2022-06-13 PROCEDURE — 84154 ASSAY OF PSA FREE: CPT | Performed by: UROLOGY

## 2022-06-15 ENCOUNTER — OFFICE VISIT (OUTPATIENT)
Dept: UROLOGY | Facility: CLINIC | Age: 67
End: 2022-06-15
Payer: MEDICARE

## 2022-06-15 VITALS
HEART RATE: 90 BPM | BODY MASS INDEX: 31.66 KG/M2 | DIASTOLIC BLOOD PRESSURE: 98 MMHG | SYSTOLIC BLOOD PRESSURE: 172 MMHG | HEIGHT: 74 IN | WEIGHT: 246.69 LBS

## 2022-06-15 DIAGNOSIS — R97.20 ELEVATED PSA: ICD-10-CM

## 2022-06-15 DIAGNOSIS — C64.1 RENAL CANCER, RIGHT: ICD-10-CM

## 2022-06-15 PROCEDURE — 99214 OFFICE O/P EST MOD 30 MIN: CPT | Mod: 24,S$GLB,, | Performed by: UROLOGY

## 2022-06-15 PROCEDURE — 1159F PR MEDICATION LIST DOCUMENTED IN MEDICAL RECORD: ICD-10-PCS | Mod: CPTII,S$GLB,, | Performed by: UROLOGY

## 2022-06-15 PROCEDURE — 4010F PR ACE/ARB THEARPY RXD/TAKEN: ICD-10-PCS | Mod: CPTII,S$GLB,, | Performed by: UROLOGY

## 2022-06-15 PROCEDURE — 4010F ACE/ARB THERAPY RXD/TAKEN: CPT | Mod: CPTII,S$GLB,, | Performed by: UROLOGY

## 2022-06-15 PROCEDURE — 99214 PR OFFICE/OUTPT VISIT, EST, LEVL IV, 30-39 MIN: ICD-10-PCS | Mod: 24,S$GLB,, | Performed by: UROLOGY

## 2022-06-15 PROCEDURE — 1126F AMNT PAIN NOTED NONE PRSNT: CPT | Mod: CPTII,S$GLB,, | Performed by: UROLOGY

## 2022-06-15 PROCEDURE — 3080F PR MOST RECENT DIASTOLIC BLOOD PRESSURE >= 90 MM HG: ICD-10-PCS | Mod: CPTII,S$GLB,, | Performed by: UROLOGY

## 2022-06-15 PROCEDURE — 1101F PR PT FALLS ASSESS DOC 0-1 FALLS W/OUT INJ PAST YR: ICD-10-PCS | Mod: CPTII,S$GLB,, | Performed by: UROLOGY

## 2022-06-15 PROCEDURE — 3008F BODY MASS INDEX DOCD: CPT | Mod: CPTII,S$GLB,, | Performed by: UROLOGY

## 2022-06-15 PROCEDURE — 3080F DIAST BP >= 90 MM HG: CPT | Mod: CPTII,S$GLB,, | Performed by: UROLOGY

## 2022-06-15 PROCEDURE — 1126F PR PAIN SEVERITY QUANTIFIED, NO PAIN PRESENT: ICD-10-PCS | Mod: CPTII,S$GLB,, | Performed by: UROLOGY

## 2022-06-15 PROCEDURE — 1159F MED LIST DOCD IN RCRD: CPT | Mod: CPTII,S$GLB,, | Performed by: UROLOGY

## 2022-06-15 PROCEDURE — 3288F FALL RISK ASSESSMENT DOCD: CPT | Mod: CPTII,S$GLB,, | Performed by: UROLOGY

## 2022-06-15 PROCEDURE — 1160F RVW MEDS BY RX/DR IN RCRD: CPT | Mod: CPTII,S$GLB,, | Performed by: UROLOGY

## 2022-06-15 PROCEDURE — 3077F SYST BP >= 140 MM HG: CPT | Mod: CPTII,S$GLB,, | Performed by: UROLOGY

## 2022-06-15 PROCEDURE — 1101F PT FALLS ASSESS-DOCD LE1/YR: CPT | Mod: CPTII,S$GLB,, | Performed by: UROLOGY

## 2022-06-15 PROCEDURE — 3077F PR MOST RECENT SYSTOLIC BLOOD PRESSURE >= 140 MM HG: ICD-10-PCS | Mod: CPTII,S$GLB,, | Performed by: UROLOGY

## 2022-06-15 PROCEDURE — 3008F PR BODY MASS INDEX (BMI) DOCUMENTED: ICD-10-PCS | Mod: CPTII,S$GLB,, | Performed by: UROLOGY

## 2022-06-15 PROCEDURE — 99999 PR PBB SHADOW E&M-EST. PATIENT-LVL III: CPT | Mod: PBBFAC,,, | Performed by: UROLOGY

## 2022-06-15 PROCEDURE — 3044F HG A1C LEVEL LT 7.0%: CPT | Mod: CPTII,S$GLB,, | Performed by: UROLOGY

## 2022-06-15 PROCEDURE — 99999 PR PBB SHADOW E&M-EST. PATIENT-LVL III: ICD-10-PCS | Mod: PBBFAC,,, | Performed by: UROLOGY

## 2022-06-15 PROCEDURE — 3044F PR MOST RECENT HEMOGLOBIN A1C LEVEL <7.0%: ICD-10-PCS | Mod: CPTII,S$GLB,, | Performed by: UROLOGY

## 2022-06-15 PROCEDURE — 1160F PR REVIEW ALL MEDS BY PRESCRIBER/CLIN PHARMACIST DOCUMENTED: ICD-10-PCS | Mod: CPTII,S$GLB,, | Performed by: UROLOGY

## 2022-06-15 PROCEDURE — 3288F PR FALLS RISK ASSESSMENT DOCUMENTED: ICD-10-PCS | Mod: CPTII,S$GLB,, | Performed by: UROLOGY

## 2022-06-15 NOTE — PROGRESS NOTES
Subjective:       Patient ID: Adrian Burt is a 67 y.o. male.    Chief Complaint: Follow-up     This is a 67 y.o.  male patient that is an established patient for right kidney cancer.    Initial HPI: Patient reports having right-sided flank pain underwent a renal ultrasound that showed a right 1 cm solid mass in a concerning approximately 4.5 cm upper pole cystic mass.  CT abdomen with and without contrast stone subsequently that showed similar findings.  He does report right-sided flank pain has resolved.  Of note, his primary care provider did prescribed antibiotics recently for Proteus urinary tract infection at the time of having flank pain.  His urinalysis at that time had blood as well as white blood cells.  There is a report of ? FH of kidney cancer.  He does report some lower urinary tract symptoms in the past and has been treated by previous urologist but currently not on treatment.  He is on testosterone replacement.    S/p right partial nephrectomy of upper pole cystic mass and right renal mass 5/9/22.     5/18/22: doing well, no N/V/F/C.    6/15/22:  Follow up labs and PSA.  PSA up to 6.5, free20%.  Doing well from partial nephrectomy standpoint.       I personally reviewed the images: CT 3/22, MRI 3/22 (cystic lesion right upper pole possible cystic RCC, 12mm right anterior midpole solid lesion possible RCC)     LAST PSA  Lab Results   Component Value Date    PSA 5.2 (H) 04/07/2021    PSA 2.1 09/10/2018    PSA 1.8 07/05/2017    PSA 2.6 11/23/2015    PSA 7.2 (H) 12/15/2008    PSA 0.6 02/22/2005    PSATOTAL 6.5 (H) 06/13/2022    PSAFREE 1.36 06/13/2022       Lab Results   Component Value Date    CREATININE 1.2 06/13/2022       ---  Past Medical History:   Diagnosis Date    Allergy     Colon polyp     Fatty liver     GERD (gastroesophageal reflux disease)     Hypertension        Past Surgical History:   Procedure Laterality Date    COLONOSCOPY N/A 8/16/2017    Procedure: COLONOSCOPY;  Surgeon:  Leo Henriquze MD;  Location: [facility] ENDO (4TH FLR);  Service: Endoscopy;  Laterality: N/A;    LEG SURGERY Left     ROBOT-ASSISTED LAPAROSCOPIC PARTIAL NEPHRECTOMY Right 2022    Procedure: Right RETROPERITONEAL robotic assisted lap partial nephrectomy  Intraoperative ultrasound with interpretation Special needs: drop in probe for ultrasound, retroperitoneal dilating baloon;  Surgeon: Arvind Álvarez MD;  Location: [facility];  Service: Urology;  Laterality: Right;       Family History   Problem Relation Age of Onset    Kidney disease Mother     Heart disease Father     Cancer Sister         kidney    Kidney disease Sister     Colon cancer Neg Hx     Esophageal cancer Neg Hx        Social History     Tobacco Use    Smoking status: Former Smoker     Quit date: 11/15/1978     Years since quittin.6    Smokeless tobacco: Never Used    Tobacco comment: smoked for 10 years, quit in 76   Substance Use Topics    Alcohol use: No    Drug use: No       Current Outpatient Medications on File Prior to Visit   Medication Sig Dispense Refill    amLODIPine (NORVASC) 10 MG tablet TAKE 1 TABLET(10 MG) BY MOUTH EVERY MORNING 90 tablet 3    aspirin (ECOTRIN) 81 MG EC tablet Take 81 mg by mouth once daily.      azelastine (ASTELIN) 137 mcg (0.1 %) nasal spray 1 spray (137 mcg total) by Nasal route 2 (two) times daily. (Patient taking differently: 1 spray by Nasal route 2 (two) times daily as needed.) 30 mL 0    ferrous gluconate 324 mg (37.5 mg iron) Tab tablet Take 1 tablet (324 mg total) by mouth daily with breakfast. 30 tablet 11    fluticasone propionate (FLONASE) 50 mcg/actuation nasal spray SPRAY ONCE IN EACH NOSTRIL EVERY DAY (Patient taking differently: 1 spray by Each Nostril route daily as needed.) 48 g 2    hydroCHLOROthiazide (HYDRODIURIL) 12.5 MG Tab Take 1 tablet (12.5 mg total) by mouth once daily. 30 tablet 11    losartan (COZAAR) 100 MG tablet TAKE 1 TABLET(100 MG) BY MOUTH EVERY DAY 90  tablet 3    montelukast (SINGULAIR) 10 mg tablet TAKE 1 TABLET(10 MG) BY MOUTH EVERY EVENING 90 tablet 0    multivit with minerals/lutein (MULTIVITAMIN 50 PLUS ORAL) Take 1 tablet by mouth once daily.      sildenafil (REVATIO) 20 mg Tab Take 20 mg by mouth 3 (three) times daily.      tamsulosin (FLOMAX) 0.4 mg Cap Take 1 capsule by mouth once daily.      testosterone cypionate (DEPOTESTOTERONE CYPIONATE) 200 mg/mL injection Inject into the muscle every 14 (fourteen) days.       No current facility-administered medications on file prior to visit.       Review of patient's allergies indicates:   Allergen Reactions    No known drug allergies        Review of Systems   Constitutional: Negative for activity change, chills and fever.   HENT: Negative for congestion.    Respiratory: Negative for cough, chest tightness and shortness of breath.    Cardiovascular: Negative for chest pain and palpitations.   Gastrointestinal: Negative for abdominal distention, abdominal pain, nausea and vomiting.   Genitourinary: Negative for difficulty urinating, flank pain, hematuria, penile pain, scrotal swelling and testicular pain.   Musculoskeletal: Negative for gait problem.       Objective:      Physical Exam  Constitutional:       Appearance: Normal appearance.   HENT:      Head: Normocephalic.   Pulmonary:      Effort: Pulmonary effort is normal.      Breath sounds: Normal breath sounds.   Abdominal:      General: Abdomen is flat. Bowel sounds are normal.      Palpations: Abdomen is soft.      Tenderness: There is no abdominal tenderness. There is no right CVA tenderness, left CVA tenderness or guarding.      Comments: Inc c/d/i   Musculoskeletal:         General: Normal range of motion.      Cervical back: Normal range of motion.   Skin:     General: Skin is warm.   Neurological:      Mental Status: He is alert.         Results for orders placed or performed during the hospital encounter of 04/12/22 (from the past 2160  hour(s))   MRI Abdomen W WO Contrast    Impression    Predominantly cystic lesion at the upper pole of the right kidney with subtly enhancing septations, a cystic renal cell carcinoma cannot be excluded.    Subtle small intrarenal lesion at the midpole of the right kidney demonstrating postcontrast enhancement, a solid renal cell carcinoma cannot be excluded.    This report was flagged in Epic as abnormal.      Electronically signed by: Tena Lozano MD  Date:    04/13/2022  Time:    08:54     Path 5/9/22:    Final Pathologic Diagnosis 1. FINAL RESECTION MARGIN OF RIGHT UPPER POLE MASS:   RENAL PARENCHYMA WITH NO NEOPLASIA IDENTIFIED   2. RIGHT UPPER POLE MASS:   INNOCUOUS BENIGN MULTILOCULAR CYST   3. MASS FROM MID RIGHT  KIDNEY :   RENAL CELL CARCINOMA WITH NO INVOLVEMENT OF MARGINS OR PERINEPHRIC ADIPOSE   TISSUE IDENTIFIED    Comment: Interp By Adolph Bonilla M.D., Signed on 05/13/2022 at 09:22   Microscopic Exam 2. This lesion is a benign multilocular cyst.  The cystic spaces have a   single layer of lining at the most.  There is no proliferative lining   identified in this entirely submitted specimen.  No area has formation of a   mass neoplasm.  There is no significant abnormality of renal parenchyma   separate from the cystic lesion.   3.   Procedure :  Partial renal excision   Specimen Laterality :  Right   Tumor Focality :  Unifocal   Tumor Size :  1.2 cm   Histologic Type :  Renal cell carcinoma, clear cell variant   Tumor Extent :  Limited to kidney   Sarcomatoid Features :  Not identified   Rhabdoid Features :  Not identified   Tumor Necrosis :  Not identified   Margins ; no margin involvement identified   Regional Lymph Node Status :   Not applicable (no regional lymph nodes   submitted or found)   PATHOLOGIC STAGE CLASSIFICATION (pTNM, AJCC 8th Edition) : pT1a pNX pMX   Additional findings :  There is no significant abnormality of renal   parenchyma apart from the masses.           Assessment:     Problem Noted   Renal Cancer, Right 3/31/2022    4 cm complex cystic lesion to posterior right upper pole, 1.1 cm right anterior solid mass on CT w/wo contrast 3/2022.  --MRI 4/22: right 3.6 cm UP cystic lesion ? Cystic RCC, right midpole 12mm solid mass  --Preop Cr 1, GFR >60  --S/p right retroperitioneal partial 5/9/22 (both masses removed)  --Path: upper pole lesion was cyst, midpole 1.2 cm [id number] CC RCC, negative margins    Post op labs  6/22--Cr 1.2, GFR >60; HH 14/45     Elevated Psa 4/14/2022    Lab Results   Component Value Date    PSA 5.2 (H) 04/07/2021    PSA 2.1 09/10/2018    PSA 1.8 07/05/2017 6/22-6.5, free 20%    On TRT         Urinary Tract Infection Without Hematuria (Resolved) 3/31/2022       Plan:     1.  Modifier 79 for elevated PSA during post op period from partial nephrectomy  2.  PSA up from pre op, on TRT  3.  Recommend biopsy vs MRI, will proceed with MRI  4.  Follow up to review      Arvind Álvarez MD

## 2022-06-17 ENCOUNTER — HOSPITAL ENCOUNTER (OUTPATIENT)
Dept: RADIOLOGY | Facility: HOSPITAL | Age: 67
Discharge: HOME | End: 2022-06-17
Attending: UROLOGY
Payer: MEDICARE

## 2022-06-17 DIAGNOSIS — R97.20 ELEVATED PSA: ICD-10-CM

## 2022-06-17 PROCEDURE — A9585 GADOBUTROL INJECTION: HCPCS | Performed by: UROLOGY

## 2022-06-17 PROCEDURE — 72197 MRI PELVIS W/O & W/DYE: CPT | Mod: TC

## 2022-06-17 PROCEDURE — 25500020 PHARM REV CODE 255: Performed by: UROLOGY

## 2022-06-17 PROCEDURE — 72197 MRI PELVIS W/O & W/DYE: CPT | Mod: 26,,, | Performed by: RADIOLOGY

## 2022-06-17 PROCEDURE — 72197 MRI PROSTATE W W/O CONTRAST: ICD-10-PCS | Mod: 26,,, | Performed by: RADIOLOGY

## 2022-06-17 RX ORDER — GADOBUTROL 604.72 MG/ML
10 INJECTION INTRAVENOUS
Status: COMPLETED | OUTPATIENT
Start: 2022-06-17 | End: 2022-06-17

## 2022-06-17 RX ADMIN — GADOBUTROL 10 ML: 604.72 INJECTION INTRAVENOUS at 05:06

## 2022-06-20 ENCOUNTER — TELEPHONE (OUTPATIENT)
Dept: UROLOGY | Facility: CLINIC | Age: 67
End: 2022-06-20
Payer: MEDICARE

## 2022-06-20 NOTE — TELEPHONE ENCOUNTER
----- Message from Arvind Álvarez MD sent at 6/20/2022  8:10 AM CDT -----  Will review at appointment.

## 2022-06-20 NOTE — TELEPHONE ENCOUNTER
Spoke with patient he wanted to schedule an appointment for next week. It has been confirmed on Wednesday at 9:40am.

## 2022-06-29 ENCOUNTER — OFFICE VISIT (OUTPATIENT)
Dept: UROLOGY | Facility: CLINIC | Age: 67
End: 2022-06-29
Payer: MEDICARE

## 2022-06-29 VITALS
HEIGHT: 74 IN | HEART RATE: 79 BPM | DIASTOLIC BLOOD PRESSURE: 83 MMHG | SYSTOLIC BLOOD PRESSURE: 143 MMHG | BODY MASS INDEX: 31.99 KG/M2 | WEIGHT: 249.25 LBS

## 2022-06-29 DIAGNOSIS — R97.20 ELEVATED PSA: ICD-10-CM

## 2022-06-29 DIAGNOSIS — C64.1 RENAL CANCER, RIGHT: Primary | ICD-10-CM

## 2022-06-29 PROCEDURE — 1126F AMNT PAIN NOTED NONE PRSNT: CPT | Mod: CPTII,S$GLB,, | Performed by: UROLOGY

## 2022-06-29 PROCEDURE — 3044F HG A1C LEVEL LT 7.0%: CPT | Mod: CPTII,S$GLB,, | Performed by: UROLOGY

## 2022-06-29 PROCEDURE — 3077F PR MOST RECENT SYSTOLIC BLOOD PRESSURE >= 140 MM HG: ICD-10-PCS | Mod: CPTII,S$GLB,, | Performed by: UROLOGY

## 2022-06-29 PROCEDURE — 99999 PR PBB SHADOW E&M-EST. PATIENT-LVL III: CPT | Mod: PBBFAC,,, | Performed by: UROLOGY

## 2022-06-29 PROCEDURE — 3008F BODY MASS INDEX DOCD: CPT | Mod: CPTII,S$GLB,, | Performed by: UROLOGY

## 2022-06-29 PROCEDURE — 3079F PR MOST RECENT DIASTOLIC BLOOD PRESSURE 80-89 MM HG: ICD-10-PCS | Mod: CPTII,S$GLB,, | Performed by: UROLOGY

## 2022-06-29 PROCEDURE — 4010F PR ACE/ARB THEARPY RXD/TAKEN: ICD-10-PCS | Mod: CPTII,S$GLB,, | Performed by: UROLOGY

## 2022-06-29 PROCEDURE — 3077F SYST BP >= 140 MM HG: CPT | Mod: CPTII,S$GLB,, | Performed by: UROLOGY

## 2022-06-29 PROCEDURE — 99214 PR OFFICE/OUTPT VISIT, EST, LEVL IV, 30-39 MIN: ICD-10-PCS | Mod: 24,S$GLB,, | Performed by: UROLOGY

## 2022-06-29 PROCEDURE — 1126F PR PAIN SEVERITY QUANTIFIED, NO PAIN PRESENT: ICD-10-PCS | Mod: CPTII,S$GLB,, | Performed by: UROLOGY

## 2022-06-29 PROCEDURE — 3044F PR MOST RECENT HEMOGLOBIN A1C LEVEL <7.0%: ICD-10-PCS | Mod: CPTII,S$GLB,, | Performed by: UROLOGY

## 2022-06-29 PROCEDURE — 99999 PR PBB SHADOW E&M-EST. PATIENT-LVL III: ICD-10-PCS | Mod: PBBFAC,,, | Performed by: UROLOGY

## 2022-06-29 PROCEDURE — 1159F PR MEDICATION LIST DOCUMENTED IN MEDICAL RECORD: ICD-10-PCS | Mod: CPTII,S$GLB,, | Performed by: UROLOGY

## 2022-06-29 PROCEDURE — 99214 OFFICE O/P EST MOD 30 MIN: CPT | Mod: 24,S$GLB,, | Performed by: UROLOGY

## 2022-06-29 PROCEDURE — 1159F MED LIST DOCD IN RCRD: CPT | Mod: CPTII,S$GLB,, | Performed by: UROLOGY

## 2022-06-29 PROCEDURE — 3008F PR BODY MASS INDEX (BMI) DOCUMENTED: ICD-10-PCS | Mod: CPTII,S$GLB,, | Performed by: UROLOGY

## 2022-06-29 PROCEDURE — 1160F PR REVIEW ALL MEDS BY PRESCRIBER/CLIN PHARMACIST DOCUMENTED: ICD-10-PCS | Mod: CPTII,S$GLB,, | Performed by: UROLOGY

## 2022-06-29 PROCEDURE — 1160F RVW MEDS BY RX/DR IN RCRD: CPT | Mod: CPTII,S$GLB,, | Performed by: UROLOGY

## 2022-06-29 PROCEDURE — 3079F DIAST BP 80-89 MM HG: CPT | Mod: CPTII,S$GLB,, | Performed by: UROLOGY

## 2022-06-29 PROCEDURE — 4010F ACE/ARB THERAPY RXD/TAKEN: CPT | Mod: CPTII,S$GLB,, | Performed by: UROLOGY

## 2022-06-29 NOTE — PROGRESS NOTES
Subjective:       Patient ID: Adrian Burt is a 67 y.o. male.    Chief Complaint: No chief complaint on file.     This is a 67 y.o.  male patient that is an established patient for right kidney cancer.    Initial HPI: Patient reports having right-sided flank pain underwent a renal ultrasound that showed a right 1 cm solid mass in a concerning approximately 4.5 cm upper pole cystic mass.  CT abdomen with and without contrast stone subsequently that showed similar findings.  He does report right-sided flank pain has resolved.  Of note, his primary care provider did prescribed antibiotics recently for Proteus urinary tract infection at the time of having flank pain.  His urinalysis at that time had blood as well as white blood cells.  There is a report of ? FH of kidney cancer.  He does report some lower urinary tract symptoms in the past and has been treated by previous urologist but currently not on treatment.  He is on testosterone replacement.    S/p right partial nephrectomy of upper pole cystic mass and right renal mass 5/9/22.     5/18/22: doing well, no N/V/F/C.    6/15/22:  Follow up labs and PSA.  PSA up to 6.5, free20%.  Doing well from partial nephrectomy standpoint.   6/29/22:  Follow up review MRI, no signficant LUTs on tamsulosin.  Notes enlarged prostate for years.  Followed in past by Dr. Rogers at [facility] [place].  He had PSA done there recently that was 3.91.  Brings notes from these visits and h/o prostatitis in past.        I personally reviewed the images: CT 3/22, MRI 3/22 (cystic lesion right upper pole possible cystic RCC, 12mm right anterior midpole solid lesion possible RCC)     LAST PSA  Lab Results   Component Value Date    PSA 5.2 (H) 04/07/2021    PSA 2.1 09/10/2018    PSA 1.8 07/05/2017    PSA 2.6 11/23/2015    PSA 7.2 (H) 12/15/2008    PSA 0.6 02/22/2005    PSATOTAL 6.5 (H) 06/13/2022    PSAFREE 1.36 06/13/2022       Lab Results   Component Value Date    CREATININE 1.2 06/13/2022        ---  Past Medical History:   Diagnosis Date    Allergy     Colon polyp     Fatty liver     GERD (gastroesophageal reflux disease)     Hypertension        Past Surgical History:   Procedure Laterality Date    COLONOSCOPY N/A 2017    Procedure: COLONOSCOPY;  Surgeon: Leo Henriquez MD;  Location: [address]);  Service: Endoscopy;  Laterality: N/A;    LEG SURGERY Left     ROBOT-ASSISTED LAPAROSCOPIC PARTIAL NEPHRECTOMY Right 2022    Procedure: Right RETROPERITONEAL robotic assisted lap partial nephrectomy  Intraoperative ultrasound with interpretation Special needs: drop in probe for ultrasound, retroperitoneal dilating baloon;  Surgeon: Arvind Álvarez MD;  Location: [facility] OR;  Service: Urology;  Laterality: Right;       Family History   Problem Relation Age of Onset    Kidney disease Mother     Heart disease Father     Cancer Sister         kidney    Kidney disease Sister     Colon cancer Neg Hx     Esophageal cancer Neg Hx        Social History     Tobacco Use    Smoking status: Former Smoker     Quit date: 11/15/1978     Years since quittin.6    Smokeless tobacco: Never Used    Tobacco comment: smoked for 10 years, quit in 76   Substance Use Topics    Alcohol use: No    Drug use: No       Current Outpatient Medications on File Prior to Visit   Medication Sig Dispense Refill    amLODIPine (NORVASC) 10 MG tablet TAKE 1 TABLET(10 MG) BY MOUTH EVERY MORNING 90 tablet 3    aspirin (ECOTRIN) 81 MG EC tablet Take 81 mg by mouth once daily.      azelastine (ASTELIN) 137 mcg (0.1 %) nasal spray 1 spray (137 mcg total) by Nasal route 2 (two) times daily. (Patient taking differently: 1 spray by Nasal route 2 (two) times daily as needed.) 30 mL 0    ferrous gluconate 324 mg (37.5 mg iron) Tab tablet Take 1 tablet (324 mg total) by mouth daily with breakfast. 30 tablet 11    fluticasone propionate (FLONASE) 50 mcg/actuation nasal spray SPRAY ONCE IN EACH NOSTRIL EVERY  DAY (Patient taking differently: 1 spray by Each Nostril route daily as needed.) 48 g 2    hydroCHLOROthiazide (HYDRODIURIL) 12.5 MG Tab Take 1 tablet (12.5 mg total) by mouth once daily. 30 tablet 11    losartan (COZAAR) 100 MG tablet TAKE 1 TABLET(100 MG) BY MOUTH EVERY DAY 90 tablet 3    montelukast (SINGULAIR) 10 mg tablet TAKE 1 TABLET(10 MG) BY MOUTH EVERY EVENING 90 tablet 0    multivit with minerals/lutein (MULTIVITAMIN 50 PLUS ORAL) Take 1 tablet by mouth once daily.      sildenafil (REVATIO) 20 mg Tab Take 20 mg by mouth 3 (three) times daily.      tamsulosin (FLOMAX) 0.4 mg Cap Take 1 capsule by mouth once daily.      testosterone cypionate (DEPOTESTOTERONE CYPIONATE) 200 mg/mL injection Inject into the muscle every 14 (fourteen) days.       No current facility-administered medications on file prior to visit.       Review of patient's allergies indicates:   Allergen Reactions    No known drug allergies        Review of Systems   Constitutional: Negative for activity change, chills and fever.   HENT: Negative for congestion.    Respiratory: Negative for cough, chest tightness and shortness of breath.    Cardiovascular: Negative for chest pain and palpitations.   Gastrointestinal: Negative for abdominal distention, abdominal pain, nausea and vomiting.   Genitourinary: Negative for difficulty urinating, flank pain, hematuria, penile pain, scrotal swelling and testicular pain.   Musculoskeletal: Negative for gait problem.       Objective:      Physical Exam  Constitutional:       Appearance: Normal appearance.   HENT:      Head: Normocephalic.   Pulmonary:      Effort: Pulmonary effort is normal.      Breath sounds: Normal breath sounds.   Abdominal:      General: Abdomen is flat. Bowel sounds are normal.      Palpations: Abdomen is soft.      Tenderness: There is no abdominal tenderness. There is no right CVA tenderness, left CVA tenderness or guarding.      Comments: Inc c/d/i   Musculoskeletal:          General: Normal range of motion.      Cervical back: Normal range of motion.   Skin:     General: Skin is warm.   Neurological:      Mental Status: He is alert.         Results for orders placed or performed during the hospital encounter of 06/17/22 (from the past 2160 hour(s))   MRI Prostate W W/O Contrast    Impression    1. Benign prostatic hyperplasia.  2. Red marrow hyperplasia.  Overall Assessment: PI-RADS 2 - Low (clinically significant cancer is unlikely to be present)    Number of targets created for potential MR/US fusion biopsy    Peripheral zone: 0    Transition zone: 0      Electronically signed by: Jersey Hall MD  Date:    06/19/2022  Time:    09:47   Results for orders placed or performed during the hospital encounter of 04/12/22 (from the past 2160 hour(s))   MRI Abdomen W WO Contrast    Impression    Predominantly cystic lesion at the upper pole of the right kidney with subtly enhancing septations, a cystic renal cell carcinoma cannot be excluded.    Subtle small intrarenal lesion at the midpole of the right kidney demonstrating postcontrast enhancement, a solid renal cell carcinoma cannot be excluded.    This report was flagged in Epic as abnormal.      Electronically signed by: Tena Lozano MD  Date:    04/13/2022  Time:    08:54     Path 5/9/22:    Final Pathologic Diagnosis 1. FINAL RESECTION MARGIN OF RIGHT UPPER POLE MASS:   RENAL PARENCHYMA WITH NO NEOPLASIA IDENTIFIED   2. RIGHT UPPER POLE MASS:   INNOCUOUS BENIGN MULTILOCULAR CYST   3. MASS FROM MID RIGHT  KIDNEY :   RENAL CELL CARCINOMA WITH NO INVOLVEMENT OF MARGINS OR PERINEPHRIC ADIPOSE   TISSUE IDENTIFIED    Comment: Interp By Adolph Bonilla M.D., Signed on 05/13/2022 at 09:22   Microscopic Exam 2. This lesion is a benign multilocular cyst.  The cystic spaces have a   single layer of lining at the most.  There is no proliferative lining   identified in this entirely submitted specimen.  No area has formation of a    mass neoplasm.  There is no significant abnormality of renal parenchyma   separate from the cystic lesion.   3.   Procedure :  Partial renal excision   Specimen Laterality :  Right   Tumor Focality :  Unifocal   Tumor Size :  1.2 cm   Histologic Type :  Renal cell carcinoma, clear cell variant   Tumor Extent :  Limited to kidney   Sarcomatoid Features :  Not identified   Rhabdoid Features :  Not identified   Tumor Necrosis :  Not identified   Margins ; no margin involvement identified   Regional Lymph Node Status :   Not applicable (no regional lymph nodes   submitted or found)   PATHOLOGIC STAGE CLASSIFICATION (pTNM, AJCC 8th Edition) : pT1a pNX pMX   Additional findings :  There is no significant abnormality of renal   parenchyma apart from the masses.      Results for orders placed or performed during the hospital encounter of 06/17/22 (from the past 2160 hour(s))   MRI Prostate W W/O Contrast    Impression    1. Benign prostatic hyperplasia.  2. Red marrow hyperplasia.  Overall Assessment: PI-RADS 2 - Low (clinically significant cancer is unlikely to be present)    Number of targets created for potential MR/US fusion biopsy    Peripheral zone: 0    Transition zone: 0      Electronically signed by: Jersey Hall MD  Date:    06/19/2022  Time:    09:47                         Assessment:     Problem Noted   Renal Cancer, Right 3/31/2022    4 cm complex cystic lesion to posterior right upper pole, 1.1 cm right anterior solid mass on CT w/wo contrast 3/2022.  --MRI 4/22: right 3.6 cm UP cystic lesion ? Cystic RCC, right midpole 12mm solid mass  --Preop Cr 1, GFR >60  --S/p right retroperitioneal partial 5/9/22 (both masses removed)  --Path: upper pole lesion was cyst, midpole 1.2 cm [id number] CC RCC, negative margins    Post op labs  6/22--Cr 1.2, GFR >60; HH 14/45     Elevated Psa 4/14/2022    Lab Results   Component Value Date    PSA 5.2 (H) 04/07/2021    PSA 2.1 09/10/2018    PSA 1.8 07/05/2017 6/22-6.5, free  20%    On TRT         Urinary Tract Infection Without Hematuria (Resolved) 3/31/2022       Plan:     1.  Modifier 79 for elevated PSA during post op period from partial nephrectomy  2.  MRI reviewed, no biopsy needed at current especially with PSA 3.91 recently at [facility] [place] [facility] visit  3.  Follow up in 6 months with PSA, BMP, CBC, CT A/P w/wo contrast, CXR for follow up renal cell carcinoma  4.  Discussed prostate very large on MRI, discussed RASP vs HoLEP vs adding finasteride.  Given paucity of LUTs wishes to observe.       Arvind Álvarez MD

## 2022-07-19 ENCOUNTER — PATIENT MESSAGE (OUTPATIENT)
Dept: RESEARCH | Facility: CLINIC | Age: 67
End: 2022-07-19
Payer: MEDICARE

## 2022-10-21 DIAGNOSIS — D50.9 IRON DEFICIENCY ANEMIA, UNSPECIFIED IRON DEFICIENCY ANEMIA TYPE: Primary | ICD-10-CM

## 2022-11-22 ENCOUNTER — IMMUNIZATION (OUTPATIENT)
Dept: INTERNAL MEDICINE | Facility: CLINIC | Age: 67
End: 2022-11-22
Payer: MEDICARE

## 2022-11-22 ENCOUNTER — CLINICAL SUPPORT (OUTPATIENT)
Dept: INTERNAL MEDICINE | Facility: CLINIC | Age: 67
End: 2022-11-22
Payer: MEDICARE

## 2022-11-22 PROCEDURE — G0008 FLU VACCINE - QUADRIVALENT - ADJUVANTED: ICD-10-PCS | Mod: S$GLB,,, | Performed by: INTERNAL MEDICINE

## 2022-11-22 PROCEDURE — G0008 ADMIN INFLUENZA VIRUS VAC: HCPCS | Mod: S$GLB,,, | Performed by: INTERNAL MEDICINE

## 2022-11-22 PROCEDURE — 90694 FLU VACCINE - QUADRIVALENT - ADJUVANTED: ICD-10-PCS | Mod: S$GLB,,, | Performed by: INTERNAL MEDICINE

## 2022-11-22 PROCEDURE — 90694 VACC AIIV4 NO PRSRV 0.5ML IM: CPT | Mod: S$GLB,,, | Performed by: INTERNAL MEDICINE

## 2022-12-02 ENCOUNTER — TELEPHONE (OUTPATIENT)
Dept: ADMINISTRATIVE | Facility: CLINIC | Age: 67
End: 2022-12-02
Payer: MEDICARE

## 2022-12-05 ENCOUNTER — PATIENT MESSAGE (OUTPATIENT)
Dept: UROLOGY | Facility: CLINIC | Age: 67
End: 2022-12-05
Payer: MEDICARE

## 2022-12-05 ENCOUNTER — OFFICE VISIT (OUTPATIENT)
Dept: INTERNAL MEDICINE | Facility: CLINIC | Age: 67
End: 2022-12-05
Payer: MEDICARE

## 2022-12-05 VITALS
WEIGHT: 248 LBS | DIASTOLIC BLOOD PRESSURE: 62 MMHG | OXYGEN SATURATION: 97 % | HEART RATE: 80 BPM | SYSTOLIC BLOOD PRESSURE: 130 MMHG | BODY MASS INDEX: 31.84 KG/M2

## 2022-12-05 DIAGNOSIS — I10 ESSENTIAL HYPERTENSION: ICD-10-CM

## 2022-12-05 DIAGNOSIS — E66.09 CLASS 1 OBESITY DUE TO EXCESS CALORIES WITH BODY MASS INDEX (BMI) OF 31.0 TO 31.9 IN ADULT, UNSPECIFIED WHETHER SERIOUS COMORBIDITY PRESENT: ICD-10-CM

## 2022-12-05 DIAGNOSIS — R73.03 PREDIABETES: ICD-10-CM

## 2022-12-05 DIAGNOSIS — Z00.00 ENCOUNTER FOR MEDICARE ANNUAL WELLNESS EXAM: Primary | ICD-10-CM

## 2022-12-05 DIAGNOSIS — K63.5 POLYP OF COLON, UNSPECIFIED PART OF COLON, UNSPECIFIED TYPE: ICD-10-CM

## 2022-12-05 DIAGNOSIS — K21.9 GASTROESOPHAGEAL REFLUX DISEASE, UNSPECIFIED WHETHER ESOPHAGITIS PRESENT: ICD-10-CM

## 2022-12-05 DIAGNOSIS — Z12.11 SCREENING FOR COLON CANCER: ICD-10-CM

## 2022-12-05 PROCEDURE — 3075F PR MOST RECENT SYSTOLIC BLOOD PRESS GE 130-139MM HG: ICD-10-PCS | Mod: CPTII,S$GLB,, | Performed by: NURSE PRACTITIONER

## 2022-12-05 PROCEDURE — 3008F PR BODY MASS INDEX (BMI) DOCUMENTED: ICD-10-PCS | Mod: CPTII,S$GLB,, | Performed by: NURSE PRACTITIONER

## 2022-12-05 PROCEDURE — 4010F ACE/ARB THERAPY RXD/TAKEN: CPT | Mod: CPTII,S$GLB,, | Performed by: NURSE PRACTITIONER

## 2022-12-05 PROCEDURE — 1101F PR PT FALLS ASSESS DOC 0-1 FALLS W/OUT INJ PAST YR: ICD-10-PCS | Mod: CPTII,S$GLB,, | Performed by: NURSE PRACTITIONER

## 2022-12-05 PROCEDURE — 3008F BODY MASS INDEX DOCD: CPT | Mod: CPTII,S$GLB,, | Performed by: NURSE PRACTITIONER

## 2022-12-05 PROCEDURE — 1160F PR REVIEW ALL MEDS BY PRESCRIBER/CLIN PHARMACIST DOCUMENTED: ICD-10-PCS | Mod: CPTII,S$GLB,, | Performed by: NURSE PRACTITIONER

## 2022-12-05 PROCEDURE — 3078F DIAST BP <80 MM HG: CPT | Mod: CPTII,S$GLB,, | Performed by: NURSE PRACTITIONER

## 2022-12-05 PROCEDURE — 1126F PR PAIN SEVERITY QUANTIFIED, NO PAIN PRESENT: ICD-10-PCS | Mod: CPTII,S$GLB,, | Performed by: NURSE PRACTITIONER

## 2022-12-05 PROCEDURE — 99999 PR PBB SHADOW E&M-EST. PATIENT-LVL III: CPT | Mod: PBBFAC,,, | Performed by: NURSE PRACTITIONER

## 2022-12-05 PROCEDURE — 3044F PR MOST RECENT HEMOGLOBIN A1C LEVEL <7.0%: ICD-10-PCS | Mod: CPTII,S$GLB,, | Performed by: NURSE PRACTITIONER

## 2022-12-05 PROCEDURE — 1170F FXNL STATUS ASSESSED: CPT | Mod: CPTII,S$GLB,, | Performed by: NURSE PRACTITIONER

## 2022-12-05 PROCEDURE — 3288F PR FALLS RISK ASSESSMENT DOCUMENTED: ICD-10-PCS | Mod: CPTII,S$GLB,, | Performed by: NURSE PRACTITIONER

## 2022-12-05 PROCEDURE — 1159F PR MEDICATION LIST DOCUMENTED IN MEDICAL RECORD: ICD-10-PCS | Mod: CPTII,S$GLB,, | Performed by: NURSE PRACTITIONER

## 2022-12-05 PROCEDURE — 1159F MED LIST DOCD IN RCRD: CPT | Mod: CPTII,S$GLB,, | Performed by: NURSE PRACTITIONER

## 2022-12-05 PROCEDURE — 1170F PR FUNCTIONAL STATUS ASSESSED: ICD-10-PCS | Mod: CPTII,S$GLB,, | Performed by: NURSE PRACTITIONER

## 2022-12-05 PROCEDURE — 3078F PR MOST RECENT DIASTOLIC BLOOD PRESSURE < 80 MM HG: ICD-10-PCS | Mod: CPTII,S$GLB,, | Performed by: NURSE PRACTITIONER

## 2022-12-05 PROCEDURE — 4010F PR ACE/ARB THEARPY RXD/TAKEN: ICD-10-PCS | Mod: CPTII,S$GLB,, | Performed by: NURSE PRACTITIONER

## 2022-12-05 PROCEDURE — 3288F FALL RISK ASSESSMENT DOCD: CPT | Mod: CPTII,S$GLB,, | Performed by: NURSE PRACTITIONER

## 2022-12-05 PROCEDURE — 1101F PT FALLS ASSESS-DOCD LE1/YR: CPT | Mod: CPTII,S$GLB,, | Performed by: NURSE PRACTITIONER

## 2022-12-05 PROCEDURE — 3075F SYST BP GE 130 - 139MM HG: CPT | Mod: CPTII,S$GLB,, | Performed by: NURSE PRACTITIONER

## 2022-12-05 PROCEDURE — 1126F AMNT PAIN NOTED NONE PRSNT: CPT | Mod: CPTII,S$GLB,, | Performed by: NURSE PRACTITIONER

## 2022-12-05 PROCEDURE — 3044F HG A1C LEVEL LT 7.0%: CPT | Mod: CPTII,S$GLB,, | Performed by: NURSE PRACTITIONER

## 2022-12-05 PROCEDURE — 1160F RVW MEDS BY RX/DR IN RCRD: CPT | Mod: CPTII,S$GLB,, | Performed by: NURSE PRACTITIONER

## 2022-12-05 PROCEDURE — G0439 PPPS, SUBSEQ VISIT: HCPCS | Mod: S$GLB,,, | Performed by: NURSE PRACTITIONER

## 2022-12-05 PROCEDURE — G0439 PR MEDICARE ANNUAL WELLNESS SUBSEQUENT VISIT: ICD-10-PCS | Mod: S$GLB,,, | Performed by: NURSE PRACTITIONER

## 2022-12-05 PROCEDURE — 99999 PR PBB SHADOW E&M-EST. PATIENT-LVL III: ICD-10-PCS | Mod: PBBFAC,,, | Performed by: NURSE PRACTITIONER

## 2022-12-05 NOTE — PATIENT INSTRUCTIONS
Counseling and Referral of Other Preventative  (Italic type indicates deductible and co-insurance are waived)    Patient Name: Adrian Burt  Today's Date: 12/5/2022    Health Maintenance       Date Due Completion Date    COVID-19 Vaccine (5 - Booster) 02/04/2022 12/10/2021    Colorectal Cancer Screening 08/16/2022 8/16/2017    Hemoglobin A1c (Prediabetes) 03/24/2023 3/24/2022    PROSTATE-SPECIFIC ANTIGEN 06/13/2023 6/13/2022    Lipid Panel 03/24/2027 3/24/2022    TETANUS VACCINE 09/10/2028 9/10/2018        No orders of the defined types were placed in this encounter.      The following information is provided to all patients.  This information is to help you find resources for any of the problems found today that may be affecting your health:                Living healthy guide: www.[facility].[place].gov      Understanding Diabetes: www.diabetes.org      Eating healthy: www.cdc.gov/healthyweight      CDC home safety checklist: www.cdc.gov/steadi/patient.html      Agency on Aging: www.goea.[place].[facility]      Alcoholics anonymous (AA): www.aa.org      Physical Activity: www.shlomo.nih.gov/ib5dkpb      Tobacco use: www.quitwithusla.org

## 2022-12-05 NOTE — PROGRESS NOTES
Adrian Burt presented for a  Medicare AWV and comprehensive Health Risk Assessment today. The following components were reviewed and updated:    Medical history  Family History  Social history  Allergies and Current Medications  Health Risk Assessment  Health Maintenance  Care Team         ** See Completed Assessments for Annual Wellness Visit within the encounter summary.**         The following assessments were completed:  Living Situation  CAGE  Depression Screening  Timed Get Up and Go  Whisper Test  Cognitive Function Screening  Nutrition Screening  ADL Screening  PAQ Screening          Vitals:    12/05/22 1401   BP: 130/62   Pulse: 80   SpO2: 97%   Weight: 112.5 kg (248 lb 0.3 oz)     Body mass index is 31.84 kg/m².  Physical Exam  Vitals and nursing note reviewed.   Constitutional:       Appearance: He is well-developed.   HENT:      Head: Normocephalic and atraumatic.      Right Ear: External ear normal.      Left Ear: External ear normal.   Eyes:      Conjunctiva/sclera: Conjunctivae normal.      Pupils: Pupils are equal, round, and reactive to light.   Cardiovascular:      Rate and Rhythm: Normal rate and regular rhythm.   Pulmonary:      Effort: Pulmonary effort is normal.      Breath sounds: Normal breath sounds.   Abdominal:      General: Bowel sounds are normal.      Palpations: Abdomen is soft.   Musculoskeletal:         General: Normal range of motion.      Cervical back: Normal range of motion and neck supple.   Skin:     General: Skin is warm and dry.   Neurological:      Mental Status: He is alert and oriented to person, place, and time.             Diagnoses and health risks identified today and associated recommendations/orders:    1. Encounter for Medicare annual wellness exam  Health Maintenance updated   Records reviewed   Exam done   - Ambulatory Referral/Consult to Enhanced Annual Wellness Visit (eAWV)    2. Essential hypertension  Stable, followed by PCP   Take medications as  prescribed.   Monitor BP at home, goal BP < or = 140/80, call office if consistently above this range.   Follow low salt DASH diet and exercise.   BMI reviewed.     3. Prediabetes  Stable and chronic. Followed by PCP.     4. Class 1 obesity due to excess calories with body mass index (BMI) of 31.0 to 31.9 in adult, unspecified whether serious comorbidity present  BMI reviewed    5. Screening for colon cancer  - Case Request Endoscopy: COLONOSCOPY    6. Polyp of colon, unspecified part of colon, unspecified type  - Case Request Endoscopy: COLONOSCOPY    7. Gastroesophageal reflux disease, unspecified whether esophagitis present  Stable and chronic. Continue current medications. Followed by PCP.     Counseling and Referral of Other Preventative  (Italic type indicates deductible and co-insurance are waived)    Patient Name: Adrian Burt  Today's Date: 12/5/2022    Health Maintenance         Date Due Completion Date    COVID-19 Vaccine (5 - Booster) 02/04/2022 12/10/2021    Colorectal Cancer Screening 08/16/2022 8/16/2017    Hemoglobin A1c (Prediabetes) 03/24/2023 3/24/2022    PROSTATE-SPECIFIC ANTIGEN 06/13/2023 6/13/2022    Lipid Panel 03/24/2027 3/24/2022    TETANUS VACCINE 09/10/2028 9/10/2018          No orders of the defined types were placed in this encounter.        Provided Adrian Sierra with a 5-10 year written screening schedule and personal prevention plan. Recommendations were developed using the USPSTF age appropriate recommendations. Education, counseling, and referrals were provided as needed. After Visit Summary printed and given to patient which includes a list of additional screenings\tests needed.    Follow up in about 1 year (around 12/5/2023) for medicare annual wellness visit.    Jeri Steel, NP  I offered to discuss advanced care planning, including how to pick a person who would make decisions for you if you were unable to make them for yourself, called a health care power of ,  and what kind of decisions you might make such as use of life sustaining treatments such as ventilators and tube feeding when faced with a life limiting illness recorded on a living will that they will need to know. (How you want to be cared for as you near the end of your natural life)     X  Patient is unwilling to engage in a discussion regarding advance directives at this time.    Review for Opioid Screening: Pt does not have Rx for Opioids     Review for Substance Use Disorders: Patient does not use substance

## 2022-12-06 ENCOUNTER — LAB VISIT (OUTPATIENT)
Dept: LAB | Facility: HOSPITAL | Age: 67
End: 2022-12-06
Attending: UROLOGY
Payer: MEDICARE

## 2022-12-06 DIAGNOSIS — R79.89 LOW TESTOSTERONE: ICD-10-CM

## 2022-12-06 DIAGNOSIS — R79.89 LOW TESTOSTERONE: Primary | ICD-10-CM

## 2022-12-06 DIAGNOSIS — R97.20 ELEVATED PSA: ICD-10-CM

## 2022-12-06 DIAGNOSIS — C64.1 RENAL CANCER, RIGHT: ICD-10-CM

## 2022-12-06 LAB
ANION GAP SERPL CALC-SCNC: 9 MMOL/L (ref 8–16)
BASOPHILS # BLD AUTO: 0.01 K/UL (ref 0–0.2)
BASOPHILS NFR BLD: 0.3 % (ref 0–1.9)
BUN SERPL-MCNC: 11 MG/DL (ref 8–23)
CALCIUM SERPL-MCNC: 8.9 MG/DL (ref 8.7–10.5)
CHLORIDE SERPL-SCNC: 105 MMOL/L (ref 95–110)
CO2 SERPL-SCNC: 21 MMOL/L (ref 23–29)
CREAT SERPL-MCNC: 1.2 MG/DL (ref 0.5–1.4)
DIFFERENTIAL METHOD: ABNORMAL
EOSINOPHIL # BLD AUTO: 0.2 K/UL (ref 0–0.5)
EOSINOPHIL NFR BLD: 4.2 % (ref 0–8)
ERYTHROCYTE [DISTWIDTH] IN BLOOD BY AUTOMATED COUNT: 13.6 % (ref 11.5–14.5)
EST. GFR  (NO RACE VARIABLE): >60 ML/MIN/1.73 M^2
GLUCOSE SERPL-MCNC: 98 MG/DL (ref 70–110)
HCT VFR BLD AUTO: 45.6 % (ref 40–54)
HGB BLD-MCNC: 14.3 G/DL (ref 14–18)
IMM GRANULOCYTES # BLD AUTO: 0 K/UL (ref 0–0.04)
IMM GRANULOCYTES NFR BLD AUTO: 0 % (ref 0–0.5)
LYMPHOCYTES # BLD AUTO: 1.8 K/UL (ref 1–4.8)
LYMPHOCYTES NFR BLD: 49.9 % (ref 18–48)
MCH RBC QN AUTO: 27.7 PG (ref 27–31)
MCHC RBC AUTO-ENTMCNC: 31.4 G/DL (ref 32–36)
MCV RBC AUTO: 88 FL (ref 82–98)
MONOCYTES # BLD AUTO: 0.5 K/UL (ref 0.3–1)
MONOCYTES NFR BLD: 12.5 % (ref 4–15)
NEUTROPHILS # BLD AUTO: 1.2 K/UL (ref 1.8–7.7)
NEUTROPHILS NFR BLD: 33.1 % (ref 38–73)
NRBC BLD-RTO: 0 /100 WBC
PLATELET # BLD AUTO: 188 K/UL (ref 150–450)
PMV BLD AUTO: 11 FL (ref 9.2–12.9)
POTASSIUM SERPL-SCNC: 4.2 MMOL/L (ref 3.5–5.1)
RBC # BLD AUTO: 5.16 M/UL (ref 4.6–6.2)
SODIUM SERPL-SCNC: 135 MMOL/L (ref 136–145)
WBC # BLD AUTO: 3.59 K/UL (ref 3.9–12.7)

## 2022-12-06 PROCEDURE — 36415 COLL VENOUS BLD VENIPUNCTURE: CPT | Performed by: UROLOGY

## 2022-12-06 PROCEDURE — 85025 COMPLETE CBC W/AUTO DIFF WBC: CPT | Performed by: UROLOGY

## 2022-12-06 PROCEDURE — 80048 BASIC METABOLIC PNL TOTAL CA: CPT | Performed by: UROLOGY

## 2022-12-06 PROCEDURE — 84154 ASSAY OF PSA FREE: CPT | Performed by: UROLOGY

## 2022-12-06 PROCEDURE — 84153 ASSAY OF PSA TOTAL: CPT | Performed by: UROLOGY

## 2022-12-07 LAB
PROSTATE SPECIFIC ANTIGEN, TOTAL: 5.8 NG/ML (ref 0–4)
PSA FREE MFR SERPL: 27.24 %
PSA FREE SERPL-MCNC: 1.58 NG/ML (ref 0–1.5)

## 2022-12-08 ENCOUNTER — PATIENT MESSAGE (OUTPATIENT)
Dept: UROLOGY | Facility: CLINIC | Age: 67
End: 2022-12-08
Payer: MEDICARE

## 2022-12-08 NOTE — PATIENT INSTRUCTIONS
Meds as prescribed    Rest and Fluids, Tylenol or Motrin otc as needed for pain and or fever    Warm liquids to thin mucus    Allergen avoidance discussed, humidified air recommended    Warm salt water gargles as needed for throat pain    Nasal spray, taught how to correctly use      Sinusitis (Antibiotic Treatment)    The sinuses are air-filled spaces within the bones of the face. They connect to the inside of the nose. Sinusitis is an inflammation of the tissue lining the sinus cavity. Sinus inflammation can occur during a cold. It can also be due to allergies to pollens and other particles in the air. Sinusitis can cause symptoms of sinus congestion and fullness. A sinus infection causes fever, headache and facial pain. There is often green or yellow drainage from the nose or into the back of the throat (post-nasal drip). You have been given antibiotics to treat this condition.  Home care:  · Take the full course of antibiotics as instructed. Do not stop taking them, even if you feel better.  · Drink plenty of water, hot tea, and other liquids. This may help thin mucus. It also may promote sinus drainage.  · Heat may help soothe painful areas of the face. Use a towel soaked in hot water. Or,  the shower and direct the hot spray onto your face. Using a vaporizer along with a menthol rub at night may also help.   · An expectorant containing guaifenesin may help thin the mucus and promote drainage from the sinuses.  · Over-the-counter decongestants may be used unless a similar medicine was prescribed. Nasal sprays work the fastest. Use one that contains phenylephrine or oxymetazoline. First blow the nose gently. Then use the spray. Do not use these medicines more often than directed on the label or symptoms may get worse. You may also use tablets containing pseudoephedrine. Avoid products that combine ingredients, because side effects may be increased. Read labels. You can also ask the pharmacist for help.  (NOTE: Persons with high blood pressure should not use decongestants. They can raise blood pressure.)  · Over-the-counter antihistamines may help if allergies contributed to your sinusitis.    · Do not use nasal rinses or irrigation during an acute sinus infection, unless told to by your health care provider. Rinsing may spread the infection to other sinuses.  · Use acetaminophen or ibuprofen to control pain, unless another pain medicine was prescribed. (If you have chronic liver or kidney disease or ever had a stomach ulcer, talk with your doctor before using these medicines. Aspirin should never be used in anyone under 18 years of age who is ill with a fever. It may cause severe liver damage.)  · Don't smoke. This can worsen symptoms.  Follow-up care  Follow up with your healthcare provider or our staff if you are not improving within the next week.  When to seek medical advice  Call your healthcare provider if any of these occur:  · Facial pain or headache becoming more severe  · Stiff neck  · Unusual drowsiness or confusion  · Swelling of the forehead or eyelids  · Vision problems, including blurred or double vision  · Fever of 100.4ºF (38ºC) or higher, or as directed by your healthcare provider  · Seizure  · Breathing problems  · Symptoms not resolving within 10 days  Date Last Reviewed: 4/13/2015  © 2419-2246 [employer]. [address] [place] [address], [place] [address]. All rights reserved. This information is not intended as a substitute for professional medical care. Always follow your healthcare professional's instructions.         No

## 2022-12-13 ENCOUNTER — HOSPITAL ENCOUNTER (OUTPATIENT)
Dept: RADIOLOGY | Facility: HOSPITAL | Age: 67
Discharge: HOME | End: 2022-12-13
Attending: UROLOGY
Payer: MEDICARE

## 2022-12-13 DIAGNOSIS — C64.1 RENAL CANCER, RIGHT: ICD-10-CM

## 2022-12-13 PROCEDURE — 74178 CT ABD&PLV WO CNTR FLWD CNTR: CPT | Mod: TC

## 2022-12-13 PROCEDURE — 71046 XR CHEST PA AND LATERAL: ICD-10-PCS | Mod: 26,,, | Performed by: RADIOLOGY

## 2022-12-13 PROCEDURE — 71046 X-RAY EXAM CHEST 2 VIEWS: CPT | Mod: TC

## 2022-12-13 PROCEDURE — 74178 CT ABD&PLV WO CNTR FLWD CNTR: CPT | Mod: 26,,, | Performed by: RADIOLOGY

## 2022-12-13 PROCEDURE — 25500020 PHARM REV CODE 255: Performed by: UROLOGY

## 2022-12-13 PROCEDURE — 74178 CT ABDOMEN PELVIS W WO CONTRAST: ICD-10-PCS | Mod: 26,,, | Performed by: RADIOLOGY

## 2022-12-13 PROCEDURE — 71046 X-RAY EXAM CHEST 2 VIEWS: CPT | Mod: 26,,, | Performed by: RADIOLOGY

## 2022-12-13 RX ADMIN — IOHEXOL 100 ML: 350 INJECTION, SOLUTION INTRAVENOUS at 04:12

## 2022-12-16 ENCOUNTER — OFFICE VISIT (OUTPATIENT)
Dept: UROLOGY | Facility: CLINIC | Age: 67
End: 2022-12-16
Payer: MEDICARE

## 2022-12-16 VITALS
HEART RATE: 89 BPM | DIASTOLIC BLOOD PRESSURE: 81 MMHG | WEIGHT: 253.88 LBS | SYSTOLIC BLOOD PRESSURE: 161 MMHG | BODY MASS INDEX: 32.58 KG/M2 | HEIGHT: 74 IN

## 2022-12-16 DIAGNOSIS — R91.8 LUNG NODULES: ICD-10-CM

## 2022-12-16 DIAGNOSIS — N28.89 RIGHT RENAL MASS: ICD-10-CM

## 2022-12-16 DIAGNOSIS — C64.1 RENAL CANCER, RIGHT: ICD-10-CM

## 2022-12-16 DIAGNOSIS — R97.20 ELEVATED PSA: Primary | ICD-10-CM

## 2022-12-16 PROCEDURE — 1126F PR PAIN SEVERITY QUANTIFIED, NO PAIN PRESENT: ICD-10-PCS | Mod: CPTII,S$GLB,, | Performed by: UROLOGY

## 2022-12-16 PROCEDURE — 1160F PR REVIEW ALL MEDS BY PRESCRIBER/CLIN PHARMACIST DOCUMENTED: ICD-10-PCS | Mod: CPTII,S$GLB,, | Performed by: UROLOGY

## 2022-12-16 PROCEDURE — 4010F PR ACE/ARB THEARPY RXD/TAKEN: ICD-10-PCS | Mod: CPTII,S$GLB,, | Performed by: UROLOGY

## 2022-12-16 PROCEDURE — 3044F HG A1C LEVEL LT 7.0%: CPT | Mod: CPTII,S$GLB,, | Performed by: UROLOGY

## 2022-12-16 PROCEDURE — 3079F DIAST BP 80-89 MM HG: CPT | Mod: CPTII,S$GLB,, | Performed by: UROLOGY

## 2022-12-16 PROCEDURE — 3008F PR BODY MASS INDEX (BMI) DOCUMENTED: ICD-10-PCS | Mod: CPTII,S$GLB,, | Performed by: UROLOGY

## 2022-12-16 PROCEDURE — 99999 PR PBB SHADOW E&M-EST. PATIENT-LVL III: CPT | Mod: PBBFAC,,, | Performed by: UROLOGY

## 2022-12-16 PROCEDURE — 1160F RVW MEDS BY RX/DR IN RCRD: CPT | Mod: CPTII,S$GLB,, | Performed by: UROLOGY

## 2022-12-16 PROCEDURE — 3079F PR MOST RECENT DIASTOLIC BLOOD PRESSURE 80-89 MM HG: ICD-10-PCS | Mod: CPTII,S$GLB,, | Performed by: UROLOGY

## 2022-12-16 PROCEDURE — 3044F PR MOST RECENT HEMOGLOBIN A1C LEVEL <7.0%: ICD-10-PCS | Mod: CPTII,S$GLB,, | Performed by: UROLOGY

## 2022-12-16 PROCEDURE — 3077F PR MOST RECENT SYSTOLIC BLOOD PRESSURE >= 140 MM HG: ICD-10-PCS | Mod: CPTII,S$GLB,, | Performed by: UROLOGY

## 2022-12-16 PROCEDURE — 3288F PR FALLS RISK ASSESSMENT DOCUMENTED: ICD-10-PCS | Mod: CPTII,S$GLB,, | Performed by: UROLOGY

## 2022-12-16 PROCEDURE — 4010F ACE/ARB THERAPY RXD/TAKEN: CPT | Mod: CPTII,S$GLB,, | Performed by: UROLOGY

## 2022-12-16 PROCEDURE — 1101F PT FALLS ASSESS-DOCD LE1/YR: CPT | Mod: CPTII,S$GLB,, | Performed by: UROLOGY

## 2022-12-16 PROCEDURE — 99999 PR PBB SHADOW E&M-EST. PATIENT-LVL III: ICD-10-PCS | Mod: PBBFAC,,, | Performed by: UROLOGY

## 2022-12-16 PROCEDURE — 99214 OFFICE O/P EST MOD 30 MIN: CPT | Mod: S$GLB,,, | Performed by: UROLOGY

## 2022-12-16 PROCEDURE — 3288F FALL RISK ASSESSMENT DOCD: CPT | Mod: CPTII,S$GLB,, | Performed by: UROLOGY

## 2022-12-16 PROCEDURE — 3008F BODY MASS INDEX DOCD: CPT | Mod: CPTII,S$GLB,, | Performed by: UROLOGY

## 2022-12-16 PROCEDURE — 1159F PR MEDICATION LIST DOCUMENTED IN MEDICAL RECORD: ICD-10-PCS | Mod: CPTII,S$GLB,, | Performed by: UROLOGY

## 2022-12-16 PROCEDURE — 1159F MED LIST DOCD IN RCRD: CPT | Mod: CPTII,S$GLB,, | Performed by: UROLOGY

## 2022-12-16 PROCEDURE — 99214 PR OFFICE/OUTPT VISIT, EST, LEVL IV, 30-39 MIN: ICD-10-PCS | Mod: S$GLB,,, | Performed by: UROLOGY

## 2022-12-16 PROCEDURE — 1101F PR PT FALLS ASSESS DOC 0-1 FALLS W/OUT INJ PAST YR: ICD-10-PCS | Mod: CPTII,S$GLB,, | Performed by: UROLOGY

## 2022-12-16 PROCEDURE — 3077F SYST BP >= 140 MM HG: CPT | Mod: CPTII,S$GLB,, | Performed by: UROLOGY

## 2022-12-16 PROCEDURE — 1126F AMNT PAIN NOTED NONE PRSNT: CPT | Mod: CPTII,S$GLB,, | Performed by: UROLOGY

## 2023-01-04 ENCOUNTER — PATIENT MESSAGE (OUTPATIENT)
Dept: UROLOGY | Facility: CLINIC | Age: 68
End: 2023-01-04
Payer: MEDICARE

## 2023-01-14 ENCOUNTER — PATIENT MESSAGE (OUTPATIENT)
Dept: UROLOGY | Facility: CLINIC | Age: 68
End: 2023-01-14
Payer: MEDICARE

## 2023-01-18 ENCOUNTER — OFFICE VISIT (OUTPATIENT)
Dept: UROLOGY | Facility: CLINIC | Age: 68
End: 2023-01-18
Payer: COMMERCIAL

## 2023-01-18 ENCOUNTER — LAB VISIT (OUTPATIENT)
Dept: LAB | Facility: HOSPITAL | Age: 68
End: 2023-01-18
Attending: UROLOGY
Payer: MEDICARE

## 2023-01-18 VITALS
HEIGHT: 74 IN | BODY MASS INDEX: 31.75 KG/M2 | SYSTOLIC BLOOD PRESSURE: 161 MMHG | DIASTOLIC BLOOD PRESSURE: 89 MMHG | HEART RATE: 73 BPM | WEIGHT: 247.38 LBS

## 2023-01-18 DIAGNOSIS — N52.9 ERECTILE DYSFUNCTION, UNSPECIFIED ERECTILE DYSFUNCTION TYPE: ICD-10-CM

## 2023-01-18 DIAGNOSIS — R79.89 LOW TESTOSTERONE: ICD-10-CM

## 2023-01-18 DIAGNOSIS — R91.8 LUNG NODULES: Primary | ICD-10-CM

## 2023-01-18 LAB
BASOPHILS # BLD AUTO: 0.01 K/UL (ref 0–0.2)
BASOPHILS NFR BLD: 0.3 % (ref 0–1.9)
COMPLEXED PSA SERPL-MCNC: 5.9 NG/ML (ref 0–4)
DIFFERENTIAL METHOD: ABNORMAL
EOSINOPHIL # BLD AUTO: 0.2 K/UL (ref 0–0.5)
EOSINOPHIL NFR BLD: 5.3 % (ref 0–8)
ERYTHROCYTE [DISTWIDTH] IN BLOOD BY AUTOMATED COUNT: 13.2 % (ref 11.5–14.5)
HCT VFR BLD AUTO: 47.4 % (ref 40–54)
HGB BLD-MCNC: 14.7 G/DL (ref 14–18)
IMM GRANULOCYTES # BLD AUTO: 0.01 K/UL (ref 0–0.04)
IMM GRANULOCYTES NFR BLD AUTO: 0.3 % (ref 0–0.5)
LYMPHOCYTES # BLD AUTO: 1.7 K/UL (ref 1–4.8)
LYMPHOCYTES NFR BLD: 43.2 % (ref 18–48)
MCH RBC QN AUTO: 27.7 PG (ref 27–31)
MCHC RBC AUTO-ENTMCNC: 31 G/DL (ref 32–36)
MCV RBC AUTO: 89 FL (ref 82–98)
MONOCYTES # BLD AUTO: 0.6 K/UL (ref 0.3–1)
MONOCYTES NFR BLD: 15.3 % (ref 4–15)
NEUTROPHILS # BLD AUTO: 1.4 K/UL (ref 1.8–7.7)
NEUTROPHILS NFR BLD: 35.6 % (ref 38–73)
NRBC BLD-RTO: 0 /100 WBC
PLATELET # BLD AUTO: 185 K/UL (ref 150–450)
PMV BLD AUTO: 10.7 FL (ref 9.2–12.9)
RBC # BLD AUTO: 5.31 M/UL (ref 4.6–6.2)
TESTOST SERPL-MCNC: 562 NG/DL (ref 304–1227)
TESTOST SERPL-MCNC: 562 NG/DL (ref 304–1227)
WBC # BLD AUTO: 3.98 K/UL (ref 3.9–12.7)

## 2023-01-18 PROCEDURE — 84153 ASSAY OF PSA TOTAL: CPT | Performed by: UROLOGY

## 2023-01-18 PROCEDURE — 3077F PR MOST RECENT SYSTOLIC BLOOD PRESSURE >= 140 MM HG: ICD-10-PCS | Mod: CPTII,S$GLB,, | Performed by: UROLOGY

## 2023-01-18 PROCEDURE — 99999 PR PBB SHADOW E&M-EST. PATIENT-LVL III: CPT | Mod: PBBFAC,HCNC,, | Performed by: UROLOGY

## 2023-01-18 PROCEDURE — 3008F PR BODY MASS INDEX (BMI) DOCUMENTED: ICD-10-PCS | Mod: CPTII,S$GLB,, | Performed by: UROLOGY

## 2023-01-18 PROCEDURE — 3008F BODY MASS INDEX DOCD: CPT | Mod: CPTII,S$GLB,, | Performed by: UROLOGY

## 2023-01-18 PROCEDURE — 3079F PR MOST RECENT DIASTOLIC BLOOD PRESSURE 80-89 MM HG: ICD-10-PCS | Mod: CPTII,S$GLB,, | Performed by: UROLOGY

## 2023-01-18 PROCEDURE — 3077F SYST BP >= 140 MM HG: CPT | Mod: CPTII,S$GLB,, | Performed by: UROLOGY

## 2023-01-18 PROCEDURE — 3288F PR FALLS RISK ASSESSMENT DOCUMENTED: ICD-10-PCS | Mod: CPTII,S$GLB,, | Performed by: UROLOGY

## 2023-01-18 PROCEDURE — 1160F RVW MEDS BY RX/DR IN RCRD: CPT | Mod: CPTII,S$GLB,, | Performed by: UROLOGY

## 2023-01-18 PROCEDURE — 1101F PR PT FALLS ASSESS DOC 0-1 FALLS W/OUT INJ PAST YR: ICD-10-PCS | Mod: CPTII,S$GLB,, | Performed by: UROLOGY

## 2023-01-18 PROCEDURE — 3079F DIAST BP 80-89 MM HG: CPT | Mod: CPTII,S$GLB,, | Performed by: UROLOGY

## 2023-01-18 PROCEDURE — 1126F PR PAIN SEVERITY QUANTIFIED, NO PAIN PRESENT: ICD-10-PCS | Mod: CPTII,S$GLB,, | Performed by: UROLOGY

## 2023-01-18 PROCEDURE — 1126F AMNT PAIN NOTED NONE PRSNT: CPT | Mod: CPTII,S$GLB,, | Performed by: UROLOGY

## 2023-01-18 PROCEDURE — 1101F PT FALLS ASSESS-DOCD LE1/YR: CPT | Mod: CPTII,S$GLB,, | Performed by: UROLOGY

## 2023-01-18 PROCEDURE — 36415 COLL VENOUS BLD VENIPUNCTURE: CPT | Performed by: UROLOGY

## 2023-01-18 PROCEDURE — 3288F FALL RISK ASSESSMENT DOCD: CPT | Mod: CPTII,S$GLB,, | Performed by: UROLOGY

## 2023-01-18 PROCEDURE — 1159F PR MEDICATION LIST DOCUMENTED IN MEDICAL RECORD: ICD-10-PCS | Mod: CPTII,S$GLB,, | Performed by: UROLOGY

## 2023-01-18 PROCEDURE — 84403 ASSAY OF TOTAL TESTOSTERONE: CPT | Performed by: UROLOGY

## 2023-01-18 PROCEDURE — 99214 PR OFFICE/OUTPT VISIT, EST, LEVL IV, 30-39 MIN: ICD-10-PCS | Mod: S$GLB,,, | Performed by: UROLOGY

## 2023-01-18 PROCEDURE — 99999 PR PBB SHADOW E&M-EST. PATIENT-LVL III: ICD-10-PCS | Mod: PBBFAC,HCNC,, | Performed by: UROLOGY

## 2023-01-18 PROCEDURE — 1160F PR REVIEW ALL MEDS BY PRESCRIBER/CLIN PHARMACIST DOCUMENTED: ICD-10-PCS | Mod: CPTII,S$GLB,, | Performed by: UROLOGY

## 2023-01-18 PROCEDURE — 1159F MED LIST DOCD IN RCRD: CPT | Mod: CPTII,S$GLB,, | Performed by: UROLOGY

## 2023-01-18 PROCEDURE — 99214 OFFICE O/P EST MOD 30 MIN: CPT | Mod: S$GLB,,, | Performed by: UROLOGY

## 2023-01-18 PROCEDURE — 85025 COMPLETE CBC W/AUTO DIFF WBC: CPT | Performed by: UROLOGY

## 2023-01-18 RX ORDER — TADALAFIL 20 MG/1
20 TABLET ORAL DAILY PRN
Qty: 12 TABLET | Refills: 5 | Status: SHIPPED | OUTPATIENT
Start: 2023-01-18 | End: 2024-04-02

## 2023-01-18 RX ORDER — TESTOSTERONE CYPIONATE 200 MG/ML
200 INJECTION, SOLUTION INTRAMUSCULAR
Qty: 5 ML | Refills: 2 | Status: SHIPPED | OUTPATIENT
Start: 2023-01-18 | End: 2023-05-31

## 2023-01-18 RX ORDER — MODERNA COVID-19 VACCINE, BIVALENT 25; 25 UG/.5ML; UG/.5ML
INJECTION, SUSPENSION INTRAMUSCULAR
COMMUNITY
Start: 2022-10-05 | End: 2023-09-21

## 2023-01-18 NOTE — PROGRESS NOTES
Subjective:       Patient ID: Adrian Burt is a 67 y.o. male.    Chief Complaint: Follow-up     This is a 67 y.o.  male patient that is an established patient for right kidney cancer, low testosterone, elevated PSA, erectile dysfunction.    Initial HPI: Patient reports having right-sided flank pain underwent a renal ultrasound that showed a right 1 cm solid mass in a concerning approximately 4.5 cm upper pole cystic mass.  CT abdomen with and without contrast stone subsequently that showed similar findings.  There is a report of ? FH of kidney cancer.  He does report some lower urinary tract symptoms in the past and has been treated by previous urologist but currently not on treatment.  He is on testosterone replacement.    S/p right partial nephrectomy of upper pole cystic mass and right renal mass 5/9/22.  Upper pole cystic mass was cyst lower pole mass was 1.2 cm clear cell renal cell carcinoma.  PSA up to 6.5, free20%.    MRI prostate showed PI-RADS 2 with no lesions, volume 122  Follow up imaging for nephrectomy done 12/22, chest x-ray negative but CT of the abdomen pelvis did show some lower pulmonary nodules up to 5 mm.  Evidence of right partial nephrectomy without recurrence.  No lymphadenopathy.  PSA 5.8, free PSA 26%    Has not done CT of the chest yet, here for erectile dysfunction and low testosterone.  He ran out of testosterone approximately a month ago.  Had testosterone done at his last labs with the results are still pending likely lost specimen.  He is also having erectile dysfunctions taken Cialis with good results in the past.    LAST PSA  Lab Results   Component Value Date    PSA 5.2 (H) 04/07/2021    PSA 2.1 09/10/2018    PSA 1.8 07/05/2017    PSA 2.6 11/23/2015    PSA 7.2 (H) 12/15/2008    PSA 0.6 02/22/2005    PSATOTAL 5.8 (H) 12/06/2022    PSATOTAL 6.5 (H) 06/13/2022    PSAFREE 1.58 (H) 12/06/2022    PSAFREE 1.36 06/13/2022       Lab Results   Component Value Date    CREATININE 1.2  2022       ---  Past Medical History:   Diagnosis Date    Allergy     Colon polyp     Fatty liver     GERD (gastroesophageal reflux disease)     Hypertension        Past Surgical History:   Procedure Laterality Date    COLONOSCOPY N/A 2017    Procedure: COLONOSCOPY;  Surgeon: Leo Henriquez MD;  Location: [address]);  Service: Endoscopy;  Laterality: N/A;    LEG SURGERY Left     ROBOT-ASSISTED LAPAROSCOPIC PARTIAL NEPHRECTOMY Right 2022    Procedure: Right RETROPERITONEAL robotic assisted lap partial nephrectomy  Intraoperative ultrasound with interpretation Special needs: drop in probe for ultrasound, retroperitoneal dilating baloon;  Surgeon: Arvind Álvarez MD;  Location: [facility] OR;  Service: Urology;  Laterality: Right;       Family History   Problem Relation Age of Onset    Kidney disease Mother     Heart disease Father     Cancer Sister         kidney    Kidney disease Sister     Colon cancer Neg Hx     Esophageal cancer Neg Hx        Social History     Tobacco Use    Smoking status: Former     Types: Cigarettes     Quit date: 11/15/1978     Years since quittin.2    Smokeless tobacco: Never    Tobacco comments:     smoked for 10 years, quit in 76   Substance Use Topics    Alcohol use: No    Drug use: No       Current Outpatient Medications on File Prior to Visit   Medication Sig Dispense Refill    amLODIPine (NORVASC) 10 MG tablet TAKE 1 TABLET(10 MG) BY MOUTH EVERY MORNING 90 tablet 3    aspirin (ECOTRIN) 81 MG EC tablet Take 81 mg by mouth once daily.      azelastine (ASTELIN) 137 mcg (0.1 %) nasal spray 1 spray (137 mcg total) by Nasal route 2 (two) times daily. (Patient taking differently: 1 spray by Nasal route 2 (two) times daily as needed.) 30 mL 0    ferrous gluconate (FERGON) 324 MG tablet TAKE 1 TABLET BY MOUTH DAILY WITH BREAKFAST 30 tablet 3    fluticasone propionate (FLONASE) 50 mcg/actuation nasal spray SPRAY 1 SPRAY IN EACH NOSTRIL EVERY DAY 48 g 2     hydroCHLOROthiazide (HYDRODIURIL) 12.5 MG Tab Take 1 tablet (12.5 mg total) by mouth once daily. 30 tablet 11    losartan (COZAAR) 100 MG tablet TAKE 1 TABLET(100 MG) BY MOUTH EVERY DAY 90 tablet 3    MODERNA COVID BIVAL,6Y UP,,PF, 50 mcg/0.5 mL injection       montelukast (SINGULAIR) 10 mg tablet Take 1 tablet (10 mg total) by mouth every evening. 90 tablet 0    multivit with minerals/lutein (MULTIVITAMIN 50 PLUS ORAL) Take 1 tablet by mouth once daily.      tamsulosin (FLOMAX) 0.4 mg Cap Take 1 capsule by mouth once daily.      [DISCONTINUED] sildenafil (REVATIO) 20 mg Tab Take 20 mg by mouth 3 (three) times daily.      [DISCONTINUED] testosterone cypionate (DEPOTESTOTERONE CYPIONATE) 200 mg/mL injection Inject into the muscle every 14 (fourteen) days.       No current facility-administered medications on file prior to visit.       Review of patient's allergies indicates:  No Known Allergies      Review of Systems   Constitutional:  Negative for activity change, chills and fever.   HENT:  Negative for congestion.    Respiratory:  Negative for cough, chest tightness and shortness of breath.    Cardiovascular:  Negative for chest pain and palpitations.   Gastrointestinal:  Negative for abdominal distention, abdominal pain, nausea and vomiting.   Genitourinary:  Negative for difficulty urinating, flank pain, hematuria, penile pain, scrotal swelling and testicular pain.   Musculoskeletal:  Negative for gait problem.     Objective:      Physical Exam  Constitutional:       Appearance: Normal appearance.   HENT:      Head: Normocephalic.   Pulmonary:      Effort: Pulmonary effort is normal.      Breath sounds: Normal breath sounds.   Abdominal:      General: Abdomen is flat. Bowel sounds are normal.      Palpations: Abdomen is soft.      Tenderness: There is no abdominal tenderness. There is no right CVA tenderness, left CVA tenderness or guarding.      Comments: Inc c/d/i   Musculoskeletal:         General: Normal  range of motion.      Cervical back: Normal range of motion.   Skin:     General: Skin is warm.   Neurological:      Mental Status: He is alert.       No results found for this or any previous visit (from the past 2160 hour(s)).    Path 5/9/22:    Final Pathologic Diagnosis 1. FINAL RESECTION MARGIN OF RIGHT UPPER POLE MASS:   RENAL PARENCHYMA WITH NO NEOPLASIA IDENTIFIED   2. RIGHT UPPER POLE MASS:   INNOCUOUS BENIGN MULTILOCULAR CYST   3. MASS FROM MID RIGHT  KIDNEY :   RENAL CELL CARCINOMA WITH NO INVOLVEMENT OF MARGINS OR PERINEPHRIC ADIPOSE   TISSUE IDENTIFIED    Comment: Interp By Adolph Bonilla M.D., Signed on 05/13/2022 at 09:22   Microscopic Exam 2. This lesion is a benign multilocular cyst.  The cystic spaces have a   single layer of lining at the most.  There is no proliferative lining   identified in this entirely submitted specimen.  No area has formation of a   mass neoplasm.  There is no significant abnormality of renal parenchyma   separate from the cystic lesion.   3.   Procedure :  Partial renal excision   Specimen Laterality :  Right   Tumor Focality :  Unifocal   Tumor Size :  1.2 cm   Histologic Type :  Renal cell carcinoma, clear cell variant   Tumor Extent :  Limited to kidney   Sarcomatoid Features :  Not identified   Rhabdoid Features :  Not identified   Tumor Necrosis :  Not identified   Margins ; no margin involvement identified   Regional Lymph Node Status :   Not applicable (no regional lymph nodes   submitted or found)   PATHOLOGIC STAGE CLASSIFICATION (pTNM, AJCC 8th Edition) : pT1a pNX pMX   Additional findings :  There is no significant abnormality of renal   parenchyma apart from the masses.      Results for orders placed or performed during the hospital encounter of 06/17/22 (from the past 2160 hour(s))   MRI Prostate W W/O Contrast    Impression    1. Benign prostatic hyperplasia.  2. Red marrow hyperplasia.  Overall Assessment: PI-RADS 2 - Low (clinically significant cancer is  unlikely to be present)    Number of targets created for potential MR/US fusion biopsy    Peripheral zone: 0    Transition zone: 0      Electronically signed by: Jersey Hall MD  Date:    06/19/2022  Time:    09:47                         Assessment:     Problem Noted   Renal Cancer, Right 3/31/2022    4 cm complex cystic lesion to posterior right upper pole, 1.1 cm right anterior solid mass on CT w/wo contrast 3/2022.  --MRI 4/22: right 3.6 cm UP cystic lesion ? Cystic RCC, right midpole 12mm solid mass  --Preop Cr 1, GFR >60  --S/p right retroperitioneal partial 5/9/22 (both masses removed)  --Path: upper pole lesion was cyst, midpole 1.2 cm [id number] CC RCC, negative margins  KATIE    Post op imaging:  CT/CXR 12/22: lower pulm nodules up to 5mm on CT but not on CXR, no recurrence.      Post op labs  6/22--Cr 1.2, GFR >60  12/22--Cr 1.2, GFR >60     Elevated Psa 4/14/2022 6/22-6.5, free 20%  12/22-5.8, free 26%     MRI prostate 6/22: volume 122, no lesions, PIRADS 2   On TRT         Erectile Dysfunction 7/19/2012    Refilled tadalafil 01/2023     Low Testosterone 1/18/2023    Reviewed risk of elevated PSA, prostate cancer and testosterone replacement therapy.  Testosterone refilled 01/2023     Urinary Tract Infection Without Hematuria (Resolved) 3/31/2022       Plan:     1.  Tadalafil and testosterone prescribed.  2.  To have testosterone done today as less when is still pending likely lost  3.  Scheduled chest CT  4.  Follow-up in 6 months with testosterone labs and PSA      Arvind Álvarez MD

## 2023-01-23 ENCOUNTER — HOSPITAL ENCOUNTER (OUTPATIENT)
Dept: RADIOLOGY | Facility: HOSPITAL | Age: 68
Discharge: HOME | End: 2023-01-23
Attending: UROLOGY
Payer: COMMERCIAL

## 2023-01-23 DIAGNOSIS — R91.8 LUNG NODULES: ICD-10-CM

## 2023-01-23 DIAGNOSIS — R91.8 LUNG NODULES: Primary | ICD-10-CM

## 2023-01-23 PROCEDURE — 71250 CT CHEST WITHOUT CONTRAST: ICD-10-PCS | Mod: 26,,, | Performed by: RADIOLOGY

## 2023-01-23 PROCEDURE — 71250 CT THORAX DX C-: CPT | Mod: 26,,, | Performed by: RADIOLOGY

## 2023-01-23 PROCEDURE — 71250 CT THORAX DX C-: CPT | Mod: TC

## 2023-01-28 DIAGNOSIS — I10 ESSENTIAL HYPERTENSION: ICD-10-CM

## 2023-01-28 NOTE — TELEPHONE ENCOUNTER
No new care gaps identified.  [facility] Embedded Care Gaps. Reference number: [id number]. 1/28/2023   3:26:53 AM CST

## 2023-01-30 ENCOUNTER — TELEPHONE (OUTPATIENT)
Dept: UROLOGY | Facility: CLINIC | Age: 68
End: 2023-01-30
Payer: MEDICARE

## 2023-01-30 RX ORDER — LOSARTAN POTASSIUM 100 MG/1
TABLET ORAL
Qty: 90 TABLET | Refills: 0 | Status: SHIPPED | OUTPATIENT
Start: 2023-01-30 | End: 2023-04-10 | Stop reason: SDUPTHER

## 2023-01-30 NOTE — TELEPHONE ENCOUNTER
I called and spoke with a  at [facility] regarding checking the status of a prior authorization for tadalafil. The  stated that the patient isn't approved for 12 tablets for 30 days but he is approved for 6 tabs for 23 days. I voiced that I will get in contact with the patient to inform him about the prescription.

## 2023-01-30 NOTE — TELEPHONE ENCOUNTER
Refill Routing Note     Refill Routing Note   Medication(s) are not appropriate for processing by [facility] for the following reason(s):       Required vitals abnormal    [facility] action(s):  Defer         Medication reconciliation completed: No   Extended chart review required: No  Alert overridden per protocol: No    Care gaps identified:  No       Appointments  past 12m or future 3m with PCP    Date Provider   Last Visit   4/7/2021 Mone Brown MD   Next Visit   Visit date not found Mone Brown MD   ED visits in past 90 days: 0        Note composed:8:46 AM 01/30/2023

## 2023-02-13 ENCOUNTER — OFFICE VISIT (OUTPATIENT)
Dept: HEMATOLOGY/ONCOLOGY | Facility: CLINIC | Age: 68
End: 2023-02-13
Payer: MEDICARE

## 2023-02-13 VITALS
WEIGHT: 253 LBS | DIASTOLIC BLOOD PRESSURE: 84 MMHG | HEART RATE: 89 BPM | RESPIRATION RATE: 17 BRPM | BODY MASS INDEX: 32.47 KG/M2 | HEIGHT: 74 IN | SYSTOLIC BLOOD PRESSURE: 160 MMHG | OXYGEN SATURATION: 98 % | TEMPERATURE: 98 F

## 2023-02-13 DIAGNOSIS — C64.1 RENAL CANCER, RIGHT: Primary | ICD-10-CM

## 2023-02-13 DIAGNOSIS — R91.8 LUNG NODULES: ICD-10-CM

## 2023-02-13 DIAGNOSIS — R91.8 PULMONARY NODULES: ICD-10-CM

## 2023-02-13 PROCEDURE — 99205 OFFICE O/P NEW HI 60 MIN: CPT | Mod: HCNC,S$GLB,, | Performed by: INTERNAL MEDICINE

## 2023-02-13 PROCEDURE — 99999 PR PBB SHADOW E&M-EST. PATIENT-LVL V: ICD-10-PCS | Mod: PBBFAC,HCNC,, | Performed by: INTERNAL MEDICINE

## 2023-02-13 PROCEDURE — 1126F PR PAIN SEVERITY QUANTIFIED, NO PAIN PRESENT: ICD-10-PCS | Mod: HCNC,CPTII,S$GLB, | Performed by: INTERNAL MEDICINE

## 2023-02-13 PROCEDURE — 1101F PR PT FALLS ASSESS DOC 0-1 FALLS W/OUT INJ PAST YR: ICD-10-PCS | Mod: HCNC,CPTII,S$GLB, | Performed by: INTERNAL MEDICINE

## 2023-02-13 PROCEDURE — 3079F PR MOST RECENT DIASTOLIC BLOOD PRESSURE 80-89 MM HG: ICD-10-PCS | Mod: HCNC,CPTII,S$GLB, | Performed by: INTERNAL MEDICINE

## 2023-02-13 PROCEDURE — 1159F MED LIST DOCD IN RCRD: CPT | Mod: HCNC,CPTII,S$GLB, | Performed by: INTERNAL MEDICINE

## 2023-02-13 PROCEDURE — 4010F PR ACE/ARB THEARPY RXD/TAKEN: ICD-10-PCS | Mod: HCNC,CPTII,S$GLB, | Performed by: INTERNAL MEDICINE

## 2023-02-13 PROCEDURE — 4010F ACE/ARB THERAPY RXD/TAKEN: CPT | Mod: HCNC,CPTII,S$GLB, | Performed by: INTERNAL MEDICINE

## 2023-02-13 PROCEDURE — 1126F AMNT PAIN NOTED NONE PRSNT: CPT | Mod: HCNC,CPTII,S$GLB, | Performed by: INTERNAL MEDICINE

## 2023-02-13 PROCEDURE — 1160F RVW MEDS BY RX/DR IN RCRD: CPT | Mod: HCNC,CPTII,S$GLB, | Performed by: INTERNAL MEDICINE

## 2023-02-13 PROCEDURE — 99999 PR PBB SHADOW E&M-EST. PATIENT-LVL V: CPT | Mod: PBBFAC,HCNC,, | Performed by: INTERNAL MEDICINE

## 2023-02-13 PROCEDURE — 3079F DIAST BP 80-89 MM HG: CPT | Mod: HCNC,CPTII,S$GLB, | Performed by: INTERNAL MEDICINE

## 2023-02-13 PROCEDURE — 1160F PR REVIEW ALL MEDS BY PRESCRIBER/CLIN PHARMACIST DOCUMENTED: ICD-10-PCS | Mod: HCNC,CPTII,S$GLB, | Performed by: INTERNAL MEDICINE

## 2023-02-13 PROCEDURE — 3008F BODY MASS INDEX DOCD: CPT | Mod: HCNC,CPTII,S$GLB, | Performed by: INTERNAL MEDICINE

## 2023-02-13 PROCEDURE — 3288F PR FALLS RISK ASSESSMENT DOCUMENTED: ICD-10-PCS | Mod: HCNC,CPTII,S$GLB, | Performed by: INTERNAL MEDICINE

## 2023-02-13 PROCEDURE — 3077F SYST BP >= 140 MM HG: CPT | Mod: HCNC,CPTII,S$GLB, | Performed by: INTERNAL MEDICINE

## 2023-02-13 PROCEDURE — 3077F PR MOST RECENT SYSTOLIC BLOOD PRESSURE >= 140 MM HG: ICD-10-PCS | Mod: HCNC,CPTII,S$GLB, | Performed by: INTERNAL MEDICINE

## 2023-02-13 PROCEDURE — 3008F PR BODY MASS INDEX (BMI) DOCUMENTED: ICD-10-PCS | Mod: HCNC,CPTII,S$GLB, | Performed by: INTERNAL MEDICINE

## 2023-02-13 PROCEDURE — 1101F PT FALLS ASSESS-DOCD LE1/YR: CPT | Mod: HCNC,CPTII,S$GLB, | Performed by: INTERNAL MEDICINE

## 2023-02-13 PROCEDURE — 3288F FALL RISK ASSESSMENT DOCD: CPT | Mod: HCNC,CPTII,S$GLB, | Performed by: INTERNAL MEDICINE

## 2023-02-13 PROCEDURE — 99205 PR OFFICE/OUTPT VISIT, NEW, LEVL V, 60-74 MIN: ICD-10-PCS | Mod: HCNC,S$GLB,, | Performed by: INTERNAL MEDICINE

## 2023-02-13 PROCEDURE — 1159F PR MEDICATION LIST DOCUMENTED IN MEDICAL RECORD: ICD-10-PCS | Mod: HCNC,CPTII,S$GLB, | Performed by: INTERNAL MEDICINE

## 2023-02-13 NOTE — PROGRESS NOTES
Subjective:       Patient ID: Adrian Burt is a 68 y.o. male.    Chief Complaint: No chief complaint on file.    HPI    Referring MD: Dr. Álvarez  Reason for referral: pulmonary nodules, Right RCC    Recently had surveillance scans for RCC performed- results as below:  2023 CT Chest:  FINDINGS:  No intravenous contrast was administered for this examination.  Therefore, it may have diminished sensitivity for detection of certain abnormalities.  Thyroid gland: Within normal limits.  Trachea: Within normal limits.  Esophagus: Mildly patulous.  Cardiovascular: Normal heart size.No pericardial effusion.Coarse calcifications in the region of the aortic valve leaflets, correlate with aortic valve function.  There is mild calcific disease of the coronary arteries.  Lymph nodes: None abnormally enlarged.  Lungs/pleura/airways:  Mild apical scarring.  There are several left lung nodules which include the followin mm in lingula (1988) 4 mm in the left lower lobe adjacent to the diaphragm (4-405, and 2 additional 5 mm left lower lobe nodules (4-318, 380). In addition, there is a 5 mm right lower lobe ground-glass nodule (4-299.  The latter is out of the field of view on the prior examination.  Upper abdomen:  Partially imaged.  Status post partial right nephrectomy.  No additional new significant disease.  Bones: Unremarkable for stated age.  Other: N/A  Impression:  1. There are several bilateral subcentimeter pulmonary nodules (measuring 5 mm and less) as described above.  At this time, these are indeterminate whether related to malignancy versus infection.  Attention on short-term follow-up imaging highly recommended.  2. Postsurgical changes related to right partial nephrectomy.  Unremarkable appearance of surgical bed.  3.  Other findings are as above.    - 2022 CT Abd/Pelvis:  FINDINGS:  Heart: No cardiomegaly or pericardial effusion.  Atherosclerosis of the aortic annulus.  Lung Bases: 0.5 cm  solid nodule in the anteromedial basal segment of the left lower lobe (axial series 3, image 11).  Additional 0.4 cm pleural based nodule in the superior lingula (axial series 3, image 5).  Bilateral dependent atelectasis.  Liver: Hepatomegaly.  Punctate hypodensity in hepatic segment 4a, too small to characterize but stable from prior exam.  No new lesions identified.  Gallbladder: No calcified gallstones.  Bile Ducts: No dilatation.  Pancreas: No mass. No peripancreatic fat stranding.  Spleen: Unremarkable  Adrenals: Unremarkable  Kidneys/Ureters: Interval postsurgical changes of right partial nephrectomy.  There is mild soft tissue thickening in both surgical beds, likely scarring/fibrosis.  No evidence of recurrent lesion.  Subcentimeter hypodensity in the upper pole of the right kidney, too small to characterize.  Left kidney is unremarkable.  No nephrolithiasis or hydronephrosis.  Bilateral ureters are normal in course and caliber.  Bladder: No wall thickening.  Reproductive organs: Prostatomegaly with mass effect on the posterior aspect of the bladder.  GI Tract/Mesentery: No evidence of bowel obstruction or inflammation.  Peritoneal Space: No ascites or free air.  Retroperitoneum: No significant adenopathy.  Abdominal wall: Unremarkable  Vasculature: No aneurysm.  Mild atherosclerosis of the descending aorta and its branches.  Bones: Multilevel degenerative change, similar to prior exams.  No acute fracture. No suspicious lytic or sclerotic lesions.  Impression:  Interval postsurgical changes of right partial nephrectomy x2.  No evidence of residual or recurrent lesion in the surgical beds.  Pulmonary nodules in the left lung, that were out of field of view on prior imaging.  Recommend attention on follow-up.  Comparison to any prior CT chest would be beneficial.  Prostatomegaly.    RCC History:  - renal mass discovered incidentally; had a fleeting pain that resolved but he mentioned to his PCP several  months later leading to below    - 3/24/2022 Renal ultrasound:  FINDINGS:  Right kidney: The right kidney measures 13.5 cm. No cortical thinning. No loss of corticomedullary distinction. Resistive index measures 0.61.  Heterogeneous lesion measuring 2.4 x 2.3 x 2.7 cm in the upper pole.  No internal Doppler signal.  1.5 x 1.6 x 1.5 cm hyperechoic lesion in the midpole.  Subcentimeter simple cyst in the lower pole.  No renal stone. No hydronephrosis.  Left kidney: The left kidney measures 12.9 cm. No cortical thinning. No loss of corticomedullary distinction. Resistive index measures 0.49.  No mass. No renal stone. No hydronephrosis.  Spleen resistive index measures 0.54.  The bladder is partially distended at the time of scanning and has an unremarkable appearance.  Prostate is enlarged measuring 6.8 x 5.4 x 5.8 cm (previously 5.1 x 4.4 x 4.5 cm).  Impression:  1. 2.7 cm heterogeneous lesion in the upper pole right kidney, which could represent a complex cyst or neoplasm.  2. 1.6 cm hyperechoic lesion in the midpole right kidney, which could represent an angiomyolipoma or other fat containing lesion such as renal cell carcinoma.  3.  No hydronephrosis or shadowing calculus.  4. Prostatomegaly.  RECOMMENDATIONS:  Renal mass protocol CT or MRI for further evaluation of right renal lesions.    - 3/28/2022 CT Abd/Pelvis:  FINDINGS:  Heart: Normal in size. No pericardial effusion.  Lung Bases: Well aerated, without consolidation or pleural fluid.  Liver: Normal in size and attenuation, with no focal hepatic lesions.  Gallbladder: No calcified gallstones.  Bile Ducts: No evidence of dilated ducts.  Pancreas: No mass or peripancreatic fat stranding.  Spleen: Unremarkable.  Adrenals: Unremarkable.  Kidneys/ Ureters: There is a 3 cm complex hypodense lobulated lesion with multiple septations arising from the upper pole of the right kidney.  Differential considerations would include complex cyst versus cystic neoplasm.  In  the interpolar aspect there is a solid mass with enhancement measuring 12 mm roughly corresponding to the echogenic lesion seen on the ultrasound.  No definite macroscopic fat attenuation is seen on the noncontrast series and is not able to be delineated from adjacent renal parenchyma on the noncontrast study.  No stones, solid mass, or hydronephrosis on the left.  Bladder: No evidence of wall thickening.  Reproductive organs: Prostate markedly enlarged with heterogeneous enhancement pattern.  GI Tract/Mesentery: No evidence of bowel obstruction or inflammation.  Peritoneal Space: No ascites. No free air.  Retroperitoneum: No significant adenopathy.  Abdominal wall: Unremarkable.  Vasculature: No significant atherosclerosis or aneurysm.  Bones: No acute fracture.  Impression:  12 mm right renal solid mass concerning for malignancy until proven otherwise.  Further evaluation as warranted.  3 cm complex lesion upper pole right kidney at minimum warrants sonographic surveillance 6 months.    - 4/12/2022 MRI Abdomen:  FINDINGS:  Pulmonary Bases: No pleural effusion  Liver: normal.  Bile Ducts: normal  Gallbladder: normal  Pancreas: normal.  Spleen: normal.  Adrenal Glands: normal.  Proximal Gastrointestinal tract/stomach: Normal.  Kidneys: Septated hyperintense T2 lesion upper pole right kidney 3.6 x 3.2 x 2.7 cm.  Subtle enhancement of the septations noted on postcontrast images, renal cell cystic carcinoma cannot be excluded.  There is a very small lesion at the midpole of the right kidney, measuring approximately 12 mm demonstrating postcontrast enhancement concerning for renal cell carcinoma, it measures 1.4 cm series 1402, image 67  Aorta and Abdominal Vasculature: normal.  Mesentery: normal  Lymph nodes: no pathologically enlarged lymph nodes are seen.  Body wall: normal  Osseous structures: No lesion seen  Other: No free fluid/ascites.  Impression:  Predominantly cystic lesion at the upper pole of the right  kidney with subtly enhancing septations, a cystic renal cell carcinoma cannot be excluded.  Subtle small intrarenal lesion at the midpole of the right kidney demonstrating postcontrast enhancement, a solid renal cell carcinoma cannot be excluded.  This report was flagged in Epic as abnormal.      - 5/9/2022  1.  Retroperitoneal robotic assisted laparoscopic right partial nephrectomy (modifier 22 for complex anatomy, 2 masses, >180% expected time)  2.  Intraoperative ultrasound with interpretation.  1. FINAL RESECTION MARGIN OF RIGHT UPPER POLE MASS:   RENAL PARENCHYMA WITH NO NEOPLASIA IDENTIFIED   2. RIGHT UPPER POLE MASS:   INNOCUOUS BENIGN MULTILOCULAR CYST   3. MASS FROM MID RIGHT  KIDNEY :   RENAL CELL CARCINOMA WITH NO INVOLVEMENT OF MARGINS OR PERINEPHRIC ADIPOSE   TISSUE IDENTIFIED   2. This lesion is a benign multilocular cyst.  The cystic spaces have a   single layer of lining at the most.  There is no proliferative lining   identified in this entirely submitted specimen.  No area has formation of a   mass neoplasm.  There is no significant abnormality of renal parenchyma   separate from the cystic lesion.   3.   Procedure :  Partial renal excision   Specimen Laterality :  Right   Tumor Focality :  Unifocal   Tumor Size :  1.2 cm   Histologic Type :  Renal cell carcinoma, clear cell variant   Tumor Extent :  Limited to kidney   Sarcomatoid Features :  Not identified   Rhabdoid Features :  Not identified   Tumor Necrosis :  Not identified   Margins ; no margin involvement identified   Regional Lymph Node Status :   Not applicable (no regional lymph nodes   submitted or found)   PATHOLOGIC STAGE CLASSIFICATION (pTNM, AJCC 8th Edition) : pT1a pNX pMX   Additional findings :  There is no significant abnormality of renal   parenchyma apart from the masses.    Additional imaging:  - 6/17/2022 MRI prostate:  FINDINGS:  Previous biopsy: None.  PSA: 6.5 ng/mL on 06/15/2022  Prior therapy: None.  Prostate: 6.2 x 6.0  x 6.4 cm corresponding to a computed volume of 122 cc.  Peripheral zone: No focal abnormalities with imaging features concerning for prostate cancer, score 1.  Transitional zone: Benign prostatic hyperplasia without focal suspicious abnormality, score 2.  Neurovascular bundle: Normal appearance.  Seminal vesicles: Normal appearance.  Adjacent Organ Involvement: No evidence for urinary bladder or rectal invasion.  Lymphadenopathy: None.  Other Findings: Diffuse marrow signal heterogeneity in keeping with red marrow hyperplasia.  Impression:  1. Benign prostatic hyperplasia.  2. Red marrow hyperplasia.  Overall Assessment: PI-RADS 2 - Low (clinically significant cancer is unlikely to be present)  Number of targets created for potential MR/US fusion biopsy  Peripheral zone: 0  Transition zone: 0    PMH:  Steel fragment removal from leg  HTN    FH:  Mother  ESRD - unclear reasons at 37 yo  Father  at age 67 yo, heart disease  Siblings - 2 sisters- 1  at age 68 from RCC  1  ESRD, EtOHism  DM  HTN  Maternal grandmother- breast cancer dies at age 59    SH:  Retired, construction work (office)  , 3 kids  Quit tobacco 1978 (1 pack smoker)  Active     Review of Systems   Constitutional:  Negative for activity change, appetite change, chills, fatigue, fever and unexpected weight change.   HENT:  Negative for nasal congestion, postnasal drip, sinus pressure/congestion, sneezing, sore throat and trouble swallowing.    Eyes:  Negative for visual disturbance.   Respiratory:  Negative for cough, shortness of breath and wheezing.    Cardiovascular:  Negative for chest pain, palpitations and leg swelling.   Gastrointestinal:  Negative for abdominal distention, abdominal pain, blood in stool, change in bowel habit, constipation, diarrhea, nausea, vomiting and change in bowel habit.   Genitourinary:  Negative for bladder incontinence, decreased urine volume, difficulty urinating, dysuria, frequency and urgency.    Musculoskeletal:  Negative for arthralgias and back pain.   Neurological:  Negative for dizziness, weakness, light-headedness, numbness, headaches, coordination difficulties and coordination difficulties.   Psychiatric/Behavioral:  Negative for dysphoric mood and sleep disturbance. The patient is not nervous/anxious.        Objective:      Physical Exam  Vitals and nursing note reviewed.   Constitutional:       General: He is not in acute distress.     Appearance: Normal appearance. He is normal weight. He is not ill-appearing.      Comments: Presents alone  Very pleasant  ECOG= 0   Eyes:      General: No scleral icterus.     Extraocular Movements: Extraocular movements intact.      Conjunctiva/sclera: Conjunctivae normal.      Pupils: Pupils are equal, round, and reactive to light.   Cardiovascular:      Rate and Rhythm: Normal rate and regular rhythm.      Heart sounds: Normal heart sounds. No murmur heard.    No friction rub. No gallop.   Pulmonary:      Effort: Pulmonary effort is normal. No respiratory distress.      Breath sounds: Normal breath sounds. No wheezing, rhonchi or rales.   Abdominal:      General: Abdomen is flat. Bowel sounds are normal. There is no distension.      Palpations: Abdomen is soft. There is no mass.      Tenderness: There is no abdominal tenderness. There is no guarding or rebound.   Musculoskeletal:         General: No swelling. Normal range of motion.      Cervical back: Normal range of motion and neck supple. No rigidity.      Right lower leg: No edema.      Left lower leg: No edema.   Lymphadenopathy:      Cervical: No cervical adenopathy.   Skin:     General: Skin is warm and dry.   Neurological:      General: No focal deficit present.      Mental Status: He is alert and oriented to person, place, and time.      Sensory: No sensory deficit.      Motor: No weakness.      Coordination: Coordination normal.   Psychiatric:         Mood and Affect: Mood normal.         Behavior:  Behavior normal.         Thought Content: Thought content normal.         Judgment: Judgment normal.       Assessment:       Problem List Items Addressed This Visit       Renal cancer, right - Primary    Pulmonary nodules     Other Visit Diagnoses       Lung nodules                Plan:       Right sided RCC  Pulmonary nodules noted on recent imaging  Too small to biopsy or further characterize with other imaging modalities- recommend repeat imaging in 3 months (4/2023)  Reviewed differential - infection, inflammatory, exposure and metastatic spread  Discuss with pulmonary as well  Genetic testing referral with personal and family history    Route Chart for Scheduling    Med Onc Chart Routing      Follow up with physician . End of 4/2023 CT scans Chest, Abd and Pelvis and see me post   Follow up with [facility]    Infusion scheduling note    Injection scheduling note    Labs    Imaging    Pharmacy appointment    Other referrals

## 2023-02-14 DIAGNOSIS — R91.8 LUNG NODULES: Primary | ICD-10-CM

## 2023-02-14 DIAGNOSIS — C64.1 RENAL CANCER, RIGHT: ICD-10-CM

## 2023-03-10 ENCOUNTER — TELEPHONE (OUTPATIENT)
Dept: UROLOGY | Facility: CLINIC | Age: 68
End: 2023-03-10
Payer: MEDICARE

## 2023-03-10 NOTE — TELEPHONE ENCOUNTER
----- Message from Mich Farr sent at 3/10/2023  2:18 PM CST -----  Contact: [facility] pharmacy  Type: Requesting refill        Who Called: [facility] pharmacy   Regarding: tamsulosin (FLOMAX) 0.4 mg Cap and tadalafiL (CIALIS) 20 MG Tab  Would the patient rather a call back or a response via [facility]? Call back  Best Call Back Number: [phone]  Additional Information: status of refill request

## 2023-03-13 ENCOUNTER — TELEPHONE (OUTPATIENT)
Dept: UROLOGY | Facility: CLINIC | Age: 68
End: 2023-03-13
Payer: MEDICARE

## 2023-03-13 DIAGNOSIS — Z87.438 HISTORY OF BENIGN PROSTATIC HYPERPLASIA: Primary | ICD-10-CM

## 2023-03-13 RX ORDER — TAMSULOSIN HYDROCHLORIDE 0.4 MG/1
1 CAPSULE ORAL DAILY
Qty: 90 CAPSULE | Refills: 3 | Status: SHIPPED | OUTPATIENT
Start: 2023-03-13 | End: 2024-03-15

## 2023-03-13 NOTE — TELEPHONE ENCOUNTER
I reached back out to the patient to inform him that one of his medications (cialas) isn't able to get refilled but I could forward this message to  on getting the flomax filled. Patient voiced that he is aware of the cialas and just wants to get the flomax refilled.

## 2023-04-03 ENCOUNTER — PATIENT MESSAGE (OUTPATIENT)
Dept: INTERNAL MEDICINE | Facility: CLINIC | Age: 68
End: 2023-04-03
Payer: MEDICARE

## 2023-04-03 ENCOUNTER — OFFICE VISIT (OUTPATIENT)
Dept: INTERNAL MEDICINE | Facility: CLINIC | Age: 68
End: 2023-04-03
Payer: MEDICARE

## 2023-04-03 VITALS
HEIGHT: 74 IN | WEIGHT: 244.69 LBS | DIASTOLIC BLOOD PRESSURE: 79 MMHG | OXYGEN SATURATION: 98 % | SYSTOLIC BLOOD PRESSURE: 130 MMHG | BODY MASS INDEX: 31.4 KG/M2 | HEART RATE: 94 BPM

## 2023-04-03 DIAGNOSIS — R05.1 ACUTE COUGH: Primary | ICD-10-CM

## 2023-04-03 DIAGNOSIS — J00 COMMON COLD: ICD-10-CM

## 2023-04-03 PROCEDURE — 1159F PR MEDICATION LIST DOCUMENTED IN MEDICAL RECORD: ICD-10-PCS | Mod: HCNC,CPTII,S$GLB, | Performed by: INTERNAL MEDICINE

## 2023-04-03 PROCEDURE — 1160F PR REVIEW ALL MEDS BY PRESCRIBER/CLIN PHARMACIST DOCUMENTED: ICD-10-PCS | Mod: HCNC,CPTII,S$GLB, | Performed by: INTERNAL MEDICINE

## 2023-04-03 PROCEDURE — 3008F PR BODY MASS INDEX (BMI) DOCUMENTED: ICD-10-PCS | Mod: HCNC,CPTII,S$GLB, | Performed by: INTERNAL MEDICINE

## 2023-04-03 PROCEDURE — 3078F PR MOST RECENT DIASTOLIC BLOOD PRESSURE < 80 MM HG: ICD-10-PCS | Mod: HCNC,CPTII,S$GLB, | Performed by: INTERNAL MEDICINE

## 2023-04-03 PROCEDURE — 4010F PR ACE/ARB THEARPY RXD/TAKEN: ICD-10-PCS | Mod: HCNC,CPTII,S$GLB, | Performed by: INTERNAL MEDICINE

## 2023-04-03 PROCEDURE — 99999 PR PBB SHADOW E&M-EST. PATIENT-LVL IV: CPT | Mod: PBBFAC,HCNC,, | Performed by: INTERNAL MEDICINE

## 2023-04-03 PROCEDURE — 99213 OFFICE O/P EST LOW 20 MIN: CPT | Mod: HCNC,S$GLB,, | Performed by: INTERNAL MEDICINE

## 2023-04-03 PROCEDURE — 1126F PR PAIN SEVERITY QUANTIFIED, NO PAIN PRESENT: ICD-10-PCS | Mod: HCNC,CPTII,S$GLB, | Performed by: INTERNAL MEDICINE

## 2023-04-03 PROCEDURE — 4010F ACE/ARB THERAPY RXD/TAKEN: CPT | Mod: HCNC,CPTII,S$GLB, | Performed by: INTERNAL MEDICINE

## 2023-04-03 PROCEDURE — 99213 PR OFFICE/OUTPT VISIT, EST, LEVL III, 20-29 MIN: ICD-10-PCS | Mod: HCNC,S$GLB,, | Performed by: INTERNAL MEDICINE

## 2023-04-03 PROCEDURE — 1126F AMNT PAIN NOTED NONE PRSNT: CPT | Mod: HCNC,CPTII,S$GLB, | Performed by: INTERNAL MEDICINE

## 2023-04-03 PROCEDURE — 3078F DIAST BP <80 MM HG: CPT | Mod: HCNC,CPTII,S$GLB, | Performed by: INTERNAL MEDICINE

## 2023-04-03 PROCEDURE — 99999 PR PBB SHADOW E&M-EST. PATIENT-LVL IV: ICD-10-PCS | Mod: PBBFAC,HCNC,, | Performed by: INTERNAL MEDICINE

## 2023-04-03 PROCEDURE — 1160F RVW MEDS BY RX/DR IN RCRD: CPT | Mod: HCNC,CPTII,S$GLB, | Performed by: INTERNAL MEDICINE

## 2023-04-03 PROCEDURE — 1159F MED LIST DOCD IN RCRD: CPT | Mod: HCNC,CPTII,S$GLB, | Performed by: INTERNAL MEDICINE

## 2023-04-03 PROCEDURE — 3008F BODY MASS INDEX DOCD: CPT | Mod: HCNC,CPTII,S$GLB, | Performed by: INTERNAL MEDICINE

## 2023-04-03 PROCEDURE — 3075F SYST BP GE 130 - 139MM HG: CPT | Mod: HCNC,CPTII,S$GLB, | Performed by: INTERNAL MEDICINE

## 2023-04-03 PROCEDURE — 3075F PR MOST RECENT SYSTOLIC BLOOD PRESS GE 130-139MM HG: ICD-10-PCS | Mod: HCNC,CPTII,S$GLB, | Performed by: INTERNAL MEDICINE

## 2023-04-03 RX ORDER — BENZONATATE 100 MG/1
100 CAPSULE ORAL 3 TIMES DAILY PRN
Qty: 30 CAPSULE | Refills: 0 | Status: SHIPPED | OUTPATIENT
Start: 2023-04-03 | End: 2023-04-10

## 2023-04-03 NOTE — PATIENT INSTRUCTIONS
Get over the counter Coricidin for congestion.  Mucinex for phlegm.  Afrin nasal spray for nasal congestion, take once daily for now more than three days.

## 2023-04-03 NOTE — PROGRESS NOTES
INTERNAL MEDICINE ESTABLISHED PATIENT VISIT NOTE    Subjective:     Chief Complaint: Cough, Nasal Congestion, and Sore Throat       Patient ID: Adrian Burt is a 68 y.o. male with preDM, hx elevated PSA, renal CA, GERD, last seen by me 4/2021, here today for c/o URI sx.    Patient states symptoms started on Saturday with his eyes feeling like they were running, also with nasal congestion and fullness in his ears.  Symptoms then progressed to cough as well as hoarseness.  Cough has been productive with green sputum.  Did home COVID test that evening which was negative.    No fever, no shortness of breath.    No body aches.    No recent travel.  Is often around children with his [employer] with which he stays active.    Took over-the-counter cold medications which only helped a little.    Past Medical History:  Past Medical History:   Diagnosis Date    Allergy     Colon polyp     Fatty liver     GERD (gastroesophageal reflux disease)     Hypertension        Home Medications:  Prior to Admission medications    Medication Sig Start Date End Date Taking? Authorizing Provider   amLODIPine (NORVASC) 10 MG tablet TAKE 1 TABLET(10 MG) BY MOUTH EVERY MORNING 3/28/22   Mone Brown MD   aspirin (ECOTRIN) 81 MG EC tablet Take 81 mg by mouth once daily.    Historical Provider   azelastine (ASTELIN) 137 mcg (0.1 %) nasal spray 1 spray (137 mcg total) by Nasal route 2 (two) times daily.  Patient taking differently: 1 spray by Nasal route 2 (two) times daily as needed. 5/6/22 5/6/23  Arvind Mckeon MD   ferrous gluconate (FERGON) 324 MG tablet TAKE 1 TABLET BY MOUTH DAILY WITH BREAKFAST 10/21/22   Ramon Chavira MD   fluticasone propionate (FLONASE) 50 mcg/actuation nasal spray SPRAY 1 SPRAY IN EACH NOSTRIL EVERY DAY 1/17/23   Mone Brown MD   hydroCHLOROthiazide (HYDRODIURIL) 12.5 MG Tab Take 1 tablet (12.5 mg total) by mouth once daily. 3/24/22 3/24/23  Mavis Jeffries NP   losartan (COZAAR) 100 MG tablet TAKE 1  "TABLET(100 MG) BY MOUTH EVERY DAY 23   Mone Brown MD   MODERNA COVID BIVAL,6Y UP,,PF, 50 mcg/0.5 mL injection  10/5/22   Historical Provider   montelukast (SINGULAIR) 10 mg tablet TAKE 1 TABLET(10 MG) BY MOUTH EVERY EVENING 3/15/23   Mone Brown MD   multivit with minerals/lutein (MULTIVITAMIN 50 PLUS ORAL) Take 1 tablet by mouth once daily.    Historical Provider   tadalafiL (CIALIS) 20 MG Tab Take 1 tablet (20 mg total) by mouth daily as needed (1 hour prior to sexual activity). 23  Arvind Álvarez MD   tamsulosin (FLOMAX) 0.4 mg Cap Take 1 capsule (0.4 mg total) by mouth once daily. 3/13/23 3/12/24  Arvind Álvarez MD   testosterone cypionate (DEPOTESTOTERONE CYPIONATE) 200 mg/mL injection Inject 1 mL (200 mg total) into the muscle every 14 (fourteen) days. 23   Arvind Álvarez MD       Allergies:  Review of patient's allergies indicates:  No Known Allergies    Social History:  Social History     Tobacco Use    Smoking status: Former     Types: Cigarettes     Quit date: 11/15/1978     Years since quittin.4    Smokeless tobacco: Never    Tobacco comments:     smoked for 10 years, quit in 78   Substance Use Topics    Alcohol use: No    Drug use: No        Review of Systems   Constitutional:  Negative for chills, fatigue and fever.   HENT:  Positive for congestion, postnasal drip, rhinorrhea, sinus pressure, sore throat and voice change. Negative for ear pain (just fullness) and sinus pain.    Respiratory:  Positive for cough. Negative for chest tightness, shortness of breath and wheezing.    Cardiovascular:  Negative for chest pain.   Gastrointestinal:  Negative for abdominal pain and blood in stool.   Genitourinary:  Negative for dysuria and frequency.       Health Maintenance:     Not addressed today    Objective:   /79 (BP Location: Right arm, Patient Position: Sitting, BP Method: Large (Manual))   Pulse 94   Ht 6' 2" (1.88 m)   Wt 111 kg (244 lb 11.4 oz)   SpO2 " 98%   BMI 31.42 kg/m²        General: AAO x3, no apparent distress  HEENT:  Slightly injected sclera with watery eyes, postnasal drip noted in posterior pharynx.  Scant fluid behind TM on right.  Submandibular glands full but no cervical lymphadenopathy appreciated.  Sinus congestion noted.  CV: RRR, no m/r/g  Pulm: Lungs CTAB, no crackles, no wheezes      Labs:     Lab Results   Component Value Date    WBC 3.98 01/18/2023    HGB 14.7 01/18/2023    HCT 47.4 01/18/2023    MCV 89 01/18/2023     01/18/2023     Sodium   Date Value Ref Range Status   12/06/2022 135 (L) 136 - 145 mmol/L Final     Potassium   Date Value Ref Range Status   12/06/2022 4.2 3.5 - 5.1 mmol/L Final     Chloride   Date Value Ref Range Status   12/06/2022 105 95 - 110 mmol/L Final     CO2   Date Value Ref Range Status   12/06/2022 21 (L) 23 - 29 mmol/L Final     Glucose   Date Value Ref Range Status   12/06/2022 98 70 - 110 mg/dL Final     BUN   Date Value Ref Range Status   12/06/2022 11 8 - 23 mg/dL Final     Creatinine   Date Value Ref Range Status   12/06/2022 1.2 0.5 - 1.4 mg/dL Final     Calcium   Date Value Ref Range Status   12/06/2022 8.9 8.7 - 10.5 mg/dL Final     Total Protein   Date Value Ref Range Status   05/02/2022 7.4 6.0 - 8.4 g/dL Final     Albumin   Date Value Ref Range Status   05/02/2022 3.7 3.5 - 5.2 g/dL Final     Total Bilirubin   Date Value Ref Range Status   05/02/2022 0.6 0.1 - 1.0 mg/dL Final     Comment:     For infants and newborns, interpretation of results should be based  on gestational age, weight and in agreement with clinical  observations.    Premature Infant recommended reference ranges:  Up to 24 hours.............<8.0 mg/dL  Up to 48 hours............<12.0 mg/dL  3-5 days..................<15.0 mg/dL  6-29 days.................<15.0 mg/dL       Alkaline Phosphatase   Date Value Ref Range Status   05/02/2022 53 (L) 55 - 135 U/L Final     AST   Date Value Ref Range Status   05/02/2022 24 10 - 40 U/L  Final     ALT   Date Value Ref Range Status   05/02/2022 27 10 - 44 U/L Final     Anion Gap   Date Value Ref Range Status   12/06/2022 9 8 - 16 mmol/L Final     eGFR if    Date Value Ref Range Status   06/13/2022 >60 >60 mL/min/1.73 m^2 Final     eGFR if non    Date Value Ref Range Status   06/13/2022 >60 >60 mL/min/1.73 m^2 Final     Comment:     Calculation used to obtain the estimated glomerular filtration  rate (eGFR) is the CKD-EPI equation.        Lab Results   Component Value Date    HGBA1C 5.7 (H) 03/24/2022     Lab Results   Component Value Date    LDLCALC 89.8 03/24/2022     No results found for: TSH      Assessment/Plan     Adrian Sierra was seen today for cough, nasal congestion and sore throat.    Diagnoses and all orders for this visit:    Acute cough  Common cold  Home COVID testing negative.  Suspect symptoms secondary to common cold.  Recommend over-the-counter medications including Coricidin as well as Mucinex.  Can also take over-the-counter Afrin nasal spray once daily for 3 days only to help with congestion.  Patient advised to notify me if he develops fever or shortness of breath or worsening sinus symptoms such as sinus pain.  We will have patient return to see me in a week at which time we can do his routine follow-up visit and reassess his cold symptoms.  -     benzonatate (TESSALON) 100 MG capsule; Take 1 capsule (100 mg total) by mouth 3 (three) times daily as needed for Cough.        HM as above  RTC in 1 week, sooner if needed.    Mone Brown MD  Department of Internal Medicine - [facility]  04/03/2023

## 2023-04-10 ENCOUNTER — LAB VISIT (OUTPATIENT)
Dept: LAB | Facility: HOSPITAL | Age: 68
End: 2023-04-10
Attending: INTERNAL MEDICINE
Payer: MEDICARE

## 2023-04-10 ENCOUNTER — IMMUNIZATION (OUTPATIENT)
Dept: PHARMACY | Facility: CLINIC | Age: 68
End: 2023-04-10
Payer: MEDICARE

## 2023-04-10 ENCOUNTER — OFFICE VISIT (OUTPATIENT)
Dept: INTERNAL MEDICINE | Facility: CLINIC | Age: 68
End: 2023-04-10
Payer: MEDICARE

## 2023-04-10 VITALS
SYSTOLIC BLOOD PRESSURE: 138 MMHG | OXYGEN SATURATION: 97 % | WEIGHT: 245.38 LBS | HEIGHT: 74 IN | DIASTOLIC BLOOD PRESSURE: 82 MMHG | BODY MASS INDEX: 31.49 KG/M2 | HEART RATE: 79 BPM

## 2023-04-10 DIAGNOSIS — R73.03 PREDIABETES: ICD-10-CM

## 2023-04-10 DIAGNOSIS — R79.89 LOW TESTOSTERONE: ICD-10-CM

## 2023-04-10 DIAGNOSIS — R79.9 ABNORMAL FINDING OF BLOOD CHEMISTRY, UNSPECIFIED: ICD-10-CM

## 2023-04-10 DIAGNOSIS — J30.1 SEASONAL ALLERGIC RHINITIS DUE TO POLLEN: ICD-10-CM

## 2023-04-10 DIAGNOSIS — R91.8 PULMONARY NODULES: ICD-10-CM

## 2023-04-10 DIAGNOSIS — E66.09 CLASS 1 OBESITY DUE TO EXCESS CALORIES WITH SERIOUS COMORBIDITY AND BODY MASS INDEX (BMI) OF 31.0 TO 31.9 IN ADULT: ICD-10-CM

## 2023-04-10 DIAGNOSIS — Z23 NEED FOR VACCINATION: Primary | ICD-10-CM

## 2023-04-10 DIAGNOSIS — Z12.11 SCREEN FOR COLON CANCER: ICD-10-CM

## 2023-04-10 DIAGNOSIS — Z13.220 LIPID SCREENING: ICD-10-CM

## 2023-04-10 DIAGNOSIS — C64.1 RENAL CANCER, RIGHT: ICD-10-CM

## 2023-04-10 DIAGNOSIS — R97.20 ELEVATED PSA: ICD-10-CM

## 2023-04-10 DIAGNOSIS — I10 ESSENTIAL HYPERTENSION: Primary | ICD-10-CM

## 2023-04-10 DIAGNOSIS — Z87.438 HISTORY OF BENIGN PROSTATIC HYPERPLASIA: ICD-10-CM

## 2023-04-10 DIAGNOSIS — I70.0 AORTIC ATHEROSCLEROSIS: ICD-10-CM

## 2023-04-10 PROBLEM — E66.811 CLASS 1 OBESITY DUE TO EXCESS CALORIES WITH SERIOUS COMORBIDITY AND BODY MASS INDEX (BMI) OF 31.0 TO 31.9 IN ADULT: Status: ACTIVE | Noted: 2019-11-14

## 2023-04-10 LAB
ALBUMIN SERPL BCP-MCNC: 3.9 G/DL (ref 3.5–5.2)
ALP SERPL-CCNC: 51 U/L (ref 55–135)
ALT SERPL W/O P-5'-P-CCNC: 27 U/L (ref 10–44)
ANION GAP SERPL CALC-SCNC: 9 MMOL/L (ref 8–16)
AST SERPL-CCNC: 27 U/L (ref 10–40)
BASOPHILS # BLD AUTO: 0.02 K/UL (ref 0–0.2)
BASOPHILS NFR BLD: 0.5 % (ref 0–1.9)
BILIRUB SERPL-MCNC: 0.6 MG/DL (ref 0.1–1)
BUN SERPL-MCNC: 14 MG/DL (ref 8–23)
CALCIUM SERPL-MCNC: 9.6 MG/DL (ref 8.7–10.5)
CHLORIDE SERPL-SCNC: 101 MMOL/L (ref 95–110)
CHOLEST SERPL-MCNC: 154 MG/DL (ref 120–199)
CHOLEST/HDLC SERPL: 4.7 {RATIO} (ref 2–5)
CO2 SERPL-SCNC: 27 MMOL/L (ref 23–29)
CREAT SERPL-MCNC: 1.1 MG/DL (ref 0.5–1.4)
DIFFERENTIAL METHOD: ABNORMAL
EOSINOPHIL # BLD AUTO: 0.2 K/UL (ref 0–0.5)
EOSINOPHIL NFR BLD: 4 % (ref 0–8)
ERYTHROCYTE [DISTWIDTH] IN BLOOD BY AUTOMATED COUNT: 13.6 % (ref 11.5–14.5)
EST. GFR  (NO RACE VARIABLE): >60 ML/MIN/1.73 M^2
ESTIMATED AVG GLUCOSE: 117 MG/DL (ref 68–131)
GLUCOSE SERPL-MCNC: 93 MG/DL (ref 70–110)
HBA1C MFR BLD: 5.7 % (ref 4–5.6)
HCT VFR BLD AUTO: 47.2 % (ref 40–54)
HDLC SERPL-MCNC: 33 MG/DL (ref 40–75)
HDLC SERPL: 21.4 % (ref 20–50)
HGB BLD-MCNC: 14.9 G/DL (ref 14–18)
IMM GRANULOCYTES # BLD AUTO: 0.01 K/UL (ref 0–0.04)
IMM GRANULOCYTES NFR BLD AUTO: 0.3 % (ref 0–0.5)
LDLC SERPL CALC-MCNC: 106.2 MG/DL (ref 63–159)
LYMPHOCYTES # BLD AUTO: 1.9 K/UL (ref 1–4.8)
LYMPHOCYTES NFR BLD: 47.4 % (ref 18–48)
MCH RBC QN AUTO: 28.2 PG (ref 27–31)
MCHC RBC AUTO-ENTMCNC: 31.6 G/DL (ref 32–36)
MCV RBC AUTO: 89 FL (ref 82–98)
MONOCYTES # BLD AUTO: 0.4 K/UL (ref 0.3–1)
MONOCYTES NFR BLD: 10.8 % (ref 4–15)
NEUTROPHILS # BLD AUTO: 1.5 K/UL (ref 1.8–7.7)
NEUTROPHILS NFR BLD: 37 % (ref 38–73)
NONHDLC SERPL-MCNC: 121 MG/DL
NRBC BLD-RTO: 0 /100 WBC
PLATELET # BLD AUTO: 212 K/UL (ref 150–450)
PMV BLD AUTO: 10.9 FL (ref 9.2–12.9)
POTASSIUM SERPL-SCNC: 4 MMOL/L (ref 3.5–5.1)
PROT SERPL-MCNC: 7.9 G/DL (ref 6–8.4)
RBC # BLD AUTO: 5.28 M/UL (ref 4.6–6.2)
SODIUM SERPL-SCNC: 137 MMOL/L (ref 136–145)
TRIGL SERPL-MCNC: 74 MG/DL (ref 30–150)
WBC # BLD AUTO: 3.97 K/UL (ref 3.9–12.7)

## 2023-04-10 PROCEDURE — 99214 OFFICE O/P EST MOD 30 MIN: CPT | Mod: HCNC,S$GLB,, | Performed by: INTERNAL MEDICINE

## 2023-04-10 PROCEDURE — 1126F AMNT PAIN NOTED NONE PRSNT: CPT | Mod: HCNC,CPTII,S$GLB, | Performed by: INTERNAL MEDICINE

## 2023-04-10 PROCEDURE — 3075F PR MOST RECENT SYSTOLIC BLOOD PRESS GE 130-139MM HG: ICD-10-PCS | Mod: HCNC,CPTII,S$GLB, | Performed by: INTERNAL MEDICINE

## 2023-04-10 PROCEDURE — 3288F FALL RISK ASSESSMENT DOCD: CPT | Mod: HCNC,CPTII,S$GLB, | Performed by: INTERNAL MEDICINE

## 2023-04-10 PROCEDURE — 1160F PR REVIEW ALL MEDS BY PRESCRIBER/CLIN PHARMACIST DOCUMENTED: ICD-10-PCS | Mod: HCNC,CPTII,S$GLB, | Performed by: INTERNAL MEDICINE

## 2023-04-10 PROCEDURE — 3008F PR BODY MASS INDEX (BMI) DOCUMENTED: ICD-10-PCS | Mod: HCNC,CPTII,S$GLB, | Performed by: INTERNAL MEDICINE

## 2023-04-10 PROCEDURE — 3008F BODY MASS INDEX DOCD: CPT | Mod: HCNC,CPTII,S$GLB, | Performed by: INTERNAL MEDICINE

## 2023-04-10 PROCEDURE — 99499 UNLISTED E&M SERVICE: CPT | Mod: S$GLB,,, | Performed by: INTERNAL MEDICINE

## 2023-04-10 PROCEDURE — 3288F PR FALLS RISK ASSESSMENT DOCUMENTED: ICD-10-PCS | Mod: HCNC,CPTII,S$GLB, | Performed by: INTERNAL MEDICINE

## 2023-04-10 PROCEDURE — 1126F PR PAIN SEVERITY QUANTIFIED, NO PAIN PRESENT: ICD-10-PCS | Mod: HCNC,CPTII,S$GLB, | Performed by: INTERNAL MEDICINE

## 2023-04-10 PROCEDURE — 3075F SYST BP GE 130 - 139MM HG: CPT | Mod: HCNC,CPTII,S$GLB, | Performed by: INTERNAL MEDICINE

## 2023-04-10 PROCEDURE — 85025 COMPLETE CBC W/AUTO DIFF WBC: CPT | Mod: HCNC | Performed by: INTERNAL MEDICINE

## 2023-04-10 PROCEDURE — 3079F PR MOST RECENT DIASTOLIC BLOOD PRESSURE 80-89 MM HG: ICD-10-PCS | Mod: HCNC,CPTII,S$GLB, | Performed by: INTERNAL MEDICINE

## 2023-04-10 PROCEDURE — 1160F RVW MEDS BY RX/DR IN RCRD: CPT | Mod: HCNC,CPTII,S$GLB, | Performed by: INTERNAL MEDICINE

## 2023-04-10 PROCEDURE — 36415 COLL VENOUS BLD VENIPUNCTURE: CPT | Mod: HCNC | Performed by: INTERNAL MEDICINE

## 2023-04-10 PROCEDURE — 99999 PR PBB SHADOW E&M-EST. PATIENT-LVL IV: CPT | Mod: PBBFAC,HCNC,, | Performed by: INTERNAL MEDICINE

## 2023-04-10 PROCEDURE — 3079F DIAST BP 80-89 MM HG: CPT | Mod: HCNC,CPTII,S$GLB, | Performed by: INTERNAL MEDICINE

## 2023-04-10 PROCEDURE — 1159F PR MEDICATION LIST DOCUMENTED IN MEDICAL RECORD: ICD-10-PCS | Mod: HCNC,CPTII,S$GLB, | Performed by: INTERNAL MEDICINE

## 2023-04-10 PROCEDURE — 1101F PT FALLS ASSESS-DOCD LE1/YR: CPT | Mod: HCNC,CPTII,S$GLB, | Performed by: INTERNAL MEDICINE

## 2023-04-10 PROCEDURE — 99214 PR OFFICE/OUTPT VISIT, EST, LEVL IV, 30-39 MIN: ICD-10-PCS | Mod: HCNC,S$GLB,, | Performed by: INTERNAL MEDICINE

## 2023-04-10 PROCEDURE — 99999 PR PBB SHADOW E&M-EST. PATIENT-LVL IV: ICD-10-PCS | Mod: PBBFAC,HCNC,, | Performed by: INTERNAL MEDICINE

## 2023-04-10 PROCEDURE — 80053 COMPREHEN METABOLIC PANEL: CPT | Mod: HCNC | Performed by: INTERNAL MEDICINE

## 2023-04-10 PROCEDURE — 80061 LIPID PANEL: CPT | Mod: HCNC | Performed by: INTERNAL MEDICINE

## 2023-04-10 PROCEDURE — 4010F PR ACE/ARB THEARPY RXD/TAKEN: ICD-10-PCS | Mod: HCNC,CPTII,S$GLB, | Performed by: INTERNAL MEDICINE

## 2023-04-10 PROCEDURE — 4010F ACE/ARB THERAPY RXD/TAKEN: CPT | Mod: HCNC,CPTII,S$GLB, | Performed by: INTERNAL MEDICINE

## 2023-04-10 PROCEDURE — 1159F MED LIST DOCD IN RCRD: CPT | Mod: HCNC,CPTII,S$GLB, | Performed by: INTERNAL MEDICINE

## 2023-04-10 PROCEDURE — 1101F PR PT FALLS ASSESS DOC 0-1 FALLS W/OUT INJ PAST YR: ICD-10-PCS | Mod: HCNC,CPTII,S$GLB, | Performed by: INTERNAL MEDICINE

## 2023-04-10 PROCEDURE — 83036 HEMOGLOBIN GLYCOSYLATED A1C: CPT | Mod: HCNC | Performed by: INTERNAL MEDICINE

## 2023-04-10 RX ORDER — AMLODIPINE BESYLATE 10 MG/1
10 TABLET ORAL EVERY MORNING
Qty: 90 TABLET | Refills: 3 | Status: SHIPPED | OUTPATIENT
Start: 2023-04-10 | End: 2024-04-02 | Stop reason: SDUPTHER

## 2023-04-10 RX ORDER — AZELASTINE 1 MG/ML
1 SPRAY, METERED NASAL 2 TIMES DAILY
Qty: 30 ML | Refills: 3 | Status: SHIPPED | OUTPATIENT
Start: 2023-04-10 | End: 2024-04-09

## 2023-04-10 RX ORDER — MONTELUKAST SODIUM 10 MG/1
10 TABLET ORAL NIGHTLY
Qty: 90 TABLET | Refills: 3 | Status: SHIPPED | OUTPATIENT
Start: 2023-04-10 | End: 2024-04-02 | Stop reason: SDUPTHER

## 2023-04-10 RX ORDER — LOSARTAN POTASSIUM 100 MG/1
100 TABLET ORAL DAILY
Qty: 90 TABLET | Refills: 3 | Status: SHIPPED | OUTPATIENT
Start: 2023-04-10 | End: 2023-06-30 | Stop reason: SDUPTHER

## 2023-04-10 RX ORDER — HYDROCHLOROTHIAZIDE 12.5 MG/1
12.5 TABLET ORAL DAILY
Qty: 90 TABLET | Refills: 3 | Status: SHIPPED | OUTPATIENT
Start: 2023-04-10 | End: 2023-04-24

## 2023-04-10 NOTE — PROGRESS NOTES
INTERNAL MEDICINE ESTABLISHED PATIENT VISIT NOTE    Subjective:     Chief Complaint: Follow-up  preDM, HTN     Patient ID: Adrian Burt is a 68 y.o. male with HTN, preDM, BPH, elevated PSA, renal CA s/p R partial nephrectomy 5/2022, GERD, last seen by me a week ago for URI sx, here today for routine f/u.    Urology issues have been followed by Dr. Álvarez in the past with last visit in January.  PSA slightly elevated at that time with plan for follow-up in 6 months with testosterone labs and PSA.  Had CT of the chest done at that time which showed some small lung nodules.  Was referred to Oncology for evaluation.  Seen by Dr. Chávez who noted nodules were too small to biopsy or further characterize so recommended f/u CT in 3 mos (scheduled for later this month c f/u appt c Dr. Chávez).    At his visit with me last week, was recommended to take over-the-counter Coricidin and Mucinex for symptoms of common cold.  States he thinks that he had an allergic reaction with Tessalon and because his lips to feel very dry and also thinks that it they felt swollen but did not look very swollen.  States he tried medications on another day and had recurrent symptoms so stopped taking it.  States cough and congestion have improved overall but does complain of some fullness in his ears, right greater than left.    Past Medical History:  Past Medical History:   Diagnosis Date    Allergy     Colon polyp     Fatty liver     GERD (gastroesophageal reflux disease)     Hypertension        Home Medications:  Prior to Admission medications    Medication Sig Start Date End Date Taking? Authorizing Provider   amLODIPine (NORVASC) 10 MG tablet TAKE 1 TABLET(10 MG) BY MOUTH EVERY MORNING 3/28/22   Mone Brown MD   aspirin (ECOTRIN) 81 MG EC tablet Take 81 mg by mouth once daily.    Historical Provider   azelastine (ASTELIN) 137 mcg (0.1 %) nasal spray 1 spray (137 mcg total) by Nasal route 2 (two) times daily.  Patient taking differently:  1 spray by Nasal route 2 (two) times daily as needed. 22  Arvind Mckeon MD   benzonatate (TESSALON) 100 MG capsule Take 1 capsule (100 mg total) by mouth 3 (three) times daily as needed for Cough. 4/3/23 4/13/23  Mone Brown MD   ferrous gluconate (FERGON) 324 MG tablet TAKE 1 TABLET BY MOUTH DAILY WITH BREAKFAST 10/21/22   Ramon Chavira MD   fluticasone propionate (FLONASE) 50 mcg/actuation nasal spray SPRAY 1 SPRAY IN EACH NOSTRIL EVERY DAY 23   Mone Brown MD   hydroCHLOROthiazide (HYDRODIURIL) 12.5 MG Tab Take 1 tablet (12.5 mg total) by mouth once daily. 3/24/22 3/24/23  Mavis Jeffries NP   losartan (COZAAR) 100 MG tablet TAKE 1 TABLET(100 MG) BY MOUTH EVERY DAY 23   Mone Brown MD   MODERNA COVID BIVAL,6Y UP,,PF, 50 mcg/0.5 mL injection  10/5/22   Historical Provider   montelukast (SINGULAIR) 10 mg tablet TAKE 1 TABLET(10 MG) BY MOUTH EVERY EVENING 3/15/23   Mone Brown MD   multivit with minerals/lutein (MULTIVITAMIN 50 PLUS ORAL) Take 1 tablet by mouth once daily.    Historical Provider   tadalafiL (CIALIS) 20 MG Tab Take 1 tablet (20 mg total) by mouth daily as needed (1 hour prior to sexual activity). 23  Arvind Álvarez MD   tamsulosin (FLOMAX) 0.4 mg Cap Take 1 capsule (0.4 mg total) by mouth once daily. 3/13/23 3/12/24  Arvind Álvarez MD   testosterone cypionate (DEPOTESTOTERONE CYPIONATE) 200 mg/mL injection Inject 1 mL (200 mg total) into the muscle every 14 (fourteen) days. 23   Arvind Álvarez MD       Allergies:  Review of patient's allergies indicates:   Allergen Reactions    Tessalon [benzonatate] Other (See Comments)     States lips felt dry and swollen       Social History:  Social History     Tobacco Use    Smoking status: Former     Types: Cigarettes     Quit date: 11/15/1978     Years since quittin.4    Smokeless tobacco: Never    Tobacco comments:     smoked for 10 years, quit in 76   Substance Use Topics    Alcohol use: No  "   Drug use: No        Review of Systems   Constitutional:  Negative for appetite change, chills, fatigue, fever and unexpected weight change.   HENT:  Negative for congestion, hearing loss and rhinorrhea.    Eyes:  Negative for pain and visual disturbance.   Respiratory:  Negative for cough, chest tightness, shortness of breath and wheezing.    Cardiovascular:  Negative for chest pain, palpitations and leg swelling.   Gastrointestinal:  Negative for abdominal distention and abdominal pain.   Endocrine: Negative for polydipsia and polyuria.   Genitourinary:  Negative for decreased urine volume, dysuria, frequency and penile discharge.   Neurological:  Negative for dizziness, weakness, numbness and headaches.   Psychiatric/Behavioral:  Negative for behavioral problems and confusion.        Health Maintenance:     Immunizations:   Influenza 11/2022  TDap 9/2018  Prevnar 20 4/2023, pvax done 4/2021  Shingrix 3/2020, 8/2020  COVID vaccine Moderna completed 2/2021, 3/24/2021, 12/2021, booster rec now.     Cancer Screening:  Colonoscopy:  8/2017 3 polyps removed, tubular adenoma on path and reported rec f/u 5 yrs, will refer.  PSA 1/2023 elevated at 5.9, marginally worse compared to previous.  AAA screening neg 9/2018    Objective:   /82 (BP Location: Left arm, Patient Position: Sitting, BP Method: Large (Manual))   Pulse 79   Ht 6' 2" (1.88 m)   Wt 111.3 kg (245 lb 6 oz)   SpO2 97%   BMI 31.50 kg/m²        General: AAO x3, no apparent distress  HEENT: PERRL, scant fluid behind TM on R, no erythema or bulging.  CV: RRR, no m/r/g  Pulm: Lungs CTAB, no crackles, no wheezes  Abd: s/NT/ND +BS  Extremities: no c/c/e    Labs:     Lab Results   Component Value Date    WBC 3.98 01/18/2023    HGB 14.7 01/18/2023    HCT 47.4 01/18/2023    MCV 89 01/18/2023     01/18/2023     Sodium   Date Value Ref Range Status   12/06/2022 135 (L) 136 - 145 mmol/L Final     Potassium   Date Value Ref Range Status   12/06/2022 4.2 " 3.5 - 5.1 mmol/L Final     Chloride   Date Value Ref Range Status   12/06/2022 105 95 - 110 mmol/L Final     CO2   Date Value Ref Range Status   12/06/2022 21 (L) 23 - 29 mmol/L Final     Glucose   Date Value Ref Range Status   12/06/2022 98 70 - 110 mg/dL Final     BUN   Date Value Ref Range Status   12/06/2022 11 8 - 23 mg/dL Final     Creatinine   Date Value Ref Range Status   12/06/2022 1.2 0.5 - 1.4 mg/dL Final     Calcium   Date Value Ref Range Status   12/06/2022 8.9 8.7 - 10.5 mg/dL Final     Total Protein   Date Value Ref Range Status   05/02/2022 7.4 6.0 - 8.4 g/dL Final     Albumin   Date Value Ref Range Status   05/02/2022 3.7 3.5 - 5.2 g/dL Final     Total Bilirubin   Date Value Ref Range Status   05/02/2022 0.6 0.1 - 1.0 mg/dL Final     Comment:     For infants and newborns, interpretation of results should be based  on gestational age, weight and in agreement with clinical  observations.    Premature Infant recommended reference ranges:  Up to 24 hours.............<8.0 mg/dL  Up to 48 hours............<12.0 mg/dL  3-5 days..................<15.0 mg/dL  6-29 days.................<15.0 mg/dL       Alkaline Phosphatase   Date Value Ref Range Status   05/02/2022 53 (L) 55 - 135 U/L Final     AST   Date Value Ref Range Status   05/02/2022 24 10 - 40 U/L Final     ALT   Date Value Ref Range Status   05/02/2022 27 10 - 44 U/L Final     Anion Gap   Date Value Ref Range Status   12/06/2022 9 8 - 16 mmol/L Final     eGFR if    Date Value Ref Range Status   06/13/2022 >60 >60 mL/min/1.73 m^2 Final     eGFR if non    Date Value Ref Range Status   06/13/2022 >60 >60 mL/min/1.73 m^2 Final     Comment:     Calculation used to obtain the estimated glomerular filtration  rate (eGFR) is the CKD-EPI equation.        Lab Results   Component Value Date    HGBA1C 5.7 (H) 03/24/2022     Lab Results   Component Value Date    LDLCALC 89.8 03/24/2022     No results found for:  TSH      Assessment/Plan     Adrian Sierra was seen today for follow-up.    Diagnoses and all orders for this visit:    Essential hypertension  At goal, continue medications.  Refill sent today.    -     losartan (COZAAR) 100 MG tablet; Take 1 tablet (100 mg total) by mouth once daily.  -     amLODIPine (NORVASC) 10 MG tablet; Take 1 tablet (10 mg total) by mouth every morning.  -     hydroCHLOROthiazide (HYDRODIURIL) 12.5 MG Tab; Take 1 tablet (12.5 mg total) by mouth once daily.    Prediabetes  Lab Results   Component Value Date    HGBA1C 5.7 (H) 03/24/2022     Pt was counseled on the need to avoid intake of simple sugars and minimize carbohydrates.  Also recommended weight loss and daily exercise.  -     CBC Auto Differential; Future  -     Comprehensive Metabolic Panel; Future  -     Hemoglobin A1C; Future    Renal cancer, right  S/p nephrectomy and followed by Dr. Steele, cont routine f/u  -     Ambulatory referral/consult to Urology; Future    Pulmonary nodules  Noted on CT from Urology, seen by Dr. Chávez c plan f/u f/u CT later this month and f/u c Dr. Chávez that week.    History of benign prostatic hyperplasia  Stable on Tamsulosin, cont meds.  -     Ambulatory referral/consult to Urology; Future    Low testosterone  Managed by Urology, cont routine f/u    Elevated PSA  As per HPI  Last checked in jan c plan to f/u p 6 mos, referral placed to schedule his routine f/u for July  -     Ambulatory referral/consult to Urology; Future    Aortic atherosclerosis  No acute issues, cont bp and lipid control    Class 1 obesity due to excess calories with serious comorbidity and body mass index (BMI) of 31.0 to 31.9 in adult  Counseled extensively on need for diet changes and daily exercise.  Recommend at least 30 minutes of exercise at least 5 days a week.      Seasonal allergic rhinitis due to pollen  As per HPI  Advised to resume Asteline  Cont Singulair  -     montelukast (SINGULAIR) 10 mg tablet; Take 1 tablet (10  mg total) by mouth every evening.  -     azelastine (ASTELIN) 137 mcg (0.1 %) nasal spray; 1 spray (137 mcg total) by Nasal route 2 (two) times daily.    Screen for colon cancer  -     Ambulatory referral/consult to Endo Procedure ; Future    Lipid screening  -     Lipid Panel; Future      HM as above  RTC in 6 mos, sooner if needed.  Fasting labs today, COVID booster today.    Mone Brown MD  Department of Internal Medicine - [facility]  04/10/2023

## 2023-04-22 ENCOUNTER — PATIENT MESSAGE (OUTPATIENT)
Dept: UROLOGY | Facility: CLINIC | Age: 68
End: 2023-04-22
Payer: MEDICARE

## 2023-04-24 ENCOUNTER — TELEPHONE (OUTPATIENT)
Dept: UROLOGY | Facility: CLINIC | Age: 68
End: 2023-04-24
Payer: MEDICARE

## 2023-04-24 ENCOUNTER — HOSPITAL ENCOUNTER (OUTPATIENT)
Dept: RADIOLOGY | Facility: HOSPITAL | Age: 68
Discharge: HOME | End: 2023-04-24
Attending: INTERNAL MEDICINE
Payer: MEDICARE

## 2023-04-24 DIAGNOSIS — C64.1 RENAL CANCER, RIGHT: ICD-10-CM

## 2023-04-24 DIAGNOSIS — R91.8 LUNG NODULES: ICD-10-CM

## 2023-04-24 PROCEDURE — 74178 CT ABD&PLV WO CNTR FLWD CNTR: CPT | Mod: 26,HCNC,, | Performed by: RADIOLOGY

## 2023-04-24 PROCEDURE — 74178 CT CHEST ABDOMEN PELVIS W W/O CONTRAST (XPD): ICD-10-PCS | Mod: 26,HCNC,, | Performed by: RADIOLOGY

## 2023-04-24 PROCEDURE — 71270 CT CHEST ABDOMEN PELVIS W W/O CONTRAST (XPD): ICD-10-PCS | Mod: 26,HCNC,, | Performed by: RADIOLOGY

## 2023-04-24 PROCEDURE — 71270 CT THORAX DX C-/C+: CPT | Mod: TC,HCNC

## 2023-04-24 PROCEDURE — 71270 CT THORAX DX C-/C+: CPT | Mod: 26,HCNC,, | Performed by: RADIOLOGY

## 2023-04-24 PROCEDURE — 25500020 PHARM REV CODE 255: Mod: HCNC | Performed by: INTERNAL MEDICINE

## 2023-04-24 RX ADMIN — IOHEXOL 100 ML: 350 INJECTION, SOLUTION INTRAVENOUS at 12:04

## 2023-04-24 NOTE — TELEPHONE ENCOUNTER
Contacted patient to follow up with medication he is trying to get addressed and is it ok to speak with Dr Tan.  Patient informed he was trying to get cardiologist to refill Trimix prescription.  Patient is currently taking testosterone, cialis, and trimix. He informed me he knows how to inject, no need for another visit.  [facility] pharmacy won't release refill since Dr Rogers has retired despite refills (2)  Contacted Dr Tan, he wanted to make sure this can be addressed since it's beyond his scope of practice.  Informed him we expect to contact patient on tomorrow.  He was very pleasant.    If trimix authorized, patient would like prescription sent to [facility] in [place].  Thanks

## 2023-04-24 NOTE — TELEPHONE ENCOUNTER
----- Message from Marleni Lorenzo sent at 4/24/2023  2:08 PM CDT -----  .Type:  Provider Requesting to Speak to Dr. Álvarez     Who Called: Dr. Tan  Would the patient rather a call back or a response via [facility]? call  Best Call Back Number:    Additional Information:       Pt has a medication that he is having trouble getting filled and caller would like to advocate for the pt

## 2023-04-25 NOTE — TELEPHONE ENCOUNTER
Confirmed with patient  Trimix 30/2/20  Papaverine/Phentolamine/Alprostadil 30 mg/2/20 mcg/ml  Inject 0.5 ml/50 units

## 2023-04-26 NOTE — TELEPHONE ENCOUNTER
Is there a certain time frame patient should have PSA drawn since last one was 1/2023?  Initially you requested he have done around July 2023 prior to new refill request.  Please advise

## 2023-04-27 ENCOUNTER — OFFICE VISIT (OUTPATIENT)
Dept: HEMATOLOGY/ONCOLOGY | Facility: CLINIC | Age: 68
End: 2023-04-27
Payer: MEDICARE

## 2023-04-27 VITALS
SYSTOLIC BLOOD PRESSURE: 162 MMHG | WEIGHT: 242.06 LBS | BODY MASS INDEX: 31.07 KG/M2 | RESPIRATION RATE: 18 BRPM | OXYGEN SATURATION: 99 % | DIASTOLIC BLOOD PRESSURE: 82 MMHG | HEIGHT: 74 IN | TEMPERATURE: 98 F | HEART RATE: 76 BPM

## 2023-04-27 DIAGNOSIS — C64.1 RENAL CANCER, RIGHT: ICD-10-CM

## 2023-04-27 DIAGNOSIS — Z87.438 HISTORY OF BENIGN PROSTATIC HYPERPLASIA: Primary | ICD-10-CM

## 2023-04-27 DIAGNOSIS — R91.8 PULMONARY NODULES: ICD-10-CM

## 2023-04-27 DIAGNOSIS — L98.9 SKIN LESION: ICD-10-CM

## 2023-04-27 DIAGNOSIS — Z00.00 HEALTHCARE MAINTENANCE: ICD-10-CM

## 2023-04-27 PROCEDURE — 1101F PR PT FALLS ASSESS DOC 0-1 FALLS W/OUT INJ PAST YR: ICD-10-PCS | Mod: HCNC,CPTII,S$GLB, | Performed by: INTERNAL MEDICINE

## 2023-04-27 PROCEDURE — 3077F SYST BP >= 140 MM HG: CPT | Mod: HCNC,CPTII,S$GLB, | Performed by: INTERNAL MEDICINE

## 2023-04-27 PROCEDURE — 1160F PR REVIEW ALL MEDS BY PRESCRIBER/CLIN PHARMACIST DOCUMENTED: ICD-10-PCS | Mod: HCNC,CPTII,S$GLB, | Performed by: INTERNAL MEDICINE

## 2023-04-27 PROCEDURE — 4010F ACE/ARB THERAPY RXD/TAKEN: CPT | Mod: HCNC,CPTII,S$GLB, | Performed by: INTERNAL MEDICINE

## 2023-04-27 PROCEDURE — 1126F AMNT PAIN NOTED NONE PRSNT: CPT | Mod: HCNC,CPTII,S$GLB, | Performed by: INTERNAL MEDICINE

## 2023-04-27 PROCEDURE — 99999 PR PBB SHADOW E&M-EST. PATIENT-LVL IV: ICD-10-PCS | Mod: PBBFAC,HCNC,, | Performed by: INTERNAL MEDICINE

## 2023-04-27 PROCEDURE — 99999 PR PBB SHADOW E&M-EST. PATIENT-LVL IV: CPT | Mod: PBBFAC,HCNC,, | Performed by: INTERNAL MEDICINE

## 2023-04-27 PROCEDURE — 3008F PR BODY MASS INDEX (BMI) DOCUMENTED: ICD-10-PCS | Mod: HCNC,CPTII,S$GLB, | Performed by: INTERNAL MEDICINE

## 2023-04-27 PROCEDURE — 1159F PR MEDICATION LIST DOCUMENTED IN MEDICAL RECORD: ICD-10-PCS | Mod: HCNC,CPTII,S$GLB, | Performed by: INTERNAL MEDICINE

## 2023-04-27 PROCEDURE — 99214 PR OFFICE/OUTPT VISIT, EST, LEVL IV, 30-39 MIN: ICD-10-PCS | Mod: HCNC,S$GLB,, | Performed by: INTERNAL MEDICINE

## 2023-04-27 PROCEDURE — 1159F MED LIST DOCD IN RCRD: CPT | Mod: HCNC,CPTII,S$GLB, | Performed by: INTERNAL MEDICINE

## 2023-04-27 PROCEDURE — 1126F PR PAIN SEVERITY QUANTIFIED, NO PAIN PRESENT: ICD-10-PCS | Mod: HCNC,CPTII,S$GLB, | Performed by: INTERNAL MEDICINE

## 2023-04-27 PROCEDURE — 1160F RVW MEDS BY RX/DR IN RCRD: CPT | Mod: HCNC,CPTII,S$GLB, | Performed by: INTERNAL MEDICINE

## 2023-04-27 PROCEDURE — 4010F PR ACE/ARB THEARPY RXD/TAKEN: ICD-10-PCS | Mod: HCNC,CPTII,S$GLB, | Performed by: INTERNAL MEDICINE

## 2023-04-27 PROCEDURE — 3079F DIAST BP 80-89 MM HG: CPT | Mod: HCNC,CPTII,S$GLB, | Performed by: INTERNAL MEDICINE

## 2023-04-27 PROCEDURE — 99214 OFFICE O/P EST MOD 30 MIN: CPT | Mod: HCNC,S$GLB,, | Performed by: INTERNAL MEDICINE

## 2023-04-27 PROCEDURE — 3008F BODY MASS INDEX DOCD: CPT | Mod: HCNC,CPTII,S$GLB, | Performed by: INTERNAL MEDICINE

## 2023-04-27 PROCEDURE — 3288F FALL RISK ASSESSMENT DOCD: CPT | Mod: HCNC,CPTII,S$GLB, | Performed by: INTERNAL MEDICINE

## 2023-04-27 PROCEDURE — 3079F PR MOST RECENT DIASTOLIC BLOOD PRESSURE 80-89 MM HG: ICD-10-PCS | Mod: HCNC,CPTII,S$GLB, | Performed by: INTERNAL MEDICINE

## 2023-04-27 PROCEDURE — 3044F PR MOST RECENT HEMOGLOBIN A1C LEVEL <7.0%: ICD-10-PCS | Mod: HCNC,CPTII,S$GLB, | Performed by: INTERNAL MEDICINE

## 2023-04-27 PROCEDURE — 3077F PR MOST RECENT SYSTOLIC BLOOD PRESSURE >= 140 MM HG: ICD-10-PCS | Mod: HCNC,CPTII,S$GLB, | Performed by: INTERNAL MEDICINE

## 2023-04-27 PROCEDURE — 3288F PR FALLS RISK ASSESSMENT DOCUMENTED: ICD-10-PCS | Mod: HCNC,CPTII,S$GLB, | Performed by: INTERNAL MEDICINE

## 2023-04-27 PROCEDURE — 3044F HG A1C LEVEL LT 7.0%: CPT | Mod: HCNC,CPTII,S$GLB, | Performed by: INTERNAL MEDICINE

## 2023-04-27 PROCEDURE — 1101F PT FALLS ASSESS-DOCD LE1/YR: CPT | Mod: HCNC,CPTII,S$GLB, | Performed by: INTERNAL MEDICINE

## 2023-04-27 NOTE — PROGRESS NOTES
Subjective     Patient ID: Adrian Burt is a 68 y.o. male.    Chief Complaint: Renal cancer, right    HPI    Referring MD: Dr. Álvarez  Reason for referral: pulmonary nodules, Right RCC    No pain issues  Weight stable  No current fevers, chills or infectious complaints- Recent sinus infection approximately 4 weeks ago  Was given supportive medications and noted it caused a dry mouth he is still recovering    Interval surveillance scans:  - 4/24/2023 CT C/A/P:  FINDINGS:  CHEST:  Thoracic soft tissue: Punctate hypodensity in the left thyroid lobe.  Heart: Normal in size with no evidence of pericardial effusion. No significant atherosclerotic disease of the coronary arteries.  Aorta: No significant atherosclerotic disease.  Mediastinum: No abnormal lymph nodes.  Lungs: Stable 0.5 cm pulmonary nodule in the left lower lobe (axial series 3, image 71)..  Stable 2-3 mm pulmonary micronodule in the right lower lobe (axial series 3, image 105).  ABDOMEN:  Liver: Punctate hypodensity in the left hepatic lobe, too small to characterize.  The portal and hepatic veins are patent.  Gallbladder and biliary: No evidence of gallstones.  No intrahepatic or extrahepatic ductal dilatation.  Spleen: Within normal limits.  Pancreas: Within normal limits.  Bowel: Within normal limits.  No evidence of obstruction or inflammatory process.  Peritoneum: No ascites or pneumoperitoneum.  Abdominal Adenopathy: None.  Adrenals: Within normal limits.  Kidneys: Postsurgical changes of right partial nephrectomy.  There is mild soft tissue thickening in both surgical beds, likely scarring/fibrosis, similar to the prior.  No evidence of local neoplasm recurrence.  Stable subcentimeter hypodensity in the upper pole of the right kidney, too small to characterize.  Left kidney is unremarkable.  No nephrolithiasis or hydronephrosis.  Bilateral ureters are normal in course and caliber.  Urinary bladder: Unremarkable.  Reproductive organs: Diffuse  enlargement of the prostatic gland, measures 6.6 cm in transverse dimension.  Abdominal wall: Unremarkable.  Vasculature: Atherosclerotic disease of the abdominal aorta with no evidence of aneurysmal dilatation.  Bones: Benign osseous hemangioma at the level of T11.  Degenerative disease of the spine with no acute fracture or lytic lesion.  Grade 1 spondylolisthesis at L4-5.  Impression:  Stable postsurgical changes of right partial nephrectomy.  No findings to suggest local neoplasm recurrence.  No evidence of distant metastatic disease.  Bilateral pulmonary nodules, as above.  Prostatomegaly.  Additional findings, as above.     RCC History:  - renal mass discovered incidentally; had a fleeting pain that resolved but he mentioned to his PCP several months later leading to below     - 3/24/2022 Renal ultrasound:  FINDINGS:  Right kidney: The right kidney measures 13.5 cm. No cortical thinning. No loss of corticomedullary distinction. Resistive index measures 0.61.  Heterogeneous lesion measuring 2.4 x 2.3 x 2.7 cm in the upper pole.  No internal Doppler signal.  1.5 x 1.6 x 1.5 cm hyperechoic lesion in the midpole.  Subcentimeter simple cyst in the lower pole.  No renal stone. No hydronephrosis.  Left kidney: The left kidney measures 12.9 cm. No cortical thinning. No loss of corticomedullary distinction. Resistive index measures 0.49.  No mass. No renal stone. No hydronephrosis.  Spleen resistive index measures 0.54.  The bladder is partially distended at the time of scanning and has an unremarkable appearance.  Prostate is enlarged measuring 6.8 x 5.4 x 5.8 cm (previously 5.1 x 4.4 x 4.5 cm).  Impression:  1. 2.7 cm heterogeneous lesion in the upper pole right kidney, which could represent a complex cyst or neoplasm.  2. 1.6 cm hyperechoic lesion in the midpole right kidney, which could represent an angiomyolipoma or other fat containing lesion such as renal cell carcinoma.  3.  No hydronephrosis or shadowing  calculus.  4. Prostatomegaly.  RECOMMENDATIONS:  Renal mass protocol CT or MRI for further evaluation of right renal lesions.     - 3/28/2022 CT Abd/Pelvis:  FINDINGS:  Heart: Normal in size. No pericardial effusion.  Lung Bases: Well aerated, without consolidation or pleural fluid.  Liver: Normal in size and attenuation, with no focal hepatic lesions.  Gallbladder: No calcified gallstones.  Bile Ducts: No evidence of dilated ducts.  Pancreas: No mass or peripancreatic fat stranding.  Spleen: Unremarkable.  Adrenals: Unremarkable.  Kidneys/ Ureters: There is a 3 cm complex hypodense lobulated lesion with multiple septations arising from the upper pole of the right kidney.  Differential considerations would include complex cyst versus cystic neoplasm.  In the interpolar aspect there is a solid mass with enhancement measuring 12 mm roughly corresponding to the echogenic lesion seen on the ultrasound.  No definite macroscopic fat attenuation is seen on the noncontrast series and is not able to be delineated from adjacent renal parenchyma on the noncontrast study.  No stones, solid mass, or hydronephrosis on the left.  Bladder: No evidence of wall thickening.  Reproductive organs: Prostate markedly enlarged with heterogeneous enhancement pattern.  GI Tract/Mesentery: No evidence of bowel obstruction or inflammation.  Peritoneal Space: No ascites. No free air.  Retroperitoneum: No significant adenopathy.  Abdominal wall: Unremarkable.  Vasculature: No significant atherosclerosis or aneurysm.  Bones: No acute fracture.  Impression:  12 mm right renal solid mass concerning for malignancy until proven otherwise.  Further evaluation as warranted.  3 cm complex lesion upper pole right kidney at minimum warrants sonographic surveillance 6 months.     - 4/12/2022 MRI Abdomen:  FINDINGS:  Pulmonary Bases: No pleural effusion  Liver: normal.  Bile Ducts: normal  Gallbladder: normal  Pancreas: normal.  Spleen: normal.  Adrenal  Glands: normal.  Proximal Gastrointestinal tract/stomach: Normal.  Kidneys: Septated hyperintense T2 lesion upper pole right kidney 3.6 x 3.2 x 2.7 cm.  Subtle enhancement of the septations noted on postcontrast images, renal cell cystic carcinoma cannot be excluded.  There is a very small lesion at the midpole of the right kidney, measuring approximately 12 mm demonstrating postcontrast enhancement concerning for renal cell carcinoma, it measures 1.4 cm series 1402, image 67  Aorta and Abdominal Vasculature: normal.  Mesentery: normal  Lymph nodes: no pathologically enlarged lymph nodes are seen.  Body wall: normal  Osseous structures: No lesion seen  Other: No free fluid/ascites.  Impression:  Predominantly cystic lesion at the upper pole of the right kidney with subtly enhancing septations, a cystic renal cell carcinoma cannot be excluded.  Subtle small intrarenal lesion at the midpole of the right kidney demonstrating postcontrast enhancement, a solid renal cell carcinoma cannot be excluded.  This report was flagged in Epic as abnormal.     - 5/9/2022 Surgical nephrectomy:  1.  Retroperitoneal robotic assisted laparoscopic right partial nephrectomy (modifier 22 for complex anatomy, 2 masses, >180% expected time)  2.  Intraoperative ultrasound with interpretation.  1. FINAL RESECTION MARGIN OF RIGHT UPPER POLE MASS:   RENAL PARENCHYMA WITH NO NEOPLASIA IDENTIFIED   2. RIGHT UPPER POLE MASS:   INNOCUOUS BENIGN MULTILOCULAR CYST   3. MASS FROM MID RIGHT  KIDNEY :   RENAL CELL CARCINOMA WITH NO INVOLVEMENT OF MARGINS OR PERINEPHRIC ADIPOSE   TISSUE IDENTIFIED   2. This lesion is a benign multilocular cyst.  The cystic spaces have a   single layer of lining at the most.  There is no proliferative lining   identified in this entirely submitted specimen.  No area has formation of a   mass neoplasm.  There is no significant abnormality of renal parenchyma   separate from the cystic lesion.   3.   Procedure :  Partial  renal excision   Specimen Laterality :  Right   Tumor Focality :  Unifocal   Tumor Size :  1.2 cm   Histologic Type :  Renal cell carcinoma, clear cell variant   Tumor Extent :  Limited to kidney   Sarcomatoid Features :  Not identified   Rhabdoid Features :  Not identified   Tumor Necrosis :  Not identified   Margins ; no margin involvement identified   Regional Lymph Node Status :   Not applicable (no regional lymph nodes   submitted or found)   PATHOLOGIC STAGE CLASSIFICATION (pTNM, AJCC 8th Edition) : pT1a pNX pMX   Additional findings :  There is no significant abnormality of renal   parenchyma apart from the masses.     Additional imaging:  - 6/17/2022 MRI prostate:  FINDINGS:  Previous biopsy: None.  PSA: 6.5 ng/mL on 06/15/2022  Prior therapy: None.  Prostate: 6.2 x 6.0 x 6.4 cm corresponding to a computed volume of 122 cc.  Peripheral zone: No focal abnormalities with imaging features concerning for prostate cancer, score 1.  Transitional zone: Benign prostatic hyperplasia without focal suspicious abnormality, score 2.  Neurovascular bundle: Normal appearance.  Seminal vesicles: Normal appearance.  Adjacent Organ Involvement: No evidence for urinary bladder or rectal invasion.  Lymphadenopathy: None.  Other Findings: Diffuse marrow signal heterogeneity in keeping with red marrow hyperplasia.  Impression:  1. Benign prostatic hyperplasia.  2. Red marrow hyperplasia.  Overall Assessment: PI-RADS 2 - Low (clinically significant cancer is unlikely to be present)  Number of targets created for potential MR/US fusion biopsy  Peripheral zone: 0  Transition zone: 0     Recently had surveillance scans for RCC performed- results as below:  1/23/2023 CT Chest:  FINDINGS:  No intravenous contrast was administered for this examination.  Therefore, it may have diminished sensitivity for detection of certain abnormalities.  Thyroid gland: Within normal limits.  Trachea: Within normal limits.  Esophagus: Mildly  patulous.  Cardiovascular: Normal heart size.No pericardial effusion.Coarse calcifications in the region of the aortic valve leaflets, correlate with aortic valve function.  There is mild calcific disease of the coronary arteries.  Lymph nodes: None abnormally enlarged.  Lungs/pleura/airways:  Mild apical scarring.  There are several left lung nodules which include the followin mm in lingula (1988) 4 mm in the left lower lobe adjacent to the diaphragm (4-405, and 2 additional 5 mm left lower lobe nodules (4-318, 380). In addition, there is a 5 mm right lower lobe ground-glass nodule (4-299.  The latter is out of the field of view on the prior examination.  Upper abdomen:  Partially imaged.  Status post partial right nephrectomy.  No additional new significant disease.  Bones: Unremarkable for stated age.  Other: N/A  Impression:  1. There are several bilateral subcentimeter pulmonary nodules (measuring 5 mm and less) as described above.  At this time, these are indeterminate whether related to malignancy versus infection.  Attention on short-term follow-up imaging highly recommended.  2. Postsurgical changes related to right partial nephrectomy.  Unremarkable appearance of surgical bed.  3.  Other findings are as above.     - 2022 CT Abd/Pelvis:  FINDINGS:  Heart: No cardiomegaly or pericardial effusion.  Atherosclerosis of the aortic annulus.  Lung Bases: 0.5 cm solid nodule in the anteromedial basal segment of the left lower lobe (axial series 3, image 11).  Additional 0.4 cm pleural based nodule in the superior lingula (axial series 3, image 5).  Bilateral dependent atelectasis.  Liver: Hepatomegaly.  Punctate hypodensity in hepatic segment 4a, too small to characterize but stable from prior exam.  No new lesions identified.  Gallbladder: No calcified gallstones.  Bile Ducts: No dilatation.  Pancreas: No mass. No peripancreatic fat stranding.  Spleen: Unremarkable  Adrenals:  Unremarkable  Kidneys/Ureters: Interval postsurgical changes of right partial nephrectomy.  There is mild soft tissue thickening in both surgical beds, likely scarring/fibrosis.  No evidence of recurrent lesion.  Subcentimeter hypodensity in the upper pole of the right kidney, too small to characterize.  Left kidney is unremarkable.  No nephrolithiasis or hydronephrosis.  Bilateral ureters are normal in course and caliber.  Bladder: No wall thickening.  Reproductive organs: Prostatomegaly with mass effect on the posterior aspect of the bladder.  GI Tract/Mesentery: No evidence of bowel obstruction or inflammation.  Peritoneal Space: No ascites or free air.  Retroperitoneum: No significant adenopathy.  Abdominal wall: Unremarkable  Vasculature: No aneurysm.  Mild atherosclerosis of the descending aorta and its branches.  Bones: Multilevel degenerative change, similar to prior exams.  No acute fracture. No suspicious lytic or sclerotic lesions.  Impression:  Interval postsurgical changes of right partial nephrectomy x2.  No evidence of residual or recurrent lesion in the surgical beds.  Pulmonary nodules in the left lung, that were out of field of view on prior imaging.  Recommend attention on follow-up.  Comparison to any prior CT chest would be beneficial.  Prostatomegaly.    PMH:  Steel fragment removal from leg  HTN     FH:  Mother  ESRD - unclear reasons at 37 yo  Father  at age 69 yo, heart disease  Siblings - 2 sisters- 1  at age 68 from RCC  1  ESRD, EtOHism  DM  HTN  Maternal grandmother- breast cancer dies at age 59     SH:  Retired, construction work (office)  , 3 kids  Quit tobacco  (1 pack smoker)  Active     Review of Systems   Constitutional:  Negative for activity change, appetite change, fatigue, fever and unexpected weight change.   HENT:  Negative for hearing loss.    Eyes:  Negative for visual disturbance.   Respiratory:  Negative for cough, shortness of breath and  wheezing.    Cardiovascular:  Negative for chest pain, palpitations and leg swelling.   Gastrointestinal:  Negative for abdominal distention, abdominal pain, blood in stool, change in bowel habit, constipation, diarrhea, nausea, vomiting, reflux and change in bowel habit.   Genitourinary:  Negative for decreased urine volume, difficulty urinating, frequency and urgency.   Musculoskeletal:  Negative for arthralgias, back pain and joint deformity.   Neurological:  Negative for dizziness, weakness, light-headedness, numbness and headaches.   Hematological:  Negative for adenopathy. Does not bruise/bleed easily.   Psychiatric/Behavioral:  Negative for dysphoric mood. The patient is not nervous/anxious.         Objective     Physical Exam  Vitals and nursing note reviewed.   Constitutional:       General: He is not in acute distress.     Appearance: Normal appearance. He is normal weight. He is not ill-appearing.      Comments: Presents alone  Very pleasant  ECOG= 0   Eyes:      General: No scleral icterus.     Extraocular Movements: Extraocular movements intact.      Conjunctiva/sclera: Conjunctivae normal.      Pupils: Pupils are equal, round, and reactive to light.   Cardiovascular:      Rate and Rhythm: Normal rate and regular rhythm.      Heart sounds: Normal heart sounds. No murmur heard.    No friction rub. No gallop.   Pulmonary:      Effort: Pulmonary effort is normal. No respiratory distress.      Breath sounds: Normal breath sounds. No wheezing, rhonchi or rales.   Abdominal:      General: Abdomen is flat. Bowel sounds are normal. There is no distension.      Palpations: Abdomen is soft. There is no mass.      Tenderness: There is no abdominal tenderness. There is no guarding or rebound.   Musculoskeletal:         General: No swelling. Normal range of motion.      Cervical back: Normal range of motion and neck supple. No rigidity.      Right lower leg: No edema.      Left lower leg: No edema.    Lymphadenopathy:      Cervical: No cervical adenopathy.   Skin:     General: Skin is warm and dry.   Neurological:      General: No focal deficit present.      Mental Status: He is alert and oriented to person, place, and time.      Sensory: No sensory deficit.      Motor: No weakness.      Coordination: Coordination normal.   Psychiatric:         Mood and Affect: Mood normal.         Behavior: Behavior normal.         Thought Content: Thought content normal.         Judgment: Judgment normal.     Labs reviewed     Assessment and Plan     Problem List Items Addressed This Visit       Skin lesion    Renal cancer, right    Pulmonary nodules    History of benign prostatic hyperplasia - Primary    Healthcare maintenance     Right sided RCC  Pulmonary nodules noted on recent imaging  Too small to biopsy or further characterize with other imaging modalities- surveillance imaging stable  Repeat again in 4 months  Knows to call for any issues    BPH:  - 6/17/2022 MRI prostate:  FINDINGS:  Previous biopsy: None.  PSA: 6.5 ng/mL on 06/15/2022  Prior therapy: None.  Prostate: 6.2 x 6.0 x 6.4 cm corresponding to a computed volume of 122 cc.  Peripheral zone: No focal abnormalities with imaging features concerning for prostate cancer, score 1.  Transitional zone: Benign prostatic hyperplasia without focal suspicious abnormality, score 2.  Neurovascular bundle: Normal appearance.  Seminal vesicles: Normal appearance.  Adjacent Organ Involvement: No evidence for urinary bladder or rectal invasion.  Lymphadenopathy: None.  Other Findings: Diffuse marrow signal heterogeneity in keeping with red marrow hyperplasia.  Impression:  1. Benign prostatic hyperplasia.  2. Red marrow hyperplasia.  Overall Assessment: PI-RADS 2 - Low (clinically significant cancer is unlikely to be present)  Number of targets created for potential MR/US fusion biopsy  Peripheral zone: 0  Transition zone: 0    Healthcare Maintenance:  Needs colonoscopy- was  originally cancelled with Covid and then cancelled again  He has not heard from department- will send message     Skin lesion:  Dermatology referral    Route Chart for Scheduling    Med Onc Chart Routing      Follow up with physician 4 months. scans and labs prior   Follow up with [facility]    Infusion scheduling note    Injection scheduling note    Labs CMP and CBC   Scheduling:  Preferred lab:  Lab interval:     Imaging CT chest abdomen pelvis      Pharmacy appointment    Other referrals       > 45 minutes total encounter

## 2023-05-01 ENCOUNTER — LAB VISIT (OUTPATIENT)
Dept: LAB | Facility: HOSPITAL | Age: 68
End: 2023-05-01
Attending: UROLOGY
Payer: MEDICARE

## 2023-05-01 DIAGNOSIS — R97.20 ELEVATED PSA: ICD-10-CM

## 2023-05-01 LAB
PROSTATE SPECIFIC ANTIGEN, TOTAL: 6.7 NG/ML (ref 0–4)
PSA FREE MFR SERPL: 19.7 %
PSA FREE SERPL-MCNC: 1.32 NG/ML (ref 0–1.5)

## 2023-05-01 PROCEDURE — 84153 ASSAY OF PSA TOTAL: CPT | Mod: HCNC | Performed by: UROLOGY

## 2023-05-01 PROCEDURE — 36415 COLL VENOUS BLD VENIPUNCTURE: CPT | Mod: HCNC | Performed by: UROLOGY

## 2023-05-02 ENCOUNTER — TELEPHONE (OUTPATIENT)
Dept: UROLOGY | Facility: CLINIC | Age: 68
End: 2023-05-02
Payer: MEDICARE

## 2023-05-02 NOTE — TELEPHONE ENCOUNTER
Left VM for patient indicating that we have lab results for him and an appt is advised to discuss. Left my callback number and requested call back to schedule.

## 2023-05-03 ENCOUNTER — OFFICE VISIT (OUTPATIENT)
Dept: UROLOGY | Facility: CLINIC | Age: 68
End: 2023-05-03
Payer: MEDICARE

## 2023-05-03 VITALS
HEIGHT: 74 IN | HEART RATE: 89 BPM | DIASTOLIC BLOOD PRESSURE: 90 MMHG | SYSTOLIC BLOOD PRESSURE: 160 MMHG | BODY MASS INDEX: 31.34 KG/M2 | WEIGHT: 244.19 LBS

## 2023-05-03 DIAGNOSIS — N52.01 ERECTILE DYSFUNCTION DUE TO ARTERIAL INSUFFICIENCY: Primary | ICD-10-CM

## 2023-05-03 DIAGNOSIS — R97.20 ELEVATED PSA: ICD-10-CM

## 2023-05-03 DIAGNOSIS — N52.9 ERECTILE DYSFUNCTION, UNSPECIFIED ERECTILE DYSFUNCTION TYPE: ICD-10-CM

## 2023-05-03 DIAGNOSIS — R79.89 LOW TESTOSTERONE: ICD-10-CM

## 2023-05-03 PROCEDURE — 99999 PR PBB SHADOW E&M-EST. PATIENT-LVL III: CPT | Mod: PBBFAC,HCNC,, | Performed by: UROLOGY

## 2023-05-03 PROCEDURE — 4010F PR ACE/ARB THEARPY RXD/TAKEN: ICD-10-PCS | Mod: HCNC,CPTII,S$GLB, | Performed by: UROLOGY

## 2023-05-03 PROCEDURE — 1159F MED LIST DOCD IN RCRD: CPT | Mod: HCNC,CPTII,S$GLB, | Performed by: UROLOGY

## 2023-05-03 PROCEDURE — 3288F FALL RISK ASSESSMENT DOCD: CPT | Mod: HCNC,CPTII,S$GLB, | Performed by: UROLOGY

## 2023-05-03 PROCEDURE — 1160F PR REVIEW ALL MEDS BY PRESCRIBER/CLIN PHARMACIST DOCUMENTED: ICD-10-PCS | Mod: HCNC,CPTII,S$GLB, | Performed by: UROLOGY

## 2023-05-03 PROCEDURE — 3044F HG A1C LEVEL LT 7.0%: CPT | Mod: HCNC,CPTII,S$GLB, | Performed by: UROLOGY

## 2023-05-03 PROCEDURE — 3008F BODY MASS INDEX DOCD: CPT | Mod: HCNC,CPTII,S$GLB, | Performed by: UROLOGY

## 2023-05-03 PROCEDURE — 1101F PT FALLS ASSESS-DOCD LE1/YR: CPT | Mod: HCNC,CPTII,S$GLB, | Performed by: UROLOGY

## 2023-05-03 PROCEDURE — 3080F PR MOST RECENT DIASTOLIC BLOOD PRESSURE >= 90 MM HG: ICD-10-PCS | Mod: HCNC,CPTII,S$GLB, | Performed by: UROLOGY

## 2023-05-03 PROCEDURE — 1160F RVW MEDS BY RX/DR IN RCRD: CPT | Mod: HCNC,CPTII,S$GLB, | Performed by: UROLOGY

## 2023-05-03 PROCEDURE — 3077F PR MOST RECENT SYSTOLIC BLOOD PRESSURE >= 140 MM HG: ICD-10-PCS | Mod: HCNC,CPTII,S$GLB, | Performed by: UROLOGY

## 2023-05-03 PROCEDURE — 99214 OFFICE O/P EST MOD 30 MIN: CPT | Mod: HCNC,S$GLB,, | Performed by: UROLOGY

## 2023-05-03 PROCEDURE — 3044F PR MOST RECENT HEMOGLOBIN A1C LEVEL <7.0%: ICD-10-PCS | Mod: HCNC,CPTII,S$GLB, | Performed by: UROLOGY

## 2023-05-03 PROCEDURE — 1101F PR PT FALLS ASSESS DOC 0-1 FALLS W/OUT INJ PAST YR: ICD-10-PCS | Mod: HCNC,CPTII,S$GLB, | Performed by: UROLOGY

## 2023-05-03 PROCEDURE — 1126F PR PAIN SEVERITY QUANTIFIED, NO PAIN PRESENT: ICD-10-PCS | Mod: HCNC,CPTII,S$GLB, | Performed by: UROLOGY

## 2023-05-03 PROCEDURE — 3008F PR BODY MASS INDEX (BMI) DOCUMENTED: ICD-10-PCS | Mod: HCNC,CPTII,S$GLB, | Performed by: UROLOGY

## 2023-05-03 PROCEDURE — 1159F PR MEDICATION LIST DOCUMENTED IN MEDICAL RECORD: ICD-10-PCS | Mod: HCNC,CPTII,S$GLB, | Performed by: UROLOGY

## 2023-05-03 PROCEDURE — 3077F SYST BP >= 140 MM HG: CPT | Mod: HCNC,CPTII,S$GLB, | Performed by: UROLOGY

## 2023-05-03 PROCEDURE — 1126F AMNT PAIN NOTED NONE PRSNT: CPT | Mod: HCNC,CPTII,S$GLB, | Performed by: UROLOGY

## 2023-05-03 PROCEDURE — 3080F DIAST BP >= 90 MM HG: CPT | Mod: HCNC,CPTII,S$GLB, | Performed by: UROLOGY

## 2023-05-03 PROCEDURE — 4010F ACE/ARB THERAPY RXD/TAKEN: CPT | Mod: HCNC,CPTII,S$GLB, | Performed by: UROLOGY

## 2023-05-03 PROCEDURE — 99999 PR PBB SHADOW E&M-EST. PATIENT-LVL III: ICD-10-PCS | Mod: PBBFAC,HCNC,, | Performed by: UROLOGY

## 2023-05-03 PROCEDURE — 99214 PR OFFICE/OUTPT VISIT, EST, LEVL IV, 30-39 MIN: ICD-10-PCS | Mod: HCNC,S$GLB,, | Performed by: UROLOGY

## 2023-05-03 PROCEDURE — 3288F PR FALLS RISK ASSESSMENT DOCUMENTED: ICD-10-PCS | Mod: HCNC,CPTII,S$GLB, | Performed by: UROLOGY

## 2023-05-03 NOTE — PROGRESS NOTES
Subjective:       Patient ID: Adrian Burt is a 68 y.o. male.    Chief Complaint: Follow-up (Lab test)     This is a 68 y.o.  male patient that is an established patient for right kidney cancer, low testosterone, elevated PSA, erectile dysfunction.    Initial HPI: Right 1 cm solid mass in a concerning approximately 4.5 cm upper pole cystic mass.  CT abdomen with and without contrast stone subsequently that showed similar findings.  There is a report of ? FH of kidney cancer.      S/p right partial nephrectomy of upper pole cystic mass and right renal mass 22.  Upper pole cystic mass was cyst lower pole mass was 1.2 cm clear cell renal cell carcinoma.  PSA up to 6.5, free20% (2022)   MRI prostate (2022) showed PI-RADS 2 with no lesions, volume 122  Follow up imaging for nephrectomy done , chest x-ray negative but CT of the abdomen pelvis did show some lower pulmonary nodules up to 5 mm.  Evidence of right partial nephrectomy without recurrence.  No lymphadenopathy.  PSA 5.8, free PSA 26%    Has not done CT of the chest yet, here for erectile dysfunction and low testosterone.  He ran out of testosterone prior and T was normal 562 ().   He is also having erectile dysfunctions, given cialis.  He is also on ICI 10-20 units of Papa 30, phen 3, PGE 50 mcg.  Having erections for 2 hours.  No erection without ICI.    5/3/23:  here to review labs, PSA up to 6.7, free 19% down from 27%.  T was normal 562 () was off T prior, given refill at time.  Here to discuss TRT, PSA elevation and ED meds.  Main reason for TRT was ED, Good results with ICI--new script for Trimix  .      LAST PSA  Lab Results   Component Value Date    PSA 5.9 (H) 2023    PSA 5.2 (H) 2021    PSA 2.1 09/10/2018    PSA 1.8 2017    PSA 2.6 2015    PSA 7.2 (H) 12/15/2008    PSA 0.6 2005    PSATOTAL 6.7 (H) 2023    PSATOTAL 5.8 (H) 2022    PSATOTAL 6.5 (H) 2022    PSAFREE  1.32 2023    PSAFREE 1.58 (H) 2022    PSAFREE 1.36 2022       Lab Results   Component Value Date    CREATININE 1.1 04/10/2023       ---  Past Medical History:   Diagnosis Date    Allergy     Colon polyp     Fatty liver     GERD (gastroesophageal reflux disease)     Hypertension        Past Surgical History:   Procedure Laterality Date    COLONOSCOPY N/A 2017    Procedure: COLONOSCOPY;  Surgeon: Leo Henriquez MD;  Location: [address]);  Service: Endoscopy;  Laterality: N/A;    LEG SURGERY Left     ROBOT-ASSISTED LAPAROSCOPIC PARTIAL NEPHRECTOMY Right 2022    Procedure: Right RETROPERITONEAL robotic assisted lap partial nephrectomy  Intraoperative ultrasound with interpretation Special needs: drop in probe for ultrasound, retroperitoneal dilating baloon;  Surgeon: Arvind Álvarez MD;  Location: [facility];  Service: Urology;  Laterality: Right;       Family History   Problem Relation Age of Onset    Kidney disease Mother     Heart disease Father     Cancer Sister         kidney    Kidney disease Sister     Colon cancer Neg Hx     Esophageal cancer Neg Hx        Social History     Tobacco Use    Smoking status: Former     Types: Cigarettes     Quit date: 11/15/1978     Years since quittin.4    Smokeless tobacco: Never    Tobacco comments:     smoked for 10 years, quit in 76   Substance Use Topics    Alcohol use: No    Drug use: No       Current Outpatient Medications on File Prior to Visit   Medication Sig Dispense Refill    amLODIPine (NORVASC) 10 MG tablet Take 1 tablet (10 mg total) by mouth every morning. 90 tablet 3    aspirin (ECOTRIN) 81 MG EC tablet Take 81 mg by mouth once daily.      azelastine (ASTELIN) 137 mcg (0.1 %) nasal spray 1 spray (137 mcg total) by Nasal route 2 (two) times daily. 30 mL 3    ferrous gluconate (FERGON) 324 MG tablet TAKE 1 TABLET BY MOUTH DAILY WITH BREAKFAST 30 tablet 3    fluticasone propionate (FLONASE) 50 mcg/actuation nasal spray  SPRAY 1 SPRAY IN EACH NOSTRIL EVERY DAY 48 g 2    hydroCHLOROthiazide (HYDRODIURIL) 12.5 MG Tab TAKE 1 TABLET(12.5 MG) BY MOUTH EVERY DAY 90 tablet 3    losartan (COZAAR) 100 MG tablet Take 1 tablet (100 mg total) by mouth once daily. 90 tablet 3    MODERNA COVID BIVAL,6Y UP,,PF, 50 mcg/0.5 mL injection       montelukast (SINGULAIR) 10 mg tablet Take 1 tablet (10 mg total) by mouth every evening. 90 tablet 3    multivit with minerals/lutein (MULTIVITAMIN 50 PLUS ORAL) Take 1 tablet by mouth once daily.      tamsulosin (FLOMAX) 0.4 mg Cap Take 1 capsule (0.4 mg total) by mouth once daily. 90 capsule 3    testosterone cypionate (DEPOTESTOTERONE CYPIONATE) 200 mg/mL injection Inject 1 mL (200 mg total) into the muscle every 14 (fourteen) days. 5 mL 2    tadalafiL (CIALIS) 20 MG Tab Take 1 tablet (20 mg total) by mouth daily as needed (1 hour prior to sexual activity). 12 tablet 5     No current facility-administered medications on file prior to visit.       Review of patient's allergies indicates:   Allergen Reactions    Tessalon [benzonatate] Other (See Comments)     States lips felt dry and swollen         Review of Systems   Constitutional:  Negative for activity change, chills and fever.   HENT:  Negative for congestion.    Respiratory:  Negative for cough, chest tightness and shortness of breath.    Cardiovascular:  Negative for chest pain and palpitations.   Gastrointestinal:  Negative for abdominal distention, abdominal pain, nausea and vomiting.   Genitourinary:  Negative for difficulty urinating, flank pain, hematuria, penile pain, scrotal swelling and testicular pain.   Musculoskeletal:  Negative for gait problem.     Objective:      Physical Exam  Constitutional:       Appearance: Normal appearance.   HENT:      Head: Normocephalic.   Pulmonary:      Effort: Pulmonary effort is normal.      Breath sounds: Normal breath sounds.   Abdominal:      General: Abdomen is flat. Bowel sounds are normal.       Palpations: Abdomen is soft.      Tenderness: There is no abdominal tenderness. There is no right CVA tenderness, left CVA tenderness or guarding.      Comments: Inc c/d/i   Musculoskeletal:         General: Normal range of motion.      Cervical back: Normal range of motion.   Skin:     General: Skin is warm.   Neurological:      Mental Status: He is alert.       No results found for this or any previous visit (from the past 2160 hour(s)).    Path 5/9/22:    Final Pathologic Diagnosis 1. FINAL RESECTION MARGIN OF RIGHT UPPER POLE MASS:   RENAL PARENCHYMA WITH NO NEOPLASIA IDENTIFIED   2. RIGHT UPPER POLE MASS:   INNOCUOUS BENIGN MULTILOCULAR CYST   3. MASS FROM MID RIGHT  KIDNEY :   RENAL CELL CARCINOMA WITH NO INVOLVEMENT OF MARGINS OR PERINEPHRIC ADIPOSE   TISSUE IDENTIFIED    Comment: Interp By Adolph Bonilla M.D., Signed on 05/13/2022 at 09:22   Microscopic Exam 2. This lesion is a benign multilocular cyst.  The cystic spaces have a   single layer of lining at the most.  There is no proliferative lining   identified in this entirely submitted specimen.  No area has formation of a   mass neoplasm.  There is no significant abnormality of renal parenchyma   separate from the cystic lesion.   3.   Procedure :  Partial renal excision   Specimen Laterality :  Right   Tumor Focality :  Unifocal   Tumor Size :  1.2 cm   Histologic Type :  Renal cell carcinoma, clear cell variant   Tumor Extent :  Limited to kidney   Sarcomatoid Features :  Not identified   Rhabdoid Features :  Not identified   Tumor Necrosis :  Not identified   Margins ; no margin involvement identified   Regional Lymph Node Status :   Not applicable (no regional lymph nodes   submitted or found)   PATHOLOGIC STAGE CLASSIFICATION (pTNM, AJCC 8th Edition) : pT1a pNX pMX   Additional findings :  There is no significant abnormality of renal   parenchyma apart from the masses.      Results for orders placed or performed during the hospital encounter of  06/17/22 (from the past 2160 hour(s))   MRI Prostate W W/O Contrast    Impression    1. Benign prostatic hyperplasia.  2. Red marrow hyperplasia.  Overall Assessment: PI-RADS 2 - Low (clinically significant cancer is unlikely to be present)    Number of targets created for potential MR/US fusion biopsy    Peripheral zone: 0    Transition zone: 0      Electronically signed by: Jersey Hall MD  Date:    06/19/2022  Time:    09:47                         Assessment:     Problem Noted   Renal Cancer, Right 3/31/2022    4 cm complex cystic lesion to posterior right upper pole, 1.1 cm right anterior solid mass on CT w/wo contrast 3/2022.  --MRI 4/22: right 3.6 cm UP cystic lesion ? Cystic RCC, right midpole 12mm solid mass  --Preop Cr 1, GFR >60  --S/p right retroperitioneal partial 5/9/22 (both masses removed)  --Path: upper pole lesion was cyst, midpole 1.2 cm [id number] CC RCC, negative margins  KATIE    Post op imaging:  CT/CXR 12/22: lower pulm nodules up to 5mm on CT but not on CXR, no recurrence.      Post op labs  6/22--Cr 1.2, GFR >60  12/22--Cr 1.2, GFR >60       Elevated Psa 4/14/2022 6/22-6.5, free 20%  12/22-5.8, free 26%     MRI prostate 6/22: volume 122, no lesions, PIRADS 2   On TRT, stopped 5/2022            Erectile Dysfunction 7/19/2012    Trimix 30/2/20 refilled 5/2023       Low Testosterone 1/18/2023    Reviewed risk of elevated PSA, prostate cancer and testosterone replacement therapy.  Testosterone refilled 01/2023, stopped 5/2023 after T normal 1/23 when off TRT       Urinary Tract Infection Without Hematuria (Resolved) 3/31/2022       Plan:     1.  Rising, MRI normal 6/22.  Given testosterone replacement mainly for erectile dysfunction recommend he stopped testosterone replacement therapy given his testosterone was normal when off in 1/23.  To stop testosterone replacement therapy at current, monitor symptoms closely after.  2.  Repeat PSA in about 3 months.   3.  Refilled TriMix at 30/2/20, up  to 0.5 milliliters or 50 units.      Arvind Álvarez MD

## 2023-05-05 ENCOUNTER — PATIENT MESSAGE (OUTPATIENT)
Dept: INTERNAL MEDICINE | Facility: CLINIC | Age: 68
End: 2023-05-05
Payer: MEDICARE

## 2023-05-15 ENCOUNTER — PATIENT MESSAGE (OUTPATIENT)
Dept: HEMATOLOGY/ONCOLOGY | Facility: CLINIC | Age: 68
End: 2023-05-15
Payer: MEDICARE

## 2023-05-15 DIAGNOSIS — L98.9 SKIN LESION: Primary | ICD-10-CM

## 2023-05-16 ENCOUNTER — TELEPHONE (OUTPATIENT)
Dept: UROLOGY | Facility: CLINIC | Age: 68
End: 2023-05-16
Payer: MEDICARE

## 2023-05-16 NOTE — TELEPHONE ENCOUNTER
----- Message from Marleni Lorenzo sent at 5/16/2023  2:43 PM CDT -----  .Type:  Needs Medical Advice    Who Called: pt  Would the patient rather a call back or a response via [facility]? call  Best Call Back Number: [phone]  Additional Information:     Calling to speak with nurse about receiving a refill on a medication  Patient could not verify the name of which medication he needs

## 2023-05-16 NOTE — TELEPHONE ENCOUNTER
Returned call, spoke with patient.  Requesting refill of Trimix to be sent to [facility].  Change class from print to e-sig.  Thanks

## 2023-05-17 NOTE — TELEPHONE ENCOUNTER
Prescription faxed to pharmacy and confirmed.  No answer to notify patient, left message informing this.

## 2023-05-19 ENCOUNTER — NURSE TRIAGE (OUTPATIENT)
Dept: ADMINISTRATIVE | Facility: CLINIC | Age: 68
End: 2023-05-19
Payer: MEDICARE

## 2023-05-19 NOTE — TELEPHONE ENCOUNTER
Patient states Dr prescribed him some meds for his allergies but his tongue swell up and he didn't know which med it was that caused it. He says now he has an extremely dry mouth, lips swell off and on, and there's flakes of skin on his lips. Last urinated about 3 min ago. Care advice discussed, understanding verbalized. No available appointments for Monday. Patient would like someone to call him from the office to see if he can be worked into Monday's schedule. [facility] offered and declined. Please contact caller directly to discuss any further care advice.    Reason for Disposition   Lip swelling lasts > 3 days    Additional Information   Negative: Unresponsive, passed out or very weak   Negative: Swollen tongue   Negative: Difficulty breathing or wheezing   Negative: [1] Life-threatening reaction in the past to similar substance (e.g., food, insect bite/sting, chemical, etc.) AND [2] < 2 hours since exposure   Negative: Sounds like a life-threatening emergency to the triager   Lip swelling is main symptom   Negative: Taking an ACE Inhibitor medicine (e.g., benazepril / LOTENSIN, captopril / CAPOTEN, enalapril / VASOTEC, lisinopril / ZESTRIL)   Negative: [1] Severe swelling AND [2] cause unknown   Negative: Patient sounds very sick or weak to the triager   Negative: [1] Swelling is red AND [2] fever   Negative: [1] Swelling is painful to touch AND [2] fever   Negative: [1] Looks infected AND [2] large red area (> 2 in. or 5 cm)   Negative: Toothache   Negative: Swelling is painful to touch   Negative: Looks like a boil or infected sore   Negative: [1] Mild lip swelling from food reaction AND [2] diagnosis never confirmed by a doctor (or NP/PA)    Protocols used: Allergic Reactions - Guideline [id number], Lip Swelling-A-

## 2023-05-22 ENCOUNTER — PATIENT MESSAGE (OUTPATIENT)
Dept: DERMATOLOGY | Facility: CLINIC | Age: 68
End: 2023-05-22
Payer: MEDICARE

## 2023-05-24 ENCOUNTER — OFFICE VISIT (OUTPATIENT)
Dept: DERMATOLOGY | Facility: CLINIC | Age: 68
End: 2023-05-24
Payer: MEDICARE

## 2023-05-24 DIAGNOSIS — L30.9 DERMATITIS: ICD-10-CM

## 2023-05-24 DIAGNOSIS — L64.9 ANDROGENETIC ALOPECIA: ICD-10-CM

## 2023-05-24 DIAGNOSIS — L28.1 PRURIGO NODULARIS: Primary | ICD-10-CM

## 2023-05-24 PROCEDURE — 99204 OFFICE O/P NEW MOD 45 MIN: CPT | Mod: HCNC,25,S$GLB, | Performed by: STUDENT IN AN ORGANIZED HEALTH CARE EDUCATION/TRAINING PROGRAM

## 2023-05-24 PROCEDURE — 3288F FALL RISK ASSESSMENT DOCD: CPT | Mod: HCNC,CPTII,S$GLB, | Performed by: STUDENT IN AN ORGANIZED HEALTH CARE EDUCATION/TRAINING PROGRAM

## 2023-05-24 PROCEDURE — 1159F PR MEDICATION LIST DOCUMENTED IN MEDICAL RECORD: ICD-10-PCS | Mod: HCNC,CPTII,S$GLB, | Performed by: STUDENT IN AN ORGANIZED HEALTH CARE EDUCATION/TRAINING PROGRAM

## 2023-05-24 PROCEDURE — 1160F PR REVIEW ALL MEDS BY PRESCRIBER/CLIN PHARMACIST DOCUMENTED: ICD-10-PCS | Mod: HCNC,CPTII,S$GLB, | Performed by: STUDENT IN AN ORGANIZED HEALTH CARE EDUCATION/TRAINING PROGRAM

## 2023-05-24 PROCEDURE — 3044F PR MOST RECENT HEMOGLOBIN A1C LEVEL <7.0%: ICD-10-PCS | Mod: HCNC,CPTII,S$GLB, | Performed by: STUDENT IN AN ORGANIZED HEALTH CARE EDUCATION/TRAINING PROGRAM

## 2023-05-24 PROCEDURE — 4010F ACE/ARB THERAPY RXD/TAKEN: CPT | Mod: HCNC,CPTII,S$GLB, | Performed by: STUDENT IN AN ORGANIZED HEALTH CARE EDUCATION/TRAINING PROGRAM

## 2023-05-24 PROCEDURE — 1160F RVW MEDS BY RX/DR IN RCRD: CPT | Mod: HCNC,CPTII,S$GLB, | Performed by: STUDENT IN AN ORGANIZED HEALTH CARE EDUCATION/TRAINING PROGRAM

## 2023-05-24 PROCEDURE — 11900 PR INJECTION INTO SKIN LESIONS, UP TO 7: ICD-10-PCS | Mod: HCNC,S$GLB,, | Performed by: STUDENT IN AN ORGANIZED HEALTH CARE EDUCATION/TRAINING PROGRAM

## 2023-05-24 PROCEDURE — 3288F PR FALLS RISK ASSESSMENT DOCUMENTED: ICD-10-PCS | Mod: HCNC,CPTII,S$GLB, | Performed by: STUDENT IN AN ORGANIZED HEALTH CARE EDUCATION/TRAINING PROGRAM

## 2023-05-24 PROCEDURE — 1126F PR PAIN SEVERITY QUANTIFIED, NO PAIN PRESENT: ICD-10-PCS | Mod: HCNC,CPTII,S$GLB, | Performed by: STUDENT IN AN ORGANIZED HEALTH CARE EDUCATION/TRAINING PROGRAM

## 2023-05-24 PROCEDURE — 99999 PR PBB SHADOW E&M-EST. PATIENT-LVL II: ICD-10-PCS | Mod: PBBFAC,HCNC,, | Performed by: STUDENT IN AN ORGANIZED HEALTH CARE EDUCATION/TRAINING PROGRAM

## 2023-05-24 PROCEDURE — 4010F PR ACE/ARB THEARPY RXD/TAKEN: ICD-10-PCS | Mod: HCNC,CPTII,S$GLB, | Performed by: STUDENT IN AN ORGANIZED HEALTH CARE EDUCATION/TRAINING PROGRAM

## 2023-05-24 PROCEDURE — 99999 PR PBB SHADOW E&M-EST. PATIENT-LVL II: CPT | Mod: PBBFAC,HCNC,, | Performed by: STUDENT IN AN ORGANIZED HEALTH CARE EDUCATION/TRAINING PROGRAM

## 2023-05-24 PROCEDURE — 1101F PR PT FALLS ASSESS DOC 0-1 FALLS W/OUT INJ PAST YR: ICD-10-PCS | Mod: HCNC,CPTII,S$GLB, | Performed by: STUDENT IN AN ORGANIZED HEALTH CARE EDUCATION/TRAINING PROGRAM

## 2023-05-24 PROCEDURE — 1101F PT FALLS ASSESS-DOCD LE1/YR: CPT | Mod: HCNC,CPTII,S$GLB, | Performed by: STUDENT IN AN ORGANIZED HEALTH CARE EDUCATION/TRAINING PROGRAM

## 2023-05-24 PROCEDURE — 1126F AMNT PAIN NOTED NONE PRSNT: CPT | Mod: HCNC,CPTII,S$GLB, | Performed by: STUDENT IN AN ORGANIZED HEALTH CARE EDUCATION/TRAINING PROGRAM

## 2023-05-24 PROCEDURE — 1159F MED LIST DOCD IN RCRD: CPT | Mod: HCNC,CPTII,S$GLB, | Performed by: STUDENT IN AN ORGANIZED HEALTH CARE EDUCATION/TRAINING PROGRAM

## 2023-05-24 PROCEDURE — 99204 PR OFFICE/OUTPT VISIT, NEW, LEVL IV, 45-59 MIN: ICD-10-PCS | Mod: HCNC,25,S$GLB, | Performed by: STUDENT IN AN ORGANIZED HEALTH CARE EDUCATION/TRAINING PROGRAM

## 2023-05-24 PROCEDURE — 11900 INJECT SKIN LESIONS </W 7: CPT | Mod: HCNC,S$GLB,, | Performed by: STUDENT IN AN ORGANIZED HEALTH CARE EDUCATION/TRAINING PROGRAM

## 2023-05-24 PROCEDURE — 3044F HG A1C LEVEL LT 7.0%: CPT | Mod: HCNC,CPTII,S$GLB, | Performed by: STUDENT IN AN ORGANIZED HEALTH CARE EDUCATION/TRAINING PROGRAM

## 2023-05-24 RX ORDER — FINASTERIDE 1 MG/1
1 TABLET, FILM COATED ORAL DAILY
Qty: 30 TABLET | Refills: 11 | Status: SHIPPED | OUTPATIENT
Start: 2023-05-24 | End: 2023-10-02

## 2023-05-24 RX ORDER — HYDROCORTISONE 25 MG/G
OINTMENT TOPICAL 2 TIMES DAILY
Qty: 28.35 G | Refills: 1 | Status: SHIPPED | OUTPATIENT
Start: 2023-05-24 | End: 2023-10-02

## 2023-05-24 RX ORDER — BETAMETHASONE DIPROPIONATE 0.5 MG/G
CREAM TOPICAL 2 TIMES DAILY
Qty: 45 G | Refills: 0 | Status: SHIPPED | OUTPATIENT
Start: 2023-05-24 | End: 2023-10-02

## 2023-05-24 NOTE — PROGRESS NOTES
Subjective:      Patient ID:  Adrian Burt is a 68 y.o. male who presents for   Chief Complaint   Patient presents with    Lesion     R hand and Lips     Pt also c/o lips peeling and cracked x 2wks. Using chapstick and vaseline and vit-E oil.    Pt also c/o of hair thining    Lesion - Initial  Affected locations: left hand  Duration: 3 years  Signs / symptoms: asymptomatic  Relieving factors/Treatments tried: OTC wart treatment (Recently been using bleach)  Patient has reported that he has cut it off several times but it always comes back. He does pick at it    Lips have been dry. Have applied several things to his lips including neosporin    Review of Systems   Hematologic/Lymphatic: Does not bruise/bleed easily.     Objective:   Physical Exam   Constitutional: He appears well-developed and well-nourished. No distress.   Neurological: He is alert and oriented to person, place, and time. He is not disoriented.   Psychiatric: He has a normal mood and affect.   Skin:   Areas Examined (abnormalities noted in diagram):   Scalp / Hair Palpated and Inspected  Head / Face Inspection Performed  RUE Inspected              Diagram Legend     Erythematous scaling macule/papule c/w actinic keratosis       Vascular papule c/w angioma      Pigmented verrucoid papule/plaque c/w seborrheic keratosis      Yellow umbilicated papule c/w sebaceous hyperplasia      Irregularly shaped tan macule c/w lentigo     1-2 mm smooth white papules consistent with Milia      Movable subcutaneous cyst with punctum c/w epidermal inclusion cyst      Subcutaneous movable cyst c/w pilar cyst      Firm pink to brown papule c/w dermatofibroma      Pedunculated fleshy papule(s) c/w skin tag(s)      Evenly pigmented macule c/w junctional nevus     Mildly variegated pigmented, slightly irregular-bordered macule c/w mildly atypical nevus      Flesh colored to evenly pigmented papule c/w intradermal nevus       Pink pearly papule/plaque c/w basal cell  carcinoma      Erythematous hyperkeratotic cursted plaque c/w SCC      Surgical scar with no sign of skin cancer recurrence      Open and closed comedones      Inflammatory papules and pustules      Verrucoid papule consistent consistent with wart     Erythematous eczematous patches and plaques     Dystrophic onycholytic nail with subungual debris c/w onychomycosis     Umbilicated papule    Erythematous-base heme-crusted tan verrucoid plaque consistent with inflamed seborrheic keratosis     Erythematous Silvery Scaling Plaque c/w Psoriasis     See annotation      Assessment / Plan:        Prurigo nodularis--right dorsal hand  - counseled to avoid picking lesions  - ILK as below  - keep covered with a bandaid and can apply betamethasone under occlusion as below  -     betamethasone dipropionate 0.05 % cream; Apply topically 2 (two) times daily. Use to affected areas for up to 2 weeks then take a 1 week break or decrease to 3 times weekly. Do not apply to groin or face. Apply to spot on right hand  Dispense: 45 g; Refill: 0    Intralesional Kenalog 10mg/cc (0.1 cc total) injected into 1 lesions on the right hand today after obtaining verbal consent including risk of surrounding hypopigmentation. Patient tolerated procedure well.  Units: 1  NDC for Kenalog 10mg/cc:  2975-1572-40    Lip Dermatitis  - stop all topical to his lips other than hydrocortisone and vaseline 3-4x per day in case there is a component of ACD  -     hydrocortisone 2.5 % ointment; Apply topically 2 (two) times daily. Use to affected areas for up to 2 weeks then take a 1 week break or decrease to 3 times weekly. Apply to lips  Dispense: 28.35 g; Refill: 1    Androgenetic alopecia  -     finasteride (PROPECIA) 1 mg tablet; Take 1 tablet (1 mg total) by mouth once daily.  Dispense: 30 tablet; Refill: 11    - discussed chronicity of disease   - discussed treatment options including topical and systemic medications, PRP, hair transplant   -  start  minoxidil 5% solution or foam twice daily. Discussed that pt needs to use medication daily for efficacy. May take 6-12 months to see an improvement. Wash hands after using   - discussed starting finasteride orally including side effects of decreased libido and difficulty obtaining erection and controversial link to depression and suicidal ideation as well as post-finasteride syndrome. Also discussed risk of delayed diagnosis of prostate cancer. Patient voiced understanding   -  start finasteraide 1 mg daily        Follow up if symptoms worsen or fail to improve.

## 2023-05-26 ENCOUNTER — PATIENT MESSAGE (OUTPATIENT)
Dept: UROLOGY | Facility: CLINIC | Age: 68
End: 2023-05-26
Payer: MEDICARE

## 2023-05-31 DIAGNOSIS — N52.01 ERECTILE DYSFUNCTION DUE TO ARTERIAL INSUFFICIENCY: ICD-10-CM

## 2023-06-09 ENCOUNTER — OFFICE VISIT (OUTPATIENT)
Dept: UROLOGY | Facility: CLINIC | Age: 68
End: 2023-06-09
Payer: MEDICARE

## 2023-06-09 VITALS
SYSTOLIC BLOOD PRESSURE: 144 MMHG | DIASTOLIC BLOOD PRESSURE: 77 MMHG | BODY MASS INDEX: 31.35 KG/M2 | HEIGHT: 74 IN | HEART RATE: 83 BPM

## 2023-06-09 DIAGNOSIS — C64.1 RENAL CANCER, RIGHT: ICD-10-CM

## 2023-06-09 DIAGNOSIS — N52.01 ERECTILE DYSFUNCTION DUE TO ARTERIAL INSUFFICIENCY: Primary | ICD-10-CM

## 2023-06-09 DIAGNOSIS — R97.20 ELEVATED PSA: ICD-10-CM

## 2023-06-09 PROCEDURE — 3077F PR MOST RECENT SYSTOLIC BLOOD PRESSURE >= 140 MM HG: ICD-10-PCS | Mod: HCNC,CPTII,S$GLB, | Performed by: UROLOGY

## 2023-06-09 PROCEDURE — 99213 OFFICE O/P EST LOW 20 MIN: CPT | Mod: HCNC,S$GLB,, | Performed by: UROLOGY

## 2023-06-09 PROCEDURE — 4010F ACE/ARB THERAPY RXD/TAKEN: CPT | Mod: HCNC,CPTII,S$GLB, | Performed by: UROLOGY

## 2023-06-09 PROCEDURE — 3008F PR BODY MASS INDEX (BMI) DOCUMENTED: ICD-10-PCS | Mod: HCNC,CPTII,S$GLB, | Performed by: UROLOGY

## 2023-06-09 PROCEDURE — 3288F FALL RISK ASSESSMENT DOCD: CPT | Mod: HCNC,CPTII,S$GLB, | Performed by: UROLOGY

## 2023-06-09 PROCEDURE — 3044F PR MOST RECENT HEMOGLOBIN A1C LEVEL <7.0%: ICD-10-PCS | Mod: HCNC,CPTII,S$GLB, | Performed by: UROLOGY

## 2023-06-09 PROCEDURE — 1101F PT FALLS ASSESS-DOCD LE1/YR: CPT | Mod: HCNC,CPTII,S$GLB, | Performed by: UROLOGY

## 2023-06-09 PROCEDURE — 3078F DIAST BP <80 MM HG: CPT | Mod: HCNC,CPTII,S$GLB, | Performed by: UROLOGY

## 2023-06-09 PROCEDURE — 1160F RVW MEDS BY RX/DR IN RCRD: CPT | Mod: HCNC,CPTII,S$GLB, | Performed by: UROLOGY

## 2023-06-09 PROCEDURE — 3288F PR FALLS RISK ASSESSMENT DOCUMENTED: ICD-10-PCS | Mod: HCNC,CPTII,S$GLB, | Performed by: UROLOGY

## 2023-06-09 PROCEDURE — 99999 PR PBB SHADOW E&M-EST. PATIENT-LVL III: ICD-10-PCS | Mod: PBBFAC,HCNC,, | Performed by: UROLOGY

## 2023-06-09 PROCEDURE — 3078F PR MOST RECENT DIASTOLIC BLOOD PRESSURE < 80 MM HG: ICD-10-PCS | Mod: HCNC,CPTII,S$GLB, | Performed by: UROLOGY

## 2023-06-09 PROCEDURE — 1126F AMNT PAIN NOTED NONE PRSNT: CPT | Mod: HCNC,CPTII,S$GLB, | Performed by: UROLOGY

## 2023-06-09 PROCEDURE — 4010F PR ACE/ARB THEARPY RXD/TAKEN: ICD-10-PCS | Mod: HCNC,CPTII,S$GLB, | Performed by: UROLOGY

## 2023-06-09 PROCEDURE — 1159F MED LIST DOCD IN RCRD: CPT | Mod: HCNC,CPTII,S$GLB, | Performed by: UROLOGY

## 2023-06-09 PROCEDURE — 1101F PR PT FALLS ASSESS DOC 0-1 FALLS W/OUT INJ PAST YR: ICD-10-PCS | Mod: HCNC,CPTII,S$GLB, | Performed by: UROLOGY

## 2023-06-09 PROCEDURE — 3008F BODY MASS INDEX DOCD: CPT | Mod: HCNC,CPTII,S$GLB, | Performed by: UROLOGY

## 2023-06-09 PROCEDURE — 99999 PR PBB SHADOW E&M-EST. PATIENT-LVL III: CPT | Mod: PBBFAC,HCNC,, | Performed by: UROLOGY

## 2023-06-09 PROCEDURE — 3044F HG A1C LEVEL LT 7.0%: CPT | Mod: HCNC,CPTII,S$GLB, | Performed by: UROLOGY

## 2023-06-09 PROCEDURE — 3077F SYST BP >= 140 MM HG: CPT | Mod: HCNC,CPTII,S$GLB, | Performed by: UROLOGY

## 2023-06-09 PROCEDURE — 1126F PR PAIN SEVERITY QUANTIFIED, NO PAIN PRESENT: ICD-10-PCS | Mod: HCNC,CPTII,S$GLB, | Performed by: UROLOGY

## 2023-06-09 PROCEDURE — 99213 PR OFFICE/OUTPT VISIT, EST, LEVL III, 20-29 MIN: ICD-10-PCS | Mod: HCNC,S$GLB,, | Performed by: UROLOGY

## 2023-06-09 PROCEDURE — 1160F PR REVIEW ALL MEDS BY PRESCRIBER/CLIN PHARMACIST DOCUMENTED: ICD-10-PCS | Mod: HCNC,CPTII,S$GLB, | Performed by: UROLOGY

## 2023-06-09 PROCEDURE — 1159F PR MEDICATION LIST DOCUMENTED IN MEDICAL RECORD: ICD-10-PCS | Mod: HCNC,CPTII,S$GLB, | Performed by: UROLOGY

## 2023-06-09 RX ORDER — PNEUMOCOCCAL 20-VALENT CONJUGATE VACCINE 2.2; 2.2; 2.2; 2.2; 2.2; 2.2; 2.2; 2.2; 2.2; 2.2; 2.2; 2.2; 2.2; 2.2; 2.2; 2.2; 4.4; 2.2; 2.2; 2.2 UG/.5ML; UG/.5ML; UG/.5ML; UG/.5ML; UG/.5ML; UG/.5ML; UG/.5ML; UG/.5ML; UG/.5ML; UG/.5ML; UG/.5ML; UG/.5ML; UG/.5ML; UG/.5ML; UG/.5ML; UG/.5ML; UG/.5ML; UG/.5ML; UG/.5ML; UG/.5ML
INJECTION, SUSPENSION INTRAMUSCULAR
COMMUNITY
Start: 2023-04-03 | End: 2023-09-21

## 2023-06-09 NOTE — PROGRESS NOTES
Subjective:       Patient ID: Adrian Burt is a 68 y.o. male.    Chief Complaint: No chief complaint on file.     This is a 68 y.o.  male patient that is an established patient for right kidney cancer, low testosterone, elevated PSA, erectile dysfunction.    Initial HPI: Right 1 cm solid mass in a concerning approximately 4.5 cm upper pole cystic mass.  CT abdomen with and without contrast stone subsequently that showed similar findings.  There is a report of ? FH of kidney cancer.      S/p right partial nephrectomy of upper pole cystic mass and right renal mass 5/9/22.  Upper pole cystic mass was cyst lower pole mass was 1.2 cm clear cell renal cell carcinoma.  PSA up to 6.5, free20% (6/2022)   MRI prostate (6/2022) showed PI-RADS 2 with no lesions, volume 122  Follow up imaging for nephrectomy done 12/22, chest x-ray negative but CT of the abdomen pelvis did show some lower pulmonary nodules up to 5 mm.  Evidence of right partial nephrectomy without recurrence.  No lymphadenopathy.  PSA 5.8, free PSA 26%    Has not done CT of the chest yet, here for erectile dysfunction and low testosterone.  He ran out of testosterone prior and T was normal 562 (1/23).   He is also having erectile dysfunctions, given cialis.  He is also on ICI 10-20 units of Papa 30, phen 3, PGE 50 mcg.  Having erections for 2 hours.  No erection without ICI.    5/3/23:  here to review labs, PSA up to 6.7, free 19% down from 27%.  T was normal 562 (1/23) was off T prior, given refill at time.  Here to discuss TRT, PSA elevation and ED meds.  Main reason for TRT was ED, Good results with ICI    Given new script for Trimix 30/2/30 per patient request.  Using 20 without good results.       LAST PSA  Lab Results   Component Value Date    PSA 5.9 (H) 01/18/2023    PSA 5.2 (H) 04/07/2021    PSA 2.1 09/10/2018    PSA 1.8 07/05/2017    PSA 2.6 11/23/2015    PSA 7.2 (H) 12/15/2008    PSA 0.6 02/22/2005    PSATOTAL 6.7 (H) 05/01/2023    PSATOTAL 5.8  (H) 2022    PSATOTAL 6.5 (H) 2022    PSAFREE 1.32 2023    PSAFREE 1.58 (H) 2022    PSAFREE 1.36 2022       Lab Results   Component Value Date    CREATININE 1.1 04/10/2023       ---  Past Medical History:   Diagnosis Date    Allergy     Colon polyp     Fatty liver     GERD (gastroesophageal reflux disease)     Hypertension        Past Surgical History:   Procedure Laterality Date    COLONOSCOPY N/A 2017    Procedure: COLONOSCOPY;  Surgeon: Leo Henriquez MD;  [facility]  Service: Endoscopy;  Laterality: N/A;    LEG SURGERY Left     ROBOT-ASSISTED LAPAROSCOPIC PARTIAL NEPHRECTOMY Right 2022    Procedure: Right RETROPERITONEAL robotic assisted lap partial nephrectomy  Intraoperative ultrasound with interpretation Special needs: drop in probe for ultrasound, retroperitoneal dilating baloon;  Surgeon: Arvind Álvarez MD;  Location: [facility];  Service: Urology;  Laterality: Right;       Family History   Problem Relation Age of Onset    Kidney disease Mother     Heart disease Father     Cancer Sister         kidney    Kidney disease Sister     Colon cancer Neg Hx     Esophageal cancer Neg Hx        Social History     Tobacco Use    Smoking status: Former     Types: Cigarettes     Quit date: 11/15/1978     Years since quittin.5    Smokeless tobacco: Never    Tobacco comments:     smoked for 10 years, quit in 76   Substance Use Topics    Alcohol use: No    Drug use: No       Current Outpatient Medications on File Prior to Visit   Medication Sig Dispense Refill    amLODIPine (NORVASC) 10 MG tablet Take 1 tablet (10 mg total) by mouth every morning. 90 tablet 3    aspirin (ECOTRIN) 81 MG EC tablet Take 81 mg by mouth once daily.      azelastine (ASTELIN) 137 mcg (0.1 %) nasal spray 1 spray (137 mcg total) by Nasal route 2 (two) times daily. 30 mL 3    betamethasone dipropionate 0.05 % cream Apply topically 2 (two) times daily. Use to affected areas for up  to 2 weeks then take a 1 week break or decrease to 3 times weekly. Do not apply to groin or face. Apply to spot on right hand 45 g 0    ferrous gluconate (FERGON) 324 MG tablet TAKE 1 TABLET BY MOUTH DAILY WITH BREAKFAST 30 tablet 3    finasteride (PROPECIA) 1 mg tablet Take 1 tablet (1 mg total) by mouth once daily. 30 tablet 11    fluticasone propionate (FLONASE) 50 mcg/actuation nasal spray SPRAY 1 SPRAY IN EACH NOSTRIL EVERY DAY 48 g 2    hydroCHLOROthiazide (HYDRODIURIL) 12.5 MG Tab TAKE 1 TABLET(12.5 MG) BY MOUTH EVERY DAY 90 tablet 3    hydrocortisone 2.5 % ointment Apply topically 2 (two) times daily. Use to affected areas for up to 2 weeks then take a 1 week break or decrease to 3 times weekly. Apply to lips 28.35 g 1    losartan (COZAAR) 100 MG tablet Take 1 tablet (100 mg total) by mouth once daily. 90 tablet 3    MODERNA COVID BIVAL,6Y UP,,PF, 50 mcg/0.5 mL injection       montelukast (SINGULAIR) 10 mg tablet Take 1 tablet (10 mg total) by mouth every evening. 90 tablet 3    multivit with minerals/lutein (MULTIVITAMIN 50 PLUS ORAL) Take 1 tablet by mouth once daily.      pep injection Inject 0.2 ml as directed (20 units).      For compounding pharmacy use:   Add PAPAVERINE 30 mcg  Add PHENTOLAMINE 2 mg  Add ALPROSTADIL 30 mcg 1 vial 5    tadalafiL (CIALIS) 20 MG Tab Take 1 tablet (20 mg total) by mouth daily as needed (1 hour prior to sexual activity). 12 tablet 5    tamsulosin (FLOMAX) 0.4 mg Cap Take 1 capsule (0.4 mg total) by mouth once daily. 90 capsule 3     Current Facility-Administered Medications on File Prior to Visit   Medication Dose Route Frequency Provider Last Rate Last Admin    triamcinolone acetonide injection 10 mg  10 mg Intradermal Once Charles Barnes MD           Review of patient's allergies indicates:   Allergen Reactions    Tessalon [benzonatate] Other (See Comments)     States lips felt dry and swollen         Review of Systems   Constitutional:  Negative for activity change,  chills and fever.   HENT:  Negative for congestion.    Respiratory:  Negative for cough, chest tightness and shortness of breath.    Cardiovascular:  Negative for chest pain and palpitations.   Gastrointestinal:  Negative for abdominal distention, abdominal pain, nausea and vomiting.   Genitourinary:  Negative for difficulty urinating, flank pain, hematuria, penile pain, scrotal swelling and testicular pain.   Musculoskeletal:  Negative for gait problem.     Objective:      Physical Exam  Constitutional:       Appearance: Normal appearance.   HENT:      Head: Normocephalic.   Pulmonary:      Effort: Pulmonary effort is normal.      Breath sounds: Normal breath sounds.   Abdominal:      General: Abdomen is flat. Bowel sounds are normal.      Palpations: Abdomen is soft.      Tenderness: There is no abdominal tenderness. There is no right CVA tenderness, left CVA tenderness or guarding.      Comments: Inc c/d/i   Musculoskeletal:         General: Normal range of motion.      Cervical back: Normal range of motion.   Skin:     General: Skin is warm.   Neurological:      Mental Status: He is alert.       No results found for this or any previous visit (from the past 2160 hour(s)).    Path 5/9/22:    Final Pathologic Diagnosis 1. FINAL RESECTION MARGIN OF RIGHT UPPER POLE MASS:   RENAL PARENCHYMA WITH NO NEOPLASIA IDENTIFIED   2. RIGHT UPPER POLE MASS:   INNOCUOUS BENIGN MULTILOCULAR CYST   3. MASS FROM MID RIGHT  KIDNEY :   RENAL CELL CARCINOMA WITH NO INVOLVEMENT OF MARGINS OR PERINEPHRIC ADIPOSE   TISSUE IDENTIFIED    Comment: Interp By Adolph Bonilla M.D., Signed on 05/13/2022 at 09:22   Microscopic Exam 2. This lesion is a benign multilocular cyst.  The cystic spaces have a   single layer of lining at the most.  There is no proliferative lining   identified in this entirely submitted specimen.  No area has formation of a   mass neoplasm.  There is no significant abnormality of renal parenchyma   separate from the  cystic lesion.   3.   Procedure :  Partial renal excision   Specimen Laterality :  Right   Tumor Focality :  Unifocal   Tumor Size :  1.2 cm   Histologic Type :  Renal cell carcinoma, clear cell variant   Tumor Extent :  Limited to kidney   Sarcomatoid Features :  Not identified   Rhabdoid Features :  Not identified   Tumor Necrosis :  Not identified   Margins ; no margin involvement identified   Regional Lymph Node Status :   Not applicable (no regional lymph nodes   submitted or found)   PATHOLOGIC STAGE CLASSIFICATION (pTNM, AJCC 8th Edition) : pT1a pNX pMX   Additional findings :  There is no significant abnormality of renal   parenchyma apart from the masses.      Results for orders placed or performed during the hospital encounter of 06/17/22 (from the past 2160 hour(s))   MRI Prostate W W/O Contrast    Impression    1. Benign prostatic hyperplasia.  2. Red marrow hyperplasia.  Overall Assessment: PI-RADS 2 - Low (clinically significant cancer is unlikely to be present)    Number of targets created for potential MR/US fusion biopsy    Peripheral zone: 0    Transition zone: 0      Electronically signed by: Jersey Hall MD  Date:    06/19/2022  Time:    09:47                         Assessment:     Problem Noted   Renal Cancer, Right 3/31/2022    4 cm complex cystic lesion to posterior right upper pole, 1.1 cm right anterior solid mass on CT w/wo contrast 3/2022.  --MRI 4/22: right 3.6 cm UP cystic lesion ? Cystic RCC, right midpole 12mm solid mass  --Preop Cr 1, GFR >60  --S/p right retroperitioneal partial 5/9/22 (both masses removed)  --Path: upper pole lesion was cyst, midpole 1.2 cm [id number] CC RCC, negative margins  KATIE    Post op imaging:  CT/CXR 12/22: lower pulm nodules up to 5mm on CT but not on CXR, no recurrence.      Post op labs  6/22--Cr 1.2, GFR >60  12/22--Cr 1.2, GFR >60     Elevated Psa 4/14/2022 6/22-6.5, free 20%  12/22-5.8, free 26%     MRI prostate 6/22: volume 122, no lesions, PIRADS  2   On TRT, stopped 5/2022          Erectile Dysfunction 7/19/2012    Trimix 30/2/20 refilled 5/2023     Low Testosterone 1/18/2023    Reviewed risk of elevated PSA, prostate cancer and testosterone replacement therapy.  Testosterone refilled 01/2023, stopped 5/2023 after T normal 1/23 when off TRT     Urinary Tract Infection Without Hematuria (Resolved) 3/31/2022       Plan:     1.  Continue trimix 30/2/30, restart at 25 units increase in increments of 5 units up to max 50; no more than 3 injections per week.  If erection over 4 hours to ED.  2.  Follow up as directed with PSA.        Arvind Álvarez MD

## 2023-06-30 ENCOUNTER — PATIENT MESSAGE (OUTPATIENT)
Dept: INTERNAL MEDICINE | Facility: CLINIC | Age: 68
End: 2023-06-30
Payer: MEDICARE

## 2023-06-30 DIAGNOSIS — I10 ESSENTIAL HYPERTENSION: ICD-10-CM

## 2023-06-30 RX ORDER — LOSARTAN POTASSIUM 100 MG/1
100 TABLET ORAL DAILY
Qty: 90 TABLET | Refills: 3 | Status: SHIPPED | OUTPATIENT
Start: 2023-06-30 | End: 2024-04-02 | Stop reason: SDUPTHER

## 2023-07-14 ENCOUNTER — OFFICE VISIT (OUTPATIENT)
Dept: OPTOMETRY | Facility: CLINIC | Age: 68
End: 2023-07-14
Payer: MEDICARE

## 2023-07-14 DIAGNOSIS — H25.813 COMBINED FORMS OF AGE-RELATED CATARACT OF BOTH EYES: Primary | ICD-10-CM

## 2023-07-14 PROCEDURE — 3288F PR FALLS RISK ASSESSMENT DOCUMENTED: ICD-10-PCS | Mod: HCNC,CPTII,S$GLB, | Performed by: OPTOMETRIST

## 2023-07-14 PROCEDURE — 92004 COMPRE OPH EXAM NEW PT 1/>: CPT | Mod: HCNC,S$GLB,, | Performed by: OPTOMETRIST

## 2023-07-14 PROCEDURE — 3044F PR MOST RECENT HEMOGLOBIN A1C LEVEL <7.0%: ICD-10-PCS | Mod: HCNC,CPTII,S$GLB, | Performed by: OPTOMETRIST

## 2023-07-14 PROCEDURE — 4010F ACE/ARB THERAPY RXD/TAKEN: CPT | Mod: HCNC,CPTII,S$GLB, | Performed by: OPTOMETRIST

## 2023-07-14 PROCEDURE — 92015 PR REFRACTION: ICD-10-PCS | Mod: HCNC,S$GLB,, | Performed by: OPTOMETRIST

## 2023-07-14 PROCEDURE — 92015 DETERMINE REFRACTIVE STATE: CPT | Mod: HCNC,S$GLB,, | Performed by: OPTOMETRIST

## 2023-07-14 PROCEDURE — 1159F PR MEDICATION LIST DOCUMENTED IN MEDICAL RECORD: ICD-10-PCS | Mod: HCNC,CPTII,S$GLB, | Performed by: OPTOMETRIST

## 2023-07-14 PROCEDURE — 3044F HG A1C LEVEL LT 7.0%: CPT | Mod: HCNC,CPTII,S$GLB, | Performed by: OPTOMETRIST

## 2023-07-14 PROCEDURE — 1159F MED LIST DOCD IN RCRD: CPT | Mod: HCNC,CPTII,S$GLB, | Performed by: OPTOMETRIST

## 2023-07-14 PROCEDURE — 1101F PR PT FALLS ASSESS DOC 0-1 FALLS W/OUT INJ PAST YR: ICD-10-PCS | Mod: HCNC,CPTII,S$GLB, | Performed by: OPTOMETRIST

## 2023-07-14 PROCEDURE — 99999 PR PBB SHADOW E&M-EST. PATIENT-LVL II: ICD-10-PCS | Mod: PBBFAC,HCNC,, | Performed by: OPTOMETRIST

## 2023-07-14 PROCEDURE — 4010F PR ACE/ARB THEARPY RXD/TAKEN: ICD-10-PCS | Mod: HCNC,CPTII,S$GLB, | Performed by: OPTOMETRIST

## 2023-07-14 PROCEDURE — 3288F FALL RISK ASSESSMENT DOCD: CPT | Mod: HCNC,CPTII,S$GLB, | Performed by: OPTOMETRIST

## 2023-07-14 PROCEDURE — 99999 PR PBB SHADOW E&M-EST. PATIENT-LVL II: CPT | Mod: PBBFAC,HCNC,, | Performed by: OPTOMETRIST

## 2023-07-14 PROCEDURE — 92004 PR EYE EXAM, NEW PATIENT,COMPREHESV: ICD-10-PCS | Mod: HCNC,S$GLB,, | Performed by: OPTOMETRIST

## 2023-07-14 PROCEDURE — 1101F PT FALLS ASSESS-DOCD LE1/YR: CPT | Mod: HCNC,CPTII,S$GLB, | Performed by: OPTOMETRIST

## 2023-07-14 NOTE — PROGRESS NOTES
HPI    CC: Annual eye exam  Pt states vision has gotten a little worse since last eye exam  Pt states photosensitivity that causes headaches and ocular pain   Some redness  Irritation and itchiness sometimes when sweating   (None)tearing  (Sometimes)burning  (Sometimes)pain  (Yes)light sensitivity   (Yes)changes in vision   Pt states heavy mucous discharge when waking up most of the time and   sometimes throughout the day has to rinse eyes out  (None)tearing   (None)CL wear   Pt states using neil baby shampoo to rinse eyes out  Pt states having a film like cover over vision    Last edited by Shira Gupta, OD on 7/14/2023 10:08 AM.            Assessment /Plan     For exam results, see Encounter Report.    Combined forms of age-related cataract of both eyes      Educated pt on findings. Not visually significant. No need for removal at this time. Monitor yearly.      Eyeglass Final Rx       Eyeglass Final Rx         Sphere Cylinder Axis Add    Right -1.25 +1.25 065 +2.50    Left -1.25 +1.00 105 +2.50      Type: PAL    Expiration Date: 7/14/2024                   RTC in 1 year for annual eye exam unless changes noted sooner.

## 2023-07-24 ENCOUNTER — LAB VISIT (OUTPATIENT)
Dept: LAB | Facility: HOSPITAL | Age: 68
End: 2023-07-24
Attending: UROLOGY
Payer: MEDICARE

## 2023-07-24 ENCOUNTER — PATIENT MESSAGE (OUTPATIENT)
Dept: UROLOGY | Facility: CLINIC | Age: 68
End: 2023-07-24
Payer: MEDICARE

## 2023-07-24 DIAGNOSIS — R79.89 LOW TESTOSTERONE: Primary | ICD-10-CM

## 2023-07-24 DIAGNOSIS — R79.89 LOW TESTOSTERONE: ICD-10-CM

## 2023-07-24 DIAGNOSIS — R97.20 ELEVATED PSA: ICD-10-CM

## 2023-07-24 LAB
PROSTATE SPECIFIC ANTIGEN, TOTAL: 4.5 NG/ML (ref 0–4)
PSA FREE MFR SERPL: 20.44 %
PSA FREE SERPL-MCNC: 0.92 NG/ML (ref 0–1.5)
TESTOST SERPL-MCNC: 213 NG/DL (ref 304–1227)

## 2023-07-24 PROCEDURE — 84403 ASSAY OF TOTAL TESTOSTERONE: CPT | Performed by: UROLOGY

## 2023-07-24 PROCEDURE — 84153 ASSAY OF PSA TOTAL: CPT | Performed by: UROLOGY

## 2023-07-24 PROCEDURE — 36415 COLL VENOUS BLD VENIPUNCTURE: CPT | Performed by: UROLOGY

## 2023-07-28 ENCOUNTER — OFFICE VISIT (OUTPATIENT)
Dept: UROLOGY | Facility: CLINIC | Age: 68
End: 2023-07-28
Payer: MEDICARE

## 2023-07-28 VITALS
BODY MASS INDEX: 30.44 KG/M2 | HEART RATE: 77 BPM | DIASTOLIC BLOOD PRESSURE: 77 MMHG | HEIGHT: 74 IN | WEIGHT: 237.19 LBS | SYSTOLIC BLOOD PRESSURE: 136 MMHG

## 2023-07-28 DIAGNOSIS — R97.20 ELEVATED PSA: ICD-10-CM

## 2023-07-28 DIAGNOSIS — C64.1 RENAL CANCER, RIGHT: ICD-10-CM

## 2023-07-28 DIAGNOSIS — R79.89 LOW TESTOSTERONE: Primary | ICD-10-CM

## 2023-07-28 DIAGNOSIS — N40.0 BENIGN PROSTATIC HYPERPLASIA, UNSPECIFIED WHETHER LOWER URINARY TRACT SYMPTOMS PRESENT: ICD-10-CM

## 2023-07-28 DIAGNOSIS — Z87.438 HISTORY OF BENIGN PROSTATIC HYPERPLASIA: ICD-10-CM

## 2023-07-28 PROCEDURE — 1160F PR REVIEW ALL MEDS BY PRESCRIBER/CLIN PHARMACIST DOCUMENTED: ICD-10-PCS | Mod: CPTII,S$GLB,, | Performed by: UROLOGY

## 2023-07-28 PROCEDURE — 99999 PR PBB SHADOW E&M-EST. PATIENT-LVL IV: CPT | Mod: PBBFAC,,, | Performed by: UROLOGY

## 2023-07-28 PROCEDURE — 3078F DIAST BP <80 MM HG: CPT | Mod: CPTII,S$GLB,, | Performed by: UROLOGY

## 2023-07-28 PROCEDURE — 99214 OFFICE O/P EST MOD 30 MIN: CPT | Mod: S$GLB,,, | Performed by: UROLOGY

## 2023-07-28 PROCEDURE — 3288F PR FALLS RISK ASSESSMENT DOCUMENTED: ICD-10-PCS | Mod: CPTII,S$GLB,, | Performed by: UROLOGY

## 2023-07-28 PROCEDURE — 3008F PR BODY MASS INDEX (BMI) DOCUMENTED: ICD-10-PCS | Mod: CPTII,S$GLB,, | Performed by: UROLOGY

## 2023-07-28 PROCEDURE — 3044F PR MOST RECENT HEMOGLOBIN A1C LEVEL <7.0%: ICD-10-PCS | Mod: CPTII,S$GLB,, | Performed by: UROLOGY

## 2023-07-28 PROCEDURE — 1160F RVW MEDS BY RX/DR IN RCRD: CPT | Mod: CPTII,S$GLB,, | Performed by: UROLOGY

## 2023-07-28 PROCEDURE — 1126F AMNT PAIN NOTED NONE PRSNT: CPT | Mod: CPTII,S$GLB,, | Performed by: UROLOGY

## 2023-07-28 PROCEDURE — 1101F PR PT FALLS ASSESS DOC 0-1 FALLS W/OUT INJ PAST YR: ICD-10-PCS | Mod: CPTII,S$GLB,, | Performed by: UROLOGY

## 2023-07-28 PROCEDURE — 1126F PR PAIN SEVERITY QUANTIFIED, NO PAIN PRESENT: ICD-10-PCS | Mod: CPTII,S$GLB,, | Performed by: UROLOGY

## 2023-07-28 PROCEDURE — 4010F PR ACE/ARB THEARPY RXD/TAKEN: ICD-10-PCS | Mod: CPTII,S$GLB,, | Performed by: UROLOGY

## 2023-07-28 PROCEDURE — 1159F MED LIST DOCD IN RCRD: CPT | Mod: CPTII,S$GLB,, | Performed by: UROLOGY

## 2023-07-28 PROCEDURE — 3288F FALL RISK ASSESSMENT DOCD: CPT | Mod: CPTII,S$GLB,, | Performed by: UROLOGY

## 2023-07-28 PROCEDURE — 3075F PR MOST RECENT SYSTOLIC BLOOD PRESS GE 130-139MM HG: ICD-10-PCS | Mod: CPTII,S$GLB,, | Performed by: UROLOGY

## 2023-07-28 PROCEDURE — 1159F PR MEDICATION LIST DOCUMENTED IN MEDICAL RECORD: ICD-10-PCS | Mod: CPTII,S$GLB,, | Performed by: UROLOGY

## 2023-07-28 PROCEDURE — 99999 PR PBB SHADOW E&M-EST. PATIENT-LVL IV: ICD-10-PCS | Mod: PBBFAC,,, | Performed by: UROLOGY

## 2023-07-28 PROCEDURE — 3008F BODY MASS INDEX DOCD: CPT | Mod: CPTII,S$GLB,, | Performed by: UROLOGY

## 2023-07-28 PROCEDURE — 1101F PT FALLS ASSESS-DOCD LE1/YR: CPT | Mod: CPTII,S$GLB,, | Performed by: UROLOGY

## 2023-07-28 PROCEDURE — 4010F ACE/ARB THERAPY RXD/TAKEN: CPT | Mod: CPTII,S$GLB,, | Performed by: UROLOGY

## 2023-07-28 PROCEDURE — 3075F SYST BP GE 130 - 139MM HG: CPT | Mod: CPTII,S$GLB,, | Performed by: UROLOGY

## 2023-07-28 PROCEDURE — 3044F HG A1C LEVEL LT 7.0%: CPT | Mod: CPTII,S$GLB,, | Performed by: UROLOGY

## 2023-07-28 PROCEDURE — 99214 PR OFFICE/OUTPT VISIT, EST, LEVL IV, 30-39 MIN: ICD-10-PCS | Mod: S$GLB,,, | Performed by: UROLOGY

## 2023-07-28 PROCEDURE — 3078F PR MOST RECENT DIASTOLIC BLOOD PRESSURE < 80 MM HG: ICD-10-PCS | Mod: CPTII,S$GLB,, | Performed by: UROLOGY

## 2023-07-28 RX ORDER — TESTOSTERONE CYPIONATE 200 MG/ML
200 INJECTION, SOLUTION INTRAMUSCULAR
Qty: 10 ML | Refills: 1 | Status: SHIPPED | OUTPATIENT
Start: 2023-07-28 | End: 2024-03-21 | Stop reason: SDUPTHER

## 2023-07-28 NOTE — PROGRESS NOTES
Subjective:       Patient ID: Adrian Burt is a 68 y.o. male.    Chief Complaint: Follow-up (3 month)     This is a 68 y.o.  male patient that is an established patient for right kidney cancer, low testosterone, elevated PSA, erectile dysfunction.    Initial HPI: Right 1 cm solid mass in a concerning approximately 4.5 cm upper pole cystic mass.  CT abdomen with and without contrast stone subsequently that showed similar findings.  There is a report of ? FH of kidney cancer.        Kidney Cancer  S/p right partial nephrectomy of upper pole cystic mass and right renal mass 5/9/22.  Upper pole cystic mass was cyst lower pole mass was 1.2 cm clear cell renal cell carcinoma.  Follow up imaging for nephrectomy done 12/22, chest x-ray negative but CT of the abdomen pelvis did show some lower pulmonary nodules up to 5 mm.  Evidence of right partial nephrectomy without recurrence.  No lymphadenopathy.  Following with oncology.   CT CAP 4/2023 negative for mets, small pulm nodules.     PSA/Testosterone/ED/BPH  PSA up to 6.5, free20% (6/2022)   MRI prostate (6/2022) showed PI-RADS 2 with no lesions, volume 122    Erectile dysfunction and low testosterone.  He ran out of testosterone prior and T was normal 562 (1/23).   He is also having erectile dysfunctions, given cialis.  He is also on ICI 10-20 units of Papa 30, phen 3, PGE 50 mcg.  Having erections for 2 hours.  No erection without ICI.  Stopped ED meds, continued ICI    T was normal 562 (1/23) was off T prior, given refill at time.    Discussed TRT, PSA elevation and ED meds.  Main reason for TRT was ED, Good results with ICI  Significant fatigue after stopping TRT, T was 213 (7/23), restarted.     Voiding ok with mild LUTs AUA SS 9/2, prostate very large as above.       PSA  7/23--4.5, free 20%, T 213  5/23--6.7, free 19%    IPSS Questionnaire (AUA-SS):  Over the past month    1)  Incomplete Emptying - How often have you had a sensation of not emptying your  bladder completely after you finish urinating?  2 - Less than half the time   2)  Frequency - How often have you had to urinate again less than two hours after you finished urinating? 3 - About half the time   3)  Intermittency - How often have you found you stopped and started again several times when you urinated?  0 - Not at all   4) Urgency - How difficult have you found it to postpone urination?  1 - Less than 1 time in 5   5) Weak Stream - How often have you had a weak urinary stream?  2 - Less than half the time   6) Straining  - How often have you had to push or strain to begin urination?  0 - Not at all   7) Nocturia - How many times did you most typically get up to urinate from the time you went to bed until the time you got up in the morning?  1 - 1 time   Total score:  0-7 mild, 8-19 moderate, 20-35 severe 9   Quality of Life:  2 - Mostly Satisfied        LAST PSA  Lab Results   Component Value Date    PSA 5.9 (H) 01/18/2023    PSA 5.2 (H) 04/07/2021    PSA 2.1 09/10/2018    PSA 1.8 07/05/2017    PSA 2.6 11/23/2015    PSA 7.2 (H) 12/15/2008    PSA 0.6 02/22/2005    PSATOTAL 4.5 (H) 07/24/2023    PSATOTAL 6.7 (H) 05/01/2023    PSATOTAL 5.8 (H) 12/06/2022    PSATOTAL 6.5 (H) 06/13/2022    PSAFREE 0.92 07/24/2023    PSAFREE 1.32 05/01/2023    PSAFREE 1.58 (H) 12/06/2022    PSAFREE 1.36 06/13/2022       Lab Results   Component Value Date    CREATININE 1.1 04/10/2023       --  PMH/PSH/PFH/Meds/Allergies/Social reviewed as in chart.         Review of Systems   Constitutional:  Negative for activity change, chills and fever.   HENT:  Negative for congestion.    Respiratory:  Negative for cough, chest tightness and shortness of breath.    Cardiovascular:  Negative for chest pain and palpitations.   Gastrointestinal:  Negative for abdominal distention, abdominal pain, nausea and vomiting.   Genitourinary:  Negative for difficulty urinating, flank pain, hematuria, penile pain, scrotal swelling and testicular pain.    Musculoskeletal:  Negative for gait problem.     Objective:      Physical Exam  Constitutional:       Appearance: Normal appearance.   HENT:      Head: Normocephalic.   Pulmonary:      Effort: Pulmonary effort is normal.      Breath sounds: Normal breath sounds.   Abdominal:      General: Abdomen is flat. Bowel sounds are normal.      Palpations: Abdomen is soft.      Tenderness: There is no abdominal tenderness. There is no right CVA tenderness, left CVA tenderness or guarding.      Comments: Inc c/d/i   Musculoskeletal:         General: Normal range of motion.      Cervical back: Normal range of motion.   Skin:     General: Skin is warm.   Neurological:      Mental Status: He is alert.       No results found for this or any previous visit (from the past 2160 hour(s)).    Path 5/9/22:    Final Pathologic Diagnosis 1. FINAL RESECTION MARGIN OF RIGHT UPPER POLE MASS:   RENAL PARENCHYMA WITH NO NEOPLASIA IDENTIFIED   2. RIGHT UPPER POLE MASS:   INNOCUOUS BENIGN MULTILOCULAR CYST   3. MASS FROM MID RIGHT  KIDNEY :   RENAL CELL CARCINOMA WITH NO INVOLVEMENT OF MARGINS OR PERINEPHRIC ADIPOSE   TISSUE IDENTIFIED    Comment: Interp By Adolph Bonilla M.D., Signed on 05/13/2022 at 09:22   Microscopic Exam 2. This lesion is a benign multilocular cyst.  The cystic spaces have a   single layer of lining at the most.  There is no proliferative lining   identified in this entirely submitted specimen.  No area has formation of a   mass neoplasm.  There is no significant abnormality of renal parenchyma   separate from the cystic lesion.   3.   Procedure :  Partial renal excision   Specimen Laterality :  Right   Tumor Focality :  Unifocal   Tumor Size :  1.2 cm   Histologic Type :  Renal cell carcinoma, clear cell variant   Tumor Extent :  Limited to kidney   Sarcomatoid Features :  Not identified   Rhabdoid Features :  Not identified   Tumor Necrosis :  Not identified   Margins ; no margin involvement identified   Regional  Lymph Node Status :   Not applicable (no regional lymph nodes   submitted or found)   PATHOLOGIC STAGE CLASSIFICATION (pTNM, AJCC 8th Edition) : pT1a pNX pMX   Additional findings :  There is no significant abnormality of renal   parenchyma apart from the masses.      Results for orders placed or performed during the hospital encounter of 06/17/22 (from the past 2160 hour(s))   MRI Prostate W W/O Contrast    Impression    1. Benign prostatic hyperplasia.  2. Red marrow hyperplasia.  Overall Assessment: PI-RADS 2 - Low (clinically significant cancer is unlikely to be present)    Number of targets created for potential MR/US fusion biopsy    Peripheral zone: 0    Transition zone: 0      Electronically signed by: Jersey Hall MD  Date:    06/19/2022  Time:    09:47                         Assessment:     Problem Noted   Renal Cancer, Right 3/31/2022    4 cm complex cystic lesion to posterior right upper pole, 1.1 cm right anterior solid mass on CT w/wo contrast 3/2022.  --MRI 4/22: right 3.6 cm UP cystic lesion ? Cystic RCC, right midpole 12mm solid mass  --Preop Cr 1, GFR >60  --S/p right retroperitioneal partial 5/9/22 (both masses removed)  --Path: upper pole lesion was cyst, midpole 1.2 cm [id number] CC RCC, negative margins  KATIE    Post op imaging:  CT/CXR 12/22: lower pulm nodules up to 5mm on CT but not on CXR, no recurrence.    CT 4/23: stable pulm nodules, no recurrence    Post op labs  6/22--Cr 1.2, GFR >60  12/22--Cr 1.2, GFR >60  4/23--1.1, GFR >60     Elevated Psa 4/14/2022    PSA  7/23--4.5, free 20%, T 213  5/23--6.7, free 19%    MRI prostate 6/22: volume 122, no lesions, PIRADS 2   On TRT, stopped 5/2022   Restarted 7/2023 due to fatigue, T 213         Erectile Dysfunction 7/19/2012    Trimix 30/2/20 refilled 5/2023     Low Testosterone 1/18/2023    Reviewed risk of elevated PSA, prostate cancer and testosterone replacement therapy.  Testosterone refilled 01/2023, stopped 5/2023 after T normal 1/23 when  off TRT    Restarted 7/2023 due to fatigue, T 213     Erectile Dysfunction Due to Arterial Insufficiency 6/9/2023   Urinary Tract Infection Without Hematuria (Resolved) 3/31/2022       Plan:     1.  Reviewed CT 4/23, no mets, following with oncology for pulm nodules (stable)  2.  Refill TRT, discussed implications with elevated PSA  3.  Minimal LUTs on tamsulosin/propecia, will check labs in 3 months after TRT      Arvind Álvarez MD

## 2023-07-31 PROBLEM — Z00.00 HEALTHCARE MAINTENANCE: Status: RESOLVED | Noted: 2023-04-27 | Resolved: 2023-07-31

## 2023-08-09 ENCOUNTER — CLINICAL SUPPORT (OUTPATIENT)
Dept: ENDOSCOPY | Facility: HOSPITAL | Age: 68
End: 2023-08-09
Attending: INTERNAL MEDICINE
Payer: MEDICARE

## 2023-08-09 ENCOUNTER — TELEPHONE (OUTPATIENT)
Dept: ENDOSCOPY | Facility: HOSPITAL | Age: 68
End: 2023-08-09

## 2023-08-09 DIAGNOSIS — Z12.11 SCREEN FOR COLON CANCER: ICD-10-CM

## 2023-08-09 RX ORDER — POLYETHYLENE GLYCOL 3350, SODIUM SULFATE ANHYDROUS, SODIUM BICARBONATE, SODIUM CHLORIDE, POTASSIUM CHLORIDE 236; 22.74; 6.74; 5.86; 2.97 G/4L; G/4L; G/4L; G/4L; G/4L
4 POWDER, FOR SOLUTION ORAL ONCE
Qty: 4000 ML | Refills: 0 | Status: SHIPPED | OUTPATIENT
Start: 2023-08-09 | End: 2023-08-09

## 2023-08-09 NOTE — TELEPHONE ENCOUNTER
Spoke to patient to schedule procedure(s) Colonoscopy       Physician to perform procedure(s) Dr. DAIANA Henriquez  Date of Procedure (s) 9/25/23  Arrival Time 10:40 AM  Time of Procedure(s) 11:40 AM   Location of Procedure(s) [address]  Type of Rx Prep sent to patient: PEG  Instructions provided to patient via [facility]    Patient was informed on the following information and verbalized understanding. Screening questionnaire reviewed with patient and complete. If procedure requires anesthesia, a responsible adult needs to be present to accompany the patient home, patient cannot drive after receiving anesthesia. Appointment details are tentative, especially check-in time. Patient will receive a prep-op call 4 days prior to confirm check-in time for procedure. If applicable the patient should contact their pharmacy to verify Rx for procedure prep is ready for pick-up. Patient was advised to call the scheduling department at [phone] if pharmacy states no Rx is available. Patient was advised to call the endoscopy scheduling department if any questions or concerns arise.       Endoscopy Scheduling Department         Colonoscopy Procedure Prep Instructions     Date of procedure: 9/25/23 Arrive at: 10:40 AM     Location of Department:   [facility] [address] [place] [address]  Take the [facility] Elevators to 4th Floor Endoscopy Lab     As soon as possible:   your prep from pharmacy and over the counter DULCOLAX LAXATIVE TABLETS             On the day before your procedure   What You CAN do:   You may have clear liquids ONLY-see below for list.      Liquids That Are OK to Drink:   Water  Sports drinks (Gatorade, Power-Aid)  Coffee or tea (no cream or nondairy creamer)  Clear juices without pulp (apple, white grape)  Gelatin desserts (no fruit or toppings)  Clear soda (sprite, coke, ginger ale)  Chicken broth (until 12 midnight the night before procedure)     What You CANNOT do:    Do not EAT solid food, drink milk or anything   colored red.  Do not drink alcohol.  Do not take oral medications within 1 hour of starting   each dose of prep.  No gum chewing or candy morning of procedure                       Note:   (Please disregard the insert instructions from pharmacy).  PEG Bowel Prep is indicated for cleansing of the colon as a preparation for colonoscopy in adults.   Be sure to tell your doctor about all the medicines you take, including prescription and non-prescription medicines, vitamins, and herbal supplements. PEG Bowel Prep may affect how other medicines work.  Medication taken by mouth may not be absorbed properly when taken within 1 hour before the start of each dose of PEG Bowel Prep.     It is not uncommon to experience some abdominal cramping, nausea and/or vomiting when taking the prep. If you have nausea and/or vomiting while taking the prep, stop drinking for 20 to 30 minutes then continue.        How to take prep:     PEG Bowel Prep is a (2-day) prep.    One (1) bottle of prep are required for a complete preparation for colonoscopy. Dilute the solution concentrate as directed prior to use. You must drink water with each dose of prep, and additional water after each dose.     DOSE 1--Day Before Colonoscopy 9/24/23      Drink at least 6 to 8 glasses of clear liquids from time you wake up until you begin your prep and then continue until bedtime to avoid dehydration.      12:00 pm (NOON) Mix your entire container of prep with lukewarm water and refrigerate. Take four (4) Dulcolax (Bisacodyl) tablets with at least 8 ounces or more of clear liquids.        6:00 pm:     You must complete Steps 1 and 2 below before going to bed:     Step 1-Drink half the liquid in the container within one (1) hour.   Step 2-Refrigerate the remaining half of the liquid for dose 2. See below when to begin this step.                       IMPORTANT: If you experience preparation-related symptoms  (for example, nausea, bloating, or cramping), stop, or slow the rate of drinking the additional water until your symptoms decrease.     DOSE 2--Day of the Colonoscopy 9/25/23 at 2-3 AM.     For this dose, repeat Step 1 shown above using the remaining half of the liquid prep.   You may continue drinking water/clear liquids until   4 hours before your colonoscopy or as directed by the scheduling nurse  7:40 AM.     For more information about your procedure, please watch this informational video. It is important to watch this animated consent video prior to your arrival.   If you haven't watched the video prior to arriving, you are required to watch it during admission which can cause delays.      Options for viewing:  Using a keyboard:  press and hold the control tab (Ctrl) and left mouse click to follow link          Colonoscopy Instructional Video                                                        OR     Type link address into your web browser's address bar:  [email or web address]     Using a mobile phone: tap on web address/link.              IMPORTANT INFORMATION TO KNOW BEFORE YOUR PROCEDURE     [facility] [place] 4th Floor     If your procedure requires the administration of anesthesia, it is necessary for a responsible adult to drive you home. (Medical Transportation, Uber, Lyft, Taxi, etc. may ONLY be used if a responsible adult is present to accompany you home.  The responsible adult CAN'T be the  of the service).       person must be available to return to pick you up within 30 minutes of being notified of discharge.      Due to the limited socially distant seating in our waiting room, please limit your guest (1) who accompany you for this procedure. If someone accompanies you for this procedure into the facility:     Consider having them proceed to an area that is socially distant other than the lobby until a member of the medical team contacts them  to provide an update after the procedure.      Also, please consider being dropped off and picked up from the facility.        Please bring a picture ID, insurance card, & copayment     Take Medications as directed below:           If you begin taking any blood thinning medications, please contact the  listed below as soon as possible.     If you are diabetic see the attached instruction sheet regarding your medication.      If you take HEART, BLOOD PRESSURE, SEIZURE, PAIN, LUNG (including inhalers/nebulizers), ANTI-REJECTION (transplant patients), or PSYCHIATRIC medications, please take at your regular times with a sip of water or as directed by the scheduling nurse.      Important contact information:     Endoscopy [phone] Hours of operation Monday-Friday 8:00-4:30pm.     Questions about insurance or financial obligations call [phone] or [phone].     If you have questions regarding the prep or need to reschedule, please call [phone]. After hours questions requiring immediate assistance, contact [facility] nurse line at [phone] or [phone].   NOTE:      On occasion, unforeseen circumstances may cause a delay in your procedure start time. We respect your time and appreciate your patience during these circumstances.

## 2023-08-09 NOTE — PLAN OF CARE
Spoke to patient to schedule procedure(s) Colonoscopy       Physician to perform procedure(s) Dr. DAIANA Henriquez  Date of Procedure (s) 9/25/23  Arrival Time 10:40 AM  Time of Procedure(s) 11:40 AM   Location of Procedure(s) [facility] 4th Floor  Type of Rx Prep sent to patient: PEG  Instructions provided to patient via [facility]    Patient was informed on the following information and verbalized understanding. Screening questionnaire reviewed with patient and complete. If procedure requires anesthesia, a responsible adult needs to be present to accompany the patient home, patient cannot drive after receiving anesthesia. Appointment details are tentative, especially check-in time. Patient will receive a prep-op call 4 days prior to confirm check-in time for procedure. If applicable the patient should contact their pharmacy to verify Rx for procedure prep is ready for pick-up. Patient was advised to call the scheduling department at [phone] if pharmacy states no Rx is available. Patient was advised to call the endoscopy scheduling department if any questions or concerns arise.      [facility] Endoscopy Scheduling Department

## 2023-08-16 ENCOUNTER — HOSPITAL ENCOUNTER (OUTPATIENT)
Dept: RADIOLOGY | Facility: HOSPITAL | Age: 68
Discharge: HOME | End: 2023-08-16
Attending: INTERNAL MEDICINE
Payer: MEDICARE

## 2023-08-16 DIAGNOSIS — R91.8 PULMONARY NODULES: ICD-10-CM

## 2023-08-16 DIAGNOSIS — C64.1 RENAL CANCER, RIGHT: ICD-10-CM

## 2023-08-16 PROCEDURE — 71270 CT CHEST ABDOMEN PELVIS W W/O CONTRAST (XPD): ICD-10-PCS | Mod: 26,HCNC,, | Performed by: RADIOLOGY

## 2023-08-16 PROCEDURE — 71270 CT THORAX DX C-/C+: CPT | Mod: 26,HCNC,, | Performed by: RADIOLOGY

## 2023-08-16 PROCEDURE — 74178 CT CHEST ABDOMEN PELVIS W W/O CONTRAST (XPD): ICD-10-PCS | Mod: 26,HCNC,, | Performed by: RADIOLOGY

## 2023-08-16 PROCEDURE — 25500020 PHARM REV CODE 255: Mod: HCNC | Performed by: INTERNAL MEDICINE

## 2023-08-16 PROCEDURE — 71270 CT THORAX DX C-/C+: CPT | Mod: TC,HCNC

## 2023-08-16 PROCEDURE — 74178 CT ABD&PLV WO CNTR FLWD CNTR: CPT | Mod: 26,HCNC,, | Performed by: RADIOLOGY

## 2023-08-16 RX ADMIN — IOHEXOL 100 ML: 350 INJECTION, SOLUTION INTRAVENOUS at 09:08

## 2023-08-16 NOTE — PROGRESS NOTES
Subjective     Patient ID: Adrian Burt is a 68 y.o. male.    Chief Complaint: History of benign prostatic hyperplasia    HPI    Diagnosis: pulmonary nodules, Right RCC pT1a pNX pMX      Reports in the interval:  Activity level is good  Weight fairly stable-- good diet  No pain issues  No urinary issues  He has noted a few episodes of lower BP- he has attributed to the heat but he is on antihypertensives as well    We discussed mild anemia in labs and he agrees to blood work today  Colonoscopy 2017- scheduled for one 9/25/2023    Interval surveillance scans reviewed:  - 8/15/2023 CT C/A/P:  FINDINGS:  CHEST:  Neck and thoracic soft tissues: Thyroid gland is unremarkable.  No axillary lymphadenopathy.  Vasculature: Left sided aortic arch. Thoracic aorta is nonaneurysmal. No significant atherosclerosis of the thoracic aorta . Main pulmonary artery distributes normally.  Heart: Normal size. Calcified atherosclerosis of the aortic valve no significant atherosclerosis of the coronary arteries..  No pericardial effusion.  Airways: Central airways are patent.  Lungs/Pleura: Lungs are symmetrically expanded. Stable appearance of 5 mm nodule in the left lower lobe (2:75), stable appearance of 4 mm subpleural nodule in the left upper lobe (2:68).  Previously visualized right lower lobe pulmonary micronodule is unchanged, possibly representing focal dilatation of the vessel.  No evidence of consolidation, mass, significant pleural thickening, pneumothorax or pleural fluid.  Hilum/Mediastinum: Scattered mediastinal lymph nodes, largest measuring 0.8 cm.  Esophagus: The middle and distal esophagus is distended with air, could relate to gastroesophageal reflux disease versus esophageal dysmotility.  ABDOMEN/PELVIS:  Liver: Normal size with smooth contours.  Punctate hypodensity in the medial segment 4A of the left hepatic lobe (3:92), unchanged from prior.  Gallbladder: Normally distended without calcified gallstones.  No  pericholecystic inflammatory changes.  Bile ducts: No intrahepatic or extrahepatic biliary ductal dilatation.  Spleen: Normal size.  Pancreas: No mass. No ductal dilatation. No peripancreatic fat stranding.  Adrenals: No significant abnormality  Renal/Ureters: Postsurgical changes of partial right nephrectomy.  Persistent mild perinephric soft tissue stranding about the surgical beds (3: 123, 134), likely scarring/fibrosis and similar to prior CT.  The left kidney is normal in size and enhancement.  No calcified renal stones.  No hydronephrosis.  The visualized ureters are normal in course and caliber.  The urinary bladder is unremarkable.  Reproductive: Prostatomegaly measuring 6.5 cm.  Pelvic phleboliths.  Stomach/Bowel: Stomach is normal.  Small bowel is normal caliber without obstruction or inflammation.  Large bowel is normal caliber without obstruction or inflammation.  Peritoneum: No free abdominopelvic fluid.  No intraperitoneal free air.  Lymph Nodes: No pathologic winifred enlargement in the abdomen or pelvis.  Vasculature: Abdominal aorta tapers normally.  Mild atherosclerosis of the abdominal aorta and its branches.  Portal vasculature, SMV, and splenic vein are patent.  Bones: Benign osseous hemangioma at the level of T11.  Age-appropriate degenerative changes of the spine.  No acute fractures or osseous destructive lesions.  Soft Tissues: No significant abnormality.  Impression:  Stable postsurgical changes of partial right nephrectomy.  No findings to suggest local or metastatic recurrence.  Stable subcentimeter pulmonary nodules.  Scattered mediastinal nodes largest measuring 0.8 cm, indeterminate significance.  Prostatomegaly.     Oncology History:  - renal mass discovered incidentally; he had a fleeting pain that resolved but he mentioned to his PCP several months later leading to below imaging     - 3/24/2022 Renal ultrasound:  FINDINGS:  Right kidney: The right kidney measures 13.5 cm. No cortical  thinning. No loss of corticomedullary distinction. Resistive index measures 0.61.  Heterogeneous lesion measuring 2.4 x 2.3 x 2.7 cm in the upper pole.  No internal Doppler signal.  1.5 x 1.6 x 1.5 cm hyperechoic lesion in the midpole.  Subcentimeter simple cyst in the lower pole.  No renal stone. No hydronephrosis.  Left kidney: The left kidney measures 12.9 cm. No cortical thinning. No loss of corticomedullary distinction. Resistive index measures 0.49.  No mass. No renal stone. No hydronephrosis.  Spleen resistive index measures 0.54.  The bladder is partially distended at the time of scanning and has an unremarkable appearance.  Prostate is enlarged measuring 6.8 x 5.4 x 5.8 cm (previously 5.1 x 4.4 x 4.5 cm).  Impression:  1. 2.7 cm heterogeneous lesion in the upper pole right kidney, which could represent a complex cyst or neoplasm.  2. 1.6 cm hyperechoic lesion in the midpole right kidney, which could represent an angiomyolipoma or other fat containing lesion such as renal cell carcinoma.  3.  No hydronephrosis or shadowing calculus.  4. Prostatomegaly.  RECOMMENDATIONS:  Renal mass protocol CT or MRI for further evaluation of right renal lesions.     - 3/28/2022 CT Abd/Pelvis:  FINDINGS:  Heart: Normal in size. No pericardial effusion.  Lung Bases: Well aerated, without consolidation or pleural fluid.  Liver: Normal in size and attenuation, with no focal hepatic lesions.  Gallbladder: No calcified gallstones.  Bile Ducts: No evidence of dilated ducts.  Pancreas: No mass or peripancreatic fat stranding.  Spleen: Unremarkable.  Adrenals: Unremarkable.  Kidneys/ Ureters: There is a 3 cm complex hypodense lobulated lesion with multiple septations arising from the upper pole of the right kidney.  Differential considerations would include complex cyst versus cystic neoplasm.  In the interpolar aspect there is a solid mass with enhancement measuring 12 mm roughly corresponding to the echogenic lesion seen on the  ultrasound.  No definite macroscopic fat attenuation is seen on the noncontrast series and is not able to be delineated from adjacent renal parenchyma on the noncontrast study.  No stones, solid mass, or hydronephrosis on the left.  Bladder: No evidence of wall thickening.  Reproductive organs: Prostate markedly enlarged with heterogeneous enhancement pattern.  GI Tract/Mesentery: No evidence of bowel obstruction or inflammation.  Peritoneal Space: No ascites. No free air.  Retroperitoneum: No significant adenopathy.  Abdominal wall: Unremarkable.  Vasculature: No significant atherosclerosis or aneurysm.  Bones: No acute fracture.  Impression:  12 mm right renal solid mass concerning for malignancy until proven otherwise.  Further evaluation as warranted.  3 cm complex lesion upper pole right kidney at minimum warrants sonographic surveillance 6 months.     - 4/12/2022 MRI Abdomen:  FINDINGS:  Pulmonary Bases: No pleural effusion  Liver: normal.  Bile Ducts: normal  Gallbladder: normal  Pancreas: normal.  Spleen: normal.  Adrenal Glands: normal.  Proximal Gastrointestinal tract/stomach: Normal.  Kidneys: Septated hyperintense T2 lesion upper pole right kidney 3.6 x 3.2 x 2.7 cm.  Subtle enhancement of the septations noted on postcontrast images, renal cell cystic carcinoma cannot be excluded.  There is a very small lesion at the midpole of the right kidney, measuring approximately 12 mm demonstrating postcontrast enhancement concerning for renal cell carcinoma, it measures 1.4 cm series 1402, image 67  Aorta and Abdominal Vasculature: normal.  Mesentery: normal  Lymph nodes: no pathologically enlarged lymph nodes are seen.  Body wall: normal  Osseous structures: No lesion seen  Other: No free fluid/ascites.  Impression:  Predominantly cystic lesion at the upper pole of the right kidney with subtly enhancing septations, a cystic renal cell carcinoma cannot be excluded.  Subtle small intrarenal lesion at the midpole  of the right kidney demonstrating postcontrast enhancement, a solid renal cell carcinoma cannot be excluded.  This report was flagged in Epic as abnormal.     - 5/9/2022 Surgical nephrectomy:  1.  Retroperitoneal robotic assisted laparoscopic right partial nephrectomy (modifier 22 for complex anatomy, 2 masses, >180% expected time)  2.  Intraoperative ultrasound with interpretation.  1. FINAL RESECTION MARGIN OF RIGHT UPPER POLE MASS:   RENAL PARENCHYMA WITH NO NEOPLASIA IDENTIFIED   2. RIGHT UPPER POLE MASS:   INNOCUOUS BENIGN MULTILOCULAR CYST   3. MASS FROM MID RIGHT  KIDNEY :   RENAL CELL CARCINOMA WITH NO INVOLVEMENT OF MARGINS OR PERINEPHRIC ADIPOSE   TISSUE IDENTIFIED   2. This lesion is a benign multilocular cyst.  The cystic spaces have a   single layer of lining at the most.  There is no proliferative lining   identified in this entirely submitted specimen.  No area has formation of a   mass neoplasm.  There is no significant abnormality of renal parenchyma   separate from the cystic lesion.   Procedure :  Partial renal excision   Specimen Laterality :  Right   Tumor Focality :  Unifocal   Tumor Size :  1.2 cm   Histologic Type :  Renal cell carcinoma, clear cell variant   Tumor Extent :  Limited to kidney   Sarcomatoid Features :  Not identified   Rhabdoid Features :  Not identified   Tumor Necrosis :  Not identified   Margins ; no margin involvement identified   Regional Lymph Node Status :   Not applicable (no regional lymph nodes   submitted or found)   PATHOLOGIC STAGE CLASSIFICATION (pTNM, AJCC 8th Edition) : pT1a pNX pMX   Additional findings :  There is no significant abnormality of renal   parenchyma apart from the masses.     Additional imaging:  - 6/17/2022 MRI prostate:  FINDINGS:  Previous biopsy: None.  PSA: 6.5 ng/mL on 06/15/2022  Prior therapy: None.  Prostate: 6.2 x 6.0 x 6.4 cm corresponding to a computed volume of 122 cc.  Peripheral zone: No focal abnormalities with imaging features  concerning for prostate cancer, score 1.  Transitional zone: Benign prostatic hyperplasia without focal suspicious abnormality, score 2.  Neurovascular bundle: Normal appearance.  Seminal vesicles: Normal appearance.  Adjacent Organ Involvement: No evidence for urinary bladder or rectal invasion.  Lymphadenopathy: None.  Other Findings: Diffuse marrow signal heterogeneity in keeping with red marrow hyperplasia.  Impression:  1. Benign prostatic hyperplasia.  2. Red marrow hyperplasia.  Overall Assessment: PI-RADS 2 - Low (clinically significant cancer is unlikely to be present)  Number of targets created for potential MR/US fusion biopsy  Peripheral zone: 0  Transition zone: 0     Recently had surveillance scans for RCC performed- results as below:  2023 CT Chest:  FINDINGS:  No intravenous contrast was administered for this examination.  Therefore, it may have diminished sensitivity for detection of certain abnormalities.  Thyroid gland: Within normal limits.  Trachea: Within normal limits.  Esophagus: Mildly patulous.  Cardiovascular: Normal heart size.No pericardial effusion.Coarse calcifications in the region of the aortic valve leaflets, correlate with aortic valve function.  There is mild calcific disease of the coronary arteries.  Lymph nodes: None abnormally enlarged.  Lungs/pleura/airways:  Mild apical scarring.  There are several left lung nodules which include the followin mm in lingula (1988) 4 mm in the left lower lobe adjacent to the diaphragm (4-405, and 2 additional 5 mm left lower lobe nodules (4-318, 380). In addition, there is a 5 mm right lower lobe ground-glass nodule (4-299.  The latter is out of the field of view on the prior examination.  Upper abdomen:  Partially imaged.  Status post partial right nephrectomy.  No additional new significant disease.  Bones: Unremarkable for stated age.  Other: N/A  Impression:  1. There are several bilateral subcentimeter pulmonary nodules  (measuring 5 mm and less) as described above.  At this time, these are indeterminate whether related to malignancy versus infection.  Attention on short-term follow-up imaging highly recommended.  2. Postsurgical changes related to right partial nephrectomy.  Unremarkable appearance of surgical bed.  3.  Other findings are as above.     - 12/13/2022 CT Abd/Pelvis:  FINDINGS:  Heart: No cardiomegaly or pericardial effusion.  Atherosclerosis of the aortic annulus.  Lung Bases: 0.5 cm solid nodule in the anteromedial basal segment of the left lower lobe (axial series 3, image 11).  Additional 0.4 cm pleural based nodule in the superior lingula (axial series 3, image 5).  Bilateral dependent atelectasis.  Liver: Hepatomegaly.  Punctate hypodensity in hepatic segment 4a, too small to characterize but stable from prior exam.  No new lesions identified.  Gallbladder: No calcified gallstones.  Bile Ducts: No dilatation.  Pancreas: No mass. No peripancreatic fat stranding.  Spleen: Unremarkable  Adrenals: Unremarkable  Kidneys/Ureters: Interval postsurgical changes of right partial nephrectomy.  There is mild soft tissue thickening in both surgical beds, likely scarring/fibrosis.  No evidence of recurrent lesion.  Subcentimeter hypodensity in the upper pole of the right kidney, too small to characterize.  Left kidney is unremarkable.  No nephrolithiasis or hydronephrosis.  Bilateral ureters are normal in course and caliber.  Bladder: No wall thickening.  Reproductive organs: Prostatomegaly with mass effect on the posterior aspect of the bladder.  GI Tract/Mesentery: No evidence of bowel obstruction or inflammation.  Peritoneal Space: No ascites or free air.  Retroperitoneum: No significant adenopathy.  Abdominal wall: Unremarkable  Vasculature: No aneurysm.  Mild atherosclerosis of the descending aorta and its branches.  Bones: Multilevel degenerative change, similar to prior exams.  No acute fracture. No suspicious lytic or  sclerotic lesions.  Impression:  Interval postsurgical changes of right partial nephrectomy x2.  No evidence of residual or recurrent lesion in the surgical beds.  Pulmonary nodules in the left lung, that were out of field of view on prior imaging.  Recommend attention on follow-up.  Comparison to any prior CT chest would be beneficial.  Prostatomegaly.     PMH:  Steel fragment removal from leg  HTN     FH:  Mother  ESRD - unclear reasons at 35 yo  Father  at age 67 yo, heart disease  Siblings - 2 sisters- 1  at age 68 from RCC  1  ESRD, EtOHism  DM  HTN  Maternal grandmother- breast cancer dies at age 59     SH:  Retired, construction work (office)  , 3 kids  Quit tobacco  (1 pack smoker)  Active           Review of Systems   Constitutional:  Negative for activity change, appetite change, fatigue, fever and unexpected weight change.   HENT:  Negative for hearing loss.    Eyes:  Negative for visual disturbance.   Respiratory:  Negative for cough, shortness of breath and wheezing.    Cardiovascular:  Negative for chest pain, palpitations and leg swelling.   Gastrointestinal:  Negative for abdominal distention, abdominal pain, blood in stool, change in bowel habit, constipation, diarrhea, nausea, vomiting, reflux and change in bowel habit.   Genitourinary:  Negative for decreased urine volume, difficulty urinating, frequency and urgency.   Musculoskeletal:  Negative for arthralgias, back pain and joint deformity.   Neurological:  Positive for light-headedness (rarely notes, checked his blood pressure and noted low for him). Negative for dizziness, weakness, numbness and headaches.   Hematological:  Negative for adenopathy. Does not bruise/bleed easily.   Psychiatric/Behavioral:  Negative for dysphoric mood. The patient is not nervous/anxious.           Objective     Physical Exam  Vitals and nursing note reviewed.   Constitutional:       General: He is not in acute distress.     Appearance:  Normal appearance. He is normal weight. He is not ill-appearing.      Comments: Presents alone  Very pleasant  ECOG= 0   Eyes:      General: No scleral icterus.     Extraocular Movements: Extraocular movements intact.      Conjunctiva/sclera: Conjunctivae normal.      Pupils: Pupils are equal, round, and reactive to light.   Cardiovascular:      Rate and Rhythm: Normal rate and regular rhythm.      Heart sounds: Normal heart sounds. No murmur heard.     No friction rub. No gallop.   Pulmonary:      Effort: Pulmonary effort is normal. No respiratory distress.      Breath sounds: Normal breath sounds. No wheezing, rhonchi or rales.   Abdominal:      General: Abdomen is flat. Bowel sounds are normal. There is no distension.      Palpations: Abdomen is soft. There is no mass.      Tenderness: There is no abdominal tenderness. There is no guarding or rebound.      Comments: No organomegaly   Musculoskeletal:         General: No swelling. Normal range of motion.      Cervical back: Normal range of motion and neck supple. No rigidity.      Right lower leg: No edema.      Left lower leg: No edema.   Lymphadenopathy:      Cervical: No cervical adenopathy.   Skin:     General: Skin is warm and dry.      Coloration: Skin is not jaundiced or pale.      Findings: No rash.   Neurological:      General: No focal deficit present.      Mental Status: He is alert and oriented to person, place, and time. Mental status is at baseline.      Sensory: No sensory deficit.      Motor: No weakness.      Coordination: Coordination normal.      Gait: Gait normal.   Psychiatric:         Mood and Affect: Mood normal.         Behavior: Behavior normal.         Thought Content: Thought content normal.         Judgment: Judgment normal.          Assessment and Plan     1. Renal cancer, right  Overview:  4 cm complex cystic lesion to posterior right upper pole, 1.1 cm right anterior solid mass on CT w/wo contrast 3/2022.  --MRI 4/22: right 3.6 cm UP  cystic lesion ? Cystic RCC, right midpole 12mm solid mass  --Preop Cr 1, GFR >60  --S/p right retroperitioneal partial 5/9/22 (both masses removed)  --Path: upper pole lesion was cyst, midpole 1.2 cm [id number] CC RCC, negative margins  KATIE    Post op imaging:  CT/CXR 12/22: lower pulm nodules up to 5mm on CT but not on CXR, no recurrence.    CT 4/23: stable pulm nodules, no recurrence    Post op labs  6/22--Cr 1.2, GFR >60  12/22--Cr 1.2, GFR >60  4/23--1.1, GFR >60      2. Pulmonary nodules    3. Normocytic anemia  -     IRON AND TIBC; Future; Expected date: 08/17/2023  -     PROTEIN ELECTROPHORESIS, SERUM; Future; Expected date: 08/17/2023  -     VITAMIN B12; Future; Expected date: 08/17/2023  -     TSH; Future; Expected date: 08/17/2023    4. Nutritional anemia, unspecified  -     VITAMIN B12; Future; Expected date: 08/17/2023        Right sided RCC  Pulmonary nodules noted on imaging- indeterminate-too small to biopsy or further characterize with other imaging modalities- surveillance imaging stable  Repeat again in 4 months  Knows to call for any issues     Anemia  Labs today  Colonoscopy already scheduled    BPH:  - 6/17/2022 MRI prostate:  FINDINGS:  Previous biopsy: None.  PSA: 6.5 ng/mL on 06/15/2022  Prior therapy: None.  Prostate: 6.2 x 6.0 x 6.4 cm corresponding to a computed volume of 122 cc.  Peripheral zone: No focal abnormalities with imaging features concerning for prostate cancer, score 1.  Transitional zone: Benign prostatic hyperplasia without focal suspicious abnormality, score 2.  Neurovascular bundle: Normal appearance.  Seminal vesicles: Normal appearance.  Adjacent Organ Involvement: No evidence for urinary bladder or rectal invasion.  Lymphadenopathy: None.  Other Findings: Diffuse marrow signal heterogeneity in keeping with red marrow hyperplasia.  Impression:  1. Benign prostatic hyperplasia.  2. Red marrow hyperplasia.  Overall Assessment: PI-RADS 2 - Low (clinically significant cancer is  unlikely to be present)  Number of targets created for potential MR/US fusion biopsy  Peripheral zone: 0  Transition zone: 0     Has annual PSA    HTN-  Recent episodes of hypotension noted- will message his PCP  (See above)    Route Chart for Scheduling    Med Onc Chart Routing      Follow up with physician . 4-5 months- cbc, cmp and scans day prior   Follow up with [facility]    Infusion scheduling note    Injection scheduling note    Labs    Imaging    Pharmacy appointment    Other referrals

## 2023-08-17 ENCOUNTER — OFFICE VISIT (OUTPATIENT)
Dept: HEMATOLOGY/ONCOLOGY | Facility: CLINIC | Age: 68
End: 2023-08-17
Payer: MEDICARE

## 2023-08-17 ENCOUNTER — LAB VISIT (OUTPATIENT)
Dept: LAB | Facility: HOSPITAL | Age: 68
End: 2023-08-17
Attending: INTERNAL MEDICINE
Payer: MEDICARE

## 2023-08-17 VITALS
DIASTOLIC BLOOD PRESSURE: 66 MMHG | HEART RATE: 87 BPM | TEMPERATURE: 98 F | SYSTOLIC BLOOD PRESSURE: 148 MMHG | BODY MASS INDEX: 31.15 KG/M2 | RESPIRATION RATE: 17 BRPM | HEIGHT: 74 IN | OXYGEN SATURATION: 98 % | WEIGHT: 242.75 LBS

## 2023-08-17 DIAGNOSIS — C64.1 RENAL CANCER, RIGHT: Primary | ICD-10-CM

## 2023-08-17 DIAGNOSIS — I10 ESSENTIAL HYPERTENSION: ICD-10-CM

## 2023-08-17 DIAGNOSIS — D53.9 NUTRITIONAL ANEMIA, UNSPECIFIED: ICD-10-CM

## 2023-08-17 DIAGNOSIS — R91.8 PULMONARY NODULES: ICD-10-CM

## 2023-08-17 DIAGNOSIS — D64.9 NORMOCYTIC ANEMIA: ICD-10-CM

## 2023-08-17 DIAGNOSIS — Z87.438 HISTORY OF BENIGN PROSTATIC HYPERPLASIA: ICD-10-CM

## 2023-08-17 LAB
IRON SERPL-MCNC: 82 UG/DL (ref 45–160)
SATURATED IRON: 29 % (ref 20–50)
TOTAL IRON BINDING CAPACITY: 278 UG/DL (ref 250–450)
TRANSFERRIN SERPL-MCNC: 188 MG/DL (ref 200–375)
TSH SERPL DL<=0.005 MIU/L-ACNC: 2.55 UIU/ML (ref 0.4–4)
VIT B12 SERPL-MCNC: 640 PG/ML (ref 210–950)

## 2023-08-17 PROCEDURE — 3288F FALL RISK ASSESSMENT DOCD: CPT | Mod: HCNC,CPTII,S$GLB, | Performed by: INTERNAL MEDICINE

## 2023-08-17 PROCEDURE — 1160F PR REVIEW ALL MEDS BY PRESCRIBER/CLIN PHARMACIST DOCUMENTED: ICD-10-PCS | Mod: HCNC,CPTII,S$GLB, | Performed by: INTERNAL MEDICINE

## 2023-08-17 PROCEDURE — 1126F AMNT PAIN NOTED NONE PRSNT: CPT | Mod: HCNC,CPTII,S$GLB, | Performed by: INTERNAL MEDICINE

## 2023-08-17 PROCEDURE — 1101F PT FALLS ASSESS-DOCD LE1/YR: CPT | Mod: HCNC,CPTII,S$GLB, | Performed by: INTERNAL MEDICINE

## 2023-08-17 PROCEDURE — 3044F HG A1C LEVEL LT 7.0%: CPT | Mod: HCNC,CPTII,S$GLB, | Performed by: INTERNAL MEDICINE

## 2023-08-17 PROCEDURE — 83540 ASSAY OF IRON: CPT | Mod: HCNC | Performed by: INTERNAL MEDICINE

## 2023-08-17 PROCEDURE — 3008F BODY MASS INDEX DOCD: CPT | Mod: HCNC,CPTII,S$GLB, | Performed by: INTERNAL MEDICINE

## 2023-08-17 PROCEDURE — 1159F PR MEDICATION LIST DOCUMENTED IN MEDICAL RECORD: ICD-10-PCS | Mod: HCNC,CPTII,S$GLB, | Performed by: INTERNAL MEDICINE

## 2023-08-17 PROCEDURE — 3044F PR MOST RECENT HEMOGLOBIN A1C LEVEL <7.0%: ICD-10-PCS | Mod: HCNC,CPTII,S$GLB, | Performed by: INTERNAL MEDICINE

## 2023-08-17 PROCEDURE — 3078F PR MOST RECENT DIASTOLIC BLOOD PRESSURE < 80 MM HG: ICD-10-PCS | Mod: HCNC,CPTII,S$GLB, | Performed by: INTERNAL MEDICINE

## 2023-08-17 PROCEDURE — 4010F ACE/ARB THERAPY RXD/TAKEN: CPT | Mod: HCNC,CPTII,S$GLB, | Performed by: INTERNAL MEDICINE

## 2023-08-17 PROCEDURE — 3077F PR MOST RECENT SYSTOLIC BLOOD PRESSURE >= 140 MM HG: ICD-10-PCS | Mod: HCNC,CPTII,S$GLB, | Performed by: INTERNAL MEDICINE

## 2023-08-17 PROCEDURE — 99214 OFFICE O/P EST MOD 30 MIN: CPT | Mod: HCNC,S$GLB,, | Performed by: INTERNAL MEDICINE

## 2023-08-17 PROCEDURE — 82607 VITAMIN B-12: CPT | Mod: HCNC | Performed by: INTERNAL MEDICINE

## 2023-08-17 PROCEDURE — 4010F PR ACE/ARB THEARPY RXD/TAKEN: ICD-10-PCS | Mod: HCNC,CPTII,S$GLB, | Performed by: INTERNAL MEDICINE

## 2023-08-17 PROCEDURE — 3288F PR FALLS RISK ASSESSMENT DOCUMENTED: ICD-10-PCS | Mod: HCNC,CPTII,S$GLB, | Performed by: INTERNAL MEDICINE

## 2023-08-17 PROCEDURE — 36415 COLL VENOUS BLD VENIPUNCTURE: CPT | Mod: HCNC | Performed by: INTERNAL MEDICINE

## 2023-08-17 PROCEDURE — 1101F PR PT FALLS ASSESS DOC 0-1 FALLS W/OUT INJ PAST YR: ICD-10-PCS | Mod: HCNC,CPTII,S$GLB, | Performed by: INTERNAL MEDICINE

## 2023-08-17 PROCEDURE — 84165 PROTEIN E-PHORESIS SERUM: CPT | Mod: HCNC | Performed by: INTERNAL MEDICINE

## 2023-08-17 PROCEDURE — 1126F PR PAIN SEVERITY QUANTIFIED, NO PAIN PRESENT: ICD-10-PCS | Mod: HCNC,CPTII,S$GLB, | Performed by: INTERNAL MEDICINE

## 2023-08-17 PROCEDURE — 99214 PR OFFICE/OUTPT VISIT, EST, LEVL IV, 30-39 MIN: ICD-10-PCS | Mod: HCNC,S$GLB,, | Performed by: INTERNAL MEDICINE

## 2023-08-17 PROCEDURE — 84443 ASSAY THYROID STIM HORMONE: CPT | Mod: HCNC | Performed by: INTERNAL MEDICINE

## 2023-08-17 PROCEDURE — 3078F DIAST BP <80 MM HG: CPT | Mod: HCNC,CPTII,S$GLB, | Performed by: INTERNAL MEDICINE

## 2023-08-17 PROCEDURE — 1160F RVW MEDS BY RX/DR IN RCRD: CPT | Mod: HCNC,CPTII,S$GLB, | Performed by: INTERNAL MEDICINE

## 2023-08-17 PROCEDURE — 84165 PATHOLOGIST INTERPRETATION SPE: ICD-10-PCS | Mod: 26,HCNC,, | Performed by: PATHOLOGY

## 2023-08-17 PROCEDURE — 84165 PROTEIN E-PHORESIS SERUM: CPT | Mod: 26,HCNC,, | Performed by: PATHOLOGY

## 2023-08-17 PROCEDURE — 3008F PR BODY MASS INDEX (BMI) DOCUMENTED: ICD-10-PCS | Mod: HCNC,CPTII,S$GLB, | Performed by: INTERNAL MEDICINE

## 2023-08-17 PROCEDURE — 99999 PR PBB SHADOW E&M-EST. PATIENT-LVL V: CPT | Mod: PBBFAC,HCNC,, | Performed by: INTERNAL MEDICINE

## 2023-08-17 PROCEDURE — 3077F SYST BP >= 140 MM HG: CPT | Mod: HCNC,CPTII,S$GLB, | Performed by: INTERNAL MEDICINE

## 2023-08-17 PROCEDURE — 84466 ASSAY OF TRANSFERRIN: CPT | Mod: HCNC | Performed by: INTERNAL MEDICINE

## 2023-08-17 PROCEDURE — 1159F MED LIST DOCD IN RCRD: CPT | Mod: HCNC,CPTII,S$GLB, | Performed by: INTERNAL MEDICINE

## 2023-08-17 PROCEDURE — 99999 PR PBB SHADOW E&M-EST. PATIENT-LVL V: ICD-10-PCS | Mod: PBBFAC,HCNC,, | Performed by: INTERNAL MEDICINE

## 2023-08-18 LAB
ALBUMIN SERPL ELPH-MCNC: 3.69 G/DL (ref 3.35–5.55)
ALPHA1 GLOB SERPL ELPH-MCNC: 0.24 G/DL (ref 0.17–0.41)
ALPHA2 GLOB SERPL ELPH-MCNC: 0.76 G/DL (ref 0.43–0.99)
B-GLOBULIN SERPL ELPH-MCNC: 0.78 G/DL (ref 0.5–1.1)
GAMMA GLOB SERPL ELPH-MCNC: 1.33 G/DL (ref 0.67–1.58)
PROT SERPL-MCNC: 6.8 G/DL (ref 6–8.4)

## 2023-08-21 LAB — PATHOLOGIST INTERPRETATION SPE: NORMAL

## 2023-08-31 ENCOUNTER — TELEPHONE (OUTPATIENT)
Dept: PRIMARY CARE CLINIC | Facility: CLINIC | Age: 68
End: 2023-08-31
Payer: MEDICARE

## 2023-08-31 NOTE — TELEPHONE ENCOUNTER
----- Message from Ayush Augustine sent at 8/31/2023  7:43 AM CDT -----  Contact: Pt. Portal  .Type:  Sooner Apoointment Request    Caller is requesting a sooner appointment.  Caller declined first available appointment listed below.  Caller will not accept being placed on the waitlist and is requesting a message be sent to doctor.  Name of Caller:pt  When is the first available appointment?  Symptoms: annual  Would the patient rather a call back or a response via [facility]?  Call back  Best Call Back Number:[phone]  Additional Information:

## 2023-09-20 PROBLEM — I51.7 LVH (LEFT VENTRICULAR HYPERTROPHY): Status: ACTIVE | Noted: 2022-07-27

## 2023-09-20 PROBLEM — I08.9 DISEASE OF MULTIPLE VALVES OF HEART: Status: ACTIVE | Noted: 2022-07-27

## 2023-09-20 PROBLEM — I51.7 LEFT ATRIAL ENLARGEMENT: Status: ACTIVE | Noted: 2022-07-27

## 2023-09-20 PROBLEM — J32.0 CHRONIC MAXILLARY SINUSITIS: Status: ACTIVE | Noted: 2020-07-14

## 2023-09-21 ENCOUNTER — OFFICE VISIT (OUTPATIENT)
Dept: INTERNAL MEDICINE | Facility: CLINIC | Age: 68
End: 2023-09-21
Payer: MEDICARE

## 2023-09-21 VITALS
DIASTOLIC BLOOD PRESSURE: 80 MMHG | WEIGHT: 242.31 LBS | BODY MASS INDEX: 31.1 KG/M2 | HEART RATE: 80 BPM | SYSTOLIC BLOOD PRESSURE: 140 MMHG | OXYGEN SATURATION: 97 % | HEIGHT: 74 IN

## 2023-09-21 DIAGNOSIS — Z23 NEED FOR INFLUENZA VACCINATION: ICD-10-CM

## 2023-09-21 DIAGNOSIS — R73.03 PREDIABETES: ICD-10-CM

## 2023-09-21 DIAGNOSIS — C64.1 RENAL CANCER, RIGHT: ICD-10-CM

## 2023-09-21 DIAGNOSIS — I70.0 AORTIC ATHEROSCLEROSIS: ICD-10-CM

## 2023-09-21 DIAGNOSIS — I11.9 HYPERTENSIVE HEART DISEASE WITHOUT CHF (CONGESTIVE HEART FAILURE): ICD-10-CM

## 2023-09-21 DIAGNOSIS — Z00.00 ENCOUNTER FOR PREVENTIVE HEALTH EXAMINATION: Primary | ICD-10-CM

## 2023-09-21 DIAGNOSIS — I10 ESSENTIAL HYPERTENSION: ICD-10-CM

## 2023-09-21 DIAGNOSIS — K21.9 GASTROESOPHAGEAL REFLUX DISEASE WITHOUT ESOPHAGITIS: ICD-10-CM

## 2023-09-21 DIAGNOSIS — E66.9 OBESITY (BMI 30-39.9): ICD-10-CM

## 2023-09-21 PROCEDURE — 99999 PR PBB SHADOW E&M-EST. PATIENT-LVL V: CPT | Mod: PBBFAC,HCNC,, | Performed by: NURSE PRACTITIONER

## 2023-09-21 PROCEDURE — 1160F PR REVIEW ALL MEDS BY PRESCRIBER/CLIN PHARMACIST DOCUMENTED: ICD-10-PCS | Mod: HCNC,CPTII,S$GLB, | Performed by: NURSE PRACTITIONER

## 2023-09-21 PROCEDURE — G0439 PPPS, SUBSEQ VISIT: HCPCS | Mod: HCNC,S$GLB,, | Performed by: NURSE PRACTITIONER

## 2023-09-21 PROCEDURE — 4010F ACE/ARB THERAPY RXD/TAKEN: CPT | Mod: HCNC,CPTII,S$GLB, | Performed by: NURSE PRACTITIONER

## 2023-09-21 PROCEDURE — 3044F PR MOST RECENT HEMOGLOBIN A1C LEVEL <7.0%: ICD-10-PCS | Mod: HCNC,CPTII,S$GLB, | Performed by: NURSE PRACTITIONER

## 2023-09-21 PROCEDURE — 1170F PR FUNCTIONAL STATUS ASSESSED: ICD-10-PCS | Mod: HCNC,CPTII,S$GLB, | Performed by: NURSE PRACTITIONER

## 2023-09-21 PROCEDURE — 1101F PT FALLS ASSESS-DOCD LE1/YR: CPT | Mod: HCNC,CPTII,S$GLB, | Performed by: NURSE PRACTITIONER

## 2023-09-21 PROCEDURE — G0008 FLU VACCINE - QUADRIVALENT - ADJUVANTED: ICD-10-PCS | Mod: HCNC,S$GLB,, | Performed by: NURSE PRACTITIONER

## 2023-09-21 PROCEDURE — 3008F PR BODY MASS INDEX (BMI) DOCUMENTED: ICD-10-PCS | Mod: HCNC,CPTII,S$GLB, | Performed by: NURSE PRACTITIONER

## 2023-09-21 PROCEDURE — 1160F RVW MEDS BY RX/DR IN RCRD: CPT | Mod: HCNC,CPTII,S$GLB, | Performed by: NURSE PRACTITIONER

## 2023-09-21 PROCEDURE — 3079F PR MOST RECENT DIASTOLIC BLOOD PRESSURE 80-89 MM HG: ICD-10-PCS | Mod: HCNC,CPTII,S$GLB, | Performed by: NURSE PRACTITIONER

## 2023-09-21 PROCEDURE — 1101F PR PT FALLS ASSESS DOC 0-1 FALLS W/OUT INJ PAST YR: ICD-10-PCS | Mod: HCNC,CPTII,S$GLB, | Performed by: NURSE PRACTITIONER

## 2023-09-21 PROCEDURE — 4010F PR ACE/ARB THEARPY RXD/TAKEN: ICD-10-PCS | Mod: HCNC,CPTII,S$GLB, | Performed by: NURSE PRACTITIONER

## 2023-09-21 PROCEDURE — 90694 FLU VACCINE - QUADRIVALENT - ADJUVANTED: ICD-10-PCS | Mod: HCNC,S$GLB,, | Performed by: NURSE PRACTITIONER

## 2023-09-21 PROCEDURE — 3288F PR FALLS RISK ASSESSMENT DOCUMENTED: ICD-10-PCS | Mod: HCNC,CPTII,S$GLB, | Performed by: NURSE PRACTITIONER

## 2023-09-21 PROCEDURE — 3077F SYST BP >= 140 MM HG: CPT | Mod: HCNC,CPTII,S$GLB, | Performed by: NURSE PRACTITIONER

## 2023-09-21 PROCEDURE — G0439 PR MEDICARE ANNUAL WELLNESS SUBSEQUENT VISIT: ICD-10-PCS | Mod: HCNC,S$GLB,, | Performed by: NURSE PRACTITIONER

## 2023-09-21 PROCEDURE — G0008 ADMIN INFLUENZA VIRUS VAC: HCPCS | Mod: HCNC,S$GLB,, | Performed by: NURSE PRACTITIONER

## 2023-09-21 PROCEDURE — 3288F FALL RISK ASSESSMENT DOCD: CPT | Mod: HCNC,CPTII,S$GLB, | Performed by: NURSE PRACTITIONER

## 2023-09-21 PROCEDURE — 3079F DIAST BP 80-89 MM HG: CPT | Mod: HCNC,CPTII,S$GLB, | Performed by: NURSE PRACTITIONER

## 2023-09-21 PROCEDURE — 3008F BODY MASS INDEX DOCD: CPT | Mod: HCNC,CPTII,S$GLB, | Performed by: NURSE PRACTITIONER

## 2023-09-21 PROCEDURE — 1159F PR MEDICATION LIST DOCUMENTED IN MEDICAL RECORD: ICD-10-PCS | Mod: HCNC,CPTII,S$GLB, | Performed by: NURSE PRACTITIONER

## 2023-09-21 PROCEDURE — 1159F MED LIST DOCD IN RCRD: CPT | Mod: HCNC,CPTII,S$GLB, | Performed by: NURSE PRACTITIONER

## 2023-09-21 PROCEDURE — 3077F PR MOST RECENT SYSTOLIC BLOOD PRESSURE >= 140 MM HG: ICD-10-PCS | Mod: HCNC,CPTII,S$GLB, | Performed by: NURSE PRACTITIONER

## 2023-09-21 PROCEDURE — 1170F FXNL STATUS ASSESSED: CPT | Mod: HCNC,CPTII,S$GLB, | Performed by: NURSE PRACTITIONER

## 2023-09-21 PROCEDURE — 90694 VACC AIIV4 NO PRSRV 0.5ML IM: CPT | Mod: HCNC,S$GLB,, | Performed by: NURSE PRACTITIONER

## 2023-09-21 PROCEDURE — 99999 PR PBB SHADOW E&M-EST. PATIENT-LVL V: ICD-10-PCS | Mod: PBBFAC,HCNC,, | Performed by: NURSE PRACTITIONER

## 2023-09-21 PROCEDURE — 3044F HG A1C LEVEL LT 7.0%: CPT | Mod: HCNC,CPTII,S$GLB, | Performed by: NURSE PRACTITIONER

## 2023-09-21 NOTE — PROGRESS NOTES
"  Adrian Burt presented for a  Medicare AWV and comprehensive Health Risk Assessment today. The following components were reviewed and updated:    Medical history  Family History  Social history  Allergies and Current Medications  Health Risk Assessment  Health Maintenance  Care Team         ** See Completed Assessments for Annual Wellness Visit within the encounter summary.**         The following assessments were completed:  Living Situation  CAGE  Depression Screening  Timed Get Up and Go  Whisper Test  Cognitive Function Screening      Nutrition Screening  ADL Screening  PAQ Screening        Vitals:    09/21/23 0856 09/21/23 0924   BP: (!) 158/88 (!) 140/80   Pulse: 77 80   SpO2: 97%    Weight: 109.9 kg (242 lb 4.6 oz)    Height: 6' 2" (1.88 m)      Body mass index is 31.11 kg/m².    Physical Exam  Vitals and nursing note reviewed.   Constitutional:       Appearance: He is obese.   HENT:      Head: Normocephalic.      Nose: Nose normal.      Mouth/Throat:      Mouth: Mucous membranes are moist.   Eyes:      Conjunctiva/sclera: Conjunctivae normal.   Cardiovascular:      Rate and Rhythm: Normal rate and regular rhythm.      Heart sounds: Normal heart sounds.   Pulmonary:      Effort: Pulmonary effort is normal.      Breath sounds: Normal breath sounds.   Musculoskeletal:         General: Normal range of motion.      Cervical back: Normal range of motion.   Skin:     General: Skin is warm and dry.   Neurological:      General: No focal deficit present.      Mental Status: He is alert and oriented to person, place, and time.   Psychiatric:         Mood and Affect: Mood normal.         Behavior: Behavior normal.         Thought Content: Thought content normal.         Judgment: Judgment normal.               Diagnoses and health risks identified today and associated recommendations/orders:    1. Encounter for preventive health examination  Exam done    Health Maintenance updated    Records reviewed    2. Renal " cancer, right  Chronic, followed by Urology    3. Aortic atherosclerosis  Chronic, followed by PCP    4. Essential hypertension  Chronic, followed by PCP    Take medications as prescribed.    Monitor BP at home, goal BP < or = 140/80, call office if consistently above this range.    Follow low salt DASH diet and exercise.    BMI reviewed.    Go to ED if Headaches, blurred vision, chest pain, or SOB occurs along with elevated readings > or = 160/90.    5. Hypertensive heart disease without CHF (congestive heart failure)  Chronic, followed by PCP    6. Prediabetes  Chronic, followed by PCP     Follow low carb, low fat, high fiber diet and exercise to prevent the development of T2DM    7. Gastroesophageal reflux disease without esophagitis  Chronic, followed by PCP    GERD stable on meds, education on dietary triggers done    8. Need for influenza vaccination  Given today    9. BMI 31.0-31.9,adult  BMI reviewed    10. Obesity (BMI 30-39.9)  BMI reviewed.    Diet and exercise to lose weight.        Provided Adrian Sierra with a 5-10 year written screening schedule and personal prevention plan. Recommendations were developed using the USPSTF age appropriate recommendations. Education, counseling, and referrals were provided as needed. After Visit Summary printed and given to patient which includes a list of additional screenings\tests needed.    Follow up in about 11 days (around 10/2/2023) for with PCP Dr. Brown as scheduled.    Serene Serra DNP      I offered to discuss advanced care planning, including how to pick a person who would make decisions for you if you were unable to make them for yourself, called a health care power of , and what kind of decisions you might make such as use of life sustaining treatments such as ventilators and tube feeding when faced with a life limiting illness recorded on a living will that they will need to know. (How you want to be cared for as you near the end of your natural  life)     X Patient is interested in learning more about how to make advanced directives.  I provided them paperwork and offered to discuss this with them.

## 2023-09-21 NOTE — PATIENT INSTRUCTIONS
Counseling and Referral of Other Preventative  (Italic type indicates deductible and co-insurance are waived)    Patient Name: Adrian uBrt  Today's Date: 9/21/2023    Health Maintenance         Date Due Completion Date    Influenza Vaccine (1) Given today 11/22/2022    Colorectal Cancer Screening 09/25/2023 (Originally 1955) 8/16/2017    High Dose Statin 10/02/2023 (Originally 1/25/1976) ---    Hemoglobin A1c (Prediabetes) 04/10/2024 4/10/2023    PROSTATE-SPECIFIC ANTIGEN 07/24/2024 7/24/2023    Lipid Panel 04/10/2028 4/10/2023    TETANUS VACCINE 09/10/2028 9/10/2018          No orders of the defined types were placed in this encounter.      The following information is provided to all patients.  This information is to help you find resources for any of the problems found today that may be affecting your health:                Living healthy guide: www.[facility].[place].[facility]      Understanding Diabetes: www.diabetes.org      Eating healthy: www.cdc.gov/healthyweight      CDC home safety checklist: www.cdc.gov/steadi/patient.html      Agency on Aging: www.goea.[place].[facility]      Alcoholics anonymous (AA): www.aa.org      Physical Activity: www.shlomo.nih.gov/ac2yzqq      Tobacco use: www.quitwithusla.org

## 2023-09-21 NOTE — PROGRESS NOTES
After obtaining consent, and per orders of Dr. Serene Serra, injection of FluAD given in left deltoid by Latonya Jama. Patient instructed to remain in clinic for 15 minutes afterwards, and to report any adverse reaction to staff immediately. Pt tolerated vaccine well.

## 2023-09-25 ENCOUNTER — HOSPITAL ENCOUNTER (OUTPATIENT)
Facility: HOSPITAL | Age: 68
Discharge: HOME | End: 2023-09-25
Attending: COLON & RECTAL SURGERY | Admitting: COLON & RECTAL SURGERY
Payer: MEDICARE

## 2023-09-25 ENCOUNTER — ANESTHESIA EVENT (OUTPATIENT)
Dept: ENDOSCOPY | Facility: HOSPITAL | Age: 68
End: 2023-09-25
Payer: MEDICARE

## 2023-09-25 ENCOUNTER — ANESTHESIA (OUTPATIENT)
Dept: ENDOSCOPY | Facility: HOSPITAL | Age: 68
End: 2023-09-25
Payer: MEDICARE

## 2023-09-25 VITALS
HEIGHT: 74 IN | BODY MASS INDEX: 31.95 KG/M2 | WEIGHT: 249 LBS | HEART RATE: 64 BPM | SYSTOLIC BLOOD PRESSURE: 133 MMHG | RESPIRATION RATE: 17 BRPM | DIASTOLIC BLOOD PRESSURE: 83 MMHG | TEMPERATURE: 98 F | OXYGEN SATURATION: 99 %

## 2023-09-25 DIAGNOSIS — Z91.89 ENCOUNTER FOR SCREENING FOR COLORECTAL CANCER IN HIGH RISK PATIENT: ICD-10-CM

## 2023-09-25 DIAGNOSIS — Z86.010 ENCOUNTER FOR COLONOSCOPY DUE TO HISTORY OF ADENOMATOUS COLONIC POLYPS: Primary | ICD-10-CM

## 2023-09-25 DIAGNOSIS — Z12.11 ENCOUNTER FOR COLONOSCOPY DUE TO HISTORY OF ADENOMATOUS COLONIC POLYPS: Primary | ICD-10-CM

## 2023-09-25 DIAGNOSIS — Z12.12 ENCOUNTER FOR SCREENING FOR COLORECTAL CANCER IN HIGH RISK PATIENT: ICD-10-CM

## 2023-09-25 DIAGNOSIS — Z12.11 ENCOUNTER FOR SCREENING FOR COLORECTAL CANCER IN HIGH RISK PATIENT: ICD-10-CM

## 2023-09-25 PROCEDURE — 45385 COLONOSCOPY W/LESION REMOVAL: CPT | Mod: PT,HCNC,, | Performed by: COLON & RECTAL SURGERY

## 2023-09-25 PROCEDURE — 27201089 HC SNARE, DISP (ANY): Mod: HCNC | Performed by: COLON & RECTAL SURGERY

## 2023-09-25 PROCEDURE — 45385 PR COLONOSCOPY,REMV LESN,SNARE: ICD-10-PCS | Mod: PT,HCNC,, | Performed by: COLON & RECTAL SURGERY

## 2023-09-25 PROCEDURE — 25000003 PHARM REV CODE 250: Mod: HCNC | Performed by: REGISTERED NURSE

## 2023-09-25 PROCEDURE — 63600175 PHARM REV CODE 636 W HCPCS: Mod: HCNC | Performed by: REGISTERED NURSE

## 2023-09-25 PROCEDURE — 88305 TISSUE EXAM BY PATHOLOGIST: CPT | Mod: 26,HCNC,, | Performed by: PATHOLOGY

## 2023-09-25 PROCEDURE — E9220 PRA ENDO ANESTHESIA: HCPCS | Mod: HCNC,PT,, | Performed by: REGISTERED NURSE

## 2023-09-25 PROCEDURE — E9220 PRA ENDO ANESTHESIA: ICD-10-PCS | Mod: HCNC,PT,, | Performed by: REGISTERED NURSE

## 2023-09-25 PROCEDURE — 88305 TISSUE EXAM BY PATHOLOGIST: CPT | Mod: HCNC | Performed by: PATHOLOGY

## 2023-09-25 PROCEDURE — 37000008 HC ANESTHESIA 1ST 15 MINUTES: Mod: HCNC | Performed by: COLON & RECTAL SURGERY

## 2023-09-25 PROCEDURE — 45385 COLONOSCOPY W/LESION REMOVAL: CPT | Mod: PT,HCNC | Performed by: COLON & RECTAL SURGERY

## 2023-09-25 PROCEDURE — 37000009 HC ANESTHESIA EA ADD 15 MINS: Mod: HCNC | Performed by: COLON & RECTAL SURGERY

## 2023-09-25 PROCEDURE — 88305 TISSUE EXAM BY PATHOLOGIST: ICD-10-PCS | Mod: 26,HCNC,, | Performed by: PATHOLOGY

## 2023-09-25 RX ORDER — PROPOFOL 10 MG/ML
VIAL (ML) INTRAVENOUS
Status: DISCONTINUED | OUTPATIENT
Start: 2023-09-25 | End: 2023-09-25

## 2023-09-25 RX ORDER — LIDOCAINE HYDROCHLORIDE 20 MG/ML
INJECTION INTRAVENOUS
Status: DISCONTINUED | OUTPATIENT
Start: 2023-09-25 | End: 2023-09-25

## 2023-09-25 RX ORDER — SODIUM CHLORIDE 9 MG/ML
INJECTION, SOLUTION INTRAVENOUS CONTINUOUS
Status: DISCONTINUED | OUTPATIENT
Start: 2023-09-25 | End: 2023-09-25 | Stop reason: HOSPADM

## 2023-09-25 RX ADMIN — SODIUM CHLORIDE: 0.9 INJECTION, SOLUTION INTRAVENOUS at 11:09

## 2023-09-25 RX ADMIN — PROPOFOL 80 MG: 10 INJECTION, EMULSION INTRAVENOUS at 11:09

## 2023-09-25 RX ADMIN — LIDOCAINE HYDROCHLORIDE 100 MG: 20 INJECTION INTRAVENOUS at 11:09

## 2023-09-25 NOTE — TRANSFER OF CARE
"Anesthesia Transfer of Care Note    Patient: Adrian Burt    Procedure(s) Performed: Procedure(s) (LRB):  COLONOSCOPY (N/A)    Patient location: PACU    Anesthesia Type: general    Transport from OR: Transported from OR on room air with adequate spontaneous ventilation    Post pain: adequate analgesia    Post assessment: no apparent anesthetic complications    Post vital signs: stable    Level of consciousness: sedated and responds to stimulation    Nausea/Vomiting: no nausea/vomiting    Complications: none    Transfer of care protocol was followed      Last vitals:   Visit Vitals  BP (!) 158/78 (BP Location: Left arm, Patient Position: Lying)   Pulse 71   Temp 36.5 °C (97.7 °F) (Temporal)   Resp 16   Ht 6' 2" (1.88 m)   Wt 112.9 kg (249 lb)   SpO2 99%   BMI 31.97 kg/m²     "

## 2023-09-25 NOTE — ANESTHESIA PREPROCEDURE EVALUATION
09/25/2023  Adrian Burt is a 68 y.o., male.    Patient Active Problem List   Diagnosis    Seasonal allergies    Hemorrhoids without complication    DJD (degenerative joint disease)    Erectile dysfunction    Essential hypertension    History of benign prostatic hyperplasia    Prediabetes    Class 1 obesity due to excess calories with serious comorbidity and body mass index (BMI) of 31.0 to 31.9 in adult    GERD (gastroesophageal reflux disease)    Renal cancer, right    Elevated PSA    Low testosterone    Pulmonary nodules    Aortic atherosclerosis    Skin lesion    Erectile dysfunction due to arterial insufficiency    Normocytic anemia    Left atrial enlargement    LVH (left ventricular hypertrophy)    Hypertensive heart disease without CHF (congestive heart failure)    Disease of multiple valves of heart    Chronic maxillary sinusitis     Past Medical History:   Diagnosis Date    Allergy     Colon polyp     Fatty liver     GERD (gastroesophageal reflux disease)     Hypertension      Past Surgical History:   Procedure Laterality Date    COLONOSCOPY N/A 8/16/2017    Procedure: COLONOSCOPY;  Surgeon: Leo Henriquez MD;  Location: [address]);  Service: Endoscopy;  Laterality: N/A;    LEG SURGERY Left     ROBOT-ASSISTED LAPAROSCOPIC PARTIAL NEPHRECTOMY Right 5/9/2022    Procedure: Right RETROPERITONEAL robotic assisted lap partial nephrectomy  Intraoperative ultrasound with interpretation Special needs: drop in probe for ultrasound, retroperitoneal dilating baloon;  Surgeon: Arvind Álvarez MD;  Location: [facility];  Service: Urology;  Laterality: Right;           Pre-op Assessment    I have reviewed the Patient Summary Reports.    I have reviewed the NPO Status.   I have reviewed the Medications.     Review of Systems  Anesthesia Hx:  No problems with previous  Anesthesia  History of prior surgery of interest to airway management or planning:   Cardiovascular:   Hypertension    Renal/:   Chronic Renal Disease    Hepatic/GI:   GERD Liver Disease,    Musculoskeletal:   Arthritis         Physical Exam  General: Well nourished, Cooperative, Alert and Oriented    Airway:  Mallampati: II   Mouth Opening: Normal  TM Distance: Normal  Tongue: Normal  Neck ROM: Normal ROM        Anesthesia Plan  Type of Anesthesia, risks & benefits discussed:    Anesthesia Type: MAC, Gen Natural Airway  Intra-op Monitoring Plan: Standard ASA Monitors  Induction:  IV  Informed Consent: Informed consent signed with the Patient and all parties understand the risks and agree with anesthesia plan.  All questions answered.   ASA Score: 2  Day of Surgery Review of History & Physical: H&P Update referred to the surgeon/provider.    Ready For Surgery From Anesthesia Perspective.     .

## 2023-09-25 NOTE — H&P
COLONOSCOPY HISTORY AND PHYSICAL EXAM    Procedure : Colonoscopy      INDICATIONS: personal history of colon polyps    Family Hx of CRC: none    Last Colonoscopy:  2017  Findings: polyps       Past Medical History:   Diagnosis Date    Allergy     Colon polyp     Fatty liver     GERD (gastroesophageal reflux disease)     Hypertension      Sedation Problems: NO  Family History   Problem Relation Age of Onset    Kidney disease Mother     Heart disease Father     Cancer Sister         kidney    Kidney disease Sister     Colon cancer Neg Hx     Esophageal cancer Neg Hx      Fam Hx of Sedation Problems: NO  Social History     Socioeconomic History    Marital status:    Tobacco Use    Smoking status: Former     Current packs/day: 0.00     Types: Cigarettes     Quit date: 11/15/1978     Years since quittin.8    Smokeless tobacco: Never    Tobacco comments:     smoked for 10 years, quit in 76   Substance and Sexual Activity    Alcohol use: No    Drug use: No    Sexual activity: Yes     Partners: Female     Social Determinants of Health     Financial Resource Strain: Low Risk  (2023)    Overall Financial Resource Strain (CARDIA)     Difficulty of Paying Living Expenses: Not hard at all   Food Insecurity: No Food Insecurity (2023)    Hunger Vital Sign     Worried About Running Out of Food in the Last Year: Never true     Ran Out of Food in the Last Year: Never true   Transportation Needs: No Transportation Needs (2023)    PRAPARE - Transportation     Lack of Transportation (Medical): No     Lack of Transportation (Non-Medical): No   Physical Activity: Inactive (2023)    Exercise Vital Sign     Days of Exercise per Week: 0 days     Minutes of Exercise per Session: 0 min   Stress: No Stress Concern Present (2023)    [employer] [place] [employer] Occupational [employer] - Occupational Stress Questionnaire     Feeling of Stress : Not at all   Social Connections: Socially Isolated (2023)    Social  "Connection and Isolation Panel [NHANES]     Frequency of Communication with Friends and Family: Never     Frequency of Social Gatherings with Friends and Family: Never     Attends Islam Services: Never     Active Member of Clubs or Organizations: No     Attends Club or Organization Meetings: Never     Marital Status:    Housing Stability: Low Risk  (9/21/2023)    Housing Stability Vital Sign     Unable to Pay for Housing in the Last Year: No     Number of Places Lived in the Last Year: 1     Unstable Housing in the Last Year: No       Review of Systems - Negative except   Respiratory ROS: no dyspnea  Cardiovascular ROS: no exertional chest pain  Gastrointestinal ROS: NO abdominal discomfort,  NO rectal bleeding  Musculoskeletal ROS: no muscular pain  Neurological ROS: no recent stroke    Physical Exam:  BP (!) 158/78 (BP Location: Left arm, Patient Position: Lying)   Pulse 71   Temp 97.7 °F (36.5 °C) (Temporal)   Resp 16   Ht 6' 2" (1.88 m)   Wt 112.9 kg (249 lb)   SpO2 99%   BMI 31.97 kg/m²   General: no distress  Head: normocephalic  Mallampati Score   Neck: supple, symmetrical, trachea midline  Lungs:  clear to auscultation bilaterally  Heart: regular rate and rhythm  Abdomen: soft, non-tender non-distented; bowel sounds normal; no masses,  no organomegaly  Extremities: no cyanosis or edema, or clubbing    ASA:  II    PLAN  COLONOSCOPY.    SedationPlan :MAC    The details of the procedure, the possible need for biopsy or polypectomy and the potential risks including bleeding, perforation, missed polyps were discussed in detail.      "

## 2023-09-25 NOTE — PROVATION PATIENT INSTRUCTIONS
Discharge Summary/Instructions after an Endoscopic Procedure  Patient Name: Adrian Burt  Patient MRN: [id number]  Patient YOB: 1955 Monday, September 25, 2023  Leo Henriquez MD  Dear patient,  As a result of recent federal legislation (The Federal Cures Act), you may   receive lab or pathology results from your procedure in your [facility]   account before your physician is able to contact you. Your physician or   their representative will relay the results to you with their   recommendations at their soonest availability.  Thank you,  RESTRICTIONS:  During your procedure today, you received medications for sedation.  These   medications may affect your judgment, balance and coordination.  Therefore,   for 24 hours, you have the following restrictions:   - DO NOT drive a car, operate machinery, make legal/financial decisions,   sign important papers or drink alcohol.    ACTIVITY:  Today: no heavy lifting, straining or running due to procedural   sedation/anesthesia.  The following day: return to full activity including work.  DIET:  Eat and drink normally unless instructed otherwise.     TREATMENT FOR COMMON SIDE EFFECTS:  - Mild abdominal pain, nausea, belching, bloating or excessive gas:  rest,   eat lightly and use a heating pad.  - Sore Throat: treat with throat lozenges and/or gargle with warm salt   water.  - Because air was used during the procedure, expelling large amounts of air   from your rectum or belching is normal.  - If a bowel prep was taken, you may not have a bowel movement for 1-3 days.    This is normal.  SYMPTOMS TO WATCH FOR AND REPORT TO YOUR PHYSICIAN:  1. Abdominal pain or bloating, other than gas cramps.  2. Chest pain.  3. Back pain.  4. Signs of infection such as: chills or fever occurring within 24 hours   after the procedure.  5. Rectal bleeding, which would show as bright red, maroon, or black stools.   (A tablespoon of blood from the rectum is not serious, especially  if   hemorrhoids are present.)  6. Vomiting.  7. Weakness or dizziness.  GO DIRECTLY TO THE NEAREST EMERGENCY ROOM IF YOU HAVE ANY OF THE FOLLOWING:      Difficulty breathing              Chills and/or fever over 101 F   Persistent vomiting and/or vomiting blood   Severe abdominal pain   Severe chest pain   Black, tarry stools   Bleeding- more than one tablespoon   Any other symptom or condition that you feel may need urgent attention  Your doctor recommends these additional instructions:  If any biopsies were taken, your doctors clinic will contact you in 1 to 2   weeks with any results.  - Discharge patient to home (ambulatory).   - Patient has a contact number available for emergencies.  The signs and   symptoms of potential delayed complications were discussed with the   patient.  Return to normal activities tomorrow.  Written discharge   instructions were provided to the patient.   - Resume previous diet.   - Continue present medications.   - Return to primary care physician as previously scheduled.   - Repeat colonoscopy in 5 years for surveillance.  For questions, problems or results please call your physician - Leo Henriquez MD at Work:  [phone].  [facility], EMERGENCY ROOM PHONE NUMBER: [phone]  IF A COMPLICATION OR EMERGENCY SITUATION ARISES AND YOU ARE UNABLE TO REACH   YOUR PHYSICIAN - GO DIRECTLY TO THE EMERGENCY ROOM.  Leo Henriquez MD  9/25/2023 11:39:26 AM  This report has been verified and signed electronically.  Dear patient,  As a result of recent federal legislation (The Federal Cures Act), you may   receive lab or pathology results from your procedure in your [facility]   account before your physician is able to contact you. Your physician or   their representative will relay the results to you with their   recommendations at their soonest availability.  Thank you,  PROVATION

## 2023-09-26 NOTE — ANESTHESIA POSTPROCEDURE EVALUATION
Anesthesia Post Evaluation    Patient: Adrian Burt    Procedure(s) Performed: Procedure(s) (LRB):  COLONOSCOPY (N/A)    Final Anesthesia Type: general      Patient location during evaluation: GI PACU  Patient participation: Yes- Able to Participate  Level of consciousness: awake and alert, awake and oriented  Post-procedure vital signs: reviewed and stable  Pain management: adequate  Airway patency: patent    PONV status at discharge: No PONV  Anesthetic complications: no      Cardiovascular status: blood pressure returned to baseline, stable and hemodynamically stable  Respiratory status: unassisted, spontaneous ventilation and room air  Hydration status: euvolemic  Follow-up not needed.          Vitals Value Taken Time   /83 09/25/23 1211   Temp 36.5 °C (97.7 °F) 09/25/23 1142   Pulse 64 09/25/23 1211   Resp 17 09/25/23 1211   SpO2 99 % 09/25/23 1211         Event Time   Out of Recovery 12:32:54         Pain/Sid Score: Sdi Score: 10 (9/25/2023 11:58 AM)

## 2023-10-02 ENCOUNTER — OFFICE VISIT (OUTPATIENT)
Dept: INTERNAL MEDICINE | Facility: CLINIC | Age: 68
End: 2023-10-02
Payer: MEDICARE

## 2023-10-02 ENCOUNTER — LAB VISIT (OUTPATIENT)
Dept: LAB | Facility: HOSPITAL | Age: 68
End: 2023-10-02
Attending: INTERNAL MEDICINE
Payer: MEDICARE

## 2023-10-02 VITALS
BODY MASS INDEX: 31.21 KG/M2 | HEART RATE: 71 BPM | HEIGHT: 74 IN | WEIGHT: 243.19 LBS | DIASTOLIC BLOOD PRESSURE: 76 MMHG | OXYGEN SATURATION: 98 % | SYSTOLIC BLOOD PRESSURE: 136 MMHG

## 2023-10-02 DIAGNOSIS — R73.03 PREDIABETES: ICD-10-CM

## 2023-10-02 DIAGNOSIS — R97.20 ELEVATED PSA: ICD-10-CM

## 2023-10-02 DIAGNOSIS — R91.8 PULMONARY NODULES: ICD-10-CM

## 2023-10-02 DIAGNOSIS — R79.89 LOW TESTOSTERONE: ICD-10-CM

## 2023-10-02 DIAGNOSIS — C64.1 RENAL CANCER, RIGHT: ICD-10-CM

## 2023-10-02 DIAGNOSIS — I10 ESSENTIAL HYPERTENSION: Primary | ICD-10-CM

## 2023-10-02 DIAGNOSIS — D64.9 ANEMIA, UNSPECIFIED TYPE: ICD-10-CM

## 2023-10-02 LAB
ANION GAP SERPL CALC-SCNC: 10 MMOL/L (ref 8–16)
BASOPHILS # BLD AUTO: 0.02 K/UL (ref 0–0.2)
BASOPHILS NFR BLD: 0.4 % (ref 0–1.9)
BUN SERPL-MCNC: 12 MG/DL (ref 8–23)
CALCIUM SERPL-MCNC: 9.3 MG/DL (ref 8.7–10.5)
CHLORIDE SERPL-SCNC: 101 MMOL/L (ref 95–110)
CO2 SERPL-SCNC: 27 MMOL/L (ref 23–29)
CREAT SERPL-MCNC: 1 MG/DL (ref 0.5–1.4)
DIFFERENTIAL METHOD: ABNORMAL
EOSINOPHIL # BLD AUTO: 0.3 K/UL (ref 0–0.5)
EOSINOPHIL NFR BLD: 5.8 % (ref 0–8)
ERYTHROCYTE [DISTWIDTH] IN BLOOD BY AUTOMATED COUNT: 13.2 % (ref 11.5–14.5)
EST. GFR  (NO RACE VARIABLE): >60 ML/MIN/1.73 M^2
ESTIMATED AVG GLUCOSE: 103 MG/DL (ref 68–131)
FERRITIN SERPL-MCNC: 198 NG/ML (ref 20–300)
FINAL PATHOLOGIC DIAGNOSIS: NORMAL
GLUCOSE SERPL-MCNC: 89 MG/DL (ref 70–110)
GROSS: NORMAL
HBA1C MFR BLD: 5.2 % (ref 4–5.6)
HCT VFR BLD AUTO: 44 % (ref 40–54)
HGB BLD-MCNC: 13.8 G/DL (ref 14–18)
IMM GRANULOCYTES # BLD AUTO: 0.01 K/UL (ref 0–0.04)
IMM GRANULOCYTES NFR BLD AUTO: 0.2 % (ref 0–0.5)
LYMPHOCYTES # BLD AUTO: 2.1 K/UL (ref 1–4.8)
LYMPHOCYTES NFR BLD: 47.5 % (ref 18–48)
Lab: NORMAL
MCH RBC QN AUTO: 27.9 PG (ref 27–31)
MCHC RBC AUTO-ENTMCNC: 31.4 G/DL (ref 32–36)
MCV RBC AUTO: 89 FL (ref 82–98)
MONOCYTES # BLD AUTO: 0.6 K/UL (ref 0.3–1)
MONOCYTES NFR BLD: 12.6 % (ref 4–15)
NEUTROPHILS # BLD AUTO: 1.5 K/UL (ref 1.8–7.7)
NEUTROPHILS NFR BLD: 33.5 % (ref 38–73)
NRBC BLD-RTO: 0 /100 WBC
PLATELET # BLD AUTO: 199 K/UL (ref 150–450)
PMV BLD AUTO: 10.9 FL (ref 9.2–12.9)
POTASSIUM SERPL-SCNC: 3.5 MMOL/L (ref 3.5–5.1)
RBC # BLD AUTO: 4.94 M/UL (ref 4.6–6.2)
SODIUM SERPL-SCNC: 138 MMOL/L (ref 136–145)
WBC # BLD AUTO: 4.46 K/UL (ref 3.9–12.7)

## 2023-10-02 PROCEDURE — 99999 PR PBB SHADOW E&M-EST. PATIENT-LVL IV: CPT | Mod: PBBFAC,HCNC,, | Performed by: INTERNAL MEDICINE

## 2023-10-02 PROCEDURE — 1160F RVW MEDS BY RX/DR IN RCRD: CPT | Mod: HCNC,CPTII,S$GLB, | Performed by: INTERNAL MEDICINE

## 2023-10-02 PROCEDURE — 1159F MED LIST DOCD IN RCRD: CPT | Mod: HCNC,CPTII,S$GLB, | Performed by: INTERNAL MEDICINE

## 2023-10-02 PROCEDURE — 36415 COLL VENOUS BLD VENIPUNCTURE: CPT | Mod: HCNC | Performed by: INTERNAL MEDICINE

## 2023-10-02 PROCEDURE — 3008F PR BODY MASS INDEX (BMI) DOCUMENTED: ICD-10-PCS | Mod: HCNC,CPTII,S$GLB, | Performed by: INTERNAL MEDICINE

## 2023-10-02 PROCEDURE — 99214 PR OFFICE/OUTPT VISIT, EST, LEVL IV, 30-39 MIN: ICD-10-PCS | Mod: HCNC,S$GLB,, | Performed by: INTERNAL MEDICINE

## 2023-10-02 PROCEDURE — 1101F PT FALLS ASSESS-DOCD LE1/YR: CPT | Mod: HCNC,CPTII,S$GLB, | Performed by: INTERNAL MEDICINE

## 2023-10-02 PROCEDURE — 3044F PR MOST RECENT HEMOGLOBIN A1C LEVEL <7.0%: ICD-10-PCS | Mod: HCNC,CPTII,S$GLB, | Performed by: INTERNAL MEDICINE

## 2023-10-02 PROCEDURE — 1101F PR PT FALLS ASSESS DOC 0-1 FALLS W/OUT INJ PAST YR: ICD-10-PCS | Mod: HCNC,CPTII,S$GLB, | Performed by: INTERNAL MEDICINE

## 2023-10-02 PROCEDURE — 4010F ACE/ARB THERAPY RXD/TAKEN: CPT | Mod: HCNC,CPTII,S$GLB, | Performed by: INTERNAL MEDICINE

## 2023-10-02 PROCEDURE — 3044F HG A1C LEVEL LT 7.0%: CPT | Mod: HCNC,CPTII,S$GLB, | Performed by: INTERNAL MEDICINE

## 2023-10-02 PROCEDURE — 4010F PR ACE/ARB THEARPY RXD/TAKEN: ICD-10-PCS | Mod: HCNC,CPTII,S$GLB, | Performed by: INTERNAL MEDICINE

## 2023-10-02 PROCEDURE — 3288F FALL RISK ASSESSMENT DOCD: CPT | Mod: HCNC,CPTII,S$GLB, | Performed by: INTERNAL MEDICINE

## 2023-10-02 PROCEDURE — 3008F BODY MASS INDEX DOCD: CPT | Mod: HCNC,CPTII,S$GLB, | Performed by: INTERNAL MEDICINE

## 2023-10-02 PROCEDURE — 99999 PR PBB SHADOW E&M-EST. PATIENT-LVL IV: ICD-10-PCS | Mod: PBBFAC,HCNC,, | Performed by: INTERNAL MEDICINE

## 2023-10-02 PROCEDURE — 1159F PR MEDICATION LIST DOCUMENTED IN MEDICAL RECORD: ICD-10-PCS | Mod: HCNC,CPTII,S$GLB, | Performed by: INTERNAL MEDICINE

## 2023-10-02 PROCEDURE — 3288F PR FALLS RISK ASSESSMENT DOCUMENTED: ICD-10-PCS | Mod: HCNC,CPTII,S$GLB, | Performed by: INTERNAL MEDICINE

## 2023-10-02 PROCEDURE — 82728 ASSAY OF FERRITIN: CPT | Mod: HCNC | Performed by: INTERNAL MEDICINE

## 2023-10-02 PROCEDURE — 1126F AMNT PAIN NOTED NONE PRSNT: CPT | Mod: HCNC,CPTII,S$GLB, | Performed by: INTERNAL MEDICINE

## 2023-10-02 PROCEDURE — 1160F PR REVIEW ALL MEDS BY PRESCRIBER/CLIN PHARMACIST DOCUMENTED: ICD-10-PCS | Mod: HCNC,CPTII,S$GLB, | Performed by: INTERNAL MEDICINE

## 2023-10-02 PROCEDURE — 99214 OFFICE O/P EST MOD 30 MIN: CPT | Mod: HCNC,S$GLB,, | Performed by: INTERNAL MEDICINE

## 2023-10-02 PROCEDURE — 3078F PR MOST RECENT DIASTOLIC BLOOD PRESSURE < 80 MM HG: ICD-10-PCS | Mod: HCNC,CPTII,S$GLB, | Performed by: INTERNAL MEDICINE

## 2023-10-02 PROCEDURE — 3075F PR MOST RECENT SYSTOLIC BLOOD PRESS GE 130-139MM HG: ICD-10-PCS | Mod: HCNC,CPTII,S$GLB, | Performed by: INTERNAL MEDICINE

## 2023-10-02 PROCEDURE — 85025 COMPLETE CBC W/AUTO DIFF WBC: CPT | Mod: HCNC | Performed by: INTERNAL MEDICINE

## 2023-10-02 PROCEDURE — 80048 BASIC METABOLIC PNL TOTAL CA: CPT | Mod: HCNC | Performed by: INTERNAL MEDICINE

## 2023-10-02 PROCEDURE — 3078F DIAST BP <80 MM HG: CPT | Mod: HCNC,CPTII,S$GLB, | Performed by: INTERNAL MEDICINE

## 2023-10-02 PROCEDURE — 1126F PR PAIN SEVERITY QUANTIFIED, NO PAIN PRESENT: ICD-10-PCS | Mod: HCNC,CPTII,S$GLB, | Performed by: INTERNAL MEDICINE

## 2023-10-02 PROCEDURE — 3075F SYST BP GE 130 - 139MM HG: CPT | Mod: HCNC,CPTII,S$GLB, | Performed by: INTERNAL MEDICINE

## 2023-10-02 PROCEDURE — 83036 HEMOGLOBIN GLYCOSYLATED A1C: CPT | Mod: HCNC | Performed by: INTERNAL MEDICINE

## 2023-10-02 NOTE — PROGRESS NOTES
INTERNAL MEDICINE ESTABLISHED PATIENT VISIT NOTE    Subjective:     Chief Complaint: Follow-up  preDM     Patient ID: Adrain Burt is a 68 y.o. male with HTN, preDM, BPH, elevated PSA followed by Dr. Steele, pulm nodules too small to characterize (too small to biopsy, followed by Dr. Chávez), renal CA s/p R partial nephrectomy 5/2022, GERD, last seen by me in April, here today for f/u HTN, preDM.    Was seen by Dr. Álvarez in May at which time PSA rising so Testosterone supplementation stopped.  Had f/u in June c improvement of the numbers so Trimix restarted.    Was also seen by Derm for peeling/cracked lips, treated c topical steroids which helped.    Also rx'ed Propecia for hair loss but states it caused ED so stopped taking it.  Still taking Flomax for BPH s issues.    Still seeing Dr. Chávez for hx renal CA and is also following pulm nodules which were stable on CT in April and again in Aug c f/u CT scheduled for Dec.    On chart review, clinic blood pressure readings have been elevated.  However, patient states that his home readings typically ranged from the 120s to 130s over 80s.  Does not go over 140 at home.  Had a recent episode where he felt lightheaded so checked his home pressures which were in the low 120s but symptoms have since resolved.    Past Medical History:  Past Medical History:   Diagnosis Date    Allergy     Colon polyp     Fatty liver     GERD (gastroesophageal reflux disease)     Hypertension        Home Medications:  Prior to Admission medications    Medication Sig Start Date End Date Taking? Authorizing Provider   amLODIPine (NORVASC) 10 MG tablet Take 1 tablet (10 mg total) by mouth every morning. 4/10/23  Yes Mone Brown MD   azelastine (ASTELIN) 137 mcg (0.1 %) nasal spray 1 spray (137 mcg total) by Nasal route 2 (two) times daily. 4/10/23 4/9/24 Yes Mone Brown MD   ferrous gluconate (FERGON) 324 MG tablet TAKE 1 TABLET BY MOUTH DAILY WITH BREAKFAST 10/21/22  Yes Ramon Chavira  MD DAIANA   fluticasone propionate (FLONASE) 50 mcg/actuation nasal spray SPRAY 1 SPRAY IN EACH NOSTRIL EVERY DAY 1/17/23  Yes Mone Brown MD   hydroCHLOROthiazide (HYDRODIURIL) 12.5 MG Tab TAKE 1 TABLET(12.5 MG) BY MOUTH EVERY DAY 4/24/23  Yes Mone Brown MD   losartan (COZAAR) 100 MG tablet Take 1 tablet (100 mg total) by mouth once daily. 6/30/23  Yes Alysha Bowman MD   montelukast (SINGULAIR) 10 mg tablet Take 1 tablet (10 mg total) by mouth every evening. 4/10/23  Yes Mone Brown MD   multivit with minerals/lutein (MULTIVITAMIN 50 PLUS ORAL) Take 1 tablet by mouth once daily.   Yes Provider, Historical   pep injection Inject 0.2 ml as directed (20 units).      For compounding pharmacy use:   Add PAPAVERINE 30 mcg  Add PHENTOLAMINE 2 mg  Add ALPROSTADIL 30 mcg 5/31/23  Yes Arvind Álvarez MD   tadalafiL (CIALIS) 20 MG Tab Take 1 tablet (20 mg total) by mouth daily as needed (1 hour prior to sexual activity). 1/18/23 10/2/23 Yes Arvind Álvarez MD   tamsulosin (FLOMAX) 0.4 mg Cap Take 1 capsule (0.4 mg total) by mouth once daily. 3/13/23 3/12/24 Yes Arvind Álvarez MD   testosterone cypionate (DEPOTESTOTERONE CYPIONATE) 200 mg/mL injection Inject 1 mL (200 mg total) into the muscle every 14 (fourteen) days. 7/28/23 7/27/24 Yes Arvind Álvarez MD   aspirin (ECOTRIN) 81 MG EC tablet Take 81 mg by mouth once daily.    Provider, Historical   betamethasone dipropionate 0.05 % cream Apply topically 2 (two) times daily. Use to affected areas for up to 2 weeks then take a 1 week break or decrease to 3 times weekly. Do not apply to groin or face. Apply to spot on right hand 5/24/23   Charles Barnes MD   finasteride (PROPECIA) 1 mg tablet Take 1 tablet (1 mg total) by mouth once daily. 5/24/23 5/18/24  Charles Barnes MD   hydrocortisone 2.5 % ointment Apply topically 2 (two) times daily. Use to affected areas for up to 2 weeks then take a 1 week break or decrease to 3 times weekly. Apply to  "lips 23   Charles Barnes MD       Allergies:  Review of patient's allergies indicates:   Allergen Reactions    Tessalon [benzonatate] Other (See Comments)     States lips felt dry and swollen       Social History:  Social History     Tobacco Use    Smoking status: Former     Current packs/day: 0.00     Types: Cigarettes     Quit date: 11/15/1978     Years since quittin.9    Smokeless tobacco: Never    Tobacco comments:     smoked for 10 years, quit in 76   Substance Use Topics    Alcohol use: No    Drug use: No        Review of Systems   Constitutional:  Negative for appetite change, chills, fatigue, fever and unexpected weight change.   HENT:  Negative for congestion, hearing loss and rhinorrhea.    Eyes:  Negative for pain and visual disturbance.   Respiratory:  Negative for cough, chest tightness, shortness of breath and wheezing.    Cardiovascular:  Negative for chest pain, palpitations and leg swelling.   Gastrointestinal:  Negative for abdominal distention and abdominal pain.   Endocrine: Negative for polydipsia and polyuria.   Genitourinary:  Negative for decreased urine volume, dysuria, frequency and penile discharge.   Neurological:  Negative for dizziness, weakness, numbness and headaches.   Psychiatric/Behavioral:  Negative for behavioral problems and confusion.          Health Maintenance:     Immunizations:   Influenza 23  TDap 2018  Prevnar 20 2023, pvax done 2021  Shingrix 3/2020, 2020  COVID vaccine Moderna completed 2021, 3/24/2021, 2021, 2023, booster rec now.     Cancer Screening:  Colonoscopy:  23 c Dr. Henriquez, four polyps removed, path pending, told f/u in 5 yrs  PSA 2023 elevated but improved.  AAA screening neg 2018    Objective:   /76   Pulse 71   Ht 6' 2" (1.88 m)   Wt 110.3 kg (243 lb 2.7 oz)   SpO2 98%   BMI 31.22 kg/m²        General: AAO x3, no apparent distress  HEENT: no cervical LAD, no thyroid masses appreciated, TM clear and " gray  CV: RRR, no m/r/g  Pulm: Lungs CTAB, no crackles, no wheezes  Abd: s/NT/ND +BS  Extremities: no c/c/e    Labs:     Lab Results   Component Value Date    WBC 5.26 08/16/2023    HGB 12.3 (L) 08/16/2023    HCT 39.6 (L) 08/16/2023    MCV 90 08/16/2023     08/16/2023     Lab Results   Component Value Date    IRON 82 08/17/2023    TRANSFERRIN 188 (L) 08/17/2023    TIBC 278 08/17/2023    FESATURATED 29 08/17/2023      Lab Results   Component Value Date    FERRITIN 10 (L) 05/24/2021     Lab Results   Component Value Date    HTEXICJC52 640 08/17/2023       Sodium   Date Value Ref Range Status   08/16/2023 143 136 - 145 mmol/L Final     Potassium   Date Value Ref Range Status   08/16/2023 3.7 3.5 - 5.1 mmol/L Final     Chloride   Date Value Ref Range Status   08/16/2023 108 95 - 110 mmol/L Final     CO2   Date Value Ref Range Status   08/16/2023 29 23 - 29 mmol/L Final     Glucose   Date Value Ref Range Status   08/16/2023 102 70 - 110 mg/dL Final     BUN   Date Value Ref Range Status   08/16/2023 18 8 - 23 mg/dL Final     Creatinine   Date Value Ref Range Status   08/16/2023 1.4 0.5 - 1.4 mg/dL Final     Calcium   Date Value Ref Range Status   08/16/2023 9.5 8.7 - 10.5 mg/dL Final     Total Protein   Date Value Ref Range Status   08/16/2023 7.2 6.0 - 8.4 g/dL Final     Albumin   Date Value Ref Range Status   08/16/2023 3.7 3.5 - 5.2 g/dL Final     Total Bilirubin   Date Value Ref Range Status   08/16/2023 0.7 0.1 - 1.0 mg/dL Final     Comment:     For infants and newborns, interpretation of results should be based  on gestational age, weight and in agreement with clinical  observations.    Premature Infant recommended reference ranges:  Up to 24 hours.............<8.0 mg/dL  Up to 48 hours............<12.0 mg/dL  3-5 days..................<15.0 mg/dL  6-29 days.................<15.0 mg/dL       Alkaline Phosphatase   Date Value Ref Range Status   08/16/2023 63 55 - 135 U/L Final     AST   Date Value Ref Range  Status   08/16/2023 26 10 - 40 U/L Final     ALT   Date Value Ref Range Status   08/16/2023 25 10 - 44 U/L Final     Anion Gap   Date Value Ref Range Status   08/16/2023 6 (L) 8 - 16 mmol/L Final     eGFR if    Date Value Ref Range Status   06/13/2022 >60 >60 mL/min/1.73 m^2 Final     eGFR if non    Date Value Ref Range Status   06/13/2022 >60 >60 mL/min/1.73 m^2 Final     Comment:     Calculation used to obtain the estimated glomerular filtration  rate (eGFR) is the CKD-EPI equation.        Lab Results   Component Value Date    HGBA1C 5.7 (H) 04/10/2023     Lab Results   Component Value Date    LDLCALC 106.2 04/10/2023     Lab Results   Component Value Date    TSH 2.551 08/17/2023         Assessment/Plan     Adrian Sierra was seen today for follow-up.    Diagnoses and all orders for this visit:    Essential hypertension  As per HPI.  Clinic readings have been intermittently elevated but home checks have been consistently at goal.  May have component of white coat hypertension.  We will monitor for now and consider increasing hydrochlorothiazide in the future if needed.  Could also consider changing to amlodipine 10 mg if issues with intermittent lightheadedness.    Also discussed adequate hydration     Prediabetes  5.7 on last check.  Mild.  Discussed dietary modifications.  We will repeat labs now   -     Hemoglobin A1C; Future  -     Basic Metabolic Panel; Future    Anemia, unspecified type  History of low iron in the past.  Most recent labs closer to normal but had recent drop in H&H.  Recent colonoscopy did not show any evidence of bleeding.  Polyps removed but path pending.  We will repeat CBC with repeat ferritin now and if numbers remain low we will consider referral to gastro for possible EGD.    -     CBC Auto Differential; Future  -     Ferritin; Future    Low testosterone  As per HPI, followed by Urology and on supplementation.  Continue routine follow-up.      Renal cancer,  right  As per HPI, no issues, followed by Dr. Chávez.    Elevated PSA  Improved when taken temporarily off testosterone supplementation.  Last check improved overall.  We will defer to Urology     Pulmonary nodules  No acute issues, too small to characterize and stable on last CT with follow-up CT scheduled for December c Dr. Chávez.      HM as above  RTC in 6 mos, sooner if needed.  Fasting labs today.    Mone Brown MD  Department of Internal Medicine - [facility]  10/02/2023

## 2023-10-10 ENCOUNTER — PATIENT MESSAGE (OUTPATIENT)
Dept: INTERNAL MEDICINE | Facility: CLINIC | Age: 68
End: 2023-10-10
Payer: MEDICARE

## 2023-10-17 NOTE — PT/OT/SLP EVAL
Occupational Therapy   Evaluation and Discharge Note    Name: Adrian Burt  MRN: [id number]  Admitting Diagnosis:  Right renal mass   Recent Surgery: Procedure(s) (LRB):  Right RETROPERITONEAL robotic assisted lap partial nephrectomy  Intraoperative ultrasound with interpretation Special needs: drop in probe for ultrasound, retroperitoneal dilating baloon (Right) 1 Day Post-Op    Recommendations:     Discharge Recommendations: home  Discharge Equipment Recommendations:  shower chair  Barriers to discharge:  None    Assessment:     Adrian Burt is a 67 y.o. male with a medical diagnosis of Right renal mass. At this time, patient is functioning at their prior level of function and does not require further acute OT services.     Plan:     During this hospitalization, patient does not require further acute OT services.  Please re-consult if situation changes.    · Plan of Care Reviewed with: patient, daughter    Subjective     Chief Complaint: none stated  Patient/Family Comments/goals: return to PLOF    Occupational Profile:  Living Environment: lIVES W/WIFE IN @, 4 trey, r hr, 13 STEPS W/r hr TO 2ND FLOOR BED/BATH WITH wis AND  CHAIR  Previous level of function: [facility]  Roles and Routines:  of [employer]ouch  Equipment Used at home:  none  Assistance upon Discharge: family    Pain/Comfort:  · Pain Rating 1: 1/10  · Location - Side 1: Right  · Location - Orientation 1: lateral  · Location 1: back  · Pain Addressed 1: Reposition, Distraction  · Pain Rating Post-Intervention 1: 1/10    Patients cultural, spiritual, Synagogue conflicts given the current situation: no    Objective:     Communicated with: nurse prior to session.  Patient found HOB elevated with telemetry, tapia catheter, peripheral IV, LIZETH drain upon OT entry to room.    General Precautions: Standard, fall   Orthopedic Precautions:    Braces:    Respiratory Status: Nasal cannula, flow 1 L/min     Occupational Performance:    Bed Mobility:    · Patient  completed Rolling/Turning to Right with independence  · Patient completed Scooting/Bridging with independence  · Patient completed Supine to Sit with independence    Functional Mobility/Transfers:  · Patient completed Sit <> Stand Transfer with independence  with  no assistive device   · Patient completed Bed <> Chair Transfer using Step Transfer technique with independence with no assistive device  · Functional Mobility: mod I no AD    Activities of Daily Living:  · Grooming: independence standing at sink  · Lower Body Dressing: modified independence socks    Cognitive/Visual Perceptual:  AO4    Physical Exam:  BUE AROM/strength WNL  Good sitting and standing balance    AMPAC 6 Click ADL:  AMPAC Total Score: 24    Treatment & Education:  Pt educated on role of OT/POC, adaptive dressing tech 2/2 increased low back pain with hip flexion and forward reaching  Education:    Patient left up in chair with all lines intact, call button in reach, nurse notified and daughter present    GOALS:   Multidisciplinary Problems     Occupational Therapy Goals     Not on file          Multidisciplinary Problems (Resolved)        Problem: Occupational Therapy    Goal Priority Disciplines Outcome Interventions   Occupational Therapy Goal   (Resolved)     OT, PT/OT Met                    History:     Past Medical History:   Diagnosis Date    Allergy     Colon polyp     Fatty liver     GERD (gastroesophageal reflux disease)     Hypertension        Past Surgical History:   Procedure Laterality Date    COLONOSCOPY N/A 8/16/2017    Procedure: COLONOSCOPY;  Surgeon: Leo Henriuqez MD;  Location: [facility] ([address] [place] [address]);  Service: Endoscopy;  Laterality: N/A;    LEG SURGERY Left        Time Tracking:     OT Date of Treatment: 05/10/22  OT Start Time: 1026  OT Stop Time: 1051  OT Total Time (min): 25 min    Billable Minutes:Evaluation 15  Self Care/Home Management 10    5/10/2022     Minoxidil Counseling: Minoxidil is a topical medication which can increase blood flow where it is applied. It is uncertain how this medication increases hair growth. Side effects are uncommon and include stinging and allergic reactions.

## 2023-11-14 NOTE — ADDENDUM NOTE
Addendum  created 11/14/23 0940 by Savage Fan CRNA    Attestation recorded in Intraprocedure, Intraprocedure Attestations filed

## 2023-12-14 ENCOUNTER — HOSPITAL ENCOUNTER (OUTPATIENT)
Dept: RADIOLOGY | Facility: HOSPITAL | Age: 68
Discharge: HOME | End: 2023-12-14
Attending: INTERNAL MEDICINE
Payer: MEDICARE

## 2023-12-14 ENCOUNTER — PATIENT MESSAGE (OUTPATIENT)
Dept: INTERNAL MEDICINE | Facility: CLINIC | Age: 68
End: 2023-12-14
Payer: MEDICARE

## 2023-12-14 DIAGNOSIS — C64.1 RENAL CANCER, RIGHT: ICD-10-CM

## 2023-12-14 DIAGNOSIS — R91.8 PULMONARY NODULES: ICD-10-CM

## 2023-12-14 LAB
CREAT SERPL-MCNC: 1.3 MG/DL (ref 0.5–1.4)
SAMPLE: NORMAL

## 2023-12-14 PROCEDURE — 71260 CT THORAX DX C+: CPT | Mod: TC,HCNC

## 2023-12-14 PROCEDURE — 25500020 PHARM REV CODE 255: Mod: HCNC | Performed by: INTERNAL MEDICINE

## 2023-12-14 PROCEDURE — 74177 CT ABD & PELVIS W/CONTRAST: CPT | Mod: 26,HCNC,, | Performed by: RADIOLOGY

## 2023-12-14 PROCEDURE — 71260 CT THORAX DX C+: CPT | Mod: 26,HCNC,, | Performed by: RADIOLOGY

## 2023-12-14 PROCEDURE — 74177 CT ABD & PELVIS W/CONTRAST: CPT | Mod: TC,HCNC

## 2023-12-14 PROCEDURE — 74177 CT CHEST ABDOMEN PELVIS WITH IV CONTRAST (XPD): ICD-10-PCS | Mod: 26,HCNC,, | Performed by: RADIOLOGY

## 2023-12-14 PROCEDURE — 71260 CT CHEST ABDOMEN PELVIS WITH IV CONTRAST (XPD): ICD-10-PCS | Mod: 26,HCNC,, | Performed by: RADIOLOGY

## 2023-12-14 RX ADMIN — IOHEXOL 100 ML: 350 INJECTION, SOLUTION INTRAVENOUS at 02:12

## 2023-12-19 ENCOUNTER — OFFICE VISIT (OUTPATIENT)
Dept: HEMATOLOGY/ONCOLOGY | Facility: CLINIC | Age: 68
End: 2023-12-19
Payer: MEDICARE

## 2023-12-19 VITALS
OXYGEN SATURATION: 99 % | BODY MASS INDEX: 32.82 KG/M2 | TEMPERATURE: 97 F | SYSTOLIC BLOOD PRESSURE: 166 MMHG | HEART RATE: 71 BPM | HEIGHT: 74 IN | RESPIRATION RATE: 17 BRPM | WEIGHT: 255.75 LBS | DIASTOLIC BLOOD PRESSURE: 89 MMHG

## 2023-12-19 DIAGNOSIS — R91.8 PULMONARY NODULES: ICD-10-CM

## 2023-12-19 DIAGNOSIS — Z85.528 HISTORY OF RENAL CELL CANCER: Primary | ICD-10-CM

## 2023-12-19 DIAGNOSIS — R97.20 ELEVATED PSA: ICD-10-CM

## 2023-12-19 DIAGNOSIS — D64.9 NORMOCYTIC ANEMIA: ICD-10-CM

## 2023-12-19 DIAGNOSIS — Z87.438 HISTORY OF BENIGN PROSTATIC HYPERPLASIA: ICD-10-CM

## 2023-12-19 PROCEDURE — 1159F PR MEDICATION LIST DOCUMENTED IN MEDICAL RECORD: ICD-10-PCS | Mod: HCNC,CPTII,S$GLB, | Performed by: INTERNAL MEDICINE

## 2023-12-19 PROCEDURE — 3044F PR MOST RECENT HEMOGLOBIN A1C LEVEL <7.0%: ICD-10-PCS | Mod: HCNC,CPTII,S$GLB, | Performed by: INTERNAL MEDICINE

## 2023-12-19 PROCEDURE — 1101F PT FALLS ASSESS-DOCD LE1/YR: CPT | Mod: HCNC,CPTII,S$GLB, | Performed by: INTERNAL MEDICINE

## 2023-12-19 PROCEDURE — 99999 PR PBB SHADOW E&M-EST. PATIENT-LVL IV: ICD-10-PCS | Mod: PBBFAC,HCNC,, | Performed by: INTERNAL MEDICINE

## 2023-12-19 PROCEDURE — 1160F RVW MEDS BY RX/DR IN RCRD: CPT | Mod: HCNC,CPTII,S$GLB, | Performed by: INTERNAL MEDICINE

## 2023-12-19 PROCEDURE — 1101F PR PT FALLS ASSESS DOC 0-1 FALLS W/OUT INJ PAST YR: ICD-10-PCS | Mod: HCNC,CPTII,S$GLB, | Performed by: INTERNAL MEDICINE

## 2023-12-19 PROCEDURE — 3008F PR BODY MASS INDEX (BMI) DOCUMENTED: ICD-10-PCS | Mod: HCNC,CPTII,S$GLB, | Performed by: INTERNAL MEDICINE

## 2023-12-19 PROCEDURE — 3079F PR MOST RECENT DIASTOLIC BLOOD PRESSURE 80-89 MM HG: ICD-10-PCS | Mod: HCNC,CPTII,S$GLB, | Performed by: INTERNAL MEDICINE

## 2023-12-19 PROCEDURE — 1159F MED LIST DOCD IN RCRD: CPT | Mod: HCNC,CPTII,S$GLB, | Performed by: INTERNAL MEDICINE

## 2023-12-19 PROCEDURE — 1160F PR REVIEW ALL MEDS BY PRESCRIBER/CLIN PHARMACIST DOCUMENTED: ICD-10-PCS | Mod: HCNC,CPTII,S$GLB, | Performed by: INTERNAL MEDICINE

## 2023-12-19 PROCEDURE — 3008F BODY MASS INDEX DOCD: CPT | Mod: HCNC,CPTII,S$GLB, | Performed by: INTERNAL MEDICINE

## 2023-12-19 PROCEDURE — 99214 OFFICE O/P EST MOD 30 MIN: CPT | Mod: HCNC,S$GLB,, | Performed by: INTERNAL MEDICINE

## 2023-12-19 PROCEDURE — 99214 PR OFFICE/OUTPT VISIT, EST, LEVL IV, 30-39 MIN: ICD-10-PCS | Mod: HCNC,S$GLB,, | Performed by: INTERNAL MEDICINE

## 2023-12-19 PROCEDURE — 3079F DIAST BP 80-89 MM HG: CPT | Mod: HCNC,CPTII,S$GLB, | Performed by: INTERNAL MEDICINE

## 2023-12-19 PROCEDURE — 3044F HG A1C LEVEL LT 7.0%: CPT | Mod: HCNC,CPTII,S$GLB, | Performed by: INTERNAL MEDICINE

## 2023-12-19 PROCEDURE — 1126F AMNT PAIN NOTED NONE PRSNT: CPT | Mod: HCNC,CPTII,S$GLB, | Performed by: INTERNAL MEDICINE

## 2023-12-19 PROCEDURE — 99999 PR PBB SHADOW E&M-EST. PATIENT-LVL IV: CPT | Mod: PBBFAC,HCNC,, | Performed by: INTERNAL MEDICINE

## 2023-12-19 PROCEDURE — 3077F SYST BP >= 140 MM HG: CPT | Mod: HCNC,CPTII,S$GLB, | Performed by: INTERNAL MEDICINE

## 2023-12-19 PROCEDURE — 3288F PR FALLS RISK ASSESSMENT DOCUMENTED: ICD-10-PCS | Mod: HCNC,CPTII,S$GLB, | Performed by: INTERNAL MEDICINE

## 2023-12-19 PROCEDURE — 3288F FALL RISK ASSESSMENT DOCD: CPT | Mod: HCNC,CPTII,S$GLB, | Performed by: INTERNAL MEDICINE

## 2023-12-19 PROCEDURE — 4010F PR ACE/ARB THEARPY RXD/TAKEN: ICD-10-PCS | Mod: HCNC,CPTII,S$GLB, | Performed by: INTERNAL MEDICINE

## 2023-12-19 PROCEDURE — 1126F PR PAIN SEVERITY QUANTIFIED, NO PAIN PRESENT: ICD-10-PCS | Mod: HCNC,CPTII,S$GLB, | Performed by: INTERNAL MEDICINE

## 2023-12-19 PROCEDURE — 4010F ACE/ARB THERAPY RXD/TAKEN: CPT | Mod: HCNC,CPTII,S$GLB, | Performed by: INTERNAL MEDICINE

## 2023-12-19 PROCEDURE — 3077F PR MOST RECENT SYSTOLIC BLOOD PRESSURE >= 140 MM HG: ICD-10-PCS | Mod: HCNC,CPTII,S$GLB, | Performed by: INTERNAL MEDICINE

## 2023-12-19 NOTE — PROGRESS NOTES
Subjective     Patient ID: Adrian Burt is a 68 y.o. male.    Chief Complaint: Renal cancer, right    HPI    Returns for follow up and surveillance scans  Diagnosis: pulmonary nodules, Right RCC pT1a pNX pMX      Reports doing well in the interval:  Denies fatigue  Weight stable, good diet  Some seasonal allergies well controlled  Denies pain  Good urine output    Up to date on HM:  - 9/25/2023 Colonoscopy  Impression:            - Four 5 mm polyps in the descending colon, in the                          distal transverse colon and in the ascending                          colon, removed with a cold snare. Resected and                          retrieved.                          - Diverticulosis in the sigmoid colon.                          - The distal rectum and anal verge are normal on                          retroflexion view.   Pathology:    1. Colon, ascending, polyps x2, biopsy:  - Tubular adenoma, multiple fragments.    - Sessile serrated lesion, multiple fragments.    - See comment.    2. Colon, distal transverse, polyp, biopsy:  - Tubular adenoma.    3. Colon, descending, polyp, biopsy:  - Tubular adenoma.          Interval surveillance scans reviewed:  - 12/14/2023 CT C/A/P:  FINDINGS:  Base of Neck: No significant abnormality.  Thoracic soft tissues: Minimal bilateral gynecomastia.  No axillary lymphadenopathy.  Aorta: 3 vessel left-sided aortic arch.  No aneurysm and no significant atherosclerosis.  Heart: Normal size. No effusion.  Pulmonary vasculature: Hypoattenuation at the origin of the superior lobar artery of the left lung (series 2, image 44) favored artifactual.  Pulmonary distribute normally otherwise.  Em/Mediastinum: No pathologic winifred enlargement.  Airways: Mild peribronchial cuffing.  Lungs/Pleura: No consolidation, pleural effusion, or pneumothorax.  Several stable bilateral solid pulmonary nodules measuring up to 5 mm.  For example a nodule in the superior segment left lower  lobe (series 6, image 257).  No new or enlarged nodules.  Esophagus: Small amount of fluid layering within the mid esophagus.  Small hiatal hernia.  Liver: Normal size and attenuation. No focal lesions.  Gallbladder: No calcified gallstones.  Bile Ducts: No dilatation.  Pancreas: No mass. No peripancreatic fat stranding.  Spleen: Normal size.  Adrenals: No focal lesions.  Kidneys/Ureters: Postoperative change of partial right nephrectomy.  No new enhancement or nodular growth at the operative sites to suggest local recurrent disease.  No new masses.  No abnormality of the renal veins.  No hydroureteronephrosis.  Bladder: No wall thickening.  Reproductive organs: Prostatomegaly.  GI Tract/Mesentery: Small hiatal hernia.  The stomach is unremarkable.  Small bowel is normal in caliber.  Few scattered colonic diverticula.  No evidence of bowel inflammation.  Peritoneal Space: No ascites or free air.  Retroperitoneum: No significant adenopathy.  Abdominal wall: Similar stranding the bilateral gluteal soft tissues.  Vasculature: Minimal aortic calcific atherosclerosis.  No aneurysm.  Bones: No osseous degenerative changes without acute fracture.  No osseous destructive lesions.  T11 benign osseous hemangioma.  Impression:  In this patient with renal cell carcinoma status post right-sided partial nephrectomy, there is no convincing local recurrent or metastatic disease to the chest, abdomen, or pelvis.  Additional findings as described above.     Oncology History:  - renal mass discovered incidentally; he had a fleeting pain that resolved but he mentioned to his PCP several months later leading to below imaging     - 3/24/2022 Renal ultrasound:  FINDINGS:  Right kidney: The right kidney measures 13.5 cm. No cortical thinning. No loss of corticomedullary distinction. Resistive index measures 0.61.  Heterogeneous lesion measuring 2.4 x 2.3 x 2.7 cm in the upper pole.  No internal Doppler signal.  1.5 x 1.6 x 1.5 cm  hyperechoic lesion in the midpole.  Subcentimeter simple cyst in the lower pole.  No renal stone. No hydronephrosis.  Left kidney: The left kidney measures 12.9 cm. No cortical thinning. No loss of corticomedullary distinction. Resistive index measures 0.49.  No mass. No renal stone. No hydronephrosis.  Spleen resistive index measures 0.54.  The bladder is partially distended at the time of scanning and has an unremarkable appearance.  Prostate is enlarged measuring 6.8 x 5.4 x 5.8 cm (previously 5.1 x 4.4 x 4.5 cm).  Impression:  1. 2.7 cm heterogeneous lesion in the upper pole right kidney, which could represent a complex cyst or neoplasm.  2. 1.6 cm hyperechoic lesion in the midpole right kidney, which could represent an angiomyolipoma or other fat containing lesion such as renal cell carcinoma.  3.  No hydronephrosis or shadowing calculus.  4. Prostatomegaly.  RECOMMENDATIONS:  Renal mass protocol CT or MRI for further evaluation of right renal lesions.     - 3/28/2022 CT Abd/Pelvis:  FINDINGS:  Heart: Normal in size. No pericardial effusion.  Lung Bases: Well aerated, without consolidation or pleural fluid.  Liver: Normal in size and attenuation, with no focal hepatic lesions.  Gallbladder: No calcified gallstones.  Bile Ducts: No evidence of dilated ducts.  Pancreas: No mass or peripancreatic fat stranding.  Spleen: Unremarkable.  Adrenals: Unremarkable.  Kidneys/ Ureters: There is a 3 cm complex hypodense lobulated lesion with multiple septations arising from the upper pole of the right kidney.  Differential considerations would include complex cyst versus cystic neoplasm.  In the interpolar aspect there is a solid mass with enhancement measuring 12 mm roughly corresponding to the echogenic lesion seen on the ultrasound.  No definite macroscopic fat attenuation is seen on the noncontrast series and is not able to be delineated from adjacent renal parenchyma on the noncontrast study.  No stones, solid mass, or  hydronephrosis on the left.  Bladder: No evidence of wall thickening.  Reproductive organs: Prostate markedly enlarged with heterogeneous enhancement pattern.  GI Tract/Mesentery: No evidence of bowel obstruction or inflammation.  Peritoneal Space: No ascites. No free air.  Retroperitoneum: No significant adenopathy.  Abdominal wall: Unremarkable.  Vasculature: No significant atherosclerosis or aneurysm.  Bones: No acute fracture.  Impression:  12 mm right renal solid mass concerning for malignancy until proven otherwise.  Further evaluation as warranted.  3 cm complex lesion upper pole right kidney at minimum warrants sonographic surveillance 6 months.     - 4/12/2022 MRI Abdomen:  FINDINGS:  Pulmonary Bases: No pleural effusion  Liver: normal.  Bile Ducts: normal  Gallbladder: normal  Pancreas: normal.  Spleen: normal.  Adrenal Glands: normal.  Proximal Gastrointestinal tract/stomach: Normal.  Kidneys: Septated hyperintense T2 lesion upper pole right kidney 3.6 x 3.2 x 2.7 cm.  Subtle enhancement of the septations noted on postcontrast images, renal cell cystic carcinoma cannot be excluded.  There is a very small lesion at the midpole of the right kidney, measuring approximately 12 mm demonstrating postcontrast enhancement concerning for renal cell carcinoma, it measures 1.4 cm series 1402, image 67  Aorta and Abdominal Vasculature: normal.  Mesentery: normal  Lymph nodes: no pathologically enlarged lymph nodes are seen.  Body wall: normal  Osseous structures: No lesion seen  Other: No free fluid/ascites.  Impression:  Predominantly cystic lesion at the upper pole of the right kidney with subtly enhancing septations, a cystic renal cell carcinoma cannot be excluded.  Subtle small intrarenal lesion at the midpole of the right kidney demonstrating postcontrast enhancement, a solid renal cell carcinoma cannot be excluded.  This report was flagged in Epic as abnormal.     - 5/9/2022 Surgical nephrectomy:  1.   Retroperitoneal robotic assisted laparoscopic right partial nephrectomy (modifier 22 for complex anatomy, 2 masses, >180% expected time)  2.  Intraoperative ultrasound with interpretation.  1. FINAL RESECTION MARGIN OF RIGHT UPPER POLE MASS:   RENAL PARENCHYMA WITH NO NEOPLASIA IDENTIFIED   2. RIGHT UPPER POLE MASS:   INNOCUOUS BENIGN MULTILOCULAR CYST   3. MASS FROM MID RIGHT  KIDNEY :   RENAL CELL CARCINOMA WITH NO INVOLVEMENT OF MARGINS OR PERINEPHRIC ADIPOSE   TISSUE IDENTIFIED   2. This lesion is a benign multilocular cyst.  The cystic spaces have a   single layer of lining at the most.  There is no proliferative lining   identified in this entirely submitted specimen.  No area has formation of a   mass neoplasm.  There is no significant abnormality of renal parenchyma   separate from the cystic lesion.   Procedure :  Partial renal excision   Specimen Laterality :  Right   Tumor Focality :  Unifocal   Tumor Size :  1.2 cm   Histologic Type :  Renal cell carcinoma, clear cell variant   Tumor Extent :  Limited to kidney   Sarcomatoid Features :  Not identified   Rhabdoid Features :  Not identified   Tumor Necrosis :  Not identified   Margins ; no margin involvement identified   Regional Lymph Node Status :   Not applicable (no regional lymph nodes   submitted or found)   PATHOLOGIC STAGE CLASSIFICATION (pTNM, AJCC 8th Edition) : pT1a pNX pMX   Additional findings :  There is no significant abnormality of renal   parenchyma apart from the masses.     Additional imaging:  - 6/17/2022 MRI prostate:  FINDINGS:  Previous biopsy: None.  PSA: 6.5 ng/mL on 06/15/2022  Prior therapy: None.  Prostate: 6.2 x 6.0 x 6.4 cm corresponding to a computed volume of 122 cc.  Peripheral zone: No focal abnormalities with imaging features concerning for prostate cancer, score 1.  Transitional zone: Benign prostatic hyperplasia without focal suspicious abnormality, score 2.  Neurovascular bundle: Normal appearance.  Seminal vesicles:  Normal appearance.  Adjacent Organ Involvement: No evidence for urinary bladder or rectal invasion.  Lymphadenopathy: None.  Other Findings: Diffuse marrow signal heterogeneity in keeping with red marrow hyperplasia.  Impression:  1. Benign prostatic hyperplasia.  2. Red marrow hyperplasia.  Overall Assessment: PI-RADS 2 - Low (clinically significant cancer is unlikely to be present)  Number of targets created for potential MR/US fusion biopsy  Peripheral zone: 0  Transition zone: 0     Recently had surveillance scans for RCC performed- results as below:  2023 CT Chest:  FINDINGS:  No intravenous contrast was administered for this examination.  Therefore, it may have diminished sensitivity for detection of certain abnormalities.  Thyroid gland: Within normal limits.  Trachea: Within normal limits.  Esophagus: Mildly patulous.  Cardiovascular: Normal heart size.No pericardial effusion.Coarse calcifications in the region of the aortic valve leaflets, correlate with aortic valve function.  There is mild calcific disease of the coronary arteries.  Lymph nodes: None abnormally enlarged.  Lungs/pleura/airways:  Mild apical scarring.  There are several left lung nodules which include the followin mm in lingula (1988) 4 mm in the left lower lobe adjacent to the diaphragm (4-405, and 2 additional 5 mm left lower lobe nodules (4-318, 380). In addition, there is a 5 mm right lower lobe ground-glass nodule (4-299.  The latter is out of the field of view on the prior examination.  Upper abdomen:  Partially imaged.  Status post partial right nephrectomy.  No additional new significant disease.  Bones: Unremarkable for stated age.  Other: N/A  Impression:  1. There are several bilateral subcentimeter pulmonary nodules (measuring 5 mm and less) as described above.  At this time, these are indeterminate whether related to malignancy versus infection.  Attention on short-term follow-up imaging highly recommended.  2.  Postsurgical changes related to right partial nephrectomy.  Unremarkable appearance of surgical bed.  3.  Other findings are as above.     - 12/13/2022 CT Abd/Pelvis:  FINDINGS:  Heart: No cardiomegaly or pericardial effusion.  Atherosclerosis of the aortic annulus.  Lung Bases: 0.5 cm solid nodule in the anteromedial basal segment of the left lower lobe (axial series 3, image 11).  Additional 0.4 cm pleural based nodule in the superior lingula (axial series 3, image 5).  Bilateral dependent atelectasis.  Liver: Hepatomegaly.  Punctate hypodensity in hepatic segment 4a, too small to characterize but stable from prior exam.  No new lesions identified.  Gallbladder: No calcified gallstones.  Bile Ducts: No dilatation.  Pancreas: No mass. No peripancreatic fat stranding.  Spleen: Unremarkable  Adrenals: Unremarkable  Kidneys/Ureters: Interval postsurgical changes of right partial nephrectomy.  There is mild soft tissue thickening in both surgical beds, likely scarring/fibrosis.  No evidence of recurrent lesion.  Subcentimeter hypodensity in the upper pole of the right kidney, too small to characterize.  Left kidney is unremarkable.  No nephrolithiasis or hydronephrosis.  Bilateral ureters are normal in course and caliber.  Bladder: No wall thickening.  Reproductive organs: Prostatomegaly with mass effect on the posterior aspect of the bladder.  GI Tract/Mesentery: No evidence of bowel obstruction or inflammation.  Peritoneal Space: No ascites or free air.  Retroperitoneum: No significant adenopathy.  Abdominal wall: Unremarkable  Vasculature: No aneurysm.  Mild atherosclerosis of the descending aorta and its branches.  Bones: Multilevel degenerative change, similar to prior exams.  No acute fracture. No suspicious lytic or sclerotic lesions.  Impression:  Interval postsurgical changes of right partial nephrectomy x2.  No evidence of residual or recurrent lesion in the surgical beds.  Pulmonary nodules in the left lung,  that were out of field of view on prior imaging.  Recommend attention on follow-up.  Comparison to any prior CT chest would be beneficial.  Prostatomegaly.     PMH:  Steel fragment removal from leg  HTN     FH:  Mother  ESRD - unclear reasons at 37 yo  Father  at age 67 yo, heart disease  Siblings - 2 sisters- 1  at age 68 from RCC  1  ESRD, EtOHism  DM  HTN  Maternal grandmother- breast cancer dies at age 59     SH:  Retired, construction work (office)  , 3 kids  Quit tobacco  (1 pack smoker)  Active       Review of Systems   Constitutional:  Negative for activity change, appetite change, fatigue, fever and unexpected weight change.   HENT:  Negative for hearing loss.    Eyes:  Negative for visual disturbance.   Respiratory:  Negative for cough, shortness of breath and wheezing.    Cardiovascular:  Negative for chest pain, palpitations and leg swelling.   Gastrointestinal:  Negative for abdominal distention, abdominal pain, blood in stool, change in bowel habit, constipation, diarrhea, nausea, vomiting and reflux.   Genitourinary:  Negative for decreased urine volume, difficulty urinating, frequency and urgency.   Musculoskeletal:  Negative for arthralgias, back pain and joint deformity.   Neurological:  Negative for dizziness, weakness, light-headedness, numbness and headaches.   Hematological:  Negative for adenopathy. Does not bruise/bleed easily.   Psychiatric/Behavioral:  Negative for dysphoric mood. The patient is not nervous/anxious.           Objective     Physical Exam  Vitals and nursing note reviewed.   Constitutional:       General: He is not in acute distress.     Appearance: Normal appearance. He is normal weight. He is not ill-appearing.      Comments: Presents alone  Very pleasant  ECOG= 0   Eyes:      General: No scleral icterus.     Extraocular Movements: Extraocular movements intact.      Conjunctiva/sclera: Conjunctivae normal.      Pupils: Pupils are equal, round, and  reactive to light.   Cardiovascular:      Rate and Rhythm: Normal rate and regular rhythm.      Heart sounds: Normal heart sounds. No murmur heard.     No friction rub. No gallop.   Pulmonary:      Effort: Pulmonary effort is normal. No respiratory distress.      Breath sounds: Normal breath sounds. No wheezing, rhonchi or rales.   Abdominal:      General: Abdomen is flat. Bowel sounds are normal. There is no distension.      Palpations: Abdomen is soft. There is no mass.      Tenderness: There is no abdominal tenderness. There is no guarding or rebound.      Comments: No organomegaly   Musculoskeletal:         General: No swelling. Normal range of motion.      Cervical back: Normal range of motion and neck supple. No rigidity.      Right lower leg: No edema.      Left lower leg: No edema.   Lymphadenopathy:      Cervical: No cervical adenopathy.   Skin:     General: Skin is warm and dry.      Coloration: Skin is not jaundiced or pale.      Findings: No rash.   Neurological:      General: No focal deficit present.      Mental Status: He is alert and oriented to person, place, and time. Mental status is at baseline.      Sensory: No sensory deficit.      Motor: No weakness.      Coordination: Coordination normal.      Gait: Gait normal.   Psychiatric:         Mood and Affect: Mood normal.         Behavior: Behavior normal.         Thought Content: Thought content normal.         Judgment: Judgment normal.       Labs- reviewed  Imaging- reviewed     Assessment and Plan     1. History of renal cell cancer  Overview:  4 cm complex cystic lesion to posterior right upper pole, 1.1 cm right anterior solid mass on CT w/wo contrast 3/2022.  --MRI 4/22: right 3.6 cm UP cystic lesion ? Cystic RCC, right midpole 12mm solid mass  --Preop Cr 1, GFR >60  --S/p right retroperitioneal partial 5/9/22 (both masses removed)  --Path: upper pole lesion was cyst, midpole 1.2 cm [id number] CC RCC, negative margins  KATIE    Post op  imaging:  CT/CXR 12/22: lower pulm nodules up to 5mm on CT but not on CXR, no recurrence.    CT 4/23: stable pulm nodules, no recurrence    Post op labs  6/22--Cr 1.2, GFR >60  12/22--Cr 1.2, GFR >60  4/23--1.1, GFR >60      2. Normocytic anemia    3. Elevated PSA  Overview:  PSA  7/23--4.5, free 20%, T 213  5/23--6.7, free 19%    MRI prostate 6/22: volume 122, no lesions, PIRADS 2   On TRT, stopped 5/2022   Restarted 7/2023 due to fatigue, T 213          4. History of benign prostatic hyperplasia    5. Pulmonary nodules        Right sided RCC  Pulmonary nodules noted on imaging- indeterminate-too small to biopsy or further characterize with other imaging modalities- surveillance imaging stable  Repeat again in 4 months  Knows to call for any issues     Anemia  Stable  4 benign polyps on recent colonoscopy    BPH:  - 6/17/2022 MRI prostate:  FINDINGS:  Previous biopsy: None.  PSA: 6.5 ng/mL on 06/15/2022  Prior therapy: None.  Prostate: 6.2 x 6.0 x 6.4 cm corresponding to a computed volume of 122 cc.  Peripheral zone: No focal abnormalities with imaging features concerning for prostate cancer, score 1.  Transitional zone: Benign prostatic hyperplasia without focal suspicious abnormality, score 2.  Neurovascular bundle: Normal appearance.  Seminal vesicles: Normal appearance.  Adjacent Organ Involvement: No evidence for urinary bladder or rectal invasion.  Lymphadenopathy: None.  Other Findings: Diffuse marrow signal heterogeneity in keeping with red marrow hyperplasia.  Impression:  1. Benign prostatic hyperplasia.  2. Red marrow hyperplasia.  Overall Assessment: PI-RADS 2 - Low (clinically significant cancer is unlikely to be present)  Number of targets created for potential MR/US fusion biopsy  Peripheral zone: 0  Transition zone: 0     Has annual PSA and sees Urology     Route Chart for Scheduling    Med Onc Chart Routing      Follow up with physician . 4 months with labs and scans at least 1 day prior, needs  follow up early next year with his urologist   Follow up with [facility]    Infusion scheduling note    Injection scheduling note    Labs    Imaging    Pharmacy appointment    Other referrals

## 2024-01-01 NOTE — PROGRESS NOTES
Subjective:       Patient ID: Adrian Burt is a 67 y.o. male.    Chief Complaint: Follow-up     This is a 67 y.o.  male patient that is an established patient for right kidney cancer.    Initial HPI: Patient reports having right-sided flank pain underwent a renal ultrasound that showed a right 1 cm solid mass in a concerning approximately 4.5 cm upper pole cystic mass.  CT abdomen with and without contrast stone subsequently that showed similar findings.  He does report right-sided flank pain has resolved.  Of note, his primary care provider did prescribed antibiotics recently for Proteus urinary tract infection at the time of having flank pain.  His urinalysis at that time had blood as well as white blood cells.  There is a report of ? FH of kidney cancer.  He does report some lower urinary tract symptoms in the past and has been treated by previous urologist but currently not on treatment.  He is on testosterone replacement.    S/p right partial nephrectomy of upper pole cystic mass and right renal mass 5/9/22.  Upper pole cystic mass was cyst lower pole mass was 1.2 cm clear cell renal cell carcinoma.  PSA up to 6.5, free20%.    MRI prostate showed PI-RADS 2 with no lesions, volume 122  Follow up imaging for nephrectomy done 12/22, chest x-ray negative but CT of the abdomen pelvis did show some lower pulmonary nodules up to 5 mm.  Evidence of right partial nephrectomy without recurrence.  No lymphadenopathy.  PSA 5.8, free PSA 26%    LAST PSA  Lab Results   Component Value Date    PSA 5.2 (H) 04/07/2021    PSA 2.1 09/10/2018    PSA 1.8 07/05/2017    PSA 2.6 11/23/2015    PSA 7.2 (H) 12/15/2008    PSA 0.6 02/22/2005    PSATOTAL 5.8 (H) 12/06/2022    PSATOTAL 6.5 (H) 06/13/2022    PSAFREE 1.58 (H) 12/06/2022    PSAFREE 1.36 06/13/2022       Lab Results   Component Value Date    CREATININE 1.2 12/06/2022       ---  Past Medical History:   Diagnosis Date    Allergy     Colon polyp     Fatty liver     GERD  Event Note (gastroesophageal reflux disease)     Hypertension        Past Surgical History:   Procedure Laterality Date    COLONOSCOPY N/A 2017    Procedure: COLONOSCOPY;  Surgeon: Leo Henriquez MD;  Location: [facility] ([address] [place] [address]);  Service: Endoscopy;  Laterality: N/A;    LEG SURGERY Left     ROBOT-ASSISTED LAPAROSCOPIC PARTIAL NEPHRECTOMY Right 2022    Procedure: Right RETROPERITONEAL robotic assisted lap partial nephrectomy  Intraoperative ultrasound with interpretation Special needs: drop in probe for ultrasound, retroperitoneal dilating baloon;  Surgeon: Arvind Álvarez MD;  Location: [facility] OR;  Service: Urology;  Laterality: Right;       Family History   Problem Relation Age of Onset    Kidney disease Mother     Heart disease Father     Cancer Sister         kidney    Kidney disease Sister     Colon cancer Neg Hx     Esophageal cancer Neg Hx        Social History     Tobacco Use    Smoking status: Former     Types: Cigarettes     Quit date: 11/15/1978     Years since quittin.1    Smokeless tobacco: Never    Tobacco comments:     smoked for 10 years, quit in 78   Substance Use Topics    Alcohol use: No    Drug use: No       Current Outpatient Medications on File Prior to Visit   Medication Sig Dispense Refill    amLODIPine (NORVASC) 10 MG tablet TAKE 1 TABLET(10 MG) BY MOUTH EVERY MORNING 90 tablet 3    aspirin (ECOTRIN) 81 MG EC tablet Take 81 mg by mouth once daily.      azelastine (ASTELIN) 137 mcg (0.1 %) nasal spray 1 spray (137 mcg total) by Nasal route 2 (two) times daily. (Patient taking differently: 1 spray by Nasal route 2 (two) times daily as needed.) 30 mL 0    ferrous gluconate (FERGON) 324 MG tablet TAKE 1 TABLET BY MOUTH DAILY WITH BREAKFAST 30 tablet 3    fluticasone propionate (FLONASE) 50 mcg/actuation nasal spray SPRAY ONCE IN EACH NOSTRIL EVERY DAY (Patient taking differently: 1 spray by Each Nostril route daily as needed.) 48 g 2    hydroCHLOROthiazide (HYDRODIURIL) 12.5 MG Tab  Take 1 tablet (12.5 mg total) by mouth once daily. 30 tablet 11    losartan (COZAAR) 100 MG tablet TAKE 1 TABLET(100 MG) BY MOUTH EVERY DAY 90 tablet 3    montelukast (SINGULAIR) 10 mg tablet Take 1 tablet (10 mg total) by mouth every evening. 90 tablet 0    multivit with minerals/lutein (MULTIVITAMIN 50 PLUS ORAL) Take 1 tablet by mouth once daily.      sildenafil (REVATIO) 20 mg Tab Take 20 mg by mouth 3 (three) times daily.      tamsulosin (FLOMAX) 0.4 mg Cap Take 1 capsule by mouth once daily.      testosterone cypionate (DEPOTESTOTERONE CYPIONATE) 200 mg/mL injection Inject into the muscle every 14 (fourteen) days.       No current facility-administered medications on file prior to visit.       Review of patient's allergies indicates:  No Known Allergies      Review of Systems   Constitutional:  Negative for activity change, chills and fever.   HENT:  Negative for congestion.    Respiratory:  Negative for cough, chest tightness and shortness of breath.    Cardiovascular:  Negative for chest pain and palpitations.   Gastrointestinal:  Negative for abdominal distention, abdominal pain, nausea and vomiting.   Genitourinary:  Negative for difficulty urinating, flank pain, hematuria, penile pain, scrotal swelling and testicular pain.   Musculoskeletal:  Negative for gait problem.     Objective:      Physical Exam  Constitutional:       Appearance: Normal appearance.   HENT:      Head: Normocephalic.   Pulmonary:      Effort: Pulmonary effort is normal.      Breath sounds: Normal breath sounds.   Abdominal:      General: Abdomen is flat. Bowel sounds are normal.      Palpations: Abdomen is soft.      Tenderness: There is no abdominal tenderness. There is no right CVA tenderness, left CVA tenderness or guarding.      Comments: Inc c/d/i   Musculoskeletal:         General: Normal range of motion.      Cervical back: Normal range of motion.   Skin:     General: Skin is warm.   Neurological:      Mental Status: He is  alert.       No results found for this or any previous visit (from the past 2160 hour(s)).    Path 5/9/22:    Final Pathologic Diagnosis 1. FINAL RESECTION MARGIN OF RIGHT UPPER POLE MASS:   RENAL PARENCHYMA WITH NO NEOPLASIA IDENTIFIED   2. RIGHT UPPER POLE MASS:   INNOCUOUS BENIGN MULTILOCULAR CYST   3. MASS FROM MID RIGHT  KIDNEY :   RENAL CELL CARCINOMA WITH NO INVOLVEMENT OF MARGINS OR PERINEPHRIC ADIPOSE   TISSUE IDENTIFIED    Comment: Interp By Adolph Bonilla M.D., Signed on 05/13/2022 at 09:22   Microscopic Exam 2. This lesion is a benign multilocular cyst.  The cystic spaces have a   single layer of lining at the most.  There is no proliferative lining   identified in this entirely submitted specimen.  No area has formation of a   mass neoplasm.  There is no significant abnormality of renal parenchyma   separate from the cystic lesion.   3.   Procedure :  Partial renal excision   Specimen Laterality :  Right   Tumor Focality :  Unifocal   Tumor Size :  1.2 cm   Histologic Type :  Renal cell carcinoma, clear cell variant   Tumor Extent :  Limited to kidney   Sarcomatoid Features :  Not identified   Rhabdoid Features :  Not identified   Tumor Necrosis :  Not identified   Margins ; no margin involvement identified   Regional Lymph Node Status :   Not applicable (no regional lymph nodes   submitted or found)   PATHOLOGIC STAGE CLASSIFICATION (pTNM, AJCC 8th Edition) : pT1a pNX pMX   Additional findings :  There is no significant abnormality of renal   parenchyma apart from the masses.      Results for orders placed or performed during the hospital encounter of 06/17/22 (from the past 2160 hour(s))   MRI Prostate W W/O Contrast    Impression    1. Benign prostatic hyperplasia.  2. Red marrow hyperplasia.  Overall Assessment: PI-RADS 2 - Low (clinically significant cancer is unlikely to be present)    Number of targets created for potential MR/US fusion biopsy    Peripheral zone: 0    Transition zone:  0      Electronically signed by: Jersey Hall MD  Date:    06/19/2022  Time:    09:47                         Assessment:     Problem Noted   Renal Cancer, Right 3/31/2022    4 cm complex cystic lesion to posterior right upper pole, 1.1 cm right anterior solid mass on CT w/wo contrast 3/2022.  --MRI 4/22: right 3.6 cm UP cystic lesion ? Cystic RCC, right midpole 12mm solid mass  --Preop Cr 1, GFR >60  --S/p right retroperitioneal partial 5/9/22 (both masses removed)  --Path: upper pole lesion was cyst, midpole 1.2 cm [id number] CC RCC, negative margins  KATIE    Post op imaging:  CT/CXR 12/22: lower pulm nodules up to 5mm on CT but not on CXR, no recurrence.      Post op labs  6/22--Cr 1.2, GFR >60  12/22--Cr 1.2, GFR >60     Elevated Psa 4/14/2022 6/22-6.5, free 20%  12/22-5.8, free 26%     MRI prostate 6/22: volume 122, no lesions, PIRADS 2   On TRT         Urinary Tract Infection Without Hematuria (Resolved) 3/31/2022       Plan:     1.  Reviewed CT and chest x-ray, lower pulmonary nodules noted on CT of the abdomen.  We will check CT of the chest, referred to Oncology/pulmonology if needed.  Highly doubt that these are related to his renal cell carcinoma  2.  PSA stable, no lesions on MRI, does not want to do traditional biopsy at this time, low PSAD 0.05  3.  Follow up in 6 months with PSA  4.  Will call re CT chest results.          Arvind Álvaerz MD

## 2024-02-20 ENCOUNTER — TELEPHONE (OUTPATIENT)
Dept: INTERNAL MEDICINE | Facility: CLINIC | Age: 69
End: 2024-02-20
Payer: MEDICARE

## 2024-02-20 NOTE — TELEPHONE ENCOUNTER
----- Message from Vera Ferrell sent at 2/20/2024  8:50 AM CST -----  Contact: [phone]  Savannah with [facility] is calling she is asking for a follow up for the active medication list for the pt they are awaiting the return fax     Fax [phone]

## 2024-03-04 ENCOUNTER — PATIENT MESSAGE (OUTPATIENT)
Dept: UROLOGY | Facility: CLINIC | Age: 69
End: 2024-03-04
Payer: MEDICARE

## 2024-03-06 ENCOUNTER — TELEPHONE (OUTPATIENT)
Dept: UROLOGY | Facility: CLINIC | Age: 69
End: 2024-03-06
Payer: MEDICARE

## 2024-03-06 NOTE — TELEPHONE ENCOUNTER
----- Message from Ayush Augustine sent at 3/6/2024  1:06 PM CST -----  Contact: Sue  .Type:  Needs Medical Advice    Who Called:  [facility]      Would the patient rather a call back or a response via [facility]?  Call back  Best Call Back Number: [phone]   Additional Information: [facility] is calling for  a active medication list for the above pt.  Fax # [phone]

## 2024-03-06 NOTE — TELEPHONE ENCOUNTER
I returned Jonathan call from [facility] regarding if we'd received a fax from them. I voiced that we have and we will make sure to send it back. Jonathan is aware.

## 2024-03-07 ENCOUNTER — TELEPHONE (OUTPATIENT)
Dept: INTERNAL MEDICINE | Facility: CLINIC | Age: 69
End: 2024-03-07
Payer: MEDICARE

## 2024-03-07 NOTE — TELEPHONE ENCOUNTER
----- Message from Ligia Morrison MA sent at 3/6/2024 11:56 AM CST -----  Contact: [phone]  Jonathan ([facility] called                  In regards to speak with provider or staff to Follow up on a fax request that was faxed over on march 3rd 2024 for Active medication list that patient is currently taking as of now.               Fax number:[phone]

## 2024-03-09 DIAGNOSIS — J30.1 SEASONAL ALLERGIC RHINITIS DUE TO POLLEN: ICD-10-CM

## 2024-03-09 NOTE — TELEPHONE ENCOUNTER
No care due was identified.  [facility] Embedded Care Due Messages. Reference number: [id number].   3/09/2024 3:49:16 PM CST

## 2024-03-11 RX ORDER — FLUTICASONE PROPIONATE 50 MCG
1 SPRAY, SUSPENSION (ML) NASAL
Qty: 48 G | Refills: 1 | Status: SHIPPED | OUTPATIENT
Start: 2024-03-11

## 2024-03-11 NOTE — TELEPHONE ENCOUNTER
Refill Decision Note   Adrian Burt  is requesting a refill authorization.  Brief Assessment and Rationale for Refill:  Approve     Medication Therapy Plan:         Comments:     Note composed:3:05 AM 03/11/2024

## 2024-03-12 ENCOUNTER — LAB VISIT (OUTPATIENT)
Dept: LAB | Facility: HOSPITAL | Age: 69
End: 2024-03-12
Attending: UROLOGY
Payer: MEDICARE

## 2024-03-12 DIAGNOSIS — N40.0 BENIGN PROSTATIC HYPERPLASIA, UNSPECIFIED WHETHER LOWER URINARY TRACT SYMPTOMS PRESENT: ICD-10-CM

## 2024-03-12 DIAGNOSIS — R79.89 LOW TESTOSTERONE: ICD-10-CM

## 2024-03-12 LAB
BASOPHILS # BLD AUTO: 0.03 K/UL (ref 0–0.2)
BASOPHILS NFR BLD: 0.7 % (ref 0–1.9)
DIFFERENTIAL METHOD BLD: ABNORMAL
EOSINOPHIL # BLD AUTO: 0.3 K/UL (ref 0–0.5)
EOSINOPHIL NFR BLD: 6.4 % (ref 0–8)
ERYTHROCYTE [DISTWIDTH] IN BLOOD BY AUTOMATED COUNT: 13.7 % (ref 11.5–14.5)
HCT VFR BLD AUTO: 49 % (ref 40–54)
HGB BLD-MCNC: 15.4 G/DL (ref 14–18)
IMM GRANULOCYTES # BLD AUTO: 0.01 K/UL (ref 0–0.04)
IMM GRANULOCYTES NFR BLD AUTO: 0.2 % (ref 0–0.5)
LYMPHOCYTES # BLD AUTO: 2.3 K/UL (ref 1–4.8)
LYMPHOCYTES NFR BLD: 51.8 % (ref 18–48)
MCH RBC QN AUTO: 27.9 PG (ref 27–31)
MCHC RBC AUTO-ENTMCNC: 31.4 G/DL (ref 32–36)
MCV RBC AUTO: 89 FL (ref 82–98)
MONOCYTES # BLD AUTO: 0.5 K/UL (ref 0.3–1)
MONOCYTES NFR BLD: 11.4 % (ref 4–15)
NEUTROPHILS # BLD AUTO: 1.3 K/UL (ref 1.8–7.7)
NEUTROPHILS NFR BLD: 29.5 % (ref 38–73)
NRBC BLD-RTO: 0 /100 WBC
PLATELET # BLD AUTO: 192 K/UL (ref 150–450)
PMV BLD AUTO: 11.4 FL (ref 9.2–12.9)
PROSTATE SPECIFIC ANTIGEN, TOTAL: 2.9 NG/ML (ref 0–4)
PSA FREE MFR SERPL: 26.21 %
PSA FREE SERPL-MCNC: 0.76 NG/ML (ref 0–1.5)
RBC # BLD AUTO: 5.52 M/UL (ref 4.6–6.2)
WBC # BLD AUTO: 4.38 K/UL (ref 3.9–12.7)

## 2024-03-12 PROCEDURE — 84154 ASSAY OF PSA FREE: CPT | Mod: HCNC | Performed by: UROLOGY

## 2024-03-12 PROCEDURE — 85025 COMPLETE CBC W/AUTO DIFF WBC: CPT | Mod: HCNC | Performed by: UROLOGY

## 2024-03-12 PROCEDURE — 36415 COLL VENOUS BLD VENIPUNCTURE: CPT | Mod: HCNC | Performed by: UROLOGY

## 2024-03-12 PROCEDURE — 84403 ASSAY OF TOTAL TESTOSTERONE: CPT | Mod: HCNC | Performed by: UROLOGY

## 2024-03-13 LAB — TESTOST SERPL-MCNC: 446 NG/DL (ref 304–1227)

## 2024-03-15 DIAGNOSIS — Z87.438 HISTORY OF BENIGN PROSTATIC HYPERPLASIA: ICD-10-CM

## 2024-03-15 RX ORDER — TAMSULOSIN HYDROCHLORIDE 0.4 MG/1
CAPSULE ORAL
Qty: 90 CAPSULE | Refills: 11 | Status: SHIPPED | OUTPATIENT
Start: 2024-03-15 | End: 2024-03-21 | Stop reason: SDUPTHER

## 2024-03-18 ENCOUNTER — PATIENT MESSAGE (OUTPATIENT)
Dept: INTERNAL MEDICINE | Facility: CLINIC | Age: 69
End: 2024-03-18
Payer: MEDICARE

## 2024-03-18 DIAGNOSIS — Z87.438 HISTORY OF BENIGN PROSTATIC HYPERPLASIA: ICD-10-CM

## 2024-03-18 DIAGNOSIS — I10 ESSENTIAL HYPERTENSION: ICD-10-CM

## 2024-03-21 ENCOUNTER — OFFICE VISIT (OUTPATIENT)
Dept: UROLOGY | Facility: CLINIC | Age: 69
End: 2024-03-21
Payer: MEDICARE

## 2024-03-21 VITALS
HEIGHT: 74 IN | DIASTOLIC BLOOD PRESSURE: 88 MMHG | SYSTOLIC BLOOD PRESSURE: 174 MMHG | BODY MASS INDEX: 32.03 KG/M2 | WEIGHT: 249.56 LBS | HEART RATE: 70 BPM

## 2024-03-21 DIAGNOSIS — N52.01 ERECTILE DYSFUNCTION DUE TO ARTERIAL INSUFFICIENCY: ICD-10-CM

## 2024-03-21 DIAGNOSIS — R35.0 BENIGN PROSTATIC HYPERPLASIA WITH URINARY FREQUENCY: ICD-10-CM

## 2024-03-21 DIAGNOSIS — N40.1 BENIGN PROSTATIC HYPERPLASIA WITH URINARY FREQUENCY: ICD-10-CM

## 2024-03-21 DIAGNOSIS — R97.20 ELEVATED PSA: ICD-10-CM

## 2024-03-21 DIAGNOSIS — R79.89 LOW TESTOSTERONE: Primary | ICD-10-CM

## 2024-03-21 PROCEDURE — 1159F MED LIST DOCD IN RCRD: CPT | Mod: HCNC,CPTII,S$GLB, | Performed by: UROLOGY

## 2024-03-21 PROCEDURE — 1160F RVW MEDS BY RX/DR IN RCRD: CPT | Mod: HCNC,CPTII,S$GLB, | Performed by: UROLOGY

## 2024-03-21 PROCEDURE — 3079F DIAST BP 80-89 MM HG: CPT | Mod: HCNC,CPTII,S$GLB, | Performed by: UROLOGY

## 2024-03-21 PROCEDURE — 3008F BODY MASS INDEX DOCD: CPT | Mod: HCNC,CPTII,S$GLB, | Performed by: UROLOGY

## 2024-03-21 PROCEDURE — 99999 PR PBB SHADOW E&M-EST. PATIENT-LVL III: CPT | Mod: PBBFAC,HCNC,, | Performed by: UROLOGY

## 2024-03-21 PROCEDURE — 3288F FALL RISK ASSESSMENT DOCD: CPT | Mod: HCNC,CPTII,S$GLB, | Performed by: UROLOGY

## 2024-03-21 PROCEDURE — 4010F ACE/ARB THERAPY RXD/TAKEN: CPT | Mod: HCNC,CPTII,S$GLB, | Performed by: UROLOGY

## 2024-03-21 PROCEDURE — 1126F AMNT PAIN NOTED NONE PRSNT: CPT | Mod: HCNC,CPTII,S$GLB, | Performed by: UROLOGY

## 2024-03-21 PROCEDURE — 3077F SYST BP >= 140 MM HG: CPT | Mod: HCNC,CPTII,S$GLB, | Performed by: UROLOGY

## 2024-03-21 PROCEDURE — 99214 OFFICE O/P EST MOD 30 MIN: CPT | Mod: HCNC,S$GLB,, | Performed by: UROLOGY

## 2024-03-21 PROCEDURE — 1101F PT FALLS ASSESS-DOCD LE1/YR: CPT | Mod: HCNC,CPTII,S$GLB, | Performed by: UROLOGY

## 2024-03-21 RX ORDER — TAMSULOSIN HYDROCHLORIDE 0.4 MG/1
CAPSULE ORAL
Qty: 90 CAPSULE | Refills: 11 | Status: SHIPPED | OUTPATIENT
Start: 2024-03-21 | End: 2024-03-25 | Stop reason: SDUPTHER

## 2024-03-21 RX ORDER — TESTOSTERONE CYPIONATE 200 MG/ML
200 INJECTION, SOLUTION INTRAMUSCULAR
Qty: 10 ML | Refills: 1 | Status: SHIPPED | OUTPATIENT
Start: 2024-03-21 | End: 2025-03-21

## 2024-03-21 RX ORDER — SILDENAFIL 100 MG/1
TABLET, FILM COATED ORAL
COMMUNITY
Start: 2023-12-05

## 2024-03-21 RX ORDER — HYDROCHLOROTHIAZIDE 12.5 MG/1
12.5 TABLET ORAL DAILY
Qty: 90 TABLET | Refills: 3 | Status: SHIPPED | OUTPATIENT
Start: 2024-03-21 | End: 2024-05-09

## 2024-03-21 NOTE — TELEPHONE ENCOUNTER
No care due was identified.  [facility] Embedded Care Due Messages. Reference number: [id number].   3/21/2024 2:21:22 PM CDT

## 2024-03-21 NOTE — PROGRESS NOTES
Subjective:       Patient ID: Adrian Burt is a 69 y.o. male.    Chief Complaint: Medication Refill (trt)     This is a 69 y.o.  male patient that is an established patient for right kidney cancer, low testosterone, elevated PSA, erectile dysfunction.    Initial HPI: Right 1 cm solid mass in a concerning approximately 4.5 cm upper pole cystic mass.  CT abdomen with and without contrast stone subsequently that showed similar findings.  There is a report of ? FH of kidney cancer.        Kidney Cancer  S/p right partial nephrectomy of upper pole cystic mass and right renal mass 5/9/22.  Upper pole cystic mass was cyst lower pole mass was 1.2 cm clear cell renal cell carcinoma.  Follow up imaging for nephrectomy done 12/22, chest x-ray negative but CT of the abdomen pelvis did show some lower pulmonary nodules up to 5 mm.  Evidence of right partial nephrectomy without recurrence.  No lymphadenopathy.   CT CAP 4/2023 negative for mets, small pulm nodules.   Following with oncology for imaging of lung and RCC>      PSA/Testosterone/ED/BPH  PSA up to 6.5, free20% (6/2022)   MRI prostate (6/2022) showed PI-RADS 2 with no lesions, volume 122    Erectile dysfunction and low testosterone.  He ran out of testosterone prior and T was normal 562 (1/23).   He is also having erectile dysfunctions, given cialis.  He is also on ICI 10-20 units of Papa 30, phen 3, PGE 50 mcg.  Having erections for 2 hours.  No erection without ICI.  Stopped ED meds, continued ICI    T was normal 562 (1/23) was off T prior, given refill at time.    Discussed TRT, PSA elevation and ED meds.  Main reason for TRT was ED, Good results with ICI  Significant fatigue after stopping TRT, T was 213 (7/23), restarted.     Voiding ok with mild LUTs AUA SS 9/2, prostate very large as above.       PSA  3/24--2.9, free 26%, HH ok 15/49, T 446  7/23--4.5, free 20%, T 213  5/23--6.7, free 19%    IPSS Questionnaire (AUA-SS):  Over the past month    1)   Incomplete Emptying - How often have you had a sensation of not emptying your bladder completely after you finish urinating?  2 - Less than half the time   2)  Frequency - How often have you had to urinate again less than two hours after you finished urinating? 3 - About half the time   3)  Intermittency - How often have you found you stopped and started again several times when you urinated?  0 - Not at all   4) Urgency - How difficult have you found it to postpone urination?  1 - Less than 1 time in 5   5) Weak Stream - How often have you had a weak urinary stream?  2 - Less than half the time   6) Straining  - How often have you had to push or strain to begin urination?  0 - Not at all   7) Nocturia - How many times did you most typically get up to urinate from the time you went to bed until the time you got up in the morning?  1 - 1 time   Total score:  0-7 mild, 8-19 moderate, 20-35 severe 9   Quality of Life:  2 - Mostly Satisfied        LAST PSA  Lab Results   Component Value Date    PSA 5.9 (H) 01/18/2023    PSA 5.2 (H) 04/07/2021    PSA 2.1 09/10/2018    PSA 1.8 07/05/2017    PSA 2.6 11/23/2015    PSA 7.2 (H) 12/15/2008    PSA 0.6 02/22/2005    PSATOTAL 2.9 03/12/2024    PSATOTAL 4.5 (H) 07/24/2023    PSATOTAL 6.7 (H) 05/01/2023    PSATOTAL 5.8 (H) 12/06/2022    PSATOTAL 6.5 (H) 06/13/2022    PSAFREE 0.76 03/12/2024    PSAFREE 0.92 07/24/2023    PSAFREE 1.32 05/01/2023    PSAFREE 1.58 (H) 12/06/2022    PSAFREE 1.36 06/13/2022       Lab Results   Component Value Date    CREATININE 1.2 12/14/2023       --  PMH/PSH/PFH/Meds/Allergies/Social reviewed as in chart.         Review of Systems   Constitutional:  Negative for activity change, chills and fever.   HENT:  Negative for congestion.    Respiratory:  Negative for cough, chest tightness and shortness of breath.    Cardiovascular:  Negative for chest pain and palpitations.   Gastrointestinal:  Negative for abdominal distention, abdominal pain, nausea and  vomiting.   Genitourinary:  Negative for difficulty urinating, flank pain, hematuria, penile pain, scrotal swelling and testicular pain.   Musculoskeletal:  Negative for gait problem.       Objective:      Physical Exam  Constitutional:       Appearance: Normal appearance.   HENT:      Head: Normocephalic.   Pulmonary:      Effort: Pulmonary effort is normal.      Breath sounds: Normal breath sounds.   Abdominal:      General: Abdomen is flat. Bowel sounds are normal.      Palpations: Abdomen is soft.      Tenderness: There is no abdominal tenderness. There is no right CVA tenderness, left CVA tenderness or guarding.      Comments: Inc c/d/i   Musculoskeletal:         General: Normal range of motion.      Cervical back: Normal range of motion.   Skin:     General: Skin is warm.   Neurological:      Mental Status: He is alert.         No results found for this or any previous visit (from the past 2160 hour(s)).    Path 5/9/22:    Final Pathologic Diagnosis 1. FINAL RESECTION MARGIN OF RIGHT UPPER POLE MASS:   RENAL PARENCHYMA WITH NO NEOPLASIA IDENTIFIED   2. RIGHT UPPER POLE MASS:   INNOCUOUS BENIGN MULTILOCULAR CYST   3. MASS FROM MID RIGHT  KIDNEY :   RENAL CELL CARCINOMA WITH NO INVOLVEMENT OF MARGINS OR PERINEPHRIC ADIPOSE   TISSUE IDENTIFIED    Comment: Interp By Adolph Bonilla M.D., Signed on 05/13/2022 at 09:22   Microscopic Exam 2. This lesion is a benign multilocular cyst.  The cystic spaces have a   single layer of lining at the most.  There is no proliferative lining   identified in this entirely submitted specimen.  No area has formation of a   mass neoplasm.  There is no significant abnormality of renal parenchyma   separate from the cystic lesion.   3.   Procedure :  Partial renal excision   Specimen Laterality :  Right   Tumor Focality :  Unifocal   Tumor Size :  1.2 cm   Histologic Type :  Renal cell carcinoma, clear cell variant   Tumor Extent :  Limited to kidney   Sarcomatoid Features :  Not  identified   Rhabdoid Features :  Not identified   Tumor Necrosis :  Not identified   Margins ; no margin involvement identified   Regional Lymph Node Status :   Not applicable (no regional lymph nodes   submitted or found)   PATHOLOGIC STAGE CLASSIFICATION (pTNM, AJCC 8th Edition) : pT1a pNX pMX   Additional findings :  There is no significant abnormality of renal   parenchyma apart from the masses.      Results for orders placed or performed during the hospital encounter of 06/17/22 (from the past 2160 hour(s))   MRI Prostate W W/O Contrast    Impression    1. Benign prostatic hyperplasia.  2. Red marrow hyperplasia.  Overall Assessment: PI-RADS 2 - Low (clinically significant cancer is unlikely to be present)    Number of targets created for potential MR/US fusion biopsy    Peripheral zone: 0    Transition zone: 0      Electronically signed by: Jersey Hall MD  Date:    06/19/2022  Time:    09:47                         Assessment:     Problem Noted   History of Renal Cell Cancer 3/31/2022    4 cm complex cystic lesion to posterior right upper pole, 1.1 cm right anterior solid mass on CT w/wo contrast 3/2022.  --MRI 4/22: right 3.6 cm UP cystic lesion ? Cystic RCC, right midpole 12mm solid mass  --Preop Cr 1, GFR >60  --S/p right retroperitioneal partial 5/9/22 (both masses removed)  --Path: upper pole lesion was cyst, midpole 1.2 cm [id number] CC RCC, negative margins  KATIE    Post op imaging:  CT/CXR 12/22: lower pulm nodules up to 5mm on CT but not on CXR, no recurrence.    CT 4/23: stable pulm nodules, no recurrence    Post op labs  6/22--Cr 1.2, GFR >60  12/22--Cr 1.2, GFR >60  4/23--1.1, GFR >60     Elevated Psa 4/14/2022    MRI prostate 6/22: volume 122, no lesions, PIRADS 2   On TRT, stopped 5/2022   Restarted 7/2023 due to fatigue, T 213         Erectile Dysfunction Due to Arterial Insufficiency 7/19/2012    Trimix 30/2/20      Low Testosterone 1/18/2023    Reviewed risk of elevated PSA, prostate cancer  and testosterone replacement therapy.  Testosterone refilled 01/2023, stopped 5/2023 after T normal 1/23 when off TRT    Restarted 7/2023 due to fatigue, T 213     Urinary Tract Infection Without Hematuria (Resolved) 3/31/2022       Plan:     1.  To have CT CAP with oncology next month, will follow up results..   2.  Refill TRT, discussed implications with h/o elevated PSA  3.  Minimal LUTs on tamsulosin, will check labs in 3 months after TRT      Arvind Álvarez MD

## 2024-03-25 DIAGNOSIS — Z87.438 HISTORY OF BENIGN PROSTATIC HYPERPLASIA: ICD-10-CM

## 2024-03-25 RX ORDER — TAMSULOSIN HYDROCHLORIDE 0.4 MG/1
CAPSULE ORAL
Qty: 90 CAPSULE | Refills: 11 | Status: SHIPPED | OUTPATIENT
Start: 2024-03-25

## 2024-04-02 ENCOUNTER — OFFICE VISIT (OUTPATIENT)
Dept: INTERNAL MEDICINE | Facility: CLINIC | Age: 69
End: 2024-04-02
Payer: MEDICARE

## 2024-04-02 VITALS
HEART RATE: 84 BPM | HEIGHT: 74 IN | WEIGHT: 246.69 LBS | OXYGEN SATURATION: 97 % | BODY MASS INDEX: 31.66 KG/M2 | DIASTOLIC BLOOD PRESSURE: 76 MMHG | SYSTOLIC BLOOD PRESSURE: 116 MMHG

## 2024-04-02 DIAGNOSIS — R73.03 PREDIABETES: ICD-10-CM

## 2024-04-02 DIAGNOSIS — J30.1 SEASONAL ALLERGIC RHINITIS DUE TO POLLEN: ICD-10-CM

## 2024-04-02 DIAGNOSIS — R79.9 ABNORMAL FINDING OF BLOOD CHEMISTRY, UNSPECIFIED: ICD-10-CM

## 2024-04-02 DIAGNOSIS — Z00.00 VISIT FOR ANNUAL HEALTH EXAMINATION: Primary | ICD-10-CM

## 2024-04-02 DIAGNOSIS — Z13.220 LIPID SCREENING: ICD-10-CM

## 2024-04-02 DIAGNOSIS — Z85.528 HISTORY OF RENAL CELL CANCER: ICD-10-CM

## 2024-04-02 DIAGNOSIS — I10 ESSENTIAL HYPERTENSION: ICD-10-CM

## 2024-04-02 DIAGNOSIS — R97.20 ELEVATED PSA: ICD-10-CM

## 2024-04-02 PROBLEM — I70.0 AORTIC ATHEROSCLEROSIS: Status: RESOLVED | Noted: 2023-04-10 | Resolved: 2024-04-02

## 2024-04-02 PROCEDURE — 99999 PR PBB SHADOW E&M-EST. PATIENT-LVL IV: CPT | Mod: PBBFAC,,, | Performed by: INTERNAL MEDICINE

## 2024-04-02 PROCEDURE — 3078F DIAST BP <80 MM HG: CPT | Mod: CPTII,S$GLB,, | Performed by: INTERNAL MEDICINE

## 2024-04-02 PROCEDURE — 3288F FALL RISK ASSESSMENT DOCD: CPT | Mod: CPTII,S$GLB,, | Performed by: INTERNAL MEDICINE

## 2024-04-02 PROCEDURE — 3008F BODY MASS INDEX DOCD: CPT | Mod: CPTII,S$GLB,, | Performed by: INTERNAL MEDICINE

## 2024-04-02 PROCEDURE — 3074F SYST BP LT 130 MM HG: CPT | Mod: CPTII,S$GLB,, | Performed by: INTERNAL MEDICINE

## 2024-04-02 PROCEDURE — 1101F PT FALLS ASSESS-DOCD LE1/YR: CPT | Mod: CPTII,S$GLB,, | Performed by: INTERNAL MEDICINE

## 2024-04-02 PROCEDURE — 1159F MED LIST DOCD IN RCRD: CPT | Mod: CPTII,S$GLB,, | Performed by: INTERNAL MEDICINE

## 2024-04-02 PROCEDURE — 1126F AMNT PAIN NOTED NONE PRSNT: CPT | Mod: CPTII,S$GLB,, | Performed by: INTERNAL MEDICINE

## 2024-04-02 PROCEDURE — 99397 PER PM REEVAL EST PAT 65+ YR: CPT | Mod: S$GLB,,, | Performed by: INTERNAL MEDICINE

## 2024-04-02 PROCEDURE — 1160F RVW MEDS BY RX/DR IN RCRD: CPT | Mod: CPTII,S$GLB,, | Performed by: INTERNAL MEDICINE

## 2024-04-02 PROCEDURE — 4010F ACE/ARB THERAPY RXD/TAKEN: CPT | Mod: CPTII,S$GLB,, | Performed by: INTERNAL MEDICINE

## 2024-04-02 RX ORDER — LOSARTAN POTASSIUM 100 MG/1
100 TABLET ORAL DAILY
Qty: 90 TABLET | Refills: 3 | Status: SHIPPED | OUTPATIENT
Start: 2024-04-02

## 2024-04-02 RX ORDER — AMLODIPINE BESYLATE 10 MG/1
10 TABLET ORAL EVERY MORNING
Qty: 90 TABLET | Refills: 3 | Status: SHIPPED | OUTPATIENT
Start: 2024-04-02

## 2024-04-02 RX ORDER — MONTELUKAST SODIUM 10 MG/1
10 TABLET ORAL NIGHTLY
Qty: 90 TABLET | Refills: 3 | Status: SHIPPED | OUTPATIENT
Start: 2024-04-02 | End: 2024-04-14

## 2024-04-02 NOTE — PROGRESS NOTES
INTERNAL MEDICINE ESTABLISHED PATIENT VISIT NOTE    Subjective:     Chief Complaint: Annual Exam       Patient ID: Adrian Burt is a 69 y.o. male with  HTN, preDM, BPH, elevated PSA followed by janny Barclay nodules too small to characterize (too small to biopsy, followed by Dr. Chávez), renal CA s/p R partial nephrectomy 5/2022, GERD, last seen by me in October, here today for annual exam.    Still followed by Dr. Chávez and urology for hx renal CA.  PSA and testosterone levels in March wnl.    Feeling well overall.  Taking all meds as rx'ed.    Past Medical History:  Past Medical History:   Diagnosis Date    Allergy     Colon polyp     Fatty liver     GERD (gastroesophageal reflux disease)     Hypertension        Home Medications:  Prior to Admission medications    Medication Sig Start Date End Date Taking? Authorizing Provider   amLODIPine (NORVASC) 10 MG tablet Take 1 tablet (10 mg total) by mouth every morning. 4/10/23  Yes Mone Brown MD   azelastine (ASTELIN) 137 mcg (0.1 %) nasal spray 1 spray (137 mcg total) by Nasal route 2 (two) times daily. 4/10/23 4/9/24 Yes Mone Brown MD   ferrous gluconate (FERGON) 324 MG tablet TAKE 1 TABLET BY MOUTH DAILY WITH BREAKFAST 10/21/22  Yes Ramon Chavira MD   fluticasone propionate (FLONASE) 50 mcg/actuation nasal spray USE 1 SPRAY INTO EACH NOSTRIL EVERY DAY 3/11/24  Yes Mone Brown MD   hydroCHLOROthiazide (HYDRODIURIL) 12.5 MG Tab Take 1 tablet (12.5 mg total) by mouth once daily. 3/21/24  Yes Mavis Jeffries NP   losartan (COZAAR) 100 MG tablet Take 1 tablet (100 mg total) by mouth once daily. 6/30/23  Yes Alysha Bowman MD   montelukast (SINGULAIR) 10 mg tablet Take 1 tablet (10 mg total) by mouth every evening. 4/10/23  Yes Mone Brown MD   multivit with minerals/lutein (MULTIVITAMIN 50 PLUS ORAL) Take 1 tablet by mouth once daily.   Yes Provider, Historical   pep injection Inject 0.2 ml as directed (20 units).      For compounding  pharmacy use:   Add PAPAVERINE 30 mcg  Add PHENTOLAMINE 2 mg  Add ALPROSTADIL 30 mcg 23  Yes Arvind Álvarez MD   sildenafiL (VIAGRA) 100 MG tablet TAKE 1 TABLET BY MOUTH 1 HOUR PRIOR TP SEXUAL ACTIVITY. 23  Yes Provider, Mackenzie   tadalafiL (CIALIS) 20 MG Tab Take 1 tablet (20 mg total) by mouth daily as needed (1 hour prior to sexual activity). 23 Yes Arvind Álvarez MD   tamsulosin (FLOMAX) 0.4 mg Cap TAKE ONE CAPSULE BY MOUTH DAILY AT 9 AM 3/25/24  Yes Arvind Álvarez MD   testosterone cypionate (DEPOTESTOTERONE CYPIONATE) 200 mg/mL injection Inject 1 mL (200 mg total) into the muscle every 14 (fourteen) days. 3/21/24 3/21/25 Yes Arvind Álvarez MD       Allergies:  Review of patient's allergies indicates:   Allergen Reactions    Tessalon [benzonatate] Other (See Comments)     States lips felt dry and swollen       Social History:  Social History     Tobacco Use    Smoking status: Former     Current packs/day: 0.00     Types: Cigarettes     Quit date: 11/15/1978     Years since quittin.4    Smokeless tobacco: Never    Tobacco comments:     smoked for 10 years, quit in 76   Substance Use Topics    Alcohol use: No    Drug use: No        Review of Systems   Constitutional:  Negative for appetite change, chills, fatigue, fever and unexpected weight change.   HENT:  Negative for congestion, hearing loss and rhinorrhea.    Eyes:  Negative for pain and visual disturbance.   Respiratory:  Negative for cough, chest tightness, shortness of breath and wheezing.    Cardiovascular:  Negative for chest pain, palpitations and leg swelling.   Gastrointestinal:  Negative for abdominal distention and abdominal pain.   Endocrine: Negative for polydipsia and polyuria.   Genitourinary:  Negative for decreased urine volume, dysuria, frequency and penile discharge.   Neurological:  Negative for dizziness, weakness, numbness and headaches.   Psychiatric/Behavioral:  Negative for  "behavioral problems and confusion.          Health Maintenance:     Immunizations:   Influenza 9/21/23  TDap 9/2018  Prevnar 20 4/2023, pvax done 4/2021  Shingrix 3/2020, 8/2020  COVID vaccine Moderna completed 2/2021, 3/24/2021, 12/2021, 4/2023, 10/2023  RSV today     Cancer Screening:  Colonoscopy:  9/25/23 c Dr. Henriquez, four polyps removed, path c/w tubular adenoma, told f/u in 5 yrs  PSA 3/2024 WNL  AAA screening neg 9/2018    Objective:   /76 (BP Location: Right arm, Patient Position: Sitting, BP Method: Large (Manual))   Pulse 84   Ht 6' 2" (1.88 m)   Wt 111.9 kg (246 lb 11.1 oz)   SpO2 97%   BMI 31.67 kg/m²        General: AAO x3, no apparent distress  CV: RRR, no m/r/g  Pulm: Lungs CTAB, no crackles, no wheezes  Abd: s/NT/ND +BS  Extremities: no c/c/e    Labs:     Lab Results   Component Value Date    WBC 4.38 03/12/2024    HGB 15.4 03/12/2024    HCT 49.0 03/12/2024    MCV 89 03/12/2024     03/12/2024     Sodium   Date Value Ref Range Status   12/14/2023 138 136 - 145 mmol/L Final     Potassium   Date Value Ref Range Status   12/14/2023 4.4 3.5 - 5.1 mmol/L Final     Chloride   Date Value Ref Range Status   12/14/2023 105 95 - 110 mmol/L Final     CO2   Date Value Ref Range Status   12/14/2023 26 23 - 29 mmol/L Final     Glucose   Date Value Ref Range Status   12/14/2023 98 70 - 110 mg/dL Final     BUN   Date Value Ref Range Status   12/14/2023 14 8 - 23 mg/dL Final     Creatinine   Date Value Ref Range Status   12/14/2023 1.2 0.5 - 1.4 mg/dL Final     Calcium   Date Value Ref Range Status   12/14/2023 9.6 8.7 - 10.5 mg/dL Final     Total Protein   Date Value Ref Range Status   12/14/2023 7.6 6.0 - 8.4 g/dL Final     Albumin   Date Value Ref Range Status   12/14/2023 3.7 3.5 - 5.2 g/dL Final     Total Bilirubin   Date Value Ref Range Status   12/14/2023 0.6 0.1 - 1.0 mg/dL Final     Comment:     For infants and newborns, interpretation of results should be based  on gestational age, weight " and in agreement with clinical  observations.    Premature Infant recommended reference ranges:  Up to 24 hours.............<8.0 mg/dL  Up to 48 hours............<12.0 mg/dL  3-5 days..................<15.0 mg/dL  6-29 days.................<15.0 mg/dL       Alkaline Phosphatase   Date Value Ref Range Status   12/14/2023 57 55 - 135 U/L Final     AST   Date Value Ref Range Status   12/14/2023 33 10 - 40 U/L Final     ALT   Date Value Ref Range Status   12/14/2023 32 10 - 44 U/L Final     Anion Gap   Date Value Ref Range Status   12/14/2023 7 (L) 8 - 16 mmol/L Final     eGFR if    Date Value Ref Range Status   06/13/2022 >60 >60 mL/min/1.73 m^2 Final     eGFR if non    Date Value Ref Range Status   06/13/2022 >60 >60 mL/min/1.73 m^2 Final     Comment:     Calculation used to obtain the estimated glomerular filtration  rate (eGFR) is the CKD-EPI equation.        Lab Results   Component Value Date    HGBA1C 5.2 10/02/2023     Lab Results   Component Value Date    LDLCALC 106.2 04/10/2023     Lab Results   Component Value Date    TSH 2.551 08/17/2023         Assessment/Plan     Adrian was seen today for annual exam.    Diagnoses and all orders for this visit:    Visit for annual health examination  History and physical exam completed.  Health maintenance reviewed as above.  -     Hemoglobin A1C; Future  -     Lipid Panel; Future    Essential hypertension  at goal.  Cont current medications.  -     amLODIPine (NORVASC) 10 MG tablet; Take 1 tablet (10 mg total) by mouth every morning.  -     losartan (COZAAR) 100 MG tablet; Take 1 tablet (100 mg total) by mouth once daily.    Prediabetes  Lab Results   Component Value Date    HGBA1C 5.2 10/02/2023     Normalized on last check  Cont lifestyle modifications.  -     Hemoglobin A1C; Future  -     Lipid Panel; Future    History of renal cell cancer  Elevated PSA  As per HPI  Cont routine f/u c Dr. Chávez and Urology    Seasonal allergic rhinitis  due to pollen  Stable on current regimen  -     montelukast (SINGULAIR) 10 mg tablet; Take 1 tablet (10 mg total) by mouth every evening.    Abnormal finding of blood chemistry, unspecified  -     Lipid Panel; Future    Lipid screening  -     Lipid Panel; Future      HM as above  RTC in 6 mos, sooner if needed.    Mone Brown MD  Department of Internal Medicine - [facility]  04/02/2024

## 2024-04-14 DIAGNOSIS — J30.1 SEASONAL ALLERGIC RHINITIS DUE TO POLLEN: ICD-10-CM

## 2024-04-14 RX ORDER — MONTELUKAST SODIUM 10 MG/1
TABLET ORAL
Qty: 90 TABLET | Refills: 3 | Status: SHIPPED | OUTPATIENT
Start: 2024-04-14

## 2024-04-14 NOTE — TELEPHONE ENCOUNTER
No care due was identified.  Health [facility] Embedded Care Due Messages. Reference number: [id number].   4/14/2024 12:06:38 PM CDT

## 2024-04-15 NOTE — TELEPHONE ENCOUNTER
Adrian Burt  is requesting a refill authorization.  Brief Assessment and Rationale for Refill:  Approve     Medication Therapy Plan:         Comments:     Note composed:10:07 PM 04/14/2024

## 2024-04-21 DIAGNOSIS — J30.1 SEASONAL ALLERGIC RHINITIS DUE TO POLLEN: ICD-10-CM

## 2024-04-21 NOTE — TELEPHONE ENCOUNTER
No care due was identified.  [facility] Embedded Care Due Messages. Reference number: [id number].   4/21/2024 4:26:22 AM CDT

## 2024-04-22 ENCOUNTER — HOSPITAL ENCOUNTER (OUTPATIENT)
Dept: RADIOLOGY | Facility: HOSPITAL | Age: 69
Discharge: HOME | End: 2024-04-22
Attending: INTERNAL MEDICINE
Payer: MEDICARE

## 2024-04-22 DIAGNOSIS — Z85.528 HISTORY OF RENAL CELL CANCER: ICD-10-CM

## 2024-04-22 DIAGNOSIS — R91.8 PULMONARY NODULES: ICD-10-CM

## 2024-04-22 LAB
CREAT SERPL-MCNC: 1.4 MG/DL (ref 0.5–1.4)
SAMPLE: NORMAL

## 2024-04-22 PROCEDURE — 74177 CT ABD & PELVIS W/CONTRAST: CPT | Mod: TC

## 2024-04-22 PROCEDURE — A9698 NON-RAD CONTRAST MATERIALNOC: HCPCS | Performed by: INTERNAL MEDICINE

## 2024-04-22 PROCEDURE — 71260 CT THORAX DX C+: CPT | Mod: 26,,, | Performed by: STUDENT IN AN ORGANIZED HEALTH CARE EDUCATION/TRAINING PROGRAM

## 2024-04-22 PROCEDURE — 74177 CT ABD & PELVIS W/CONTRAST: CPT | Mod: 26,,, | Performed by: STUDENT IN AN ORGANIZED HEALTH CARE EDUCATION/TRAINING PROGRAM

## 2024-04-22 PROCEDURE — 25500020 PHARM REV CODE 255: Performed by: INTERNAL MEDICINE

## 2024-04-22 RX ORDER — AZELASTINE 1 MG/ML
SPRAY, METERED NASAL
Qty: 90 ML | Refills: 3 | Status: SHIPPED | OUTPATIENT
Start: 2024-04-22 | End: 2024-05-30 | Stop reason: SDUPTHER

## 2024-04-22 RX ADMIN — BARIUM SULFATE 450 ML: 20 SUSPENSION ORAL at 10:04

## 2024-04-22 RX ADMIN — IOHEXOL 100 ML: 350 INJECTION, SOLUTION INTRAVENOUS at 10:04

## 2024-04-22 NOTE — TELEPHONE ENCOUNTER
Refill Decision Note   Adrian Javed  is requesting a refill authorization.  Brief Assessment and Rationale for Refill:        Medication Therapy Plan:             Comments:     Note composed:10:31 AM 04/22/2024

## 2024-04-23 ENCOUNTER — OFFICE VISIT (OUTPATIENT)
Dept: HEMATOLOGY/ONCOLOGY | Facility: CLINIC | Age: 69
End: 2024-04-23
Payer: MEDICARE

## 2024-04-23 VITALS
OXYGEN SATURATION: 98 % | HEART RATE: 95 BPM | DIASTOLIC BLOOD PRESSURE: 76 MMHG | HEIGHT: 74 IN | RESPIRATION RATE: 17 BRPM | BODY MASS INDEX: 32.53 KG/M2 | SYSTOLIC BLOOD PRESSURE: 144 MMHG | WEIGHT: 253.5 LBS | TEMPERATURE: 98 F

## 2024-04-23 DIAGNOSIS — R91.8 PULMONARY NODULES: ICD-10-CM

## 2024-04-23 DIAGNOSIS — Z85.528 HISTORY OF RENAL CELL CANCER: Primary | ICD-10-CM

## 2024-04-23 PROCEDURE — 4010F ACE/ARB THERAPY RXD/TAKEN: CPT | Mod: CPTII,S$GLB,, | Performed by: INTERNAL MEDICINE

## 2024-04-23 PROCEDURE — 99213 OFFICE O/P EST LOW 20 MIN: CPT | Mod: S$GLB,,, | Performed by: INTERNAL MEDICINE

## 2024-04-23 PROCEDURE — 1101F PT FALLS ASSESS-DOCD LE1/YR: CPT | Mod: CPTII,S$GLB,, | Performed by: INTERNAL MEDICINE

## 2024-04-23 PROCEDURE — 3008F BODY MASS INDEX DOCD: CPT | Mod: CPTII,S$GLB,, | Performed by: INTERNAL MEDICINE

## 2024-04-23 PROCEDURE — 3288F FALL RISK ASSESSMENT DOCD: CPT | Mod: CPTII,S$GLB,, | Performed by: INTERNAL MEDICINE

## 2024-04-23 PROCEDURE — 3078F DIAST BP <80 MM HG: CPT | Mod: CPTII,S$GLB,, | Performed by: INTERNAL MEDICINE

## 2024-04-23 PROCEDURE — 1126F AMNT PAIN NOTED NONE PRSNT: CPT | Mod: CPTII,S$GLB,, | Performed by: INTERNAL MEDICINE

## 2024-04-23 PROCEDURE — 1160F RVW MEDS BY RX/DR IN RCRD: CPT | Mod: CPTII,S$GLB,, | Performed by: INTERNAL MEDICINE

## 2024-04-23 PROCEDURE — 99999 PR PBB SHADOW E&M-EST. PATIENT-LVL IV: CPT | Mod: PBBFAC,,, | Performed by: INTERNAL MEDICINE

## 2024-04-23 PROCEDURE — 1159F MED LIST DOCD IN RCRD: CPT | Mod: CPTII,S$GLB,, | Performed by: INTERNAL MEDICINE

## 2024-04-23 PROCEDURE — 3077F SYST BP >= 140 MM HG: CPT | Mod: CPTII,S$GLB,, | Performed by: INTERNAL MEDICINE

## 2024-04-23 NOTE — PROGRESS NOTES
Subjective     Patient ID: Adrian Burt is a 69 y.o. male.    Chief Complaint: History of renal cell cancer    HPI    Returns for follow up and surveillance scans  Diagnosis: pulmonary nodules, Right RCC pT1a pNX pMX      - 4/22/2024 CT C/A/P:  FINDINGS:  Base of neck and thoracic soft tissues are unremarkable.  Thoracic aorta is normal caliber.  Heart is normal size.  No pericardial effusion.  Minimal calcification of the coronary arteries.  Focal calcification of the aortic valve.  No pathologically enlarged thoracic lymph nodes.  Few stable bilateral subcentimeter pulmonary nodules, measuring up to 0.5 cm in the left lower lobe (series 7, image 303).  No new or enlarging nodule.  No consolidation, pleural effusion, or pneumothorax.  No focal hepatic lesion.  Gallbladder and bile ducts are unremarkable.  Spleen, adrenal glands, and pancreas are unremarkable.  Postoperative changes of partial right nephrectomies.  No evidence of local disease recurrence.  No hydronephrosis or ureteral dilatation.  Bladder is unremarkable.  Prostate is enlarged.  Stomach is unremarkable.  Colonic diverticulosis.  No bowel obstruction or inflammation.  No free intraperitoneal fluid or air.  No pathologically enlarged abdominopelvic lymph nodes.  No aneurysm.  Mild calcific atherosclerosis.  Degenerative changes of the spine.  No acute fracture or aggressive bone lesion.  Impression:  1. Patient with renal cell carcinoma status post partial right nephrectomies.  No evidence of local disease recurrence or metastasis.  2. Few stable subcentimeter pulmonary nodules.  3. Prostatomegaly.  4. Additional findings as above.    Reports doing well in the interval:  Denies fatigue  Weight stable and active  Denies pain  No urinary symptoms     Oncology History:  - renal mass discovered incidentally; he had a fleeting pain that resolved but he mentioned to his PCP several months later leading to below imaging     - 3/24/2022 Renal  ultrasound:  FINDINGS:  Right kidney: The right kidney measures 13.5 cm. No cortical thinning. No loss of corticomedullary distinction. Resistive index measures 0.61.  Heterogeneous lesion measuring 2.4 x 2.3 x 2.7 cm in the upper pole.  No internal Doppler signal.  1.5 x 1.6 x 1.5 cm hyperechoic lesion in the midpole.  Subcentimeter simple cyst in the lower pole.  No renal stone. No hydronephrosis.  Left kidney: The left kidney measures 12.9 cm. No cortical thinning. No loss of corticomedullary distinction. Resistive index measures 0.49.  No mass. No renal stone. No hydronephrosis.  Spleen resistive index measures 0.54.  The bladder is partially distended at the time of scanning and has an unremarkable appearance.  Prostate is enlarged measuring 6.8 x 5.4 x 5.8 cm (previously 5.1 x 4.4 x 4.5 cm).  Impression:  1. 2.7 cm heterogeneous lesion in the upper pole right kidney, which could represent a complex cyst or neoplasm.  2. 1.6 cm hyperechoic lesion in the midpole right kidney, which could represent an angiomyolipoma or other fat containing lesion such as renal cell carcinoma.  3.  No hydronephrosis or shadowing calculus.  4. Prostatomegaly.  RECOMMENDATIONS:  Renal mass protocol CT or MRI for further evaluation of right renal lesions.     - 3/28/2022 CT Abd/Pelvis:  FINDINGS:  Heart: Normal in size. No pericardial effusion.  Lung Bases: Well aerated, without consolidation or pleural fluid.  Liver: Normal in size and attenuation, with no focal hepatic lesions.  Gallbladder: No calcified gallstones.  Bile Ducts: No evidence of dilated ducts.  Pancreas: No mass or peripancreatic fat stranding.  Spleen: Unremarkable.  Adrenals: Unremarkable.  Kidneys/ Ureters: There is a 3 cm complex hypodense lobulated lesion with multiple septations arising from the upper pole of the right kidney.  Differential considerations would include complex cyst versus cystic neoplasm.  In the interpolar aspect there is a solid mass with  enhancement measuring 12 mm roughly corresponding to the echogenic lesion seen on the ultrasound.  No definite macroscopic fat attenuation is seen on the noncontrast series and is not able to be delineated from adjacent renal parenchyma on the noncontrast study.  No stones, solid mass, or hydronephrosis on the left.  Bladder: No evidence of wall thickening.  Reproductive organs: Prostate markedly enlarged with heterogeneous enhancement pattern.  GI Tract/Mesentery: No evidence of bowel obstruction or inflammation.  Peritoneal Space: No ascites. No free air.  Retroperitoneum: No significant adenopathy.  Abdominal wall: Unremarkable.  Vasculature: No significant atherosclerosis or aneurysm.  Bones: No acute fracture.  Impression:  12 mm right renal solid mass concerning for malignancy until proven otherwise.  Further evaluation as warranted.  3 cm complex lesion upper pole right kidney at minimum warrants sonographic surveillance 6 months.     - 4/12/2022 MRI Abdomen:  FINDINGS:  Pulmonary Bases: No pleural effusion  Liver: normal.  Bile Ducts: normal  Gallbladder: normal  Pancreas: normal.  Spleen: normal.  Adrenal Glands: normal.  Proximal Gastrointestinal tract/stomach: Normal.  Kidneys: Septated hyperintense T2 lesion upper pole right kidney 3.6 x 3.2 x 2.7 cm.  Subtle enhancement of the septations noted on postcontrast images, renal cell cystic carcinoma cannot be excluded.  There is a very small lesion at the midpole of the right kidney, measuring approximately 12 mm demonstrating postcontrast enhancement concerning for renal cell carcinoma, it measures 1.4 cm series 1402, image 67  Aorta and Abdominal Vasculature: normal.  Mesentery: normal  Lymph nodes: no pathologically enlarged lymph nodes are seen.  Body wall: normal  Osseous structures: No lesion seen  Other: No free fluid/ascites.  Impression:  Predominantly cystic lesion at the upper pole of the right kidney with subtly enhancing septations, a cystic  renal cell carcinoma cannot be excluded.  Subtle small intrarenal lesion at the midpole of the right kidney demonstrating postcontrast enhancement, a solid renal cell carcinoma cannot be excluded.  This report was flagged in Epic as abnormal.     - 5/9/2022 Surgical nephrectomy:  1.  Retroperitoneal robotic assisted laparoscopic right partial nephrectomy (modifier 22 for complex anatomy, 2 masses, >180% expected time)  2.  Intraoperative ultrasound with interpretation.  1. FINAL RESECTION MARGIN OF RIGHT UPPER POLE MASS:   RENAL PARENCHYMA WITH NO NEOPLASIA IDENTIFIED   2. RIGHT UPPER POLE MASS:   INNOCUOUS BENIGN MULTILOCULAR CYST   3. MASS FROM MID RIGHT  KIDNEY :   RENAL CELL CARCINOMA WITH NO INVOLVEMENT OF MARGINS OR PERINEPHRIC ADIPOSE   TISSUE IDENTIFIED   2. This lesion is a benign multilocular cyst.  The cystic spaces have a   single layer of lining at the most.  There is no proliferative lining   identified in this entirely submitted specimen.  No area has formation of a   mass neoplasm.  There is no significant abnormality of renal parenchyma   separate from the cystic lesion.   Procedure :  Partial renal excision   Specimen Laterality :  Right   Tumor Focality :  Unifocal   Tumor Size :  1.2 cm   Histologic Type :  Renal cell carcinoma, clear cell variant   Tumor Extent :  Limited to kidney   Sarcomatoid Features :  Not identified   Rhabdoid Features :  Not identified   Tumor Necrosis :  Not identified   Margins ; no margin involvement identified   Regional Lymph Node Status :   Not applicable (no regional lymph nodes   submitted or found)   PATHOLOGIC STAGE CLASSIFICATION (pTNM, AJCC 8th Edition) : pT1a pNX pMX   Additional findings :  There is no significant abnormality of renal   parenchyma apart from the masses.     Additional imaging:  - 6/17/2022 MRI prostate:  FINDINGS:  Previous biopsy: None.  PSA: 6.5 ng/mL on 06/15/2022  Prior therapy: None.  Prostate: 6.2 x 6.0 x 6.4 cm corresponding to a  computed volume of 122 cc.  Peripheral zone: No focal abnormalities with imaging features concerning for prostate cancer, score 1.  Transitional zone: Benign prostatic hyperplasia without focal suspicious abnormality, score 2.  Neurovascular bundle: Normal appearance.  Seminal vesicles: Normal appearance.  Adjacent Organ Involvement: No evidence for urinary bladder or rectal invasion.  Lymphadenopathy: None.  Other Findings: Diffuse marrow signal heterogeneity in keeping with red marrow hyperplasia.  Impression:  1. Benign prostatic hyperplasia.  2. Red marrow hyperplasia.  Overall Assessment: PI-RADS 2 - Low (clinically significant cancer is unlikely to be present)  Number of targets created for potential MR/US fusion biopsy  Peripheral zone: 0  Transition zone: 0     Recently had surveillance scans for RCC performed- results as below:  2023 CT Chest:  FINDINGS:  No intravenous contrast was administered for this examination.  Therefore, it may have diminished sensitivity for detection of certain abnormalities.  Thyroid gland: Within normal limits.  Trachea: Within normal limits.  Esophagus: Mildly patulous.  Cardiovascular: Normal heart size.No pericardial effusion.Coarse calcifications in the region of the aortic valve leaflets, correlate with aortic valve function.  There is mild calcific disease of the coronary arteries.  Lymph nodes: None abnormally enlarged.  Lungs/pleura/airways:  Mild apical scarring.  There are several left lung nodules which include the followin mm in lingula (1988) 4 mm in the left lower lobe adjacent to the diaphragm (4-405, and 2 additional 5 mm left lower lobe nodules (4-318, 380). In addition, there is a 5 mm right lower lobe ground-glass nodule (4-299.  The latter is out of the field of view on the prior examination.  Upper abdomen:  Partially imaged.  Status post partial right nephrectomy.  No additional new significant disease.  Bones: Unremarkable for stated  age.  Other: N/A  Impression:  1. There are several bilateral subcentimeter pulmonary nodules (measuring 5 mm and less) as described above.  At this time, these are indeterminate whether related to malignancy versus infection.  Attention on short-term follow-up imaging highly recommended.  2. Postsurgical changes related to right partial nephrectomy.  Unremarkable appearance of surgical bed.  3.  Other findings are as above.     - 12/13/2022 CT Abd/Pelvis:  FINDINGS:  Heart: No cardiomegaly or pericardial effusion.  Atherosclerosis of the aortic annulus.  Lung Bases: 0.5 cm solid nodule in the anteromedial basal segment of the left lower lobe (axial series 3, image 11).  Additional 0.4 cm pleural based nodule in the superior lingula (axial series 3, image 5).  Bilateral dependent atelectasis.  Liver: Hepatomegaly.  Punctate hypodensity in hepatic segment 4a, too small to characterize but stable from prior exam.  No new lesions identified.  Gallbladder: No calcified gallstones.  Bile Ducts: No dilatation.  Pancreas: No mass. No peripancreatic fat stranding.  Spleen: Unremarkable  Adrenals: Unremarkable  Kidneys/Ureters: Interval postsurgical changes of right partial nephrectomy.  There is mild soft tissue thickening in both surgical beds, likely scarring/fibrosis.  No evidence of recurrent lesion.  Subcentimeter hypodensity in the upper pole of the right kidney, too small to characterize.  Left kidney is unremarkable.  No nephrolithiasis or hydronephrosis.  Bilateral ureters are normal in course and caliber.  Bladder: No wall thickening.  Reproductive organs: Prostatomegaly with mass effect on the posterior aspect of the bladder.  GI Tract/Mesentery: No evidence of bowel obstruction or inflammation.  Peritoneal Space: No ascites or free air.  Retroperitoneum: No significant adenopathy.  Abdominal wall: Unremarkable  Vasculature: No aneurysm.  Mild atherosclerosis of the descending aorta and its branches.  Bones:  Multilevel degenerative change, similar to prior exams.  No acute fracture. No suspicious lytic or sclerotic lesions.  Impression:  Interval postsurgical changes of right partial nephrectomy x2.  No evidence of residual or recurrent lesion in the surgical beds.  Pulmonary nodules in the left lung, that were out of field of view on prior imaging.  Recommend attention on follow-up.  Comparison to any prior CT chest would be beneficial.  Prostatomegaly.     PMH:  Steel fragment removal from leg  HTN     FH:  Mother  ESRD - unclear reasons at 35 yo  Father  at age 69 yo, heart disease  Siblings - 2 sisters- 1  at age 68 from RCC  1  ESRD, EtOHism  DM  HTN  Maternal grandmother- breast cancer dies at age 59     SH:  Retired, construction work (office)  , 3 kids  Quit tobacco  (1 pack smoker)  Active     Review of Systems   Constitutional:  Negative for activity change, appetite change, fatigue, fever and unexpected weight change.   Respiratory:  Negative for cough, shortness of breath and wheezing.    Cardiovascular:  Negative for chest pain, palpitations and leg swelling.   Gastrointestinal:  Negative for abdominal distention, abdominal pain, blood in stool, change in bowel habit, constipation, diarrhea, nausea, vomiting and reflux.   Genitourinary:  Negative for decreased urine volume, difficulty urinating, frequency and urgency.   Musculoskeletal:  Negative for arthralgias, back pain and joint deformity.   Neurological:  Negative for dizziness, weakness, light-headedness, numbness and headaches.   Hematological:  Negative for adenopathy. Does not bruise/bleed easily.   Psychiatric/Behavioral:  Negative for dysphoric mood. The patient is not nervous/anxious.           Objective     Physical Exam  Vitals and nursing note reviewed.   Constitutional:       General: He is not in acute distress.     Appearance: Normal appearance. He is normal weight. He is not ill-appearing.      Comments:  Presents alone  Very pleasant  ECOG= 0   Eyes:      General: No scleral icterus.     Extraocular Movements: Extraocular movements intact.      Conjunctiva/sclera: Conjunctivae normal.      Pupils: Pupils are equal, round, and reactive to light.   Cardiovascular:      Rate and Rhythm: Normal rate and regular rhythm.      Heart sounds: Normal heart sounds. No murmur heard.     No friction rub. No gallop.   Pulmonary:      Effort: Pulmonary effort is normal. No respiratory distress.      Breath sounds: Normal breath sounds. No wheezing, rhonchi or rales.   Abdominal:      General: Abdomen is flat. Bowel sounds are normal. There is no distension.      Palpations: Abdomen is soft. There is no mass.      Tenderness: There is no abdominal tenderness. There is no guarding or rebound.      Comments: No organomegaly   Musculoskeletal:         General: No swelling. Normal range of motion.      Cervical back: Normal range of motion and neck supple. No rigidity.      Right lower leg: No edema.      Left lower leg: No edema.   Lymphadenopathy:      Cervical: No cervical adenopathy.   Skin:     General: Skin is warm and dry.      Coloration: Skin is not jaundiced or pale.      Findings: No rash.   Neurological:      General: No focal deficit present.      Mental Status: He is alert and oriented to person, place, and time. Mental status is at baseline.      Sensory: No sensory deficit.      Motor: No weakness.      Coordination: Coordination normal.      Gait: Gait normal.   Psychiatric:         Mood and Affect: Mood normal.         Behavior: Behavior normal.         Thought Content: Thought content normal.         Judgment: Judgment normal.   Labs- reviewed  Imaging- reviewed       Assessment and Plan     1. History of renal cell cancer  Overview:  4 cm complex cystic lesion to posterior right upper pole, 1.1 cm right anterior solid mass on CT w/wo contrast 3/2022.  --MRI 4/22: right 3.6 cm UP cystic lesion ? Cystic RCC, right  midpole 12mm solid mass  --Preop Cr 1, GFR >60  --S/p right retroperitioneal partial 5/9/22 (both masses removed)  --Path: upper pole lesion was cyst, midpole 1.2 cm [id number] CC RCC, negative margins  KATIE    Post op imaging:  CT/CXR 12/22: lower pulm nodules up to 5mm on CT but not on CXR, no recurrence.    CT 4/23: stable pulm nodules, no recurrence    Post op labs  6/22--Cr 1.2, GFR >60  12/22--Cr 1.2, GFR >60  4/23--1.1, GFR >60      2. Pulmonary nodules      Right sided RCC  Pulmonary nodules noted on imaging- indeterminate-too small to biopsy or further characterize with other imaging modalities- surveillance imaging stable  Repeat again in 6 months  Knows to call for any issues in the interval       Route Chart for Scheduling    Med Onc Chart Routing      Follow up with physician 6 months. cbc, cmp and EP and scans prior   Follow up with [facility]    Infusion scheduling note    Injection scheduling note    Labs    Imaging    Pharmacy appointment    Other referrals

## 2024-05-09 DIAGNOSIS — I10 ESSENTIAL HYPERTENSION: ICD-10-CM

## 2024-05-09 RX ORDER — HYDROCHLOROTHIAZIDE 12.5 MG/1
TABLET ORAL
Qty: 90 TABLET | Refills: 3 | Status: SHIPPED | OUTPATIENT
Start: 2024-05-09

## 2024-05-09 NOTE — TELEPHONE ENCOUNTER
Refill Routing Note   Medication(s) are not appropriate for processing by [facility] for the following reason(s):        Required vitals abnormal    [facility] action(s):  Defer             Appointments  past 12m or future 3m with PCP    Date Provider   Last Visit   4/2/2024 Mone Brown MD   Next Visit   10/7/2024 Mone Brown MD   ED visits in past 90 days: 0        Note composed:1:25 PM 05/09/2024

## 2024-05-09 NOTE — TELEPHONE ENCOUNTER
No care due was identified.  [facility] Embedded Care Due Messages. Reference number: [id number].   5/09/2024 9:33:46 AM CDT

## 2024-05-29 ENCOUNTER — PATIENT MESSAGE (OUTPATIENT)
Dept: OPTOMETRY | Facility: CLINIC | Age: 69
End: 2024-05-29
Payer: MEDICARE

## 2024-05-30 DIAGNOSIS — J30.1 SEASONAL ALLERGIC RHINITIS DUE TO POLLEN: ICD-10-CM

## 2024-05-30 RX ORDER — AZELASTINE 1 MG/ML
1 SPRAY, METERED NASAL 2 TIMES DAILY
Qty: 90 ML | Refills: 3 | Status: SHIPPED | OUTPATIENT
Start: 2024-05-30

## 2024-05-30 NOTE — TELEPHONE ENCOUNTER
No care due was identified.  [facility] Embedded Care Due Messages. Reference number: [id number].   5/30/2024 3:51:15 PM CDT

## 2024-05-30 NOTE — TELEPHONE ENCOUNTER
Refill Decision Note   Adrian Burt  is requesting a refill authorization.  Brief Assessment and Rationale for Refill:  Approve     Medication Therapy Plan:         Comments:     Note composed:4:43 PM 05/30/2024

## 2024-05-30 NOTE — TELEPHONE ENCOUNTER
----- Message from Corine French sent at 5/30/2024  3:41 PM CDT -----  Contact: [facility] [phone]  Requesting an RX refill or new RX.  Is this a refill or new RX:   RX name and strength azelastine (ASTELIN) 137 mcg (0.1 %) nasal spray  Is this a 30 day or 90 day RX:   Pharmacy name and phone #      [facility] ([place] - [address] [address]  Phone: [phone] Fax: [phone]

## 2024-06-10 ENCOUNTER — PATIENT MESSAGE (OUTPATIENT)
Dept: INTERNAL MEDICINE | Facility: CLINIC | Age: 69
End: 2024-06-10
Payer: MEDICARE

## 2024-06-13 ENCOUNTER — TELEPHONE (OUTPATIENT)
Dept: UROLOGY | Facility: CLINIC | Age: 69
End: 2024-06-13
Payer: MEDICARE

## 2024-06-13 ENCOUNTER — LAB VISIT (OUTPATIENT)
Dept: LAB | Facility: HOSPITAL | Age: 69
End: 2024-06-13
Attending: UROLOGY
Payer: MEDICARE

## 2024-06-13 DIAGNOSIS — N40.1 BENIGN PROSTATIC HYPERPLASIA WITH URINARY FREQUENCY: ICD-10-CM

## 2024-06-13 DIAGNOSIS — R79.89 LOW TESTOSTERONE: ICD-10-CM

## 2024-06-13 DIAGNOSIS — R35.0 BENIGN PROSTATIC HYPERPLASIA WITH URINARY FREQUENCY: ICD-10-CM

## 2024-06-13 DIAGNOSIS — N52.01 ERECTILE DYSFUNCTION DUE TO ARTERIAL INSUFFICIENCY: ICD-10-CM

## 2024-06-13 LAB
BASOPHILS # BLD AUTO: 0.02 K/UL (ref 0–0.2)
BASOPHILS NFR BLD: 0.3 % (ref 0–1.9)
DIFFERENTIAL METHOD BLD: ABNORMAL
EOSINOPHIL # BLD AUTO: 0.1 K/UL (ref 0–0.5)
EOSINOPHIL NFR BLD: 1.9 % (ref 0–8)
ERYTHROCYTE [DISTWIDTH] IN BLOOD BY AUTOMATED COUNT: 13.8 % (ref 11.5–14.5)
HCT VFR BLD AUTO: 50.3 % (ref 40–54)
HGB BLD-MCNC: 15.8 G/DL (ref 14–18)
IMM GRANULOCYTES # BLD AUTO: 0.01 K/UL (ref 0–0.04)
IMM GRANULOCYTES NFR BLD AUTO: 0.2 % (ref 0–0.5)
LYMPHOCYTES # BLD AUTO: 2.3 K/UL (ref 1–4.8)
LYMPHOCYTES NFR BLD: 39.2 % (ref 18–48)
MCH RBC QN AUTO: 27.6 PG (ref 27–31)
MCHC RBC AUTO-ENTMCNC: 31.4 G/DL (ref 32–36)
MCV RBC AUTO: 88 FL (ref 82–98)
MONOCYTES # BLD AUTO: 0.7 K/UL (ref 0.3–1)
MONOCYTES NFR BLD: 11.4 % (ref 4–15)
NEUTROPHILS # BLD AUTO: 2.8 K/UL (ref 1.8–7.7)
NEUTROPHILS NFR BLD: 47 % (ref 38–73)
NRBC BLD-RTO: 0 /100 WBC
PLATELET # BLD AUTO: 210 K/UL (ref 150–450)
PMV BLD AUTO: 11 FL (ref 9.2–12.9)
PROSTATE SPECIFIC ANTIGEN, TOTAL: 7.1 NG/ML (ref 0–4)
PSA FREE MFR SERPL: 29.44 %
PSA FREE SERPL-MCNC: 2.09 NG/ML (ref 0–1.5)
RBC # BLD AUTO: 5.73 M/UL (ref 4.6–6.2)
TESTOST SERPL-MCNC: 1126 NG/DL (ref 304–1227)
WBC # BLD AUTO: 5.86 K/UL (ref 3.9–12.7)

## 2024-06-13 PROCEDURE — 84403 ASSAY OF TOTAL TESTOSTERONE: CPT | Performed by: UROLOGY

## 2024-06-13 PROCEDURE — 36415 COLL VENOUS BLD VENIPUNCTURE: CPT | Performed by: UROLOGY

## 2024-06-13 PROCEDURE — 84153 ASSAY OF PSA TOTAL: CPT | Performed by: UROLOGY

## 2024-06-13 PROCEDURE — 85025 COMPLETE CBC W/AUTO DIFF WBC: CPT | Performed by: UROLOGY

## 2024-06-13 NOTE — TELEPHONE ENCOUNTER
----- Message from Rashid Samano sent at 6/11/2024 11:20 AM CDT -----  Type:  Pharmacy Calling to Clarify an RX    Name of Caller:Jonathan with [facility]  Pharmacy Name:[facility] [place] - [facility] [place] - [address] [place] [address]   Phone: [phone]  Fax: [phone]  Prescription Name:testosterone cypionate (DEPOTESTOTERONE CYPIONATE) 200 mg/mL injection  What do they need to clarify?:is provider filling this medication   Best Call Back Number:  Additional Information: paperwork will be faxed and needs completed

## 2024-06-13 NOTE — TELEPHONE ENCOUNTER
I reached out the patient regarding his refill request for tri-mix. I voiced that he'll have to get his labs done then make a follow up appointment for the refill. Pt is aware.

## 2024-06-24 ENCOUNTER — PATIENT MESSAGE (OUTPATIENT)
Dept: UROLOGY | Facility: CLINIC | Age: 69
End: 2024-06-24
Payer: MEDICARE

## 2024-06-24 DIAGNOSIS — R97.20 ELEVATED PSA: ICD-10-CM

## 2024-06-24 DIAGNOSIS — R79.89 LOW TESTOSTERONE: Primary | ICD-10-CM

## 2024-06-26 ENCOUNTER — OFFICE VISIT (OUTPATIENT)
Dept: OPTOMETRY | Facility: CLINIC | Age: 69
End: 2024-06-26
Payer: MEDICARE

## 2024-06-26 DIAGNOSIS — H02.886 MEIBOMIAN GLAND DYSFUNCTION (MGD) OF BOTH EYES: ICD-10-CM

## 2024-06-26 DIAGNOSIS — H25.813 COMBINED FORMS OF AGE-RELATED CATARACT OF BOTH EYES: Primary | ICD-10-CM

## 2024-06-26 DIAGNOSIS — H02.883 MEIBOMIAN GLAND DYSFUNCTION (MGD) OF BOTH EYES: ICD-10-CM

## 2024-06-26 PROCEDURE — 1126F AMNT PAIN NOTED NONE PRSNT: CPT | Mod: CPTII,S$GLB,, | Performed by: OPTOMETRIST

## 2024-06-26 PROCEDURE — 4010F ACE/ARB THERAPY RXD/TAKEN: CPT | Mod: CPTII,S$GLB,, | Performed by: OPTOMETRIST

## 2024-06-26 PROCEDURE — 99999 PR PBB SHADOW E&M-EST. PATIENT-LVL II: CPT | Mod: PBBFAC,,, | Performed by: OPTOMETRIST

## 2024-06-26 PROCEDURE — 92014 COMPRE OPH EXAM EST PT 1/>: CPT | Mod: S$GLB,,, | Performed by: OPTOMETRIST

## 2024-06-26 PROCEDURE — 1101F PT FALLS ASSESS-DOCD LE1/YR: CPT | Mod: CPTII,S$GLB,, | Performed by: OPTOMETRIST

## 2024-06-26 PROCEDURE — 3288F FALL RISK ASSESSMENT DOCD: CPT | Mod: CPTII,S$GLB,, | Performed by: OPTOMETRIST

## 2024-06-26 PROCEDURE — 1159F MED LIST DOCD IN RCRD: CPT | Mod: CPTII,S$GLB,, | Performed by: OPTOMETRIST

## 2024-06-26 RX ORDER — FINASTERIDE 1 MG/1
1 TABLET, FILM COATED ORAL
COMMUNITY
Start: 2024-04-09

## 2024-06-26 NOTE — PROGRESS NOTES
HPI    Pt is here today for routine eye exam. Patient reports occasional   discomfort.  DLS: 7/2023 Dr. Gupta  (-)Flashes   (-)Floaters   (-)Diplopia   (-)Headaches   (-)Itching   (-)Tearing  (+)Burning  (+)Dryness   (+)Photophobia  (+)Glare   (+)Blurred [facility]  Past Eye Sx: (-)  Eye Meds: Visine OU PRN (rarely pt is bad at aiming gtts in his eyes)                      Allergy Relief OU PRN (rarely pt is bad at aiming gtts   in his eyes)  Last edited by Shira Gupta, OD on 6/26/2024 12:03 PM.            Assessment /Plan     For exam results, see Encounter Report.    Combined forms of age-related cataract of both eyes    Meibomian gland dysfunction (MGD) of both eyes      1. Educated pt on findings. Not visually significant. No need for removal at this time. Monitor yearly.      Eyeglass Final Rx       Eyeglass Final Rx         Sphere Cylinder Axis Add    Right -1.00 +1.00 065 +2.50    Left -1.00 +1.00 105 +2.50      Type: PAL    Expiration Date: 6/26/2025                   2. Educated pt on findings. Recommend Dung Mask ~10 min Qday. AT prn.   If symptoms worsen or dont improve, RTC. Monitor.      RTC in 1 year for annual eye exam unless changes noted sooner.

## 2024-07-03 ENCOUNTER — PATIENT MESSAGE (OUTPATIENT)
Dept: UROLOGY | Facility: CLINIC | Age: 69
End: 2024-07-03
Payer: MEDICARE

## 2024-07-11 DIAGNOSIS — I10 ESSENTIAL HYPERTENSION: ICD-10-CM

## 2024-07-11 NOTE — TELEPHONE ENCOUNTER
No care due was identified.  [facility] Embedded Care Due Messages. Reference number: [id number].   7/11/2024 1:43:27 PM CDT

## 2024-07-12 RX ORDER — LOSARTAN POTASSIUM 100 MG/1
TABLET ORAL
Qty: 90 TABLET | Refills: 2 | Status: SHIPPED | OUTPATIENT
Start: 2024-07-12

## 2024-07-12 NOTE — TELEPHONE ENCOUNTER
Refill Routing Note   Medication(s) are not appropriate for processing by [facility] for the following reason(s):        Required vitals abnormal    [facility] action(s):  Defer             Appointments  past 12m or future 3m with PCP    Date Provider   Last Visit   4/2/2024 Mone Brown MD   Next Visit   10/7/2024 Mone Brown MD   ED visits in past 90 days: 0        Note composed:7:26 PM 07/11/2024

## 2024-07-23 ENCOUNTER — TELEPHONE (OUTPATIENT)
Dept: INTERNAL MEDICINE | Facility: CLINIC | Age: 69
End: 2024-07-23
Payer: MEDICARE

## 2024-07-23 DIAGNOSIS — J30.1 SEASONAL ALLERGIC RHINITIS DUE TO POLLEN: ICD-10-CM

## 2024-07-23 DIAGNOSIS — I10 ESSENTIAL HYPERTENSION: ICD-10-CM

## 2024-07-23 RX ORDER — AZELASTINE 1 MG/ML
1 SPRAY, METERED NASAL 2 TIMES DAILY
Qty: 90 ML | Refills: 3 | Status: SHIPPED | OUTPATIENT
Start: 2024-07-23

## 2024-07-23 RX ORDER — FLUTICASONE PROPIONATE 50 MCG
1 SPRAY, SUSPENSION (ML) NASAL DAILY
Qty: 48 G | Refills: 1 | Status: SHIPPED | OUTPATIENT
Start: 2024-07-23

## 2024-07-23 RX ORDER — AMLODIPINE BESYLATE 10 MG/1
10 TABLET ORAL EVERY MORNING
Qty: 90 TABLET | Refills: 3 | Status: SHIPPED | OUTPATIENT
Start: 2024-07-23

## 2024-07-23 NOTE — TELEPHONE ENCOUNTER
No care due was identified.  [facility] Embedded Care Due Messages. Reference number: [id number].   7/23/2024 1:35:37 PM CDT

## 2024-07-23 NOTE — TELEPHONE ENCOUNTER
----- Message from Bradley Garcia MA sent at 7/23/2024  8:26 AM CDT -----  Contact: [facility] [phone]    ----- Message -----  From: Leslie Serra  Sent: 7/22/2024   3:35 PM CDT  To: Stephanie Shore Staff    [email or web address]     Patient is calling for Medical Advice regarding:[facility] [phone] calling about medication confirmation amLODIPine (NORVASC) 10 MG tablet 90 tablet  And fluticasone propionate (FLONASE) 50 mcg/actuation nasal spray 48 g  Need confirmation is the pt still taking this and is it still prescribed by the doctor.  Please call her back and advise.    How long has patient had these symptoms:    Pharmacy name and phone#:    Patient wants a call back or thru [facility]:callback    Comments:    Please advise patient replies from provider may take up to 48 hours.

## 2024-07-23 NOTE — TELEPHONE ENCOUNTER
Spoke with pt. He stated that he just needs a refill on his Astelin nasal spray everything else is up to date and he wanted you to know he has a stress test on the 31st

## 2024-07-23 NOTE — TELEPHONE ENCOUNTER
No care due was identified.  [facility] Embedded Care Due Messages. Reference number: [id number].   7/23/2024 8:27:14 AM CDT

## 2024-08-08 RX ORDER — SILDENAFIL 100 MG/1
100 TABLET, FILM COATED ORAL
Qty: 30 TABLET | Refills: 0 | Status: SHIPPED | OUTPATIENT
Start: 2024-08-08

## 2024-09-26 ENCOUNTER — PATIENT MESSAGE (OUTPATIENT)
Dept: INTERNAL MEDICINE | Facility: CLINIC | Age: 69
End: 2024-09-26
Payer: MEDICARE

## 2024-10-14 ENCOUNTER — LAB VISIT (OUTPATIENT)
Dept: LAB | Facility: HOSPITAL | Age: 69
End: 2024-10-14
Attending: INTERNAL MEDICINE
Payer: MEDICARE

## 2024-10-14 ENCOUNTER — OFFICE VISIT (OUTPATIENT)
Dept: HEMATOLOGY/ONCOLOGY | Facility: CLINIC | Age: 69
End: 2024-10-14
Payer: MEDICARE

## 2024-10-14 VITALS
TEMPERATURE: 97 F | SYSTOLIC BLOOD PRESSURE: 165 MMHG | HEIGHT: 74 IN | WEIGHT: 247.81 LBS | BODY MASS INDEX: 31.8 KG/M2 | OXYGEN SATURATION: 99 % | RESPIRATION RATE: 17 BRPM | DIASTOLIC BLOOD PRESSURE: 85 MMHG | HEART RATE: 60 BPM

## 2024-10-14 DIAGNOSIS — I51.7 LVH (LEFT VENTRICULAR HYPERTROPHY): ICD-10-CM

## 2024-10-14 DIAGNOSIS — R97.20 ELEVATED PSA: ICD-10-CM

## 2024-10-14 DIAGNOSIS — R91.8 PULMONARY NODULES: ICD-10-CM

## 2024-10-14 DIAGNOSIS — Z85.528 HISTORY OF RENAL CELL CANCER: Primary | ICD-10-CM

## 2024-10-14 DIAGNOSIS — D64.9 NORMOCYTIC ANEMIA: ICD-10-CM

## 2024-10-14 DIAGNOSIS — Z85.528 HISTORY OF RENAL CELL CANCER: ICD-10-CM

## 2024-10-14 DIAGNOSIS — I10 ESSENTIAL HYPERTENSION: ICD-10-CM

## 2024-10-14 LAB
ALBUMIN SERPL BCP-MCNC: 3.6 G/DL (ref 3.5–5.2)
ALP SERPL-CCNC: 61 U/L (ref 55–135)
ALT SERPL W/O P-5'-P-CCNC: 30 U/L (ref 10–44)
ANION GAP SERPL CALC-SCNC: 7 MMOL/L (ref 8–16)
ANISOCYTOSIS BLD QL SMEAR: SLIGHT
AST SERPL-CCNC: 32 U/L (ref 10–40)
BASOPHILS # BLD AUTO: 0.02 K/UL (ref 0–0.2)
BASOPHILS NFR BLD: 0.5 % (ref 0–1.9)
BILIRUB SERPL-MCNC: 0.6 MG/DL (ref 0.1–1)
BUN SERPL-MCNC: 16 MG/DL (ref 8–23)
CALCIUM SERPL-MCNC: 9.2 MG/DL (ref 8.7–10.5)
CHLORIDE SERPL-SCNC: 108 MMOL/L (ref 95–110)
CO2 SERPL-SCNC: 25 MMOL/L (ref 23–29)
CREAT SERPL-MCNC: 1.2 MG/DL (ref 0.5–1.4)
DIFFERENTIAL METHOD BLD: ABNORMAL
EOSINOPHIL # BLD AUTO: 0.2 K/UL (ref 0–0.5)
EOSINOPHIL NFR BLD: 4.6 % (ref 0–8)
ERYTHROCYTE [DISTWIDTH] IN BLOOD BY AUTOMATED COUNT: 13.7 % (ref 11.5–14.5)
EST. GFR  (NO RACE VARIABLE): >60 ML/MIN/1.73 M^2
GLUCOSE SERPL-MCNC: 102 MG/DL (ref 70–110)
HCT VFR BLD AUTO: 39.1 % (ref 40–54)
HGB BLD-MCNC: 11.8 G/DL (ref 14–18)
HYPOCHROMIA BLD QL SMEAR: ABNORMAL
IMM GRANULOCYTES # BLD AUTO: 0.01 K/UL (ref 0–0.04)
IMM GRANULOCYTES NFR BLD AUTO: 0.2 % (ref 0–0.5)
LYMPHOCYTES # BLD AUTO: 2.2 K/UL (ref 1–4.8)
LYMPHOCYTES NFR BLD: 50 % (ref 18–48)
MCH RBC QN AUTO: 27.4 PG (ref 27–31)
MCHC RBC AUTO-ENTMCNC: 30.2 G/DL (ref 32–36)
MCV RBC AUTO: 91 FL (ref 82–98)
MONOCYTES # BLD AUTO: 0.4 K/UL (ref 0.3–1)
MONOCYTES NFR BLD: 8.5 % (ref 4–15)
NEUTROPHILS # BLD AUTO: 1.6 K/UL (ref 1.8–7.7)
NEUTROPHILS NFR BLD: 36.2 % (ref 38–73)
NRBC BLD-RTO: 0 /100 WBC
OVALOCYTES BLD QL SMEAR: ABNORMAL
PLATELET # BLD AUTO: 185 K/UL (ref 150–450)
PMV BLD AUTO: 10.4 FL (ref 9.2–12.9)
POIKILOCYTOSIS BLD QL SMEAR: SLIGHT
POLYCHROMASIA BLD QL SMEAR: ABNORMAL
POTASSIUM SERPL-SCNC: 4.3 MMOL/L (ref 3.5–5.1)
PROT SERPL-MCNC: 7.4 G/DL (ref 6–8.4)
RBC # BLD AUTO: 4.31 M/UL (ref 4.6–6.2)
SODIUM SERPL-SCNC: 140 MMOL/L (ref 136–145)
SPHEROCYTES BLD QL SMEAR: ABNORMAL
WBC # BLD AUTO: 4.34 K/UL (ref 3.9–12.7)

## 2024-10-14 PROCEDURE — 4010F ACE/ARB THERAPY RXD/TAKEN: CPT | Mod: CPTII,S$GLB,, | Performed by: INTERNAL MEDICINE

## 2024-10-14 PROCEDURE — 1126F AMNT PAIN NOTED NONE PRSNT: CPT | Mod: CPTII,S$GLB,, | Performed by: INTERNAL MEDICINE

## 2024-10-14 PROCEDURE — G2211 COMPLEX E/M VISIT ADD ON: HCPCS | Mod: S$GLB,,, | Performed by: INTERNAL MEDICINE

## 2024-10-14 PROCEDURE — 3008F BODY MASS INDEX DOCD: CPT | Mod: CPTII,S$GLB,, | Performed by: INTERNAL MEDICINE

## 2024-10-14 PROCEDURE — 85025 COMPLETE CBC W/AUTO DIFF WBC: CPT | Performed by: INTERNAL MEDICINE

## 2024-10-14 PROCEDURE — 3079F DIAST BP 80-89 MM HG: CPT | Mod: CPTII,S$GLB,, | Performed by: INTERNAL MEDICINE

## 2024-10-14 PROCEDURE — 3288F FALL RISK ASSESSMENT DOCD: CPT | Mod: CPTII,S$GLB,, | Performed by: INTERNAL MEDICINE

## 2024-10-14 PROCEDURE — 3077F SYST BP >= 140 MM HG: CPT | Mod: CPTII,S$GLB,, | Performed by: INTERNAL MEDICINE

## 2024-10-14 PROCEDURE — 99214 OFFICE O/P EST MOD 30 MIN: CPT | Mod: S$GLB,,, | Performed by: INTERNAL MEDICINE

## 2024-10-14 PROCEDURE — 1159F MED LIST DOCD IN RCRD: CPT | Mod: CPTII,S$GLB,, | Performed by: INTERNAL MEDICINE

## 2024-10-14 PROCEDURE — 36415 COLL VENOUS BLD VENIPUNCTURE: CPT | Performed by: INTERNAL MEDICINE

## 2024-10-14 PROCEDURE — 1160F RVW MEDS BY RX/DR IN RCRD: CPT | Mod: CPTII,S$GLB,, | Performed by: INTERNAL MEDICINE

## 2024-10-14 PROCEDURE — 80053 COMPREHEN METABOLIC PANEL: CPT | Performed by: INTERNAL MEDICINE

## 2024-10-14 PROCEDURE — 99999 PR PBB SHADOW E&M-EST. PATIENT-LVL IV: CPT | Mod: PBBFAC,,, | Performed by: INTERNAL MEDICINE

## 2024-10-14 PROCEDURE — 1101F PT FALLS ASSESS-DOCD LE1/YR: CPT | Mod: CPTII,S$GLB,, | Performed by: INTERNAL MEDICINE

## 2024-10-14 NOTE — PROGRESS NOTES
Subjective     Patient ID: Adrian Burt is a 69 y.o. male.    Chief Complaint: History of renal cell cancer    HPI    Returns for follow up   Surveillance scans due    Diagnosis: pulmonary nodules, Right RCC pT1a pNX pMX      Reports adequate energy level and doing well in the interval  Weight stable  Denies pain  Remainder ROS below     Oncology History:  - renal mass discovered incidentally; he had a fleeting pain that resolved but he mentioned to his PCP several months later leading to below imaging     - 3/24/2022 Renal ultrasound:  FINDINGS:  Right kidney: The right kidney measures 13.5 cm. No cortical thinning. No loss of corticomedullary distinction. Resistive index measures 0.61.  Heterogeneous lesion measuring 2.4 x 2.3 x 2.7 cm in the upper pole.  No internal Doppler signal.  1.5 x 1.6 x 1.5 cm hyperechoic lesion in the midpole.  Subcentimeter simple cyst in the lower pole.  No renal stone. No hydronephrosis.  Left kidney: The left kidney measures 12.9 cm. No cortical thinning. No loss of corticomedullary distinction. Resistive index measures 0.49.  No mass. No renal stone. No hydronephrosis.  Spleen resistive index measures 0.54.  The bladder is partially distended at the time of scanning and has an unremarkable appearance.  Prostate is enlarged measuring 6.8 x 5.4 x 5.8 cm (previously 5.1 x 4.4 x 4.5 cm).  Impression:  1. 2.7 cm heterogeneous lesion in the upper pole right kidney, which could represent a complex cyst or neoplasm.  2. 1.6 cm hyperechoic lesion in the midpole right kidney, which could represent an angiomyolipoma or other fat containing lesion such as renal cell carcinoma.  3.  No hydronephrosis or shadowing calculus.  4. Prostatomegaly.  RECOMMENDATIONS:  Renal mass protocol CT or MRI for further evaluation of right renal lesions.     - 3/28/2022 CT Abd/Pelvis:  FINDINGS:  Heart: Normal in size. No pericardial effusion.  Lung Bases: Well aerated, without consolidation or pleural  fluid.  Liver: Normal in size and attenuation, with no focal hepatic lesions.  Gallbladder: No calcified gallstones.  Bile Ducts: No evidence of dilated ducts.  Pancreas: No mass or peripancreatic fat stranding.  Spleen: Unremarkable.  Adrenals: Unremarkable.  Kidneys/ Ureters: There is a 3 cm complex hypodense lobulated lesion with multiple septations arising from the upper pole of the right kidney.  Differential considerations would include complex cyst versus cystic neoplasm.  In the interpolar aspect there is a solid mass with enhancement measuring 12 mm roughly corresponding to the echogenic lesion seen on the ultrasound.  No definite macroscopic fat attenuation is seen on the noncontrast series and is not able to be delineated from adjacent renal parenchyma on the noncontrast study.  No stones, solid mass, or hydronephrosis on the left.  Bladder: No evidence of wall thickening.  Reproductive organs: Prostate markedly enlarged with heterogeneous enhancement pattern.  GI Tract/Mesentery: No evidence of bowel obstruction or inflammation.  Peritoneal Space: No ascites. No free air.  Retroperitoneum: No significant adenopathy.  Abdominal wall: Unremarkable.  Vasculature: No significant atherosclerosis or aneurysm.  Bones: No acute fracture.  Impression:  12 mm right renal solid mass concerning for malignancy until proven otherwise.  Further evaluation as warranted.  3 cm complex lesion upper pole right kidney at minimum warrants sonographic surveillance 6 months.     - 4/12/2022 MRI Abdomen:  FINDINGS:  Pulmonary Bases: No pleural effusion  Liver: normal.  Bile Ducts: normal  Gallbladder: normal  Pancreas: normal.  Spleen: normal.  Adrenal Glands: normal.  Proximal Gastrointestinal tract/stomach: Normal.  Kidneys: Septated hyperintense T2 lesion upper pole right kidney 3.6 x 3.2 x 2.7 cm.  Subtle enhancement of the septations noted on postcontrast images, renal cell cystic carcinoma cannot be excluded.  There is a  very small lesion at the midpole of the right kidney, measuring approximately 12 mm demonstrating postcontrast enhancement concerning for renal cell carcinoma, it measures 1.4 cm series 1402, image 67  Aorta and Abdominal Vasculature: normal.  Mesentery: normal  Lymph nodes: no pathologically enlarged lymph nodes are seen.  Body wall: normal  Osseous structures: No lesion seen  Other: No free fluid/ascites.  Impression:  Predominantly cystic lesion at the upper pole of the right kidney with subtly enhancing septations, a cystic renal cell carcinoma cannot be excluded.  Subtle small intrarenal lesion at the midpole of the right kidney demonstrating postcontrast enhancement, a solid renal cell carcinoma cannot be excluded.  This report was flagged in Epic as abnormal.     - 5/9/2022 Surgical nephrectomy:  1.  Retroperitoneal robotic assisted laparoscopic right partial nephrectomy (modifier 22 for complex anatomy, 2 masses, >180% expected time)  2.  Intraoperative ultrasound with interpretation.  1. FINAL RESECTION MARGIN OF RIGHT UPPER POLE MASS:   RENAL PARENCHYMA WITH NO NEOPLASIA IDENTIFIED   2. RIGHT UPPER POLE MASS:   INNOCUOUS BENIGN MULTILOCULAR CYST   3. MASS FROM MID RIGHT  KIDNEY :   RENAL CELL CARCINOMA WITH NO INVOLVEMENT OF MARGINS OR PERINEPHRIC ADIPOSE   TISSUE IDENTIFIED   2. This lesion is a benign multilocular cyst.  The cystic spaces have a   single layer of lining at the most.  There is no proliferative lining   identified in this entirely submitted specimen.  No area has formation of a   mass neoplasm.  There is no significant abnormality of renal parenchyma   separate from the cystic lesion.   Procedure :  Partial renal excision   Specimen Laterality :  Right   Tumor Focality :  Unifocal   Tumor Size :  1.2 cm   Histologic Type :  Renal cell carcinoma, clear cell variant   Tumor Extent :  Limited to kidney   Sarcomatoid Features :  Not identified   Rhabdoid Features :  Not identified   Tumor  Necrosis :  Not identified   Margins ; no margin involvement identified   Regional Lymph Node Status :   Not applicable (no regional lymph nodes   submitted or found)   PATHOLOGIC STAGE CLASSIFICATION (pTNM, AJCC 8th Edition) : pT1a pNX pMX   Additional findings :  There is no significant abnormality of renal   parenchyma apart from the masses.     Additional imaging:  - 6/17/2022 MRI prostate:  FINDINGS:  Previous biopsy: None.  PSA: 6.5 ng/mL on 06/15/2022  Prior therapy: None.  Prostate: 6.2 x 6.0 x 6.4 cm corresponding to a computed volume of 122 cc.  Peripheral zone: No focal abnormalities with imaging features concerning for prostate cancer, score 1.  Transitional zone: Benign prostatic hyperplasia without focal suspicious abnormality, score 2.  Neurovascular bundle: Normal appearance.  Seminal vesicles: Normal appearance.  Adjacent Organ Involvement: No evidence for urinary bladder or rectal invasion.  Lymphadenopathy: None.  Other Findings: Diffuse marrow signal heterogeneity in keeping with red marrow hyperplasia.  Impression:  1. Benign prostatic hyperplasia.  2. Red marrow hyperplasia.  Overall Assessment: PI-RADS 2 - Low (clinically significant cancer is unlikely to be present)  Number of targets created for potential MR/US fusion biopsy  Peripheral zone: 0  Transition zone: 0     Recently had surveillance scans for RCC performed- results as below:  1/23/2023 CT Chest:  FINDINGS:  No intravenous contrast was administered for this examination.  Therefore, it may have diminished sensitivity for detection of certain abnormalities.  Thyroid gland: Within normal limits.  Trachea: Within normal limits.  Esophagus: Mildly patulous.  Cardiovascular: Normal heart size.No pericardial effusion.Coarse calcifications in the region of the aortic valve leaflets, correlate with aortic valve function.  There is mild calcific disease of the coronary arteries.  Lymph nodes: None abnormally  enlarged.  Lungs/pleura/airways:  Mild apical scarring.  There are several left lung nodules which include the followin mm in lingula (1988) 4 mm in the left lower lobe adjacent to the diaphragm (4-405, and 2 additional 5 mm left lower lobe nodules (4-318, 380). In addition, there is a 5 mm right lower lobe ground-glass nodule (4-299.  The latter is out of the field of view on the prior examination.  Upper abdomen:  Partially imaged.  Status post partial right nephrectomy.  No additional new significant disease.  Bones: Unremarkable for stated age.  Other: N/A  Impression:  1. There are several bilateral subcentimeter pulmonary nodules (measuring 5 mm and less) as described above.  At this time, these are indeterminate whether related to malignancy versus infection.  Attention on short-term follow-up imaging highly recommended.  2. Postsurgical changes related to right partial nephrectomy.  Unremarkable appearance of surgical bed.  3.  Other findings are as above.     - 2022 CT Abd/Pelvis:  FINDINGS:  Heart: No cardiomegaly or pericardial effusion.  Atherosclerosis of the aortic annulus.  Lung Bases: 0.5 cm solid nodule in the anteromedial basal segment of the left lower lobe (axial series 3, image 11).  Additional 0.4 cm pleural based nodule in the superior lingula (axial series 3, image 5).  Bilateral dependent atelectasis.  Liver: Hepatomegaly.  Punctate hypodensity in hepatic segment 4a, too small to characterize but stable from prior exam.  No new lesions identified.  Gallbladder: No calcified gallstones.  Bile Ducts: No dilatation.  Pancreas: No mass. No peripancreatic fat stranding.  Spleen: Unremarkable  Adrenals: Unremarkable  Kidneys/Ureters: Interval postsurgical changes of right partial nephrectomy.  There is mild soft tissue thickening in both surgical beds, likely scarring/fibrosis.  No evidence of recurrent lesion.  Subcentimeter hypodensity in the upper pole of the right kidney, too  small to characterize.  Left kidney is unremarkable.  No nephrolithiasis or hydronephrosis.  Bilateral ureters are normal in course and caliber.  Bladder: No wall thickening.  Reproductive organs: Prostatomegaly with mass effect on the posterior aspect of the bladder.  GI Tract/Mesentery: No evidence of bowel obstruction or inflammation.  Peritoneal Space: No ascites or free air.  Retroperitoneum: No significant adenopathy.  Abdominal wall: Unremarkable  Vasculature: No aneurysm.  Mild atherosclerosis of the descending aorta and its branches.  Bones: Multilevel degenerative change, similar to prior exams.  No acute fracture. No suspicious lytic or sclerotic lesions.  Impression:  Interval postsurgical changes of right partial nephrectomy x2.  No evidence of residual or recurrent lesion in the surgical beds.  Pulmonary nodules in the left lung, that were out of field of view on prior imaging.  Recommend attention on follow-up.  Comparison to any prior CT chest would be beneficial.  Prostatomegaly.     PMH:  Steel fragment removal from leg  HTN     FH:  Mother  ESRD - unclear reasons at 35 yo  Father  at age 69 yo, heart disease  Siblings - 2 sisters- 1  at age 68 from RCC  1  ESRD, EtOHism  DM  HTN  Maternal grandmother- breast cancer dies at age 59     SH:  Retired, construction work (office)  , 3 kids  Quit tobacco  (1 pack smoker)  Active        Review of Systems   Constitutional:  Negative for activity change, appetite change, fatigue, fever and unexpected weight change.   Respiratory:  Negative for cough, shortness of breath and wheezing.    Cardiovascular:  Negative for chest pain, palpitations and leg swelling.   Gastrointestinal:  Negative for abdominal distention, abdominal pain, blood in stool, change in bowel habit, constipation, diarrhea, nausea, vomiting and reflux.   Genitourinary:  Negative for decreased urine volume, difficulty urinating, frequency and urgency.    Musculoskeletal:  Negative for arthralgias, back pain and joint deformity.   Neurological:  Negative for dizziness, weakness, light-headedness, numbness and headaches.   Hematological:  Negative for adenopathy. Does not bruise/bleed easily.   Psychiatric/Behavioral:  Negative for dysphoric mood. The patient is not nervous/anxious.           Objective     Physical Exam  Vitals and nursing note reviewed.   Constitutional:       General: He is not in acute distress.     Appearance: Normal appearance. He is normal weight. He is not ill-appearing.      Comments: Presents alone  Very pleasant  ECOG= 0   Eyes:      General: No scleral icterus.     Extraocular Movements: Extraocular movements intact.      Conjunctiva/sclera: Conjunctivae normal.      Pupils: Pupils are equal, round, and reactive to light.   Cardiovascular:      Rate and Rhythm: Normal rate and regular rhythm.      Heart sounds: Normal heart sounds. No murmur heard.     No friction rub. No gallop.   Pulmonary:      Effort: Pulmonary effort is normal. No respiratory distress.      Breath sounds: Normal breath sounds. No wheezing, rhonchi or rales.   Abdominal:      General: Abdomen is flat. Bowel sounds are normal. There is no distension.      Palpations: Abdomen is soft. There is no mass.      Tenderness: There is no abdominal tenderness. There is no guarding or rebound.      Comments: No organomegaly   Musculoskeletal:         General: No swelling. Normal range of motion.      Cervical back: Normal range of motion and neck supple. No rigidity.      Right lower leg: No edema.      Left lower leg: No edema.   Lymphadenopathy:      Cervical: No cervical adenopathy.   Skin:     General: Skin is warm and dry.      Coloration: Skin is not jaundiced or pale.      Findings: No rash.   Neurological:      General: No focal deficit present.      Mental Status: He is alert and oriented to person, place, and time. Mental status is at baseline.      Sensory: No sensory  deficit.      Motor: No weakness.      Coordination: Coordination normal.      Gait: Gait normal.   Psychiatric:         Mood and Affect: Mood normal.         Behavior: Behavior normal.         Thought Content: Thought content normal.         Judgment: Judgment normal.   Labs- reviewed       Assessment and Plan     1. History of renal cell cancer  Overview:  4 cm complex cystic lesion to posterior right upper pole, 1.1 cm right anterior solid mass on CT w/wo contrast 3/2022.  --MRI 4/22: right 3.6 cm UP cystic lesion ? Cystic RCC, right midpole 12mm solid mass  --Preop Cr 1, GFR >60  --S/p right retroperitioneal partial 5/9/22 (both masses removed)  --Path: upper pole lesion was cyst, midpole 1.2 cm [id number] CC RCC, negative margins  [facility]ATIE    Post op imaging:  CT/CXR 12/22: lower pulm nodules up to 5mm on CT but not on CXR, no recurrence.    CT 4/23: stable pulm nodules, no recurrence    Post op labs  6/22--Cr 1.2, GFR >60  12/22--Cr 1.2, GFR >60  4/23--1.1, GFR >60      2. Pulmonary nodules    3. Essential hypertension    4. Elevated PSA  Overview:  MRI prostate 6/22: volume 122, no lesions, PIRADS 2   On TRT, stopped 5/2022   Restarted 7/2023 due to fatigue, T 213          5. Normocytic anemia      Right sided RCC  Clinically KATIE    Pulmonary nodules noted on imaging- indeterminate-too small to biopsy or further characterize with other imaging modalities- surveillance imaging stable  Repeat again later this month.    HTN/LVH- good control at home  Has seen cardiology recently   dvanced lipid profile with LDL number of particles 1093, , HDL 46, trig 62. Dietary info provided. Also recommend regular exercise, at least 30 min/d  Pt does not wish to start medications.       Normocytic anemia  Reviewed prior on testosterone and held as elevated PSA  Urology monitoring PSA  Recheck parameters and additional labs  Colonoscopy up to date    Route Chart for Scheduling    Med Onc Chart Routing  Urgent    Follow up with  physician . Labs next week and needs CT scans before month is out; RTC 6 months   Follow up with [facility]    Infusion scheduling note    Injection scheduling note    Labs    Imaging    Pharmacy appointment    Other referrals

## 2024-12-03 DIAGNOSIS — Z87.438 HISTORY OF BENIGN PROSTATIC HYPERPLASIA: ICD-10-CM

## 2024-12-03 DIAGNOSIS — J30.1 SEASONAL ALLERGIC RHINITIS DUE TO POLLEN: ICD-10-CM

## 2024-12-03 DIAGNOSIS — I10 ESSENTIAL HYPERTENSION: ICD-10-CM

## 2024-12-03 RX ORDER — FLUTICASONE PROPIONATE 50 MCG
SPRAY, SUSPENSION (ML) NASAL
Refills: 0 | OUTPATIENT
Start: 2024-12-03

## 2024-12-03 RX ORDER — LOSARTAN POTASSIUM 100 MG/1
TABLET ORAL
Refills: 0 | OUTPATIENT
Start: 2024-12-03

## 2024-12-03 RX ORDER — AZELASTINE HYDROCHLORIDE, FLUTICASONE PROPIONATE 137; 50 UG/1; UG/1
SPRAY, METERED NASAL
Refills: 0 | OUTPATIENT
Start: 2024-12-03

## 2024-12-03 RX ORDER — HYDROCHLOROTHIAZIDE 12.5 MG/1
TABLET ORAL
Refills: 0 | OUTPATIENT
Start: 2024-12-03

## 2024-12-03 RX ORDER — MONTELUKAST SODIUM 10 MG/1
TABLET ORAL
Refills: 0 | OUTPATIENT
Start: 2024-12-03

## 2024-12-03 RX ORDER — SILDENAFIL 100 MG/1
TABLET, FILM COATED ORAL
Refills: 0 | OUTPATIENT
Start: 2024-12-03

## 2024-12-03 RX ORDER — TAMSULOSIN HYDROCHLORIDE 0.4 MG/1
CAPSULE ORAL
Refills: 0 | OUTPATIENT
Start: 2024-12-03

## 2024-12-03 RX ORDER — AMLODIPINE BESYLATE 10 MG/1
TABLET ORAL
Refills: 0 | OUTPATIENT
Start: 2024-12-03

## 2024-12-03 NOTE — TELEPHONE ENCOUNTER
Refill Decision Note   Adrian Burt  is requesting a refill authorization.  Brief Assessment and Rationale for Refill:  Quick Discontinue     Medication Therapy Plan: Pharmacy is requesting new scripts for the following medications without required information, (sig/ frequency/qty/etc)     Medication Reconciliation Completed: No   Comments:     No Care Gaps recommended.     Note composed:11:28 AM 12/03/2024

## 2024-12-03 NOTE — TELEPHONE ENCOUNTER
No care due was identified.  [facility] Embedded Care Due Messages. Reference number: [id number].   12/03/2024 10:16:47 AM CST

## 2025-02-11 ENCOUNTER — OFFICE VISIT (OUTPATIENT)
Dept: INTERNAL MEDICINE | Facility: CLINIC | Age: 70
End: 2025-02-11
Payer: MEDICARE

## 2025-02-11 ENCOUNTER — LAB VISIT (OUTPATIENT)
Dept: LAB | Facility: HOSPITAL | Age: 70
End: 2025-02-11
Attending: INTERNAL MEDICINE
Payer: MEDICARE

## 2025-02-11 VITALS
OXYGEN SATURATION: 98 % | WEIGHT: 254.19 LBS | HEIGHT: 74 IN | DIASTOLIC BLOOD PRESSURE: 80 MMHG | HEART RATE: 81 BPM | SYSTOLIC BLOOD PRESSURE: 136 MMHG | BODY MASS INDEX: 32.62 KG/M2

## 2025-02-11 DIAGNOSIS — D64.9 ANEMIA, UNSPECIFIED TYPE: ICD-10-CM

## 2025-02-11 DIAGNOSIS — Z85.528 HISTORY OF RENAL CELL CANCER: ICD-10-CM

## 2025-02-11 DIAGNOSIS — R97.20 ELEVATED PSA: ICD-10-CM

## 2025-02-11 DIAGNOSIS — R79.9 ABNORMAL FINDING OF BLOOD CHEMISTRY, UNSPECIFIED: ICD-10-CM

## 2025-02-11 DIAGNOSIS — R79.89 LOW TESTOSTERONE: ICD-10-CM

## 2025-02-11 DIAGNOSIS — E66.09 CLASS 1 OBESITY DUE TO EXCESS CALORIES WITH SERIOUS COMORBIDITY AND BODY MASS INDEX (BMI) OF 32.0 TO 32.9 IN ADULT: ICD-10-CM

## 2025-02-11 DIAGNOSIS — N52.01 ERECTILE DYSFUNCTION DUE TO ARTERIAL INSUFFICIENCY: ICD-10-CM

## 2025-02-11 DIAGNOSIS — E66.811 CLASS 1 OBESITY DUE TO EXCESS CALORIES WITH SERIOUS COMORBIDITY AND BODY MASS INDEX (BMI) OF 32.0 TO 32.9 IN ADULT: ICD-10-CM

## 2025-02-11 DIAGNOSIS — Z87.438 HISTORY OF BENIGN PROSTATIC HYPERPLASIA: ICD-10-CM

## 2025-02-11 DIAGNOSIS — R73.03 PREDIABETES: ICD-10-CM

## 2025-02-11 DIAGNOSIS — Z00.00 VISIT FOR ANNUAL HEALTH EXAMINATION: Primary | ICD-10-CM

## 2025-02-11 DIAGNOSIS — I10 ESSENTIAL HYPERTENSION: ICD-10-CM

## 2025-02-11 PROBLEM — C64.1 RENAL CANCER, RIGHT: Status: RESOLVED | Noted: 2025-02-11 | Resolved: 2025-02-11

## 2025-02-11 PROBLEM — C64.1 RENAL CANCER, RIGHT: Status: ACTIVE | Noted: 2025-02-11

## 2025-02-11 LAB
ALBUMIN SERPL BCP-MCNC: 3.8 G/DL (ref 3.5–5.2)
ALP SERPL-CCNC: 60 U/L (ref 40–150)
ALT SERPL W/O P-5'-P-CCNC: 37 U/L (ref 10–44)
ANION GAP SERPL CALC-SCNC: 12 MMOL/L (ref 8–16)
AST SERPL-CCNC: 31 U/L (ref 10–40)
BASOPHILS # BLD AUTO: 0.03 K/UL (ref 0–0.2)
BASOPHILS NFR BLD: 0.7 % (ref 0–1.9)
BILIRUB SERPL-MCNC: 0.5 MG/DL (ref 0.1–1)
BUN SERPL-MCNC: 16 MG/DL (ref 8–23)
CALCIUM SERPL-MCNC: 9.7 MG/DL (ref 8.7–10.5)
CHLORIDE SERPL-SCNC: 105 MMOL/L (ref 95–110)
CHOLEST SERPL-MCNC: 173 MG/DL (ref 120–199)
CHOLEST/HDLC SERPL: 4.8 {RATIO} (ref 2–5)
CO2 SERPL-SCNC: 21 MMOL/L (ref 23–29)
CREAT SERPL-MCNC: 1.1 MG/DL (ref 0.5–1.4)
DIFFERENTIAL METHOD BLD: ABNORMAL
EOSINOPHIL # BLD AUTO: 0.3 K/UL (ref 0–0.5)
EOSINOPHIL NFR BLD: 6.6 % (ref 0–8)
ERYTHROCYTE [DISTWIDTH] IN BLOOD BY AUTOMATED COUNT: 13.2 % (ref 11.5–14.5)
EST. GFR  (NO RACE VARIABLE): >60 ML/MIN/1.73 M^2
FERRITIN SERPL-MCNC: 446 NG/ML (ref 20–300)
GLUCOSE SERPL-MCNC: 93 MG/DL (ref 70–110)
HCT VFR BLD AUTO: 42.7 % (ref 40–54)
HDLC SERPL-MCNC: 36 MG/DL (ref 40–75)
HDLC SERPL: 20.8 % (ref 20–50)
HGB BLD-MCNC: 13 G/DL (ref 14–18)
IMM GRANULOCYTES # BLD AUTO: 0 K/UL (ref 0–0.04)
IMM GRANULOCYTES NFR BLD AUTO: 0 % (ref 0–0.5)
IRON SERPL-MCNC: 86 UG/DL (ref 45–160)
LDLC SERPL CALC-MCNC: 118.8 MG/DL (ref 63–159)
LYMPHOCYTES # BLD AUTO: 2.4 K/UL (ref 1–4.8)
LYMPHOCYTES NFR BLD: 56.6 % (ref 18–48)
MCH RBC QN AUTO: 27.8 PG (ref 27–31)
MCHC RBC AUTO-ENTMCNC: 30.4 G/DL (ref 32–36)
MCV RBC AUTO: 91 FL (ref 82–98)
MONOCYTES # BLD AUTO: 0.5 K/UL (ref 0.3–1)
MONOCYTES NFR BLD: 12.5 % (ref 4–15)
NEUTROPHILS # BLD AUTO: 1 K/UL (ref 1.8–7.7)
NEUTROPHILS NFR BLD: 23.6 % (ref 38–73)
NONHDLC SERPL-MCNC: 137 MG/DL
NRBC BLD-RTO: 0 /100 WBC
PLATELET # BLD AUTO: 186 K/UL (ref 150–450)
PMV BLD AUTO: 11.4 FL (ref 9.2–12.9)
POTASSIUM SERPL-SCNC: 4.4 MMOL/L (ref 3.5–5.1)
PROT SERPL-MCNC: 7.8 G/DL (ref 6–8.4)
RBC # BLD AUTO: 4.68 M/UL (ref 4.6–6.2)
SATURATED IRON: 29 % (ref 20–50)
SODIUM SERPL-SCNC: 138 MMOL/L (ref 136–145)
TOTAL IRON BINDING CAPACITY: 297 UG/DL (ref 250–450)
TRANSFERRIN SERPL-MCNC: 201 MG/DL (ref 200–375)
TRIGL SERPL-MCNC: 91 MG/DL (ref 30–150)
TSH SERPL DL<=0.005 MIU/L-ACNC: 2.25 UIU/ML (ref 0.4–4)
WBC # BLD AUTO: 4.24 K/UL (ref 3.9–12.7)

## 2025-02-11 PROCEDURE — 82728 ASSAY OF FERRITIN: CPT | Mod: HCNC | Performed by: INTERNAL MEDICINE

## 2025-02-11 PROCEDURE — 80053 COMPREHEN METABOLIC PANEL: CPT | Mod: HCNC | Performed by: INTERNAL MEDICINE

## 2025-02-11 PROCEDURE — 99999 PR PBB SHADOW E&M-EST. PATIENT-LVL III: CPT | Mod: PBBFAC,HCNC,, | Performed by: INTERNAL MEDICINE

## 2025-02-11 PROCEDURE — 84466 ASSAY OF TRANSFERRIN: CPT | Mod: HCNC | Performed by: INTERNAL MEDICINE

## 2025-02-11 PROCEDURE — 84443 ASSAY THYROID STIM HORMONE: CPT | Mod: HCNC | Performed by: INTERNAL MEDICINE

## 2025-02-11 PROCEDURE — 83036 HEMOGLOBIN GLYCOSYLATED A1C: CPT | Mod: HCNC | Performed by: INTERNAL MEDICINE

## 2025-02-11 PROCEDURE — 80061 LIPID PANEL: CPT | Mod: HCNC | Performed by: INTERNAL MEDICINE

## 2025-02-11 PROCEDURE — 85025 COMPLETE CBC W/AUTO DIFF WBC: CPT | Mod: HCNC | Performed by: INTERNAL MEDICINE

## 2025-02-11 PROCEDURE — 36415 COLL VENOUS BLD VENIPUNCTURE: CPT | Mod: HCNC | Performed by: INTERNAL MEDICINE

## 2025-02-11 NOTE — PROGRESS NOTES
INTERNAL MEDICINE ESTABLISHED PATIENT VISIT NOTE    Subjective:     Chief Complaint: Annual Exam       Patient ID: Adrian Burt is a 70 y.o. male with HTN, preDM, chronic sys and diastolic HF noted on echo 2024 (asymptomatic), BPH, elevated PSA followed by Dr. Steele, pulm nodules too small to characterize (too small to biopsy, followed by Dr. Chávez), renal CA s/p R partial nephrectomy 5/2022, GERD, last seen by me 4/2024, here today for annual exam and f/u HTN, preDM.    Still seeing Dr. Chávez for hx renal CA.  Was also being followed by pulm nodules which were stable on last CT 4/2024.  Has f/u c Dr. Chávez this April.    Was also seen by Dr. Miller at [facility] last year in Cardiology clinic for HTN.  Had elevated bp readings in clinic but states home readings consistently in the 130s/80s.  Has been on Amlodipine, HCTZ, and Losartan which he takes as rx'ed.    Had echo done by Cards which showed slightly decreased EF at 47%  and diastolic dysfunction but states feeling well overall and denies cp or sob, LE edema, PND, or orthopnea.    Was previously followed by Dr. Steele for testosterone def and ED but states he needs a new Urologist since that provider is no longer c [facility].    Past Medical History:  Past Medical History:   Diagnosis Date    Allergy     Colon polyp     Fatty liver     GERD (gastroesophageal reflux disease)     Hypertension        Home Medications:  Prior to Admission medications    Medication Sig Start Date End Date Taking? Authorizing Provider   amLODIPine (NORVASC) 10 MG tablet Take 1 tablet (10 mg total) by mouth every morning. 7/23/24  Yes Mone Brown MD   azelastine (ASTELIN) 137 mcg (0.1 %) nasal spray 1 spray (137 mcg total) by Nasal route 2 (two) times daily. 7/23/24  Yes Mone Brown MD   ferrous gluconate (FERGON) 324 MG tablet TAKE 1 TABLET BY MOUTH DAILY WITH BREAKFAST 10/21/22  Yes Ramon Chavira MD   fluticasone propionate (FLONASE) 50 mcg/actuation nasal spray 1  spray (50 mcg total) by Each Nostril route once daily. 24  Yes Mone Brown MD   hydroCHLOROthiazide (HYDRODIURIL) 12.5 MG Tab Take 1 tablet (12.5 mg total) by mouth once daily. 24  Yes Mavis Jeffries NP   losartan (COZAAR) 100 MG tablet TAKE ONE TABLET BY MOUTH DAILY AT 9 AM 24  Yes Mavis Jeffries NP   montelukast (SINGULAIR) 10 mg tablet TAKE ONE TABLET BY MOUTH DAILY AT 5 PM 24  Yes Mone Brown MD   multivit with minerals/lutein (MULTIVITAMIN 50 PLUS ORAL) Take 1 tablet by mouth once daily.   Yes Provider, Historical   pep injection Inject 0.2 ml as directed (20 units).      For compounding pharmacy use:   Add PAPAVERINE 30 mcg  Add PHENTOLAMINE 2 mg  Add ALPROSTADIL 30 mcg 23  Yes Arvind Álvarez MD   sildenafiL (VIAGRA) 100 MG tablet Take 1 tablet (100 mg total) by mouth as needed for Erectile Dysfunction. 24  Yes Mavis Jeffries NP   tamsulosin (FLOMAX) 0.4 mg Cap TAKE ONE CAPSULE BY MOUTH DAILY AT 9 AM 3/25/24  Yes Arvind Álvarez MD   testosterone cypionate (DEPOTESTOTERONE CYPIONATE) 200 mg/mL injection Inject 1 mL (200 mg total) into the muscle every 14 (fourteen) days. 3/21/24 3/21/25 Yes Arvind Álvarez MD   finasteride (PROPECIA) 1 mg tablet Take 1 mg by mouth. 24   Provider, Historical   tadalafiL (CIALIS) 20 MG Tab Take 1 tablet (20 mg total) by mouth daily as needed (1 hour prior to sexual activity). 23  Arvind Álvarez MD       Allergies:  Review of patient's allergies indicates:   Allergen Reactions    Tessalon [benzonatate] Other (See Comments)     States lips felt dry and swollen       Social History:  Social History     Tobacco Use    Smoking status: Former     Current packs/day: 0.00     Types: Cigarettes     Quit date: 11/15/1978     Years since quittin.2    Smokeless tobacco: Never    Tobacco comments:     smoked for 10 years, quit in 76   Substance Use Topics    Alcohol use: No    Drug use: No       "  Review of Systems   Constitutional:  Negative for appetite change, chills, fatigue, fever and unexpected weight change.   HENT:  Negative for congestion, hearing loss and rhinorrhea.    Eyes:  Negative for pain and visual disturbance.   Respiratory:  Negative for cough, chest tightness, shortness of breath and wheezing.    Cardiovascular:  Negative for chest pain, palpitations and leg swelling.   Gastrointestinal:  Negative for abdominal distention and abdominal pain.   Endocrine: Negative for polydipsia and polyuria.   Genitourinary:  Negative for decreased urine volume, dysuria, frequency and penile discharge.   Neurological:  Negative for dizziness, weakness, numbness and headaches.   Psychiatric/Behavioral:  Negative for behavioral problems and confusion.          Health Maintenance:     Immunizations:   Influenza 9/2024  TDap 9/2018  Prevnar 20 4/2023, pvax done 4/2021  Shingrix 3/2020, 8/2020  COVID vaccine Moderna completed 2/2021, 3/24/2021, 12/2021, 4/2023, 10/2023, 9/2024  RSV 4/2024     Cancer Screening:  Colonoscopy:  9/25/23 c Dr. Henriquez, four polyps removed, path c/w tubular adenoma, told f/u in 5 yrs  PSA 3/2024 WNL, had total PSA done in June that was higher   AAA screening neg 9/2018       Objective:   /80   Pulse 81   Ht 6' 2" (1.88 m)   Wt 115.3 kg (254 lb 3.1 oz)   SpO2 98%   BMI 32.64 kg/m²        General: AAO x3, no apparent distress  HEENT: no cervical LAD, no thyroid masses appreciated  CV: RRR, no m/r/g  Pulm: Lungs CTAB, no crackles, no wheezes  Abd: s/NT/ND +BS  Extremities: no c/c/e    Labs:     Lab Results   Component Value Date    WBC 4.34 10/14/2024    HGB 11.8 (L) 10/14/2024    HCT 39.1 (L) 10/14/2024    MCV 91 10/14/2024     10/14/2024     Sodium   Date Value Ref Range Status   10/14/2024 140 136 - 145 mmol/L Final     Potassium   Date Value Ref Range Status   10/14/2024 4.3 3.5 - 5.1 mmol/L Final     Chloride   Date Value Ref Range Status   10/14/2024 108 95 - " 110 mmol/L Final     CO2   Date Value Ref Range Status   10/14/2024 25 23 - 29 mmol/L Final     Glucose   Date Value Ref Range Status   10/14/2024 102 70 - 110 mg/dL Final     BUN   Date Value Ref Range Status   10/14/2024 16 8 - 23 mg/dL Final     Creatinine   Date Value Ref Range Status   10/14/2024 1.2 0.5 - 1.4 mg/dL Final     Calcium   Date Value Ref Range Status   10/14/2024 9.2 8.7 - 10.5 mg/dL Final     Total Protein   Date Value Ref Range Status   10/14/2024 7.4 6.0 - 8.4 g/dL Final     Albumin   Date Value Ref Range Status   10/14/2024 3.6 3.5 - 5.2 g/dL Final     Total Bilirubin   Date Value Ref Range Status   10/14/2024 0.6 0.1 - 1.0 mg/dL Final     Comment:     For infants and newborns, interpretation of results should be based  on gestational age, weight and in agreement with clinical  observations.    Premature Infant recommended reference ranges:  Up to 24 hours.............<8.0 mg/dL  Up to 48 hours............<12.0 mg/dL  3-5 days..................<15.0 mg/dL  6-29 days.................<15.0 mg/dL       Alkaline Phosphatase   Date Value Ref Range Status   10/14/2024 61 55 - 135 U/L Final     AST   Date Value Ref Range Status   10/14/2024 32 10 - 40 U/L Final     ALT   Date Value Ref Range Status   10/14/2024 30 10 - 44 U/L Final     Anion Gap   Date Value Ref Range Status   10/14/2024 7 (L) 8 - 16 mmol/L Final     eGFR if    Date Value Ref Range Status   06/13/2022 >60 >60 mL/min/1.73 m^2 Final     eGFR if non    Date Value Ref Range Status   06/13/2022 >60 >60 mL/min/1.73 m^2 Final     Comment:     Calculation used to obtain the estimated glomerular filtration  rate (eGFR) is the CKD-EPI equation.        Lab Results   Component Value Date    HGBA1C 5.2 10/02/2023     Lab Results   Component Value Date    LDLCALC 106.2 04/10/2023     Lab Results   Component Value Date    TSH 2.551 08/17/2023         Assessment/Plan     Adrian was seen today for annual  exam.    Diagnoses and all orders for this visit:    Visit for annual health examination  History and physical exam completed.  Health maintenance reviewed as above.    Essential hypertension  Home readings have been at goal so will cont current regimen of Amlodipine, HCTZ, and Losartan  Also rec low Na diet.    Prediabetes  5.2 on last check but needs updated labs, missed his lab appt last year.  Will repeat today.  Cont dietary modifications.  -     Hemoglobin A1C; Future  -     Lipid Panel; Future  -     Comprehensive Metabolic Panel; Future  -     TSH; Future    Anemia, unspecified type  Noted on last labs from Dr. Chávez.  Will repeat now c iron studies  Csc utd and denies GI sx.  -     CBC Auto Differential; Future  -     Iron and TIBC; Future  -     Ferritin; Future    History of renal cell cancer  As per HPI  Followed by Dr. Chávez c last CT showing no evidence of rcurrence  Has f/u pending in April which he was advised to keep    Low testosterone  Requesting f/u c new Urology, orders placed.  -     Ambulatory referral/consult to Urology; Future    Elevated PSA  As per HPI  Last check c Urology elevated but was lost to f/u  Updated referral placed, advised to schedule f/u at checkout today    History of benign prostatic hyperplasia  As per HPI  On Tamsulosin  No longer on Finasteride due to reported issues c ED  Can f/u c Urology    Erectile dysfunction due to arterial insufficiency  As per HPI  Cont mgmt as per Urology, plan to est care c new provider    Class 1 obesity due to excess calories with serious comorbidity and body mass index (BMI) of 32.0 to 32.9 in adult  Counseled extensively on need for diet changes and daily exercise.  Discussed examples of healthy eating.  Recommend at least 30 minutes of exercise at least 5 days a week.      Abnormal finding of blood chemistry, unspecified  -     Lipid Panel; Future      HM as above  RTC in 6 mos, sooner if needed.  Fasting labs today.    Mone Brown  MD  Department of Internal Medicine - [facility]  02/11/2025

## 2025-02-12 LAB
ESTIMATED AVG GLUCOSE: 137 MG/DL (ref 68–131)
HBA1C MFR BLD: 6.4 % (ref 4–5.6)

## 2025-02-17 ENCOUNTER — RESULTS FOLLOW-UP (OUTPATIENT)
Dept: INTERNAL MEDICINE | Facility: CLINIC | Age: 70
End: 2025-02-17
Payer: MEDICARE

## 2025-03-12 DIAGNOSIS — I10 ESSENTIAL HYPERTENSION: ICD-10-CM

## 2025-03-12 DIAGNOSIS — J30.1 SEASONAL ALLERGIC RHINITIS DUE TO POLLEN: ICD-10-CM

## 2025-03-12 RX ORDER — MONTELUKAST SODIUM 10 MG/1
TABLET ORAL
Qty: 90 TABLET | Refills: 3 | Status: SHIPPED | OUTPATIENT
Start: 2025-03-12

## 2025-03-12 RX ORDER — LOSARTAN POTASSIUM 100 MG/1
TABLET ORAL
Qty: 90 TABLET | Refills: 3 | Status: SHIPPED | OUTPATIENT
Start: 2025-03-12

## 2025-03-12 NOTE — TELEPHONE ENCOUNTER
Refill Decision Note   Adrian Burt  is requesting a refill authorization.  Brief Assessment and Rationale for Refill:  Approve     Medication Therapy Plan:         Comments:     Note composed:4:47 PM 03/12/2025

## 2025-03-12 NOTE — TELEPHONE ENCOUNTER
No care due was identified.  [facility] Embedded Care Due Messages. Reference number: [id number].   3/12/2025 9:24:26 AM CDT

## 2025-03-26 DIAGNOSIS — Z87.438 HISTORY OF BENIGN PROSTATIC HYPERPLASIA: ICD-10-CM

## 2025-03-26 RX ORDER — TAMSULOSIN HYDROCHLORIDE 0.4 MG/1
CAPSULE ORAL
Qty: 90 CAPSULE | Refills: 11 | Status: SHIPPED | OUTPATIENT
Start: 2025-03-26

## 2025-04-02 ENCOUNTER — TELEPHONE (OUTPATIENT)
Dept: UROLOGY | Facility: CLINIC | Age: 70
End: 2025-04-02
Payer: MEDICARE

## 2025-04-03 ENCOUNTER — OFFICE VISIT (OUTPATIENT)
Dept: UROLOGY | Facility: CLINIC | Age: 70
End: 2025-04-03
Payer: MEDICARE

## 2025-04-03 VITALS
HEIGHT: 74 IN | WEIGHT: 253.5 LBS | DIASTOLIC BLOOD PRESSURE: 81 MMHG | SYSTOLIC BLOOD PRESSURE: 153 MMHG | BODY MASS INDEX: 32.53 KG/M2 | RESPIRATION RATE: 12 BRPM | HEART RATE: 78 BPM | OXYGEN SATURATION: 100 %

## 2025-04-03 DIAGNOSIS — N52.01 ERECTILE DYSFUNCTION DUE TO ARTERIAL INSUFFICIENCY: ICD-10-CM

## 2025-04-03 DIAGNOSIS — C64.1 RENAL CANCER, RIGHT: ICD-10-CM

## 2025-04-03 DIAGNOSIS — R97.20 ELEVATED PSA: ICD-10-CM

## 2025-04-03 DIAGNOSIS — Z87.438 HISTORY OF BENIGN PROSTATIC HYPERPLASIA: Primary | ICD-10-CM

## 2025-04-03 DIAGNOSIS — R79.89 LOW TESTOSTERONE: ICD-10-CM

## 2025-04-03 RX ORDER — TAMSULOSIN HYDROCHLORIDE 0.4 MG/1
CAPSULE ORAL
Qty: 90 CAPSULE | Refills: 3 | Status: SHIPPED | OUTPATIENT
Start: 2025-04-03

## 2025-04-03 RX ORDER — PAPAV/PHENTOLAM/ALPROST/WATER 12-1-10/ML
VIAL (ML) INTRACAVERNOSAL DAILY PRN
Qty: 5 ML | Refills: 11 | Status: SHIPPED | OUTPATIENT
Start: 2025-04-03 | End: 2025-04-03

## 2025-04-03 RX ORDER — SILDENAFIL 100 MG/1
100 TABLET, FILM COATED ORAL
Qty: 30 TABLET | Refills: 0 | Status: SHIPPED | OUTPATIENT
Start: 2025-04-03

## 2025-04-03 RX ORDER — PAPAV/PHENTOLAM/ALPROST/WATER 12-1-10/ML
VIAL (ML) INTRACAVERNOSAL DAILY PRN
Qty: 5 ML | Refills: 11 | Status: SHIPPED | OUTPATIENT
Start: 2025-04-03

## 2025-04-03 RX ORDER — SILDENAFIL 100 MG/1
100 TABLET, FILM COATED ORAL
Qty: 30 TABLET | Refills: 0 | Status: SHIPPED | OUTPATIENT
Start: 2025-04-03 | End: 2025-04-03

## 2025-04-03 NOTE — PROGRESS NOTES
"[facility] - Urology   Clinic Note    SUBJECTIVE:     Chief Complaint: ED, BPH    History of Present Illness:  Adrian Burt is a 70 y.o. male who presents to clinic for ED and BPH. He is new to our clinic referred by Dr. Rodriguez. Referral from Mone Brown MD.    Previously saw Dr. Álvarez, underwent right robotic partial nephrectomy in 2022. Path showed RCC (subtype unspecified on path report.) He follows with Dr. Chávez ([facility]) for surveillance of this.    He uses Viagra 100 mg and Trimix (30-2-30) for ED, which works well. Cialis was less effective.    History of elevated PSA - last in 06/2024 was 7.1, highest on record. MRI prostate in 2022 showed 122 cc prostate, PIRADS2.     LUTS well controlled on Flomax.       OBJECTIVE:     Estimated body mass index is 32.55 kg/m² as calculated from the following:    Height as of this encounter: 6' 2" (1.88 m).    Weight as of this encounter: 115 kg (253 lb 8.5 oz).    Vital Signs (Most Recent)  Pulse: 78 (04/03/25 1457)  Resp: 12 (04/03/25 1457)  BP: (!) 153/81 (04/03/25 1457)  SpO2: 100 % (04/03/25 1457)    Physical Exam  Vitals reviewed.   Constitutional:       Appearance: Normal appearance.   HENT:      Head: Normocephalic and atraumatic.   Cardiovascular:      Rate and Rhythm: Normal rate.   Abdominal:      General: Abdomen is flat. There is no distension.      Tenderness: There is no abdominal tenderness.   Musculoskeletal:         General: Normal range of motion.   Skin:     General: Skin is warm and dry.   Neurological:      General: No focal deficit present.      Mental Status: He is alert and oriented to person, place, and time.   Psychiatric:         Mood and Affect: Mood normal.         Behavior: Behavior normal.         Thought Content: Thought content normal.         Judgment: Judgment normal.         Lab Results   Component Value Date    BUN 16 02/11/2025    CREATININE 1.1 02/11/2025    WBC 4.24 02/11/2025    HGB 13.0 (L) 02/11/2025    HCT 42.7 " 02/11/2025     02/11/2025    AST 31 02/11/2025    ALT 37 02/11/2025    ALKPHOS 60 02/11/2025    ALBUMIN 3.8 02/11/2025    HGBA1C 6.4 (H) 02/11/2025        Lab Results   Component Value Date    PSA 5.9 (H) 01/18/2023    PSA 5.2 (H) 04/07/2021    PSA 2.1 09/10/2018    PSA 1.8 07/05/2017    PSA 2.6 11/23/2015    PSA 7.2 (H) 12/15/2008    PSA 0.6 02/22/2005    PSAFREE 2.09 (H) 06/13/2024    PSAFREE 0.76 03/12/2024    PSAFREE 0.92 07/24/2023    PSAFREE 1.32 05/01/2023    PSAFREE 1.58 (H) 12/06/2022    PSAFREE 1.36 06/13/2022    PSAFREEPCT 29.44 06/13/2024    PSAFREEPCT 26.21 03/12/2024    PSAFREEPCT 20.44 07/24/2023    PSAFREEPCT 19.70 05/01/2023    PSAFREEPCT 27.24 12/06/2022    PSAFREEPCT 20.92 06/13/2022       ASSESSMENT     1. History of benign prostatic hyperplasia    2. Low testosterone    3. Erectile dysfunction due to arterial insufficiency    4. Renal cancer, right    5. Elevated PSA      PLAN:   1. History of benign prostatic hyperplasia  -     tamsulosin (FLOMAX) 0.4 mg Cap; TAKE ONE CAPSULE BY MOUTH DAILY AT 9 AM  Dispense: 90 capsule; Refill: 3    2. Low testosterone  Overview:  Reviewed risk of elevated PSA, prostate cancer and testosterone replacement therapy.  Testosterone refilled 01/2023, stopped 5/2023 after T normal 1/23 when off TRT    Restarted 7/2023 due to fatigue, T 213    Orders:  -     Ambulatory referral/consult to Urology    3. Erectile dysfunction due to arterial insufficiency  Overview:  Trimix 30/2/20     Orders:  -     pep injection; Inject 0.2 ml as directed (20 units).      For compounding pharmacy use:   Add PAPAVERINE 30 mcg  Add PHENTOLAMINE 2 mg  Add ALPROSTADIL 30 mcg  Dispense: 1 vial; Refill: 5    4. Renal cancer, right  -     E-Consult to Genetics    5. Elevated PSA  Overview:  MRI prostate 6/22: volume 122, no lesions, PIRADS 2   On TRT, stopped 5/2022   Restarted 7/2023 due to fatigue, T 213        Orders:  -     Prostate Specific Antigen, Diagnostic; Future; Expected  date: 10/03/2025  -     MRI Prostate W W/O Contrast; Future; Expected date: 04/03/2025    Other orders  -     Discontinue: sildenafiL (VIAGRA) 100 MG tablet; Take 1 tablet (100 mg total) by mouth as needed for Erectile Dysfunction.  Dispense: 30 tablet; Refill: 0  -     sildenafiL (VIAGRA) 100 MG tablet; Take 1 tablet (100 mg total) by mouth as needed for Erectile Dysfunction.  Dispense: 30 tablet; Refill: 0  -     Discontinue: Papaverine 300mg (30mg/mL), PGE 100mcg (10mcg/mL), Phentolamine 10mg (1mg/mL) ICAV injection; by Intracavernosal route daily as needed (daily prn). Inject directed amount into side of penis (discuss with your physician)  Dispense: 5 mL; Refill: 11  -     Discontinue: Papaverine 300mg (30mg/mL), PGE 100mcg (10mcg/mL), Phentolamine 10mg (1mg/mL) ICAV injection; by Intracavernosal route daily as needed (daily prn). Inject directed amount into side of penis (discuss with your physician)  Dispense: 5 mL; Refill: 11  -     Papaverine 300mg (30mg/mL), PGE 100mcg (10mcg/mL), Phentolamine 10mg (1mg/mL) ICAV injection; by Intracavernosal route daily as needed (daily prn). Inject directed amount into side of penis (discuss with your physician)  Dispense: 5 mL; Refill: 11     Continue Viagra and Trimix (30 mg - 2 mg - 30 mcg); Rx sent for both  Rx for Flomax refilled.  Repeat PSA and repeat MRI prostate.  F/u with [facility] for RCC surveillance. Given strong family history of RCC, referral to Genetics placed.    Rodger Thompson MD     Letter to Mone Brown MD

## 2025-04-07 ENCOUNTER — E-CONSULT (OUTPATIENT)
Dept: GENETICS | Facility: CLINIC | Age: 70
End: 2025-04-07
Payer: MEDICARE

## 2025-04-07 DIAGNOSIS — Z85.528 HISTORY OF RENAL CELL CANCER: Primary | ICD-10-CM

## 2025-04-07 NOTE — CONSULTS
"[facility]  Response for E-Consult     Patient Name: Adrian Burt  MRN: [id number]  Primary Care Provider: Mone Brown MD   Requesting Provider: Rodger Thompson MD  E-Consult to Genetics  Consult performed by: Iraida Au MD  Consult ordered by: Rodger Thompson MD          After evaluation of your eConsult clinical questions, I believe the patient should be scheduled for an office visit in our specialty due to the need for 3-generation family history, genetics intake, and genetic counseling as well as test selection. Please refer to Cancer Genetics.    To refer a patient to Cancer Genetics:   -- Open referral [id number]   -- Primary reason for referral: [facility] "Cancer Genetics"  -- Comments: Family history of breast cancer, etc  .    Total time of Consultation: 5 minutes    *This eConsult is based on the clinical data available to me and is furnished without benefit of a physician examination.  The eConsult will need to be interpreted in light of any clinical issues of changes in patient status not available to me at the time rime of filing this eConsults.  Significant changes in patient condition of level of acuity should result in a referral for in person consultation and reevaluation.  Please alert me if you have any furth questions.     Thank you for this eConsult referral.     MD Jose Busby [facility]      "

## 2025-04-08 ENCOUNTER — PATIENT MESSAGE (OUTPATIENT)
Dept: ADMINISTRATIVE | Facility: OTHER | Age: 70
End: 2025-04-08
Payer: MEDICARE

## 2025-04-08 DIAGNOSIS — C64.1 RENAL CANCER, RIGHT: Primary | ICD-10-CM

## 2025-04-17 ENCOUNTER — TELEPHONE (OUTPATIENT)
Dept: UROLOGY | Facility: CLINIC | Age: 70
End: 2025-04-17
Payer: MEDICARE

## 2025-04-17 NOTE — TELEPHONE ENCOUNTER
Spoke with Madelyn Roberson at [facility] who transferred me Julius, pharmacist regarding pt's medication sildenafil dosing instructions. Take 1 tablet (100 mg total) by mouth as needed for Erectile Dysfunction. All questions answered. No further concerns noted at this time.

## 2025-04-21 ENCOUNTER — PATIENT MESSAGE (OUTPATIENT)
Dept: HEMATOLOGY/ONCOLOGY | Facility: CLINIC | Age: 70
End: 2025-04-21
Payer: MEDICARE

## 2025-04-21 ENCOUNTER — TELEPHONE (OUTPATIENT)
Dept: HEMATOLOGY/ONCOLOGY | Facility: CLINIC | Age: 70
End: 2025-04-21
Payer: MEDICARE

## 2025-04-21 NOTE — TELEPHONE ENCOUNTER
Called patient and left message to confirm the in office appointment on 4/28/25 and to complete the genetic questionnaire

## 2025-04-23 DIAGNOSIS — J30.1 SEASONAL ALLERGIC RHINITIS DUE TO POLLEN: ICD-10-CM

## 2025-04-23 RX ORDER — FLUTICASONE PROPIONATE 50 MCG
1 SPRAY, SUSPENSION (ML) NASAL DAILY
Qty: 48 G | Refills: 3 | Status: SHIPPED | OUTPATIENT
Start: 2025-04-23

## 2025-04-23 NOTE — TELEPHONE ENCOUNTER
Refill Decision Note   Adrian Burt  is requesting a refill authorization.  Brief Assessment and Rationale for Refill:  Approve     Medication Therapy Plan:         Comments:     Note composed:10:29 AM 04/23/2025

## 2025-04-23 NOTE — TELEPHONE ENCOUNTER
No care due was identified.  [facility] Embedded Care Due Messages. Reference number: [id number].   4/23/2025 7:04:27 AM CDT

## 2025-04-28 ENCOUNTER — OFFICE VISIT (OUTPATIENT)
Dept: HEMATOLOGY/ONCOLOGY | Facility: CLINIC | Age: 70
End: 2025-04-28
Payer: MEDICARE

## 2025-04-28 ENCOUNTER — LAB VISIT (OUTPATIENT)
Dept: LAB | Facility: HOSPITAL | Age: 70
End: 2025-04-28
Attending: STUDENT IN AN ORGANIZED HEALTH CARE EDUCATION/TRAINING PROGRAM
Payer: MEDICARE

## 2025-04-28 VITALS
DIASTOLIC BLOOD PRESSURE: 82 MMHG | HEIGHT: 74 IN | WEIGHT: 248.88 LBS | SYSTOLIC BLOOD PRESSURE: 149 MMHG | HEART RATE: 72 BPM | RESPIRATION RATE: 18 BRPM | OXYGEN SATURATION: 99 % | BODY MASS INDEX: 31.94 KG/M2 | TEMPERATURE: 98 F

## 2025-04-28 DIAGNOSIS — C64.1 RENAL CANCER, RIGHT: ICD-10-CM

## 2025-04-28 DIAGNOSIS — R91.8 PULMONARY NODULES: ICD-10-CM

## 2025-04-28 DIAGNOSIS — Z80.51 FAMILY HISTORY OF KIDNEY CANCER: ICD-10-CM

## 2025-04-28 DIAGNOSIS — Z80.42 FAMILY HISTORY OF PROSTATE CANCER: ICD-10-CM

## 2025-04-28 DIAGNOSIS — Z85.528 HISTORY OF RENAL CELL CANCER: Primary | ICD-10-CM

## 2025-04-28 DIAGNOSIS — Z85.528 HISTORY OF RENAL CELL CANCER: ICD-10-CM

## 2025-04-28 DIAGNOSIS — Z13.71 ENCOUNTER FOR NONPROCREATIVE GENETIC COUNSELING AND TESTING: Primary | ICD-10-CM

## 2025-04-28 DIAGNOSIS — Z80.3 FAMILY HISTORY OF BREAST CANCER: ICD-10-CM

## 2025-04-28 DIAGNOSIS — R79.89 LOW TESTOSTERONE: ICD-10-CM

## 2025-04-28 DIAGNOSIS — R97.20 ELEVATED PSA: ICD-10-CM

## 2025-04-28 DIAGNOSIS — Z71.83 ENCOUNTER FOR NONPROCREATIVE GENETIC COUNSELING AND TESTING: Primary | ICD-10-CM

## 2025-04-28 LAB
ABSOLUTE EOSINOPHIL (OHS): 0.23 K/UL
ABSOLUTE MONOCYTE (OHS): 0.41 K/UL (ref 0.3–1)
ABSOLUTE NEUTROPHIL COUNT (OHS): 1.39 K/UL (ref 1.8–7.7)
ALBUMIN SERPL BCP-MCNC: 3.5 G/DL (ref 3.5–5.2)
ALP SERPL-CCNC: 58 UNIT/L (ref 40–150)
ALT SERPL W/O P-5'-P-CCNC: 26 UNIT/L (ref 10–44)
ANION GAP (OHS): 6 MMOL/L (ref 8–16)
AST SERPL-CCNC: 24 UNIT/L (ref 11–45)
BASOPHILS # BLD AUTO: 0.02 K/UL
BASOPHILS NFR BLD AUTO: 0.5 %
BILIRUB SERPL-MCNC: 0.5 MG/DL (ref 0.1–1)
BUN SERPL-MCNC: 20 MG/DL (ref 8–23)
CALCIUM SERPL-MCNC: 8.8 MG/DL (ref 8.7–10.5)
CHLORIDE SERPL-SCNC: 107 MMOL/L (ref 95–110)
CO2 SERPL-SCNC: 25 MMOL/L (ref 23–29)
CREAT SERPL-MCNC: 1 MG/DL (ref 0.5–1.4)
ERYTHROCYTE [DISTWIDTH] IN BLOOD BY AUTOMATED COUNT: 13.2 % (ref 11.5–14.5)
GFR SERPLBLD CREATININE-BSD FMLA CKD-EPI: >60 ML/MIN/1.73/M2
GLUCOSE SERPL-MCNC: 94 MG/DL (ref 70–110)
HCT VFR BLD AUTO: 38.2 % (ref 40–54)
HGB BLD-MCNC: 11.9 GM/DL (ref 14–18)
IMM GRANULOCYTES # BLD AUTO: 0.01 K/UL (ref 0–0.04)
IMM GRANULOCYTES NFR BLD AUTO: 0.2 % (ref 0–0.5)
LDH SERPL-CCNC: 186 U/L (ref 110–260)
LYMPHOCYTES # BLD AUTO: 2.1 K/UL (ref 1–4.8)
MCH RBC QN AUTO: 27.5 PG (ref 27–31)
MCHC RBC AUTO-ENTMCNC: 31.2 G/DL (ref 32–36)
MCV RBC AUTO: 88 FL (ref 82–98)
NUCLEATED RBC (/100WBC) (OHS): 0 /100 WBC
PLATELET # BLD AUTO: 188 K/UL (ref 150–450)
PMV BLD AUTO: 10.7 FL (ref 9.2–12.9)
POTASSIUM SERPL-SCNC: 4.1 MMOL/L (ref 3.5–5.1)
PROT SERPL-MCNC: 7.2 GM/DL (ref 6–8.4)
PSA SERPL-MCNC: 6.18 NG/ML
RBC # BLD AUTO: 4.32 M/UL (ref 4.6–6.2)
RELATIVE EOSINOPHIL (OHS): 5.5 %
RELATIVE LYMPHOCYTE (OHS): 50.5 % (ref 18–48)
RELATIVE MONOCYTE (OHS): 9.9 % (ref 4–15)
RELATIVE NEUTROPHIL (OHS): 33.4 % (ref 38–73)
SODIUM SERPL-SCNC: 138 MMOL/L (ref 136–145)
TESTOST SERPL-MCNC: 267 NG/DL (ref 304–1227)
WBC # BLD AUTO: 4.16 K/UL (ref 3.9–12.7)

## 2025-04-28 PROCEDURE — 83615 LACTATE (LD) (LDH) ENZYME: CPT

## 2025-04-28 PROCEDURE — 85025 COMPLETE CBC W/AUTO DIFF WBC: CPT

## 2025-04-28 PROCEDURE — 36415 COLL VENOUS BLD VENIPUNCTURE: CPT

## 2025-04-28 PROCEDURE — 99999 PR PBB SHADOW E&M-EST. PATIENT-LVL II: CPT | Mod: PBBFAC,,,

## 2025-04-28 PROCEDURE — 84403 ASSAY OF TOTAL TESTOSTERONE: CPT

## 2025-04-28 PROCEDURE — 84153 ASSAY OF PSA TOTAL: CPT

## 2025-04-28 PROCEDURE — 99999 PR PBB SHADOW E&M-EST. PATIENT-LVL IV: CPT | Mod: PBBFAC,,, | Performed by: INTERNAL MEDICINE

## 2025-04-28 PROCEDURE — 80053 COMPREHEN METABOLIC PANEL: CPT

## 2025-04-28 NOTE — PROGRESS NOTES
"Cancer Genetics  Hereditary and High-Risk Clinic  Department of Hematology and Oncology  [facility]    [facility]    Date of Service:  25  Visit Provider:  Edwar Hernandez, [facility] [place] [facility] [place]  Collaborating Physician:  Yasir Mcconnell MD    Patient ID  Name: Adrian Burt    : 1955    MRN: [id number]      Referring Provider:   Andrew Early  No address on file    Visit Information  The patient location is:  [facility].    The chief complaint leading to consultation is:  As below.    Visit type: in-person  Face-to-face time with patient: 33 minutes.    60 minutes in total were spent on this encounter, which includes face-to-face time and non-face-to-face time preparing to see the patient (e.g., review of tests), obtaining and/or reviewing separately obtained history, documenting clinical information in the electronic or other health record, independently interpreting results (not separately reported) and communicating results to the patient/family/caregiver, or coordinating care (not separately reported).      IMRALAINA Burt is a 70 y.o. yo male who was referred to genetic counseling due to his personal and family history of renal cell carcinoma, for which he meets NCCN guidelines for testing (personal history of renal cell carcinoma and one first degree relative diagnosed with renal cell carcinoma). A sample will be submitted to [facility] on 25 for the xG+ panel, along with insurance information. Results will be available within 2-3 weeks of sample submission.    SUBJECTIVE      Chief Complaint: Genetic evaluation (personal and family history of renal cell carcinoma)    History of Present Illness (HPI):  Adrian Burt ("Adrian"), 70 y.o., assigned male sex at birth, is established with the [facility] but new to me.  He was referred by Rodger Thompson MD  (urology) for hereditary cancer risk assessment " "given his personal and family history of renal cell carcinoma.    Age:  70 y.o.   Race and ethnicity:  Black or , Not  or /a  Weight:  248 lb  Height:  6'2"  Previous germline cancer genetic testing:  No    Cancer History  Clear cell renal cell carcinoma (2022)  S/p right partial nephrectomy        GI History  Upper GI screening: No  Most recent colonoscopy: 23  Colon polyp:  Yes   Four 5 mm polyps in the descending colon, in the distal transverse colon and in the ascending colon; tubular adenomas  : Three 3 to 4 mm polyps at the splenic flexure and at the hepatic flexure; pathology unavailable  : Two 5 mm polyps in the sigmoid colon and in the transverse colon; pathology unavailable  Pancreatitis:  No    Prostate History  PSA: 24: 7.1  VALENTINO: N/A    Dermatologic History  No issues reported  Abnormal moles/lesions: small facial bumps  Skin cancer screening: No    Focused Social History  Tobacco Use: Medium Risk (4/3/2025)    Patient History     Smoking Tobacco Use: Former     Smokeless Tobacco Use: Never     Passive Exposure: Not on file     Review of Systems   Patient's Distress Score today was 0/10 (with 10 being the worst).  Patient attributes this to general life stress. Has talked to family about genetics Patient denies experiencing suicidal or homicidal ideations (SI/HI).       FAMILY HISTORY      ONCOLOGY PEDIGREE  Ashkenazi Restorationism:  No  Consanguinity:  Parents maybe 5th or 6th cousins  Hereditary cancer genetic testing in blood relatives:  Sister diagnosed with breast cancer possibly had genetic testing which was negative, report unavailable    Family Cancer Pedigree:  Pedigree Image    Adrian reported his  family history of cancer as follows:   Sister ( at 68y) diagnosed with kidney cancer at 48y  Sister (66y) diagnosed with breast cancer at 61y  Maternal first cousin (78y) diagnosed with prostate cancer at 73y  Maternal grandfather ( at 89y) " diagnosed with prostate cancer  Maternal grandmother (59y) diagnosed with breast cancer     A family history of kidney disease of unknown type was reported in niece, siblings, and mother. Birth defects, intellectual disability, SIDS, sudden early death, multiple miscarriages and consanguinity were denied. Please refer to above pedigree for further details. A larger copy is available for review in the Media tab.     COUNSELING      Pre-test cancer genetic counseling was conducted.        Causes of Cancer:  Cancer occurs when cells grow out of control. Some genes help protect against cancer by controlling the growth of cells. However, mutations (problems) in these genes can prevent them from working properly, increasing the risk of developing cancer.  Sporadic Cancer: Most people who get cancer have sporadic cancer. Sporadic cancer is caused by mutations that occur during the lifetime. These mutations may be caused by aging, environmental exposures (ex. UV radiation, carcinogens), lifestyle (ex. smoking, drinking alcohol, sunbathing, poor diet), other medical conditions (ex. hepatitis, HPV, ulcerative colitis), or other factors.   Hereditary Cancer: A small percentage (5-10%) of people who develop cancer were born with a mutation already in one of the cancer protection genes.  Familial Cancer: Cancer can also cluster in families that do not have an identifiable mutation. This may be due to shared environmental or lifestyle factors or genetic risk factors that have not been identified or cannot be detected using current technology or panels.     The likelihood of having a hereditary cancer risk depends on many factors including who in the family had cancer, what type of cancer they had, their age at diagnosis, cancer specifics (such as MMR status of an endometrial or colon cancer or type of breast cancer), and previous genetic testing in the family. Typically, the chance is higher for families that have multiple people  with the same or related cancers, an individual with multiple cancers, younger ages of cancer, and certain types of cancer (such as pancreatic or triple negative breast cancer).      Possible Results:    Positive (pathogenic or likely pathogenic variant): A genetic variant was found that is suspected or known to impact the function of the gene. The impact of a positive result on an individual's risk of cancer varies based on the gene, specific variant, individual's sex assigned at birth, personal cancer history, other health history (such as surgical history), and family history. A positive result can impact screening and risk management recommendations. However, there are not always available guidelines for management based on a specific gene variant. Family history and personal risk factors should always be considered. Sometimes, a positive result can also have treatment or reproductive implications.   Negative: No clinically significant variants were reported in the tested genes. A negative result does not indicate that an individual cannot develop cancer or even that the individual is at average risk. An individual may still be at an increased risk for cancer based on personal risk factors or family history. Additionally, there could be a hereditary cancer predisposition that was not included in a chosen panel or is not detected with current technology.   Variant of Uncertain Significance (VUS): A variant was found. However, the lab does not have enough information to determine if the variant is benign (harmless) or pathogenic (impacts the function of the gene). The laboratory may update (reclassify) the variant over time as more information becomes available. When reclassified, most variants of uncertain significance are reclassified to benign/likely benign. Typically, it is not recommended to  based on the presence of a VUS. The chance of finding a VUS varies based on the test performed.  Generally, the chance of finding a VUS increases with the number of genes tested and decreases with the amount of testing of that gene (genes that are tested more frequently or for a longer period of time have a lower VUS rate).     Genetic Mutation Inheritance:  When an individual has a gene mutation, their first-degree relatives (parents, children, and siblings) each have a 50% chance of carrying the same mutation. Other, more distant blood relatives can also be at risk of carrying the same mutation. At-risk relatives of an individual with a mutation should consider genetic testing to help determine their risk for cancer.     Genetic Discrimination: The Genetic Information Nondiscrimination Act of 2008 (MICHAEL) is a [place] federal law that provides some protections against the use of an individual's genetic information by their health insurer and by their employer. Title I of MICHAEL prohibits most health insurers (except for insurance obtained through a job with the  or the Federal Employees Health Benefits Plan) from utilizing an individual's genetic information to make decisions regarding insurance eligibility or premium charges. Title II of MICHAEL prohibits covered entities from requesting or requiring the genetic information of employees and applicants and from using said information to make employment decisions. This does not apply to employers with fewer than 15 employees or to the .  MICHAEL also does not protect individuals from genetic discrimination by any other type of policy or entity, including but not limited to life insurance, disability insurance, long-term care insurance,  benefits, and [facility] benefits.    Genetic Testing Options:   Various genetic testing panel options were discussed along with associated benefits, limitations, and risks.   There are several issues to consider regarding multi-gene testing:  Insurance coverage can vary depending on the genetic test  panel(s) ordered.  There are limited data and a lack of clear guidelines regarding degree of cancer risk associated with some of the genes assessed and how to communicate and manage risk for carriers of these genes; this issue is compounded by the low incidence rates of hereditary disease; multi-gene tests include moderate-penetrance genes, and they often also include low-penetrance genes for which there are little available data regarding degree of cancer risk and guidelines for risk management.  Increased likelihood of detecting a genetic variant of unknown significance (VUS).  Increased chance of finding genotypically distinct cell lines (i.e., genetic mosaicism) with next-generation sequencing; clones of non-cancerous cell containing certain genetic mutations have been found in healthy adults undergoing multi-gene testing; this phenomenon can often be attributed to clonal hematopoiesis, a condition in which a hematopoietic stem cell begins making blood cells with the same acquired mutation.    The option for DNA only vs DNA+RNA was discussed. Adding RNA has a small chance of helping to clarify a VUS or detecting genetic variants that DNA only cannot (deep intronic variants). However, it must be conducted on a blood sample (not saliva).     Genetic Testing Guidelines: Adrian's reported personal/family history meets the genetic testing criteria established by the National [employer] Kidney Cancer 3.2025  Therefore, Adrian was offered germline hereditary cancer genetic testing. Adrian decided to proceed with testing after a discussion regarding various genetic testing panels that could be performed as well as associated risks. Adrian has provided informed consent.     ASSESSMENT / PLAN      Genetic Testing Logistics:  An outside laboratory performs this genetic testing. Genetic testing typically takes 2-3 weeks to after the sample has been received.  There is a potential for the patient to have  out-of-pocket costs related to genetic testing.  Post-test genetic counseling can be conducted once the genetic testing results are available.    Genetic Test Information:  Testing lab: [facility]   Test panel: xG+   ICD-10 code(s): C64.1, Z80.51, Z80.3, Z80.42   Verbal informed consent: Obtained   Specimen type: Blood  (Patient denies blood disorders that would necessitate a skin fibroblast specimen)   Specimen collection by: [facility]   Specimen collection date: 4/28/25   Results expected by: Approximately 2-3 weeks after the genetic testing lab receives the specimen   Results disclosure plan: Post-test visit if positive or complex result; otherwise, results will be communicated by phone call      Follow-up: Post-test genetic counseling will be conducted once the genetic testing results are available.    Adrian appeared to have a good understanding of the information. Questions were encouraged and answered to the patient's satisfaction, and he verbalized understanding of the information and agreement with the plan.   Adrian is welcome to contact me if he has any questions, concerns, or updates to his family history.     This assessment is based on the history and reports provided, as well as the current scientific knowledge regarding cancer genetics.     Edwar Hernandez, MGJESSICA, [facility] [place]  Certified Genetic Counselor  Department of Hematology and Oncology  [facility]    CC:  Provider Sharath

## 2025-04-28 NOTE — PROGRESS NOTES
Subjective     Patient ID: Adrian Burt is a 70 y.o. male.    Chief Complaint: History of renal cell cancer    HPI    Returns for follow up   Surveillance scans due- states never scheduled  Denies new issues  No weight changes  No pain issues  Reports energy level is down but testosterone shots stopped - awaiting new Urologists orders  Needs new PSA     Diagnosis: Pulmonary nodules, Right RCC pT1a pNX pMX   Oncology History:  - renal mass discovered incidentally; he had a fleeting pain that resolved but he mentioned to his PCP several months later leading to below imaging     - 3/24/2022 Renal ultrasound:  FINDINGS:  Right kidney: The right kidney measures 13.5 cm. No cortical thinning. No loss of corticomedullary distinction. Resistive index measures 0.61.  Heterogeneous lesion measuring 2.4 x 2.3 x 2.7 cm in the upper pole.  No internal Doppler signal.  1.5 x 1.6 x 1.5 cm hyperechoic lesion in the midpole.  Subcentimeter simple cyst in the lower pole.  No renal stone. No hydronephrosis.  Left kidney: The left kidney measures 12.9 cm. No cortical thinning. No loss of corticomedullary distinction. Resistive index measures 0.49.  No mass. No renal stone. No hydronephrosis.  Spleen resistive index measures 0.54.  The bladder is partially distended at the time of scanning and has an unremarkable appearance.  Prostate is enlarged measuring 6.8 x 5.4 x 5.8 cm (previously 5.1 x 4.4 x 4.5 cm).  Impression:  1. 2.7 cm heterogeneous lesion in the upper pole right kidney, which could represent a complex cyst or neoplasm.  2. 1.6 cm hyperechoic lesion in the midpole right kidney, which could represent an angiomyolipoma or other fat containing lesion such as renal cell carcinoma.  3.  No hydronephrosis or shadowing calculus.  4. Prostatomegaly.  RECOMMENDATIONS:  Renal mass protocol CT or MRI for further evaluation of right renal lesions.     - 3/28/2022 CT Abd/Pelvis:  FINDINGS:  Heart: Normal in size. No pericardial  effusion.  Lung Bases: Well aerated, without consolidation or pleural fluid.  Liver: Normal in size and attenuation, with no focal hepatic lesions.  Gallbladder: No calcified gallstones.  Bile Ducts: No evidence of dilated ducts.  Pancreas: No mass or peripancreatic fat stranding.  Spleen: Unremarkable.  Adrenals: Unremarkable.  Kidneys/ Ureters: There is a 3 cm complex hypodense lobulated lesion with multiple septations arising from the upper pole of the right kidney.  Differential considerations would include complex cyst versus cystic neoplasm.  In the interpolar aspect there is a solid mass with enhancement measuring 12 mm roughly corresponding to the echogenic lesion seen on the ultrasound.  No definite macroscopic fat attenuation is seen on the noncontrast series and is not able to be delineated from adjacent renal parenchyma on the noncontrast study.  No stones, solid mass, or hydronephrosis on the left.  Bladder: No evidence of wall thickening.  Reproductive organs: Prostate markedly enlarged with heterogeneous enhancement pattern.  GI Tract/Mesentery: No evidence of bowel obstruction or inflammation.  Peritoneal Space: No ascites. No free air.  Retroperitoneum: No significant adenopathy.  Abdominal wall: Unremarkable.  Vasculature: No significant atherosclerosis or aneurysm.  Bones: No acute fracture.  Impression:  12 mm right renal solid mass concerning for malignancy until proven otherwise.  Further evaluation as warranted.  3 cm complex lesion upper pole right kidney at minimum warrants sonographic surveillance 6 months.     - 4/12/2022 MRI Abdomen:  FINDINGS:  Pulmonary Bases: No pleural effusion  Liver: normal.  Bile Ducts: normal  Gallbladder: normal  Pancreas: normal.  Spleen: normal.  Adrenal Glands: normal.  Proximal Gastrointestinal tract/stomach: Normal.  Kidneys: Septated hyperintense T2 lesion upper pole right kidney 3.6 x 3.2 x 2.7 cm.  Subtle enhancement of the septations noted on  postcontrast images, renal cell cystic carcinoma cannot be excluded.  There is a very small lesion at the midpole of the right kidney, measuring approximately 12 mm demonstrating postcontrast enhancement concerning for renal cell carcinoma, it measures 1.4 cm series 1402, image 67  Aorta and Abdominal Vasculature: normal.  Mesentery: normal  Lymph nodes: no pathologically enlarged lymph nodes are seen.  Body wall: normal  Osseous structures: No lesion seen  Other: No free fluid/ascites.  Impression:  Predominantly cystic lesion at the upper pole of the right kidney with subtly enhancing septations, a cystic renal cell carcinoma cannot be excluded.  Subtle small intrarenal lesion at the midpole of the right kidney demonstrating postcontrast enhancement, a solid renal cell carcinoma cannot be excluded.  This report was flagged in Epic as abnormal.     - 5/9/2022 Surgical nephrectomy:  1.  Retroperitoneal robotic assisted laparoscopic right partial nephrectomy (modifier 22 for complex anatomy, 2 masses, >180% expected time)  2.  Intraoperative ultrasound with interpretation.  1. FINAL RESECTION MARGIN OF RIGHT UPPER POLE MASS:   RENAL PARENCHYMA WITH NO NEOPLASIA IDENTIFIED   2. RIGHT UPPER POLE MASS:   INNOCUOUS BENIGN MULTILOCULAR CYST   3. MASS FROM MID RIGHT  KIDNEY :   RENAL CELL CARCINOMA WITH NO INVOLVEMENT OF MARGINS OR PERINEPHRIC ADIPOSE   TISSUE IDENTIFIED   2. This lesion is a benign multilocular cyst.  The cystic spaces have a   single layer of lining at the most.  There is no proliferative lining   identified in this entirely submitted specimen.  No area has formation of a   mass neoplasm.  There is no significant abnormality of renal parenchyma   separate from the cystic lesion.   Procedure :  Partial renal excision   Specimen Laterality :  Right   Tumor Focality :  Unifocal   Tumor Size :  1.2 cm   Histologic Type :  Renal cell carcinoma, clear cell variant   Tumor Extent :  Limited to kidney    Sarcomatoid Features :  Not identified   Rhabdoid Features :  Not identified   Tumor Necrosis :  Not identified   Margins ; no margin involvement identified   Regional Lymph Node Status :   Not applicable (no regional lymph nodes   submitted or found)   PATHOLOGIC STAGE CLASSIFICATION (pTNM, AJCC 8th Edition) : pT1a pNX pMX   Additional findings :  There is no significant abnormality of renal   parenchyma apart from the masses.     Additional imaging:  - 6/17/2022 MRI prostate:  FINDINGS:  Previous biopsy: None.  PSA: 6.5 ng/mL on 06/15/2022  Prior therapy: None.  Prostate: 6.2 x 6.0 x 6.4 cm corresponding to a computed volume of 122 cc.  Peripheral zone: No focal abnormalities with imaging features concerning for prostate cancer, score 1.  Transitional zone: Benign prostatic hyperplasia without focal suspicious abnormality, score 2.  Neurovascular bundle: Normal appearance.  Seminal vesicles: Normal appearance.  Adjacent Organ Involvement: No evidence for urinary bladder or rectal invasion.  Lymphadenopathy: None.  Other Findings: Diffuse marrow signal heterogeneity in keeping with red marrow hyperplasia.  Impression:  1. Benign prostatic hyperplasia.  2. Red marrow hyperplasia.  Overall Assessment: PI-RADS 2 - Low (clinically significant cancer is unlikely to be present)  Number of targets created for potential MR/US fusion biopsy  Peripheral zone: 0  Transition zone: 0     Recently had surveillance scans for RCC performed- results as below:  1/23/2023 CT Chest:  FINDINGS:  No intravenous contrast was administered for this examination.  Therefore, it may have diminished sensitivity for detection of certain abnormalities.  Thyroid gland: Within normal limits.  Trachea: Within normal limits.  Esophagus: Mildly patulous.  Cardiovascular: Normal heart size.No pericardial effusion.Coarse calcifications in the region of the aortic valve leaflets, correlate with aortic valve function.  There is mild calcific disease of  the coronary arteries.  Lymph nodes: None abnormally enlarged.  Lungs/pleura/airways:  Mild apical scarring.  There are several left lung nodules which include the followin mm in lingula (1988) 4 mm in the left lower lobe adjacent to the diaphragm (4-405, and 2 additional 5 mm left lower lobe nodules (4-318, 380). In addition, there is a 5 mm right lower lobe ground-glass nodule (4-299.  The latter is out of the field of view on the prior examination.  Upper abdomen:  Partially imaged.  Status post partial right nephrectomy.  No additional new significant disease.  Bones: Unremarkable for stated age.  Other: N/A  Impression:  1. There are several bilateral subcentimeter pulmonary nodules (measuring 5 mm and less) as described above.  At this time, these are indeterminate whether related to malignancy versus infection.  Attention on short-term follow-up imaging highly recommended.  2. Postsurgical changes related to right partial nephrectomy.  Unremarkable appearance of surgical bed.  3.  Other findings are as above.     - 2022 CT Abd/Pelvis:  FINDINGS:  Heart: No cardiomegaly or pericardial effusion.  Atherosclerosis of the aortic annulus.  Lung Bases: 0.5 cm solid nodule in the anteromedial basal segment of the left lower lobe (axial series 3, image 11).  Additional 0.4 cm pleural based nodule in the superior lingula (axial series 3, image 5).  Bilateral dependent atelectasis.  Liver: Hepatomegaly.  Punctate hypodensity in hepatic segment 4a, too small to characterize but stable from prior exam.  No new lesions identified.  Gallbladder: No calcified gallstones.  Bile Ducts: No dilatation.  Pancreas: No mass. No peripancreatic fat stranding.  Spleen: Unremarkable  Adrenals: Unremarkable  Kidneys/Ureters: Interval postsurgical changes of right partial nephrectomy.  There is mild soft tissue thickening in both surgical beds, likely scarring/fibrosis.  No evidence of recurrent lesion.  Subcentimeter  hypodensity in the upper pole of the right kidney, too small to characterize.  Left kidney is unremarkable.  No nephrolithiasis or hydronephrosis.  Bilateral ureters are normal in course and caliber.  Bladder: No wall thickening.  Reproductive organs: Prostatomegaly with mass effect on the posterior aspect of the bladder.  GI Tract/Mesentery: No evidence of bowel obstruction or inflammation.  Peritoneal Space: No ascites or free air.  Retroperitoneum: No significant adenopathy.  Abdominal wall: Unremarkable  Vasculature: No aneurysm.  Mild atherosclerosis of the descending aorta and its branches.  Bones: Multilevel degenerative change, similar to prior exams.  No acute fracture. No suspicious lytic or sclerotic lesions.  Impression:  Interval postsurgical changes of right partial nephrectomy x2.  No evidence of residual or recurrent lesion in the surgical beds.  Pulmonary nodules in the left lung, that were out of field of view on prior imaging.  Recommend attention on follow-up.  Comparison to any prior CT chest would be beneficial.  Prostatomegaly.     PMH:  Steel fragment removal from leg  HTN     FH:  Mother  ESRD - unclear reasons at 37 yo  Father  at age 67 yo, heart disease  Siblings - 2 sisters- 1  at age 68 from RCC  1  ESRD, EtOHism  DM  HTN  Maternal grandmother- breast cancer dies at age 59     SH:  Retired, construction work (office)  , 3 kids  Quit tobacco  (1 pack smoker)  Active                     Review of Systems   Constitutional:  Negative for activity change, appetite change, fatigue, fever and unexpected weight change.   Respiratory:  Negative for cough, shortness of breath and wheezing.    Cardiovascular:  Negative for chest pain, palpitations and leg swelling.   Gastrointestinal:  Negative for abdominal distention, abdominal pain, blood in stool, change in bowel habit, constipation, diarrhea, nausea, vomiting and reflux.   Genitourinary:  Negative for decreased  urine volume, difficulty urinating, frequency and urgency.   Musculoskeletal:  Negative for arthralgias, back pain and joint deformity.   Neurological:  Negative for dizziness, weakness, light-headedness, numbness and headaches.   Hematological:  Negative for adenopathy. Does not bruise/bleed easily.   Psychiatric/Behavioral:  Negative for dysphoric mood. The patient is not nervous/anxious.           Objective     Physical Exam  Vitals and nursing note reviewed.   Constitutional:       General: He is not in acute distress.     Appearance: Normal appearance. He is normal weight. He is not ill-appearing.      Comments: Presents alone  Very pleasant  ECOG= 0   Eyes:      General: No scleral icterus.     Extraocular Movements: Extraocular movements intact.      Conjunctiva/sclera: Conjunctivae normal.      Pupils: Pupils are equal, round, and reactive to light.   Cardiovascular:      Rate and Rhythm: Normal rate and regular rhythm.      Heart sounds: Normal heart sounds. No murmur heard.     No friction rub. No gallop.   Pulmonary:      Effort: Pulmonary effort is normal. No respiratory distress.      Breath sounds: Normal breath sounds. No wheezing, rhonchi or rales.   Abdominal:      General: Abdomen is flat. Bowel sounds are normal. There is no distension.      Palpations: Abdomen is soft. There is no mass.      Tenderness: There is no abdominal tenderness. There is no guarding or rebound.      Comments: No organomegaly   Musculoskeletal:         General: No swelling. Normal range of motion.      Cervical back: Normal range of motion and neck supple. No rigidity.      Right lower leg: No edema.      Left lower leg: No edema.   Lymphadenopathy:      Cervical: No cervical adenopathy.   Skin:     General: Skin is warm and dry.      Coloration: Skin is not jaundiced or pale.      Findings: No rash.   Neurological:      General: No focal deficit present.      Mental Status: He is alert and oriented to person, place, and  time. Mental status is at baseline.      Sensory: No sensory deficit.      Motor: No weakness.      Coordination: Coordination normal.      Gait: Gait normal.   Psychiatric:         Mood and Affect: Mood normal.         Behavior: Behavior normal.         Thought Content: Thought content normal.         Judgment: Judgment normal.        Assessment and Plan     1. History of renal cell cancer  Overview:  4 cm complex cystic lesion to posterior right upper pole, 1.1 cm right anterior solid mass on CT w/wo contrast 3/2022.  --MRI 4/22: right 3.6 cm UP cystic lesion ? Cystic RCC, right midpole 12mm solid mass  --Preop Cr 1, GFR >60  --S/p right retroperitioneal partial 5/9/22 (both masses removed)  --Path: upper pole lesion was cyst, midpole 1.2 cm [id number] CC RCC, negative margins  KATIE    Post op imaging:  CT/CXR 12/22: lower pulm nodules up to 5mm on CT but not on CXR, no recurrence.    CT 4/23: stable pulm nodules, no recurrence    Post op labs  6/22--Cr 1.2, GFR >60  12/22--Cr 1.2, GFR >60  4/23--1.1, GFR >60    Orders:  -     CBC W/ AUTO DIFFERENTIAL; Future; Expected date: 04/28/2025  -     CMP; Future; Expected date: 04/28/2025  -     LDH; Future; Expected date: 04/28/2025    2. Pulmonary nodules  -     CBC W/ AUTO DIFFERENTIAL; Future; Expected date: 04/28/2025  -     CMP; Future; Expected date: 04/28/2025  -     LDH; Future; Expected date: 04/28/2025        Right sided RCC  Clinically KATIE  Due for repeat scans    Pulmonary nodules noted on imaging- indeterminate-too small to biopsy or further characterize with other imaging modalities- surveillance imaging stable  Repeat scans     Route Chart for Scheduling    Med Onc Chart Routing  Urgent    Follow up with physician . Labs today- done-- CT scans asap and I will call to review and set up follow up   Follow up with MIK    Infusion scheduling note    Injection scheduling note    Labs    Imaging    Pharmacy appointment    Other referrals

## 2025-04-29 ENCOUNTER — PATIENT MESSAGE (OUTPATIENT)
Dept: UROLOGY | Facility: CLINIC | Age: 70
End: 2025-04-29
Payer: MEDICARE

## 2025-04-29 ENCOUNTER — RESULTS FOLLOW-UP (OUTPATIENT)
Dept: UROLOGY | Facility: CLINIC | Age: 70
End: 2025-04-29

## 2025-04-29 DIAGNOSIS — N52.01 ERECTILE DYSFUNCTION DUE TO ARTERIAL INSUFFICIENCY: ICD-10-CM

## 2025-04-29 DIAGNOSIS — R79.89 LOW TESTOSTERONE: ICD-10-CM

## 2025-04-29 RX ORDER — TESTOSTERONE CYPIONATE 200 MG/ML
200 INJECTION, SOLUTION INTRAMUSCULAR
Qty: 10 ML | Refills: 1 | OUTPATIENT
Start: 2025-04-29 | End: 2026-04-29

## 2025-04-29 NOTE — TELEPHONE ENCOUNTER
Patient will have to Establish care with Dr. Odonnell. Called Pt he sent the prescription request to the wrong provider.

## 2025-04-29 NOTE — TELEPHONE ENCOUNTER
Spoke with patient.  He is aware that we can not refill his testosterone until his MRI is obtained and reviewed.  Thanks, M

## 2025-04-30 ENCOUNTER — TELEPHONE (OUTPATIENT)
Dept: HEMATOLOGY/ONCOLOGY | Facility: CLINIC | Age: 70
End: 2025-04-30
Payer: MEDICARE

## 2025-04-30 DIAGNOSIS — Z80.51 FAMILY HISTORY OF KIDNEY CANCER: ICD-10-CM

## 2025-04-30 DIAGNOSIS — C64.1 RENAL CANCER, RIGHT: Primary | ICD-10-CM

## 2025-04-30 NOTE — TELEPHONE ENCOUNTER
----- Message from Layne sent at 4/30/2025 11:48 AM CDT -----  Regarding: Patient advice  Contact: Pt  [phone]  Caller: Adrian  Returning call to:  Dann  Treating Physician:  Shae Chávez MD  Caller can be reached at:  [phone] of the call: Returning missed call schedule appt

## 2025-05-02 ENCOUNTER — HOSPITAL ENCOUNTER (OUTPATIENT)
Dept: RADIOLOGY | Facility: HOSPITAL | Age: 70
Discharge: HOME | End: 2025-05-02
Attending: INTERNAL MEDICINE
Payer: MEDICARE

## 2025-05-02 ENCOUNTER — HOSPITAL ENCOUNTER (OUTPATIENT)
Dept: RADIOLOGY | Facility: HOSPITAL | Age: 70
Discharge: HOME | End: 2025-05-02
Attending: STUDENT IN AN ORGANIZED HEALTH CARE EDUCATION/TRAINING PROGRAM
Payer: MEDICARE

## 2025-05-02 DIAGNOSIS — Z80.51 FAMILY HISTORY OF KIDNEY CANCER: ICD-10-CM

## 2025-05-02 DIAGNOSIS — R97.20 ELEVATED PSA: ICD-10-CM

## 2025-05-02 DIAGNOSIS — C64.1 RENAL CANCER, RIGHT: ICD-10-CM

## 2025-05-02 PROCEDURE — 25500020 PHARM REV CODE 255: Performed by: INTERNAL MEDICINE

## 2025-05-02 PROCEDURE — 74177 CT ABD & PELVIS W/CONTRAST: CPT | Mod: 26,,, | Performed by: RADIOLOGY

## 2025-05-02 PROCEDURE — 72197 MRI PELVIS W/O & W/DYE: CPT | Mod: TC

## 2025-05-02 PROCEDURE — 71260 CT THORAX DX C+: CPT | Mod: 26,,, | Performed by: RADIOLOGY

## 2025-05-02 PROCEDURE — 74177 CT ABD & PELVIS W/CONTRAST: CPT | Mod: TC

## 2025-05-02 PROCEDURE — 72197 MRI PELVIS W/O & W/DYE: CPT | Mod: 26,,, | Performed by: INTERNAL MEDICINE

## 2025-05-02 PROCEDURE — A9585 GADOBUTROL INJECTION: HCPCS | Performed by: STUDENT IN AN ORGANIZED HEALTH CARE EDUCATION/TRAINING PROGRAM

## 2025-05-02 PROCEDURE — 25500020 PHARM REV CODE 255: Performed by: STUDENT IN AN ORGANIZED HEALTH CARE EDUCATION/TRAINING PROGRAM

## 2025-05-02 RX ORDER — GADOBUTROL 604.72 MG/ML
10 INJECTION INTRAVENOUS
Status: COMPLETED | OUTPATIENT
Start: 2025-05-02 | End: 2025-05-02

## 2025-05-02 RX ADMIN — IOHEXOL 100 ML: 350 INJECTION, SOLUTION INTRAVENOUS at 04:05

## 2025-05-02 RX ADMIN — GADOBUTROL 10 ML: 604.72 INJECTION INTRAVENOUS at 02:05

## 2025-05-06 ENCOUNTER — TELEPHONE (OUTPATIENT)
Dept: UROLOGY | Facility: CLINIC | Age: 70
End: 2025-05-06
Payer: MEDICARE

## 2025-05-06 NOTE — TELEPHONE ENCOUNTER
Staff called pt to inform him of appt with Dr. Thompson tomorrow via telehealth. Pt confirmed and voiced understanding.

## 2025-05-07 ENCOUNTER — TELEPHONE (OUTPATIENT)
Dept: UROLOGY | Facility: CLINIC | Age: 70
End: 2025-05-07
Payer: MEDICARE

## 2025-05-07 NOTE — TELEPHONE ENCOUNTER
Mr. Burt called and stated that he was checked in for his virtual visit today for his MRI results. I told him it looked like it was not checked in on our side. He said that he did the e check and was on camera per his side and in a waiting state till someone picked up. He is concerned about his results.  Please advise, and can you send message back to staff box because I will be out tomorrow and Diane will be able to see the return message.

## 2025-05-09 ENCOUNTER — TELEPHONE (OUTPATIENT)
Dept: UROLOGY | Facility: CLINIC | Age: 70
End: 2025-05-09
Payer: MEDICARE

## 2025-05-09 ENCOUNTER — PATIENT MESSAGE (OUTPATIENT)
Dept: UROLOGY | Facility: CLINIC | Age: 70
End: 2025-05-09
Payer: MEDICARE

## 2025-05-09 DIAGNOSIS — R97.20 ELEVATED PSA: Primary | ICD-10-CM

## 2025-05-09 RX ORDER — CIPROFLOXACIN 500 MG/1
500 TABLET ORAL 2 TIMES DAILY
Qty: 4 TABLET | Refills: 0 | Status: SHIPPED | OUTPATIENT
Start: 2025-05-09 | End: 2025-05-11

## 2025-05-09 RX ORDER — DEXTROMETHORPHAN POLISTIREX 30 MG/5 ML
1 SUSPENSION, EXTENDED RELEASE 12 HR ORAL ONCE
Qty: 2 ENEMA | Refills: 0 | Status: SHIPPED | OUTPATIENT
Start: 2025-05-09 | End: 2025-05-09

## 2025-05-09 NOTE — TELEPHONE ENCOUNTER
Notified patient of MRI results showing new PIRADS4 lesion. Will refer to Dr. Rossi for UroNav biopsy. Rx for Fleets enemas and Cipro stent.    Would defer resuming TRT until after biopsy results come in.

## 2025-05-09 NOTE — TELEPHONE ENCOUNTER
Staff called pt to reschedule virtual visit with Dr. hTompson. Pt did not answer, staff left  instructing call back.

## 2025-05-12 ENCOUNTER — TELEPHONE (OUTPATIENT)
Dept: UROLOGY | Facility: CLINIC | Age: 70
End: 2025-05-12
Payer: MEDICARE

## 2025-05-12 DIAGNOSIS — I10 ESSENTIAL HYPERTENSION: ICD-10-CM

## 2025-05-12 RX ORDER — HYDROCHLOROTHIAZIDE 12.5 MG/1
TABLET ORAL
Qty: 90 TABLET | Refills: 3 | Status: SHIPPED | OUTPATIENT
Start: 2025-05-12

## 2025-05-12 NOTE — TELEPHONE ENCOUNTER
LVM to schedule.    ----- Message from SONIA Rowland sent at 5/9/2025  4:27 PM CDT -----    ----- Message -----  From: Marshall Rossi MD  Sent: 5/9/2025   4:05 PM CDT  To: Rodger Thompson MD; BETHANIE Ogden#    All,Please schedule this [facility]  ----- Message -----  From: Rodger Thompson MD  Sent: 5/9/2025   2:21 PM CDT  To: Marshall Rossi MD; Flo Olivares Staff; And#    Marshall,Would you be able to schedule this patient for a UroNav biopsy?And Halle/Laverne, would you be able to schedule him for a visit with me 2 weeks after to go over results?Thanks,Jay

## 2025-05-12 NOTE — TELEPHONE ENCOUNTER
----- Message from POKKT sent at 5/12/2025  4:06 PM CDT -----  Who Called:Kathy Left Message for Patient:amyes the patient know what this is regarding?:yesWould the patient rather a call back or a response via [facility] Number:.[phone] Information:

## 2025-05-14 RX ORDER — CIPROFLOXACIN 500 MG/1
500 TABLET, FILM COATED ORAL 2 TIMES DAILY
Qty: 4 TABLET | Refills: 0 | Status: SHIPPED | OUTPATIENT
Start: 2025-05-14 | End: 2025-05-16

## 2025-05-22 ENCOUNTER — TELEPHONE (OUTPATIENT)
Dept: UROLOGY | Facility: CLINIC | Age: 70
End: 2025-05-22
Payer: MEDICARE

## 2025-05-22 NOTE — TELEPHONE ENCOUNTER
YONATHANM to remind patient of appointment @1400 05/23 at the [facility] 2nd floor Urology and that he needs to take Cipro an hour prior, perform enemas and hold blood thinners. Also patient needs to arrive at least 15 min early.

## 2025-05-23 ENCOUNTER — PROCEDURE VISIT (OUTPATIENT)
Dept: UROLOGY | Facility: CLINIC | Age: 70
End: 2025-05-23
Payer: MEDICARE

## 2025-05-23 VITALS
HEART RATE: 72 BPM | BODY MASS INDEX: 30.88 KG/M2 | RESPIRATION RATE: 18 BRPM | DIASTOLIC BLOOD PRESSURE: 77 MMHG | SYSTOLIC BLOOD PRESSURE: 140 MMHG | TEMPERATURE: 98 F | WEIGHT: 240.63 LBS | HEIGHT: 74 IN

## 2025-05-23 DIAGNOSIS — R97.20 ELEVATED PSA: ICD-10-CM

## 2025-05-23 RX ORDER — LIDOCAINE HYDROCHLORIDE 10 MG/ML
20 INJECTION, SOLUTION INFILTRATION; PERINEURAL
Status: SHIPPED | OUTPATIENT
Start: 2025-05-23

## 2025-05-23 RX ORDER — LIDOCAINE HYDROCHLORIDE 20 MG/ML
JELLY TOPICAL ONCE
Status: COMPLETED | OUTPATIENT
Start: 2025-05-23 | End: 2025-05-23

## 2025-05-23 RX ORDER — CEFTRIAXONE 1 G/1
1 INJECTION, POWDER, FOR SOLUTION INTRAMUSCULAR; INTRAVENOUS
Status: COMPLETED | OUTPATIENT
Start: 2025-05-23 | End: 2025-05-23

## 2025-05-23 RX ADMIN — LIDOCAINE HYDROCHLORIDE 10 ML: 20 JELLY TOPICAL at 02:05

## 2025-05-23 RX ADMIN — CEFTRIAXONE 1 G: 1 INJECTION, POWDER, FOR SOLUTION INTRAMUSCULAR; INTRAVENOUS at 02:05

## 2025-05-23 NOTE — PATIENT INSTRUCTIONS
What to Expect After a Prostate Biopsy    You may have mild bleeding from the rectum or urine for about 1 week to 1 month, or in your ejaculate for several months. This bleeding is normal and expected, and it will stop. You may have mild discomfort in your rectal or urethral area for 24-48 hours.    You cannot do any strenuous lifting, straining, or exercising for 24 hours. You may return to full activity the day after the biopsy.    You may continue to take all your regular medications after the procedure except for the blood thinners.    You may resume all blood-thinning medications once you no longer see any bleeding or whenever your physician prescribing the medication says it is all right to do so. You may take Tylenol if you have a fever and your temperature is less than 100° F or if you have some discomfort.    You will receive a call from the Urology Department at [facility] with the results of your prostate biopsy within one week.    Signs and Symptoms to Report    Call your [facility] urologist at [phone] if you develop any of the following:  Temperature greater than 101°  F  Inability to urinate  A large amount of bleeding from the rectum or in the urine  Persistent or severe pain    After hours or on weekends, you may reach a urology resident on call at this number: [phone].

## 2025-05-23 NOTE — PROCEDURES
"Adrian Burt is a 70 y.o. male patient.    Temp: 97.9 °F (36.6 °C) (05/23/25 1354)  Pulse: 83 (05/23/25 1354)  Resp: 18 (05/23/25 1354)  BP: 131/74 (05/23/25 1354)  Weight: 109.1 kg (240 lb 10.1 oz) (05/23/25 1354)  Height: 6' 2" (188 cm) (05/23/25 1354)       Transrectal ultrasound w/ biopsy    Date/Time: 5/23/2025 3:08 PM    Performed by: Marshall Rossi MD  Authorized by: Rodger Thompson MD    Consent Done?:  Yes (Written)  Time out: Immediately prior to procedure a "time out" was called to verify the correct patient, procedure, equipment, support staff and site/side marked as required.    Indications: Prostate Nodules and Elevated PSA    Preparation: Patient was prepped and draped in usual sterile fashion    Position:  Left lateral  Anesthesia:  Lidocaine jelly, Pudendal nerve block and 20cc's 1% Lidocaine  Prostate Size:  119  Lesions:: No    Left Base Biopsies: 2  Left Mid Biopsies: 2  Left [place] Biopsies: 2  Right Base Biopsies: 2  Right Mid Biopsies: 2  Right [place] Biopsies: 2  Total Biopsies:  12    Patient tolerance:  Patient tolerated the procedure well with no immediate complications     POCT UA NEG    MR and US imaged segmented and co-registered with uronav    4 additional cores taken from target for total of 16 cores    cipro and enemas and Rocephin done      Standard instructions given  RTC 2 weeks to discuss pathology results with Dr Thompson            5/23/2025    "

## 2025-05-26 ENCOUNTER — RESULTS FOLLOW-UP (OUTPATIENT)
Dept: UROLOGY | Facility: CLINIC | Age: 70
End: 2025-05-26
Payer: MEDICARE

## 2025-05-26 NOTE — PROGRESS NOTES
Pt was instructed to keep appointment with Dr Thompson to discuss results or to make an appointment if one is not already scheduled

## 2025-05-30 ENCOUNTER — TELEPHONE (OUTPATIENT)
Dept: HEMATOLOGY/ONCOLOGY | Facility: CLINIC | Age: 70
End: 2025-05-30
Payer: MEDICARE

## 2025-05-30 NOTE — TELEPHONE ENCOUNTER
Called to review negative results of hereditary cancer genetic testing. Patient was unavailable. Left a voicemail with my phone number.

## 2025-06-09 ENCOUNTER — OFFICE VISIT (OUTPATIENT)
Dept: UROLOGY | Facility: CLINIC | Age: 70
End: 2025-06-09
Payer: MEDICARE

## 2025-06-09 VITALS
HEART RATE: 72 BPM | WEIGHT: 243.5 LBS | HEIGHT: 74 IN | SYSTOLIC BLOOD PRESSURE: 132 MMHG | BODY MASS INDEX: 31.25 KG/M2 | DIASTOLIC BLOOD PRESSURE: 81 MMHG

## 2025-06-09 DIAGNOSIS — J30.1 SEASONAL ALLERGIC RHINITIS DUE TO POLLEN: ICD-10-CM

## 2025-06-09 DIAGNOSIS — R97.20 ELEVATED PSA: Primary | ICD-10-CM

## 2025-06-09 DIAGNOSIS — R79.89 LOW TESTOSTERONE: ICD-10-CM

## 2025-06-09 DIAGNOSIS — N52.01 ERECTILE DYSFUNCTION DUE TO ARTERIAL INSUFFICIENCY: ICD-10-CM

## 2025-06-09 PROCEDURE — 1159F MED LIST DOCD IN RCRD: CPT | Mod: CPTII,S$GLB,, | Performed by: STUDENT IN AN ORGANIZED HEALTH CARE EDUCATION/TRAINING PROGRAM

## 2025-06-09 PROCEDURE — 3079F DIAST BP 80-89 MM HG: CPT | Mod: CPTII,S$GLB,, | Performed by: STUDENT IN AN ORGANIZED HEALTH CARE EDUCATION/TRAINING PROGRAM

## 2025-06-09 PROCEDURE — 1126F AMNT PAIN NOTED NONE PRSNT: CPT | Mod: CPTII,S$GLB,, | Performed by: STUDENT IN AN ORGANIZED HEALTH CARE EDUCATION/TRAINING PROGRAM

## 2025-06-09 PROCEDURE — 3008F BODY MASS INDEX DOCD: CPT | Mod: CPTII,S$GLB,, | Performed by: STUDENT IN AN ORGANIZED HEALTH CARE EDUCATION/TRAINING PROGRAM

## 2025-06-09 PROCEDURE — 3075F SYST BP GE 130 - 139MM HG: CPT | Mod: CPTII,S$GLB,, | Performed by: STUDENT IN AN ORGANIZED HEALTH CARE EDUCATION/TRAINING PROGRAM

## 2025-06-09 PROCEDURE — 3044F HG A1C LEVEL LT 7.0%: CPT | Mod: CPTII,S$GLB,, | Performed by: STUDENT IN AN ORGANIZED HEALTH CARE EDUCATION/TRAINING PROGRAM

## 2025-06-09 PROCEDURE — 99214 OFFICE O/P EST MOD 30 MIN: CPT | Mod: S$GLB,,, | Performed by: STUDENT IN AN ORGANIZED HEALTH CARE EDUCATION/TRAINING PROGRAM

## 2025-06-09 PROCEDURE — 99999 PR PBB SHADOW E&M-EST. PATIENT-LVL III: CPT | Mod: PBBFAC,,, | Performed by: STUDENT IN AN ORGANIZED HEALTH CARE EDUCATION/TRAINING PROGRAM

## 2025-06-09 PROCEDURE — 4010F ACE/ARB THERAPY RXD/TAKEN: CPT | Mod: CPTII,S$GLB,, | Performed by: STUDENT IN AN ORGANIZED HEALTH CARE EDUCATION/TRAINING PROGRAM

## 2025-06-09 PROCEDURE — 1101F PT FALLS ASSESS-DOCD LE1/YR: CPT | Mod: CPTII,S$GLB,, | Performed by: STUDENT IN AN ORGANIZED HEALTH CARE EDUCATION/TRAINING PROGRAM

## 2025-06-09 PROCEDURE — 3288F FALL RISK ASSESSMENT DOCD: CPT | Mod: CPTII,S$GLB,, | Performed by: STUDENT IN AN ORGANIZED HEALTH CARE EDUCATION/TRAINING PROGRAM

## 2025-06-09 RX ORDER — AZELASTINE 1 MG/ML
SPRAY, METERED NASAL
Qty: 90 ML | Refills: 2 | Status: SHIPPED | OUTPATIENT
Start: 2025-06-09

## 2025-06-09 RX ORDER — TESTOSTERONE CYPIONATE 200 MG/ML
100 INJECTION, SOLUTION INTRAMUSCULAR
Qty: 6 ML | Refills: 5 | Status: SHIPPED | OUTPATIENT
Start: 2025-06-09 | End: 2026-10-26

## 2025-06-09 NOTE — TELEPHONE ENCOUNTER
No care due was identified.  [facility] Embedded Care Due Messages. Reference number: [id number].   6/09/2025 7:04:29 AM CDT

## 2025-06-09 NOTE — TELEPHONE ENCOUNTER
Refill Decision Note   Adrian Burt  is requesting a refill authorization.  Brief Assessment and Rationale for Refill:  Approve     Medication Therapy Plan:         Comments:     Note composed:2:00 PM 06/09/2025

## 2025-06-09 NOTE — PROGRESS NOTES
"[facility]   Clinic Note    SUBJECTIVE:     Chief Complaint: elevated PSA    History of Present Illness:  Adrian Burt is a 70 y.o. male who presents to clinic for ED and BPH. He is established to our clinic.    Previously saw Dr. Álvarez, underwent right robotic partial nephrectomy in 2022. Path showed RCC (subtype unspecified on path report.) He follows with Dr. Chávez ([facility]) for surveillance of this.    He uses Viagra 100 mg and Trimix (30-2-30) for ED, which works well. Cialis was less effective.    History of elevated PSA - last in 06/2024 was 7.1, highest on record. MRI prostate in 2022 showed 122 cc prostate, PIRADS2.      Repeat MRI 05/2025 - 117 cc prostate, PIRADS4 lesion at right apex, neg for EPE.   He underwent UroNav biopsy with Dr. Rossi 5/23/25 - path showed ASAP at right mid and at target but was otherwise benign.     LUTS well controlled on Flomax at time of last clinic visit, somewhat worse since biopsy.    OBJECTIVE:     Estimated body mass index is 31.26 kg/m² as calculated from the following:    Height as of this encounter: 6' 2" (1.88 m).    Weight as of this encounter: 110.5 kg (243 lb 8 oz).    Vital Signs (Most Recent)  Pulse: 72 (06/09/25 0831)  BP: 132/81 (06/09/25 0831)    Physical Exam  Vitals reviewed.   Constitutional:       Appearance: Normal appearance.   HENT:      Head: Normocephalic and atraumatic.   Cardiovascular:      Rate and Rhythm: Normal rate.   Abdominal:      General: Abdomen is flat. There is no distension.      Tenderness: There is no abdominal tenderness.   Musculoskeletal:         General: Normal range of motion.   Skin:     General: Skin is warm and dry.   Neurological:      General: No focal deficit present.      Mental Status: He is alert and oriented to person, place, and time.   Psychiatric:         Mood and Affect: Mood normal.         Behavior: Behavior normal.         Thought Content: Thought content normal.         Judgment: Judgment " normal.         Lab Results   Component Value Date    BUN 20 04/28/2025    CREATININE 0.9 05/02/2025    WBC 4.16 04/28/2025    HGB 11.9 (L) 04/28/2025    HCT 38.2 (L) 04/28/2025     04/28/2025    AST 24 04/28/2025    ALT 26 04/28/2025    ALKPHOS 58 04/28/2025    ALBUMIN 3.5 04/28/2025    HGBA1C 6.4 (H) 02/11/2025        Lab Results   Component Value Date    PSA 6.18 (H) 04/28/2025    PSA 5.9 (H) 01/18/2023    PSA 5.2 (H) 04/07/2021    PSA 2.1 09/10/2018    PSA 1.8 07/05/2017    PSA 2.6 11/23/2015    PSA 7.2 (H) 12/15/2008    PSA 0.6 02/22/2005    PSAFREE 2.09 (H) 06/13/2024    PSAFREE 0.76 03/12/2024    PSAFREE 0.92 07/24/2023    PSAFREE 1.32 05/01/2023    PSAFREE 1.58 (H) 12/06/2022    PSAFREE 1.36 06/13/2022    PSAFREEPCT 29.44 06/13/2024    PSAFREEPCT 26.21 03/12/2024    PSAFREEPCT 20.44 07/24/2023    PSAFREEPCT 19.70 05/01/2023    PSAFREEPCT 27.24 12/06/2022    PSAFREEPCT 20.92 06/13/2022       ASSESSMENT     1. Elevated PSA    2. Erectile dysfunction due to arterial insufficiency    3. Low testosterone        PLAN:   1. Elevated PSA  Overview:  MRI prostate 6/22: volume 122, no lesions, PIRADS 2   On TRT, stopped 5/2022   Restarted 7/2023 due to fatigue, T 213        Orders:  -     CBC Auto Differential; Future; Expected date: 12/09/2025  -     Prostate Specific Antigen, Diagnostic; Future; Expected date: 12/09/2025  -     Testosterone,Total; Future; Expected date: 12/09/2025    2. Erectile dysfunction due to arterial insufficiency  Overview:  Trimix 30/2/20     Orders:  -     POCT urine dipstick without microscope  -     testosterone cypionate (DEPOTESTOTERONE CYPIONATE) 200 mg/mL injection; Inject 0.5 mLs (100 mg total) into the muscle every 7 days.  Dispense: 6 mL; Refill: 5  -     CBC Auto Differential; Future; Expected date: 12/09/2025  -     Prostate Specific Antigen, Diagnostic; Future; Expected date: 12/09/2025  -     Testosterone,Total; Future; Expected date: 12/09/2025    3. Low  testosterone  Overview:  Reviewed risk of elevated PSA, prostate cancer and testosterone replacement therapy.  Testosterone refilled 01/2023, stopped 5/2023 after T normal 1/23 when off TRT    Restarted 7/2023 due to fatigue, T 213    Orders:  -     POCT urine dipstick without microscope  -     testosterone cypionate (DEPOTESTOTERONE CYPIONATE) 200 mg/mL injection; Inject 0.5 mLs (100 mg total) into the muscle every 7 days.  Dispense: 6 mL; Refill: 5  -     CBC Auto Differential; Future; Expected date: 12/09/2025  -     Prostate Specific Antigen, Diagnostic; Future; Expected date: 12/09/2025  -     Testosterone,Total; Future; Expected date: 12/09/2025       Continue Viagra and Trimix (30 mg - 2 mg - 30 mcg). Restart testosterone cypionate 100 mg weekly.   Reviewed path with patient. RTC 6 months with PSA and VALENTINO, plus CBC and T.  If LUTS are persistently bothersome, RTC to discuss outlet procedure. Given size of gland, patient would be candidate for Aquablation, HoLEP, or simple prostatectomy.    Rodger Thompson MD     Letter to No ref. provider found

## 2025-06-17 ENCOUNTER — TELEPHONE (OUTPATIENT)
Dept: UROLOGY | Facility: CLINIC | Age: 70
End: 2025-06-17
Payer: MEDICARE

## 2025-06-17 NOTE — TELEPHONE ENCOUNTER
Copied from CRM #[id number]. Topic: Medications - Pharmacy  >> Jun 17, 2025 12:31 PM Bonita wrote:  Type:  Pharmacy Calling to Clarify an RX    Name of Caller:ritchie    Pharmacy Name: [facility]    Prescription Name: [email or web address]    What do they need to clarify? Amount to inject needed     Can you be contacted via [facility]?no    Best Call Back Number: [phone]    Additional Information:

## 2025-06-18 NOTE — TELEPHONE ENCOUNTER
Spoke with pharmacy.  Left voicemail informing patient to give us a call back once he receives his medication to schedule him for trimix teaching.      GUILLERMINA Galloway

## 2025-06-19 ENCOUNTER — TELEPHONE (OUTPATIENT)
Dept: UROLOGY | Facility: CLINIC | Age: 70
End: 2025-06-19
Payer: MEDICARE

## 2025-06-19 NOTE — TELEPHONE ENCOUNTER
"Copied from CRM #[id number]. Topic: General Inquiry - Patient Advice  >> Jun 19, 2025  3:36 PM Eleazar wrote:  Consult/Advisory:        Name Of Caller: Self        Contact Preference?:[phone]        What is the nature of the call?: Returning call to Laverne        Additional Notes:  "Thank you for all that you do for our patients"  "

## 2025-06-19 NOTE — TELEPHONE ENCOUNTER
Patient said that he has been on this medication before and knows how to use it. He doesn't need to come in for any teaching.

## 2025-07-24 DIAGNOSIS — I10 ESSENTIAL HYPERTENSION: ICD-10-CM

## 2025-07-24 RX ORDER — AMLODIPINE BESYLATE 10 MG/1
TABLET ORAL
Qty: 90 TABLET | Refills: 1 | Status: SHIPPED | OUTPATIENT
Start: 2025-07-24

## 2025-07-24 NOTE — TELEPHONE ENCOUNTER
No care due was identified.  [facility] Embedded Care Due Messages. Reference number: [id number].   7/24/2025 5:51:36 AM CDT

## 2025-07-24 NOTE — TELEPHONE ENCOUNTER
Refill Decision Note   Adrian Burt  is requesting a refill authorization.  Brief Assessment and Rationale for Refill:  Approve     Medication Therapy Plan:         Comments:     Note composed:2:46 PM 07/24/2025             Appointments     Last Visit   2/11/2025 Mone Brown MD   Next Visit   8/12/2025 Mone Brown MD

## 2025-08-11 DIAGNOSIS — N52.01 ERECTILE DYSFUNCTION DUE TO ARTERIAL INSUFFICIENCY: Primary | ICD-10-CM

## 2025-08-12 ENCOUNTER — LAB VISIT (OUTPATIENT)
Dept: LAB | Facility: HOSPITAL | Age: 70
End: 2025-08-12
Attending: INTERNAL MEDICINE
Payer: MEDICARE

## 2025-08-12 ENCOUNTER — OFFICE VISIT (OUTPATIENT)
Dept: INTERNAL MEDICINE | Facility: CLINIC | Age: 70
End: 2025-08-12
Payer: MEDICARE

## 2025-08-12 ENCOUNTER — TELEPHONE (OUTPATIENT)
Dept: UROLOGY | Facility: CLINIC | Age: 70
End: 2025-08-12
Payer: MEDICARE

## 2025-08-12 VITALS
HEART RATE: 77 BPM | WEIGHT: 242.31 LBS | DIASTOLIC BLOOD PRESSURE: 75 MMHG | SYSTOLIC BLOOD PRESSURE: 134 MMHG | HEIGHT: 74 IN | BODY MASS INDEX: 31.1 KG/M2 | OXYGEN SATURATION: 100 %

## 2025-08-12 DIAGNOSIS — D63.8 ANEMIA OF CHRONIC DISEASE: ICD-10-CM

## 2025-08-12 DIAGNOSIS — I10 ESSENTIAL HYPERTENSION: Primary | ICD-10-CM

## 2025-08-12 DIAGNOSIS — R97.20 ELEVATED PSA: ICD-10-CM

## 2025-08-12 DIAGNOSIS — Z85.528 HISTORY OF RENAL CELL CANCER: ICD-10-CM

## 2025-08-12 DIAGNOSIS — R73.03 PREDIABETES: ICD-10-CM

## 2025-08-12 LAB
ABSOLUTE EOSINOPHIL (OHS): 0.18 K/UL
ABSOLUTE MONOCYTE (OHS): 0.43 K/UL (ref 0.3–1)
ABSOLUTE NEUTROPHIL COUNT (OHS): 1.49 K/UL (ref 1.8–7.7)
ALBUMIN SERPL BCP-MCNC: 4.1 G/DL (ref 3.5–5.2)
ALP SERPL-CCNC: 60 UNIT/L (ref 40–150)
ALT SERPL W/O P-5'-P-CCNC: 30 UNIT/L (ref 0–55)
ANION GAP (OHS): 9 MMOL/L (ref 8–16)
AST SERPL-CCNC: 32 UNIT/L (ref 0–50)
BASOPHILS # BLD AUTO: 0.03 K/UL
BASOPHILS NFR BLD AUTO: 0.8 %
BILIRUB SERPL-MCNC: 0.5 MG/DL (ref 0.1–1)
BUN SERPL-MCNC: 16 MG/DL (ref 8–23)
CALCIUM SERPL-MCNC: 9.6 MG/DL (ref 8.7–10.5)
CHLORIDE SERPL-SCNC: 104 MMOL/L (ref 95–110)
CO2 SERPL-SCNC: 25 MMOL/L (ref 23–29)
CREAT SERPL-MCNC: 1.1 MG/DL (ref 0.5–1.4)
EAG (OHS): 108 MG/DL (ref 68–131)
ERYTHROCYTE [DISTWIDTH] IN BLOOD BY AUTOMATED COUNT: 14.3 % (ref 11.5–14.5)
GFR SERPLBLD CREATININE-BSD FMLA CKD-EPI: >60 ML/MIN/1.73/M2
GLUCOSE SERPL-MCNC: 75 MG/DL (ref 70–110)
HBA1C MFR BLD: 5.4 % (ref 4–5.6)
HCT VFR BLD AUTO: 46.6 % (ref 40–54)
HGB BLD-MCNC: 14.2 GM/DL (ref 14–18)
IMM GRANULOCYTES # BLD AUTO: 0 K/UL (ref 0–0.04)
IMM GRANULOCYTES NFR BLD AUTO: 0 % (ref 0–0.5)
LYMPHOCYTES # BLD AUTO: 1.87 K/UL (ref 1–4.8)
MCH RBC QN AUTO: 27 PG (ref 27–31)
MCHC RBC AUTO-ENTMCNC: 30.5 G/DL (ref 32–36)
MCV RBC AUTO: 89 FL (ref 82–98)
NUCLEATED RBC (/100WBC) (OHS): 0 /100 WBC
PLATELET # BLD AUTO: 210 K/UL (ref 150–450)
PMV BLD AUTO: 11.3 FL (ref 9.2–12.9)
POTASSIUM SERPL-SCNC: 4.4 MMOL/L (ref 3.5–5.1)
PROT SERPL-MCNC: 8.1 GM/DL (ref 6–8.4)
RBC # BLD AUTO: 5.26 M/UL (ref 4.6–6.2)
RELATIVE EOSINOPHIL (OHS): 4.5 %
RELATIVE LYMPHOCYTE (OHS): 46.8 % (ref 18–48)
RELATIVE MONOCYTE (OHS): 10.8 % (ref 4–15)
RELATIVE NEUTROPHIL (OHS): 37.1 % (ref 38–73)
SODIUM SERPL-SCNC: 138 MMOL/L (ref 136–145)
WBC # BLD AUTO: 4 K/UL (ref 3.9–12.7)

## 2025-08-12 PROCEDURE — 82040 ASSAY OF SERUM ALBUMIN: CPT | Mod: HCNC

## 2025-08-12 PROCEDURE — 85025 COMPLETE CBC W/AUTO DIFF WBC: CPT | Mod: HCNC

## 2025-08-12 PROCEDURE — 3078F DIAST BP <80 MM HG: CPT | Mod: CPTII,HCNC,S$GLB, | Performed by: INTERNAL MEDICINE

## 2025-08-12 PROCEDURE — 83036 HEMOGLOBIN GLYCOSYLATED A1C: CPT | Mod: HCNC

## 2025-08-12 PROCEDURE — 3075F SYST BP GE 130 - 139MM HG: CPT | Mod: CPTII,HCNC,S$GLB, | Performed by: INTERNAL MEDICINE

## 2025-08-12 PROCEDURE — 1160F RVW MEDS BY RX/DR IN RCRD: CPT | Mod: CPTII,HCNC,S$GLB, | Performed by: INTERNAL MEDICINE

## 2025-08-12 PROCEDURE — 4010F ACE/ARB THERAPY RXD/TAKEN: CPT | Mod: CPTII,HCNC,S$GLB, | Performed by: INTERNAL MEDICINE

## 2025-08-12 PROCEDURE — 99214 OFFICE O/P EST MOD 30 MIN: CPT | Mod: HCNC,S$GLB,, | Performed by: INTERNAL MEDICINE

## 2025-08-12 PROCEDURE — 1101F PT FALLS ASSESS-DOCD LE1/YR: CPT | Mod: CPTII,HCNC,S$GLB, | Performed by: INTERNAL MEDICINE

## 2025-08-12 PROCEDURE — 1159F MED LIST DOCD IN RCRD: CPT | Mod: CPTII,HCNC,S$GLB, | Performed by: INTERNAL MEDICINE

## 2025-08-12 PROCEDURE — 1126F AMNT PAIN NOTED NONE PRSNT: CPT | Mod: CPTII,HCNC,S$GLB, | Performed by: INTERNAL MEDICINE

## 2025-08-12 PROCEDURE — 36415 COLL VENOUS BLD VENIPUNCTURE: CPT | Mod: HCNC

## 2025-08-12 PROCEDURE — 3044F HG A1C LEVEL LT 7.0%: CPT | Mod: CPTII,HCNC,S$GLB, | Performed by: INTERNAL MEDICINE

## 2025-08-12 PROCEDURE — 99999 PR PBB SHADOW E&M-EST. PATIENT-LVL IV: CPT | Mod: PBBFAC,HCNC,, | Performed by: INTERNAL MEDICINE

## 2025-08-12 PROCEDURE — 3288F FALL RISK ASSESSMENT DOCD: CPT | Mod: CPTII,HCNC,S$GLB, | Performed by: INTERNAL MEDICINE

## 2025-08-12 PROCEDURE — 3008F BODY MASS INDEX DOCD: CPT | Mod: CPTII,HCNC,S$GLB, | Performed by: INTERNAL MEDICINE

## 2025-08-12 RX ORDER — SILDENAFIL 100 MG/1
100 TABLET, FILM COATED ORAL
Qty: 30 TABLET | Refills: 0 | Status: SHIPPED | OUTPATIENT
Start: 2025-08-12

## 2025-08-12 RX ORDER — LOSARTAN POTASSIUM 100 MG/1
100 TABLET ORAL DAILY
Qty: 90 TABLET | Refills: 3 | Status: SHIPPED | OUTPATIENT
Start: 2025-08-12 | End: 2025-08-12

## 2025-08-12 RX ORDER — LOSARTAN POTASSIUM 100 MG/1
100 TABLET ORAL DAILY
Qty: 90 TABLET | Refills: 3 | Status: SHIPPED | OUTPATIENT
Start: 2025-08-12 | End: 2026-08-12

## 2025-08-20 ENCOUNTER — TELEPHONE (OUTPATIENT)
Dept: UROLOGY | Facility: CLINIC | Age: 70
End: 2025-08-20
Payer: MEDICARE

## 2025-08-25 ENCOUNTER — PATIENT MESSAGE (OUTPATIENT)
Dept: UROLOGY | Facility: CLINIC | Age: 70
End: 2025-08-25
Payer: MEDICARE

## 2025-08-25 ENCOUNTER — TELEPHONE (OUTPATIENT)
Dept: UROLOGY | Facility: CLINIC | Age: 70
End: 2025-08-25
Payer: MEDICARE

## 2025-08-25 DIAGNOSIS — R30.0 DYSURIA: Primary | ICD-10-CM

## 2025-08-25 DIAGNOSIS — N52.01 ERECTILE DYSFUNCTION DUE TO ARTERIAL INSUFFICIENCY: ICD-10-CM

## 2025-08-25 PROCEDURE — 87086 URINE CULTURE/COLONY COUNT: CPT | Mod: HCNC | Performed by: STUDENT IN AN ORGANIZED HEALTH CARE EDUCATION/TRAINING PROGRAM

## 2025-08-25 RX ORDER — SILDENAFIL 100 MG/1
100 TABLET, FILM COATED ORAL
Qty: 30 TABLET | Refills: 11 | Status: SHIPPED | OUTPATIENT
Start: 2025-08-25

## (undated) DEVICE — SET TRI-LUMEN FILTERED TUBE

## (undated) DEVICE — DRAPE SCOPE PILLOW WARMER

## (undated) DEVICE — PROBE ULTRASOUND ROBOTIC PRO

## (undated) DEVICE — KIT WING PAD POSITIONING

## (undated) DEVICE — BLADE SURG CARBON STEEL SZ11

## (undated) DEVICE — GLOVE SURGICAL LATEX SZ 6.5

## (undated) DEVICE — KIT SURGIFLO HEMOSTATIC MATRIX

## (undated) DEVICE — KIT ROBOTIC 4 ARM DA VINCI SI

## (undated) DEVICE — TIP SUCTION IRRIGATOR

## (undated) DEVICE — SUT ETHILON 2-0 PSLX 30IN

## (undated) DEVICE — SEE MEDLINE ITEM 157116

## (undated) DEVICE — SOL NS 1000CC

## (undated) DEVICE — OBTURATOR 8MM BLUNT XI

## (undated) DEVICE — CLIP HEMO-LOK MLX LARGE LF

## (undated) DEVICE — SEE MEDLINE ITEM 156952

## (undated) DEVICE — CLIP HEMO-LOK ML

## (undated) DEVICE — TRAY FOLEY 16FR INFECTION CONT

## (undated) DEVICE — Device

## (undated) DEVICE — CATH RED RUBBER 8FR

## (undated) DEVICE — TAPE SILK 3IN

## (undated) DEVICE — SPONGE LAP 4X18 PREWASHED

## (undated) DEVICE — DRESSING TELFA N ADH 3X8

## (undated) DEVICE — DRESSING AQUACEL SACRAL 8 X 7

## (undated) DEVICE — STAPLER SKIN ROTATING HEAD

## (undated) DEVICE — COVER LIGHT HANDLE 80/CA

## (undated) DEVICE — IRRIGATOR ENDOSCOPY DISP.

## (undated) DEVICE — TROCAR ENDOPATH XCEL 5X100MM

## (undated) DEVICE — DRAPE ARM DAVINCI XI

## (undated) DEVICE — APPLICATOR ENDOSCOPIC

## (undated) DEVICE — SUT CTD VICRYL 0 UND BR CT

## (undated) DEVICE — GLOVE BIOGEL PI MICRO INDIC 7

## (undated) DEVICE — PENCIL ROCKER SWITCH 10FT CORD

## (undated) DEVICE — SOL ELECTROLUBE ANTI-STIC

## (undated) DEVICE — SCISSOR 5MMX35CM DIRECT DRIVE

## (undated) DEVICE — DRAIN JACKSON PRATT GOLD

## (undated) DEVICE — SEAL UNIVERSAL 5MM-8MM XI

## (undated) DEVICE — PORT AIRSEAL 12/120MM LPI

## (undated) DEVICE — SUT ABS CLIP LAPRA-TY CTD

## (undated) DEVICE — SUT MCRYL PLUS 4-0 PS2 27IN

## (undated) DEVICE — SCISSOR HOT SHEARS XI

## (undated) DEVICE — ELECTRODE REM PLYHSV RETURN 9

## (undated) DEVICE — COVER TIP CURVED SCISSORS XI

## (undated) DEVICE — NDL DRIVE LARGE XI

## (undated) DEVICE — OBTURATOR BLADELESS 8MM XI CLR

## (undated) DEVICE — NDL INSUF ULTRA VERESS 120MM

## (undated) DEVICE — NDL 18GA X1 1/2 REG BEVEL

## (undated) DEVICE — ADHESIVE DERMABOND ADVANCED